# Patient Record
Sex: FEMALE | Race: WHITE | NOT HISPANIC OR LATINO | Employment: OTHER | URBAN - METROPOLITAN AREA
[De-identification: names, ages, dates, MRNs, and addresses within clinical notes are randomized per-mention and may not be internally consistent; named-entity substitution may affect disease eponyms.]

---

## 2017-01-01 ENCOUNTER — APPOINTMENT (OUTPATIENT)
Dept: CT IMAGING | Facility: HOSPITAL | Age: 62
End: 2017-01-01

## 2017-01-01 PROCEDURE — 74177 CT ABD & PELVIS W/CONTRAST: CPT

## 2017-01-04 ENCOUNTER — APPOINTMENT (OUTPATIENT)
Dept: CT IMAGING | Facility: HOSPITAL | Age: 62
End: 2017-01-04

## 2017-01-04 PROCEDURE — 70450 CT HEAD/BRAIN W/O DYE: CPT

## 2017-05-15 PROBLEM — M81.0 OSTEOPOROSIS: Status: ACTIVE | Noted: 2017-05-15

## 2017-05-15 RX ORDER — SODIUM CHLORIDE 9 MG/ML
250 INJECTION, SOLUTION INTRAVENOUS ONCE
Status: CANCELLED | OUTPATIENT
Start: 2017-05-17

## 2017-05-15 RX ORDER — ZOLEDRONIC ACID 5 MG/100ML
5 INJECTION, SOLUTION INTRAVENOUS ONCE
Status: CANCELLED | OUTPATIENT
Start: 2017-05-17

## 2017-05-31 ENCOUNTER — INFUSION (OUTPATIENT)
Dept: ONCOLOGY | Facility: HOSPITAL | Age: 62
End: 2017-05-31

## 2017-05-31 VITALS
TEMPERATURE: 98.6 F | RESPIRATION RATE: 16 BRPM | WEIGHT: 233 LBS | BODY MASS INDEX: 34.41 KG/M2 | SYSTOLIC BLOOD PRESSURE: 113 MMHG | HEART RATE: 103 BPM | DIASTOLIC BLOOD PRESSURE: 81 MMHG

## 2017-05-31 DIAGNOSIS — M81.0 OSTEOPOROSIS: Primary | ICD-10-CM

## 2017-05-31 LAB
CREAT BLDA-MCNC: 0.8 MG/DL (ref 0.6–1.3)
CREATINE SERPL-MCNC: 0.8 MG/DL

## 2017-05-31 PROCEDURE — 82565 ASSAY OF CREATININE: CPT

## 2017-05-31 PROCEDURE — 96374 THER/PROPH/DIAG INJ IV PUSH: CPT

## 2017-05-31 PROCEDURE — 25010000002 ZOLEDRONIC ACID 5 MG/100ML SOLUTION: Performed by: INTERNAL MEDICINE

## 2017-05-31 PROCEDURE — 96365 THER/PROPH/DIAG IV INF INIT: CPT

## 2017-05-31 RX ORDER — ZOLEDRONIC ACID 5 MG/100ML
5 INJECTION, SOLUTION INTRAVENOUS ONCE
Status: COMPLETED | OUTPATIENT
Start: 2017-05-31 | End: 2017-05-31

## 2017-05-31 RX ORDER — SODIUM CHLORIDE 9 MG/ML
250 INJECTION, SOLUTION INTRAVENOUS ONCE
Status: CANCELLED | OUTPATIENT
Start: 2017-05-31

## 2017-05-31 RX ORDER — ZOLEDRONIC ACID 5 MG/100ML
5 INJECTION, SOLUTION INTRAVENOUS ONCE
Status: CANCELLED | OUTPATIENT
Start: 2017-05-31

## 2017-05-31 RX ADMIN — ZOLEDRONIC ACID 5 MG: 0.05 INJECTION, SOLUTION INTRAVENOUS at 13:16

## 2017-07-13 ENCOUNTER — APPOINTMENT (OUTPATIENT)
Dept: CT IMAGING | Facility: HOSPITAL | Age: 62
End: 2017-07-13

## 2017-07-13 ENCOUNTER — HOSPITAL ENCOUNTER (INPATIENT)
Facility: HOSPITAL | Age: 62
LOS: 3 days | Discharge: HOME | End: 2017-07-17
Attending: EMERGENCY MEDICINE | Admitting: INTERNAL MEDICINE

## 2017-07-13 ENCOUNTER — APPOINTMENT (OUTPATIENT)
Dept: GENERAL RADIOLOGY | Facility: HOSPITAL | Age: 62
End: 2017-07-13

## 2017-07-13 DIAGNOSIS — N12 PYELONEPHRITIS: Primary | ICD-10-CM

## 2017-07-13 DIAGNOSIS — R10.9 ABDOMINAL PAIN, UNSPECIFIED LOCATION: ICD-10-CM

## 2017-07-13 DIAGNOSIS — R50.9 FEVER, UNSPECIFIED FEVER CAUSE: ICD-10-CM

## 2017-07-13 LAB
ALBUMIN SERPL-MCNC: 4.5 G/DL (ref 3.2–4.8)
ALBUMIN/GLOB SERPL: 1.2 G/DL (ref 1.5–2.5)
ALP SERPL-CCNC: 95 U/L (ref 25–100)
ALT SERPL W P-5'-P-CCNC: 63 U/L (ref 7–40)
ANION GAP SERPL CALCULATED.3IONS-SCNC: 16 MMOL/L (ref 3–11)
AST SERPL-CCNC: 54 U/L (ref 0–33)
BACTERIA UR QL AUTO: ABNORMAL /HPF
BASOPHILS # BLD AUTO: 0.02 10*3/MM3 (ref 0–0.2)
BASOPHILS NFR BLD AUTO: 0.2 % (ref 0–1)
BILIRUB SERPL-MCNC: 0.4 MG/DL (ref 0.3–1.2)
BILIRUB UR QL STRIP: NEGATIVE
BUN BLD-MCNC: 10 MG/DL (ref 9–23)
BUN/CREAT SERPL: 11.1 (ref 7–25)
CALCIUM SPEC-SCNC: 9.4 MG/DL (ref 8.7–10.4)
CHLORIDE SERPL-SCNC: 100 MMOL/L (ref 99–109)
CLARITY UR: CLEAR
CO2 SERPL-SCNC: 18 MMOL/L (ref 20–31)
COLOR UR: YELLOW
CREAT BLD-MCNC: 0.9 MG/DL (ref 0.6–1.3)
D-LACTATE SERPL-SCNC: 1.8 MMOL/L (ref 0.5–2)
DEPRECATED RDW RBC AUTO: 49.2 FL (ref 37–54)
EOSINOPHIL # BLD AUTO: 0.04 10*3/MM3 (ref 0–0.3)
EOSINOPHIL NFR BLD AUTO: 0.3 % (ref 0–3)
ERYTHROCYTE [DISTWIDTH] IN BLOOD BY AUTOMATED COUNT: 15.4 % (ref 11.3–14.5)
GFR SERPL CREATININE-BSD FRML MDRD: 64 ML/MIN/1.73
GLOBULIN UR ELPH-MCNC: 3.7 GM/DL
GLUCOSE BLD-MCNC: 296 MG/DL (ref 70–100)
GLUCOSE UR STRIP-MCNC: ABNORMAL MG/DL
HCT VFR BLD AUTO: 42.1 % (ref 34.5–44)
HGB BLD-MCNC: 13.5 G/DL (ref 11.5–15.5)
HGB UR QL STRIP.AUTO: NEGATIVE
IMM GRANULOCYTES # BLD: 0.03 10*3/MM3 (ref 0–0.03)
IMM GRANULOCYTES NFR BLD: 0.3 % (ref 0–0.6)
KETONES UR QL STRIP: ABNORMAL
LEUKOCYTE ESTERASE UR QL STRIP.AUTO: ABNORMAL
LIPASE SERPL-CCNC: 37 U/L (ref 6–51)
LYMPHOCYTES # BLD AUTO: 1.22 10*3/MM3 (ref 0.6–4.8)
LYMPHOCYTES NFR BLD AUTO: 10.5 % (ref 24–44)
MCH RBC QN AUTO: 27.7 PG (ref 27–31)
MCHC RBC AUTO-ENTMCNC: 32.1 G/DL (ref 32–36)
MCV RBC AUTO: 86.3 FL (ref 80–99)
MONOCYTES # BLD AUTO: 0.88 10*3/MM3 (ref 0–1)
MONOCYTES NFR BLD AUTO: 7.6 % (ref 0–12)
NEUTROPHILS # BLD AUTO: 9.43 10*3/MM3 (ref 1.5–8.3)
NEUTROPHILS NFR BLD AUTO: 81.1 % (ref 41–71)
NITRITE UR QL STRIP: NEGATIVE
PH UR STRIP.AUTO: 6.5 [PH] (ref 5–8)
PLATELET # BLD AUTO: 218 10*3/MM3 (ref 150–450)
PMV BLD AUTO: 11.3 FL (ref 6–12)
POTASSIUM BLD-SCNC: 4.2 MMOL/L (ref 3.5–5.5)
PROT SERPL-MCNC: 8.2 G/DL (ref 5.7–8.2)
PROT UR QL STRIP: NEGATIVE
RBC # BLD AUTO: 4.88 10*6/MM3 (ref 3.89–5.14)
RBC # UR: ABNORMAL /HPF
REF LAB TEST METHOD: ABNORMAL
SODIUM BLD-SCNC: 134 MMOL/L (ref 132–146)
SP GR UR STRIP: 1.02 (ref 1–1.03)
SQUAMOUS #/AREA URNS HPF: ABNORMAL /HPF
TROPONIN I SERPL-MCNC: 0 NG/ML (ref 0–0.07)
UROBILINOGEN UR QL STRIP: ABNORMAL
WBC NRBC COR # BLD: 11.62 10*3/MM3 (ref 3.5–10.8)
WBC UR QL AUTO: ABNORMAL /HPF

## 2017-07-13 PROCEDURE — 87040 BLOOD CULTURE FOR BACTERIA: CPT | Performed by: NURSE PRACTITIONER

## 2017-07-13 PROCEDURE — 87086 URINE CULTURE/COLONY COUNT: CPT | Performed by: NURSE PRACTITIONER

## 2017-07-13 PROCEDURE — 83690 ASSAY OF LIPASE: CPT | Performed by: NURSE PRACTITIONER

## 2017-07-13 PROCEDURE — 25010000002 HYDROMORPHONE PER 4 MG: Performed by: EMERGENCY MEDICINE

## 2017-07-13 PROCEDURE — 99285 EMERGENCY DEPT VISIT HI MDM: CPT

## 2017-07-13 PROCEDURE — 87186 SC STD MICRODIL/AGAR DIL: CPT | Performed by: NURSE PRACTITIONER

## 2017-07-13 PROCEDURE — 83880 ASSAY OF NATRIURETIC PEPTIDE: CPT | Performed by: NURSE PRACTITIONER

## 2017-07-13 PROCEDURE — 84145 PROCALCITONIN (PCT): CPT | Performed by: NURSE PRACTITIONER

## 2017-07-13 PROCEDURE — 71010 HC CHEST PA OR AP: CPT

## 2017-07-13 PROCEDURE — 74176 CT ABD & PELVIS W/O CONTRAST: CPT

## 2017-07-13 PROCEDURE — 83605 ASSAY OF LACTIC ACID: CPT | Performed by: NURSE PRACTITIONER

## 2017-07-13 PROCEDURE — 84484 ASSAY OF TROPONIN QUANT: CPT

## 2017-07-13 PROCEDURE — 70450 CT HEAD/BRAIN W/O DYE: CPT

## 2017-07-13 PROCEDURE — 87077 CULTURE AEROBIC IDENTIFY: CPT | Performed by: NURSE PRACTITIONER

## 2017-07-13 PROCEDURE — 93005 ELECTROCARDIOGRAM TRACING: CPT | Performed by: NURSE PRACTITIONER

## 2017-07-13 PROCEDURE — 25010000002 ONDANSETRON PER 1 MG: Performed by: EMERGENCY MEDICINE

## 2017-07-13 PROCEDURE — 81001 URINALYSIS AUTO W/SCOPE: CPT | Performed by: NURSE PRACTITIONER

## 2017-07-13 PROCEDURE — 85025 COMPLETE CBC W/AUTO DIFF WBC: CPT | Performed by: NURSE PRACTITIONER

## 2017-07-13 PROCEDURE — 80053 COMPREHEN METABOLIC PANEL: CPT | Performed by: NURSE PRACTITIONER

## 2017-07-13 RX ORDER — ACETAMINOPHEN 325 MG/1
650 TABLET ORAL ONCE
Status: DISCONTINUED | OUTPATIENT
Start: 2017-07-13 | End: 2017-07-13

## 2017-07-13 RX ORDER — IBUPROFEN 600 MG/1
600 TABLET ORAL ONCE
Status: COMPLETED | OUTPATIENT
Start: 2017-07-13 | End: 2017-07-13

## 2017-07-13 RX ORDER — SODIUM CHLORIDE 0.9 % (FLUSH) 0.9 %
10 SYRINGE (ML) INJECTION AS NEEDED
Status: DISCONTINUED | OUTPATIENT
Start: 2017-07-13 | End: 2017-07-17 | Stop reason: HOSPADM

## 2017-07-13 RX ORDER — ONDANSETRON 2 MG/ML
4 INJECTION INTRAMUSCULAR; INTRAVENOUS ONCE
Status: COMPLETED | OUTPATIENT
Start: 2017-07-13 | End: 2017-07-13

## 2017-07-13 RX ORDER — HYDROMORPHONE HYDROCHLORIDE 1 MG/ML
0.5 INJECTION, SOLUTION INTRAMUSCULAR; INTRAVENOUS; SUBCUTANEOUS ONCE
Status: COMPLETED | OUTPATIENT
Start: 2017-07-13 | End: 2017-07-13

## 2017-07-13 RX ADMIN — SODIUM CHLORIDE 1000 ML: 9 INJECTION, SOLUTION INTRAVENOUS at 23:25

## 2017-07-13 RX ADMIN — IBUPROFEN 600 MG: 600 TABLET, FILM COATED ORAL at 23:13

## 2017-07-13 RX ADMIN — ONDANSETRON 4 MG: 2 INJECTION INTRAMUSCULAR; INTRAVENOUS at 23:55

## 2017-07-13 RX ADMIN — HYDROMORPHONE HYDROCHLORIDE 0.5 MG: 1 INJECTION, SOLUTION INTRAMUSCULAR; INTRAVENOUS; SUBCUTANEOUS at 23:55

## 2017-07-14 PROBLEM — E11.40 DIABETIC NEUROPATHY (HCC): Chronic | Status: ACTIVE | Noted: 2017-07-14

## 2017-07-14 PROBLEM — R50.9 FEVER: Status: ACTIVE | Noted: 2017-07-14

## 2017-07-14 PROBLEM — M81.0 OSTEOPOROSIS: Chronic | Status: ACTIVE | Noted: 2017-05-15

## 2017-07-14 PROBLEM — R10.31 RLQ ABDOMINAL PAIN: Status: ACTIVE | Noted: 2017-07-14

## 2017-07-14 PROBLEM — K21.9 GERD (GASTROESOPHAGEAL REFLUX DISEASE): Chronic | Status: ACTIVE | Noted: 2017-07-14

## 2017-07-14 LAB
ANION GAP SERPL CALCULATED.3IONS-SCNC: 7 MMOL/L (ref 3–11)
BASOPHILS # BLD AUTO: 0.03 10*3/MM3 (ref 0–0.2)
BASOPHILS NFR BLD AUTO: 0.3 % (ref 0–1)
BNP SERPL-MCNC: 30 PG/ML (ref 0–100)
BUN BLD-MCNC: 8 MG/DL (ref 9–23)
BUN/CREAT SERPL: 11.4 (ref 7–25)
CALCIUM SPEC-SCNC: 9 MG/DL (ref 8.7–10.4)
CHLORIDE SERPL-SCNC: 104 MMOL/L (ref 99–109)
CO2 SERPL-SCNC: 25 MMOL/L (ref 20–31)
CREAT BLD-MCNC: 0.7 MG/DL (ref 0.6–1.3)
DEPRECATED RDW RBC AUTO: 50.5 FL (ref 37–54)
EOSINOPHIL # BLD AUTO: 0.02 10*3/MM3 (ref 0–0.3)
EOSINOPHIL NFR BLD AUTO: 0.2 % (ref 0–3)
ERYTHROCYTE [DISTWIDTH] IN BLOOD BY AUTOMATED COUNT: 15.6 % (ref 11.3–14.5)
GFR SERPL CREATININE-BSD FRML MDRD: 85 ML/MIN/1.73
GLUCOSE BLD-MCNC: 225 MG/DL (ref 70–100)
GLUCOSE BLDC GLUCOMTR-MCNC: 103 MG/DL (ref 70–130)
GLUCOSE BLDC GLUCOMTR-MCNC: 164 MG/DL (ref 70–130)
GLUCOSE BLDC GLUCOMTR-MCNC: 173 MG/DL (ref 70–130)
GLUCOSE BLDC GLUCOMTR-MCNC: 191 MG/DL (ref 70–130)
GLUCOSE BLDC GLUCOMTR-MCNC: 241 MG/DL (ref 70–130)
GLUCOSE BLDC GLUCOMTR-MCNC: 88 MG/DL (ref 70–130)
HBA1C MFR BLD: 9 % (ref 4.8–5.6)
HCT VFR BLD AUTO: 38 % (ref 34.5–44)
HGB BLD-MCNC: 12.1 G/DL (ref 11.5–15.5)
IMM GRANULOCYTES # BLD: 0.03 10*3/MM3 (ref 0–0.03)
IMM GRANULOCYTES NFR BLD: 0.3 % (ref 0–0.6)
LYMPHOCYTES # BLD AUTO: 2.33 10*3/MM3 (ref 0.6–4.8)
LYMPHOCYTES NFR BLD AUTO: 20.8 % (ref 24–44)
MCH RBC QN AUTO: 27.8 PG (ref 27–31)
MCHC RBC AUTO-ENTMCNC: 31.8 G/DL (ref 32–36)
MCV RBC AUTO: 87.2 FL (ref 80–99)
MONOCYTES # BLD AUTO: 1.15 10*3/MM3 (ref 0–1)
MONOCYTES NFR BLD AUTO: 10.3 % (ref 0–12)
NEUTROPHILS # BLD AUTO: 7.62 10*3/MM3 (ref 1.5–8.3)
NEUTROPHILS NFR BLD AUTO: 68.1 % (ref 41–71)
PLATELET # BLD AUTO: 208 10*3/MM3 (ref 150–450)
PMV BLD AUTO: 11.5 FL (ref 6–12)
POTASSIUM BLD-SCNC: 3.9 MMOL/L (ref 3.5–5.5)
PROCALCITONIN SERPL-MCNC: 0.38 NG/ML
RBC # BLD AUTO: 4.36 10*6/MM3 (ref 3.89–5.14)
SODIUM BLD-SCNC: 136 MMOL/L (ref 132–146)
WBC NRBC COR # BLD: 11.18 10*3/MM3 (ref 3.5–10.8)

## 2017-07-14 PROCEDURE — 25010000002 ONDANSETRON PER 1 MG: Performed by: INTERNAL MEDICINE

## 2017-07-14 PROCEDURE — 25010000002 MEROPENEM: Performed by: NURSE PRACTITIONER

## 2017-07-14 PROCEDURE — 82962 GLUCOSE BLOOD TEST: CPT

## 2017-07-14 PROCEDURE — 25010000002 MEROPENEM: Performed by: INTERNAL MEDICINE

## 2017-07-14 PROCEDURE — 25010000002 HYDROMORPHONE PER 4 MG: Performed by: FAMILY MEDICINE

## 2017-07-14 PROCEDURE — 83036 HEMOGLOBIN GLYCOSYLATED A1C: CPT | Performed by: NURSE PRACTITIONER

## 2017-07-14 PROCEDURE — 99223 1ST HOSP IP/OBS HIGH 75: CPT | Performed by: INTERNAL MEDICINE

## 2017-07-14 PROCEDURE — 85025 COMPLETE CBC W/AUTO DIFF WBC: CPT | Performed by: INTERNAL MEDICINE

## 2017-07-14 PROCEDURE — 63710000001 INSULIN LISPRO (HUMAN) PER 5 UNITS: Performed by: NURSE PRACTITIONER

## 2017-07-14 PROCEDURE — 25010000002 HEPARIN (PORCINE) PER 1000 UNITS: Performed by: INTERNAL MEDICINE

## 2017-07-14 PROCEDURE — 63710000001 PROMETHAZINE PER 25 MG: Performed by: NURSE PRACTITIONER

## 2017-07-14 PROCEDURE — 80048 BASIC METABOLIC PNL TOTAL CA: CPT | Performed by: INTERNAL MEDICINE

## 2017-07-14 PROCEDURE — 63710000001 INSULIN DETEMIR PER 5 UNITS: Performed by: NURSE PRACTITIONER

## 2017-07-14 RX ORDER — SODIUM CHLORIDE 9 MG/ML
50 INJECTION, SOLUTION INTRAVENOUS CONTINUOUS
Status: DISCONTINUED | OUTPATIENT
Start: 2017-07-14 | End: 2017-07-17 | Stop reason: HOSPADM

## 2017-07-14 RX ORDER — SCOLOPAMINE TRANSDERMAL SYSTEM 1 MG/1
1 PATCH, EXTENDED RELEASE TRANSDERMAL
Status: DISCONTINUED | OUTPATIENT
Start: 2017-07-14 | End: 2017-07-17 | Stop reason: HOSPADM

## 2017-07-14 RX ORDER — PROMETHAZINE HYDROCHLORIDE 25 MG/1
25 TABLET ORAL EVERY 6 HOURS PRN
Status: DISCONTINUED | OUTPATIENT
Start: 2017-07-14 | End: 2017-07-16 | Stop reason: SDUPTHER

## 2017-07-14 RX ORDER — DEXTROSE MONOHYDRATE 25 G/50ML
25 INJECTION, SOLUTION INTRAVENOUS
Status: DISCONTINUED | OUTPATIENT
Start: 2017-07-14 | End: 2017-07-17 | Stop reason: HOSPADM

## 2017-07-14 RX ORDER — HEPARIN SODIUM 5000 [USP'U]/ML
5000 INJECTION, SOLUTION INTRAVENOUS; SUBCUTANEOUS EVERY 8 HOURS SCHEDULED
Status: DISCONTINUED | OUTPATIENT
Start: 2017-07-14 | End: 2017-07-17 | Stop reason: HOSPADM

## 2017-07-14 RX ORDER — ACETAMINOPHEN 325 MG/1
650 TABLET ORAL EVERY 4 HOURS PRN
Status: DISCONTINUED | OUTPATIENT
Start: 2017-07-14 | End: 2017-07-17 | Stop reason: HOSPADM

## 2017-07-14 RX ORDER — HYDROMORPHONE HYDROCHLORIDE 1 MG/ML
0.5 INJECTION, SOLUTION INTRAMUSCULAR; INTRAVENOUS; SUBCUTANEOUS
Status: DISCONTINUED | OUTPATIENT
Start: 2017-07-14 | End: 2017-07-14

## 2017-07-14 RX ORDER — ALPRAZOLAM 1 MG/1
1 TABLET ORAL 3 TIMES DAILY PRN
Status: DISCONTINUED | OUTPATIENT
Start: 2017-07-14 | End: 2017-07-17 | Stop reason: HOSPADM

## 2017-07-14 RX ORDER — ONDANSETRON 2 MG/ML
4 INJECTION INTRAMUSCULAR; INTRAVENOUS EVERY 6 HOURS PRN
Status: DISCONTINUED | OUTPATIENT
Start: 2017-07-14 | End: 2017-07-17 | Stop reason: HOSPADM

## 2017-07-14 RX ORDER — PANTOPRAZOLE SODIUM 40 MG/1
40 TABLET, DELAYED RELEASE ORAL DAILY
Status: DISCONTINUED | OUTPATIENT
Start: 2017-07-14 | End: 2017-07-17 | Stop reason: HOSPADM

## 2017-07-14 RX ORDER — HYDROMORPHONE HYDROCHLORIDE 2 MG/1
2 TABLET ORAL EVERY 6 HOURS PRN
Status: DISCONTINUED | OUTPATIENT
Start: 2017-07-14 | End: 2017-07-14

## 2017-07-14 RX ORDER — NICOTINE POLACRILEX 4 MG
15 LOZENGE BUCCAL
Status: DISCONTINUED | OUTPATIENT
Start: 2017-07-14 | End: 2017-07-17 | Stop reason: HOSPADM

## 2017-07-14 RX ORDER — TIZANIDINE 4 MG/1
2 TABLET ORAL EVERY 8 HOURS PRN
Status: DISCONTINUED | OUTPATIENT
Start: 2017-07-14 | End: 2017-07-17 | Stop reason: HOSPADM

## 2017-07-14 RX ORDER — SODIUM CHLORIDE 0.9 % (FLUSH) 0.9 %
1-10 SYRINGE (ML) INJECTION AS NEEDED
Status: DISCONTINUED | OUTPATIENT
Start: 2017-07-14 | End: 2017-07-17 | Stop reason: HOSPADM

## 2017-07-14 RX ORDER — FAMOTIDINE 20 MG/1
20 TABLET, FILM COATED ORAL NIGHTLY PRN
Status: DISCONTINUED | OUTPATIENT
Start: 2017-07-14 | End: 2017-07-17 | Stop reason: HOSPADM

## 2017-07-14 RX ORDER — METOPROLOL SUCCINATE 100 MG/1
100 TABLET, EXTENDED RELEASE ORAL DAILY
Status: DISCONTINUED | OUTPATIENT
Start: 2017-07-14 | End: 2017-07-17 | Stop reason: HOSPADM

## 2017-07-14 RX ORDER — ONDANSETRON 4 MG/1
8 TABLET, FILM COATED ORAL EVERY 6 HOURS PRN
Status: DISCONTINUED | OUTPATIENT
Start: 2017-07-14 | End: 2017-07-17 | Stop reason: HOSPADM

## 2017-07-14 RX ORDER — SACCHAROMYCES BOULARDII 250 MG
250 CAPSULE ORAL 2 TIMES DAILY
Status: DISCONTINUED | OUTPATIENT
Start: 2017-07-14 | End: 2017-07-17 | Stop reason: HOSPADM

## 2017-07-14 RX ORDER — AMLODIPINE BESYLATE 5 MG/1
5 TABLET ORAL NIGHTLY
Status: DISCONTINUED | OUTPATIENT
Start: 2017-07-14 | End: 2017-07-14

## 2017-07-14 RX ORDER — GABAPENTIN 300 MG/1
600 CAPSULE ORAL EVERY 12 HOURS SCHEDULED
Status: DISCONTINUED | OUTPATIENT
Start: 2017-07-14 | End: 2017-07-17 | Stop reason: HOSPADM

## 2017-07-14 RX ORDER — LAMOTRIGINE 100 MG/1
100 TABLET ORAL EVERY 12 HOURS SCHEDULED
Status: DISCONTINUED | OUTPATIENT
Start: 2017-07-14 | End: 2017-07-17 | Stop reason: HOSPADM

## 2017-07-14 RX ORDER — ONDANSETRON 4 MG/1
4 TABLET, FILM COATED ORAL EVERY 6 HOURS PRN
Status: DISCONTINUED | OUTPATIENT
Start: 2017-07-14 | End: 2017-07-17 | Stop reason: HOSPADM

## 2017-07-14 RX ORDER — FLUTICASONE PROPIONATE 50 MCG
1 SPRAY, SUSPENSION (ML) NASAL DAILY
Status: DISCONTINUED | OUTPATIENT
Start: 2017-07-14 | End: 2017-07-17 | Stop reason: HOSPADM

## 2017-07-14 RX ADMIN — HYDROMORPHONE HYDROCHLORIDE 1 MG: 1 INJECTION, SOLUTION INTRAMUSCULAR; INTRAVENOUS; SUBCUTANEOUS at 17:06

## 2017-07-14 RX ADMIN — FAMOTIDINE 20 MG: 20 TABLET ORAL at 23:05

## 2017-07-14 RX ADMIN — HYDROMORPHONE HYDROCHLORIDE 1 MG: 1 INJECTION, SOLUTION INTRAMUSCULAR; INTRAVENOUS; SUBCUTANEOUS at 23:54

## 2017-07-14 RX ADMIN — Medication 250 MG: at 09:43

## 2017-07-14 RX ADMIN — MEROPENEM 1 G: 1 INJECTION, POWDER, FOR SOLUTION INTRAVENOUS at 02:12

## 2017-07-14 RX ADMIN — SODIUM CHLORIDE 125 ML/HR: 9 INJECTION, SOLUTION INTRAVENOUS at 21:57

## 2017-07-14 RX ADMIN — SODIUM CHLORIDE 125 ML/HR: 9 INJECTION, SOLUTION INTRAVENOUS at 13:58

## 2017-07-14 RX ADMIN — INSULIN LISPRO 3 UNITS: 100 INJECTION, SOLUTION INTRAVENOUS; SUBCUTANEOUS at 09:44

## 2017-07-14 RX ADMIN — MEROPENEM 1 G: 1 INJECTION, POWDER, FOR SOLUTION INTRAVENOUS at 18:48

## 2017-07-14 RX ADMIN — HYDROMORPHONE HYDROCHLORIDE 0.5 MG: 1 INJECTION, SOLUTION INTRAMUSCULAR; INTRAVENOUS; SUBCUTANEOUS at 13:51

## 2017-07-14 RX ADMIN — ONDANSETRON 4 MG: 2 INJECTION INTRAMUSCULAR; INTRAVENOUS at 08:04

## 2017-07-14 RX ADMIN — Medication 250 MG: at 18:49

## 2017-07-14 RX ADMIN — MEROPENEM 1 G: 1 INJECTION, POWDER, FOR SOLUTION INTRAVENOUS at 11:44

## 2017-07-14 RX ADMIN — LAMOTRIGINE 100 MG: 100 TABLET ORAL at 09:43

## 2017-07-14 RX ADMIN — SODIUM CHLORIDE 75 ML/HR: 9 INJECTION, SOLUTION INTRAVENOUS at 04:14

## 2017-07-14 RX ADMIN — ONDANSETRON 4 MG: 2 INJECTION INTRAMUSCULAR; INTRAVENOUS at 17:14

## 2017-07-14 RX ADMIN — GABAPENTIN 600 MG: 300 CAPSULE ORAL at 21:48

## 2017-07-14 RX ADMIN — INSULIN LISPRO 3 UNITS: 100 INJECTION, SOLUTION INTRAVENOUS; SUBCUTANEOUS at 21:50

## 2017-07-14 RX ADMIN — INSULIN LISPRO 10 UNITS: 100 INJECTION, SOLUTION INTRAVENOUS; SUBCUTANEOUS at 09:45

## 2017-07-14 RX ADMIN — ONDANSETRON 4 MG: 2 INJECTION INTRAMUSCULAR; INTRAVENOUS at 23:14

## 2017-07-14 RX ADMIN — PROMETHAZINE HYDROCHLORIDE 25 MG: 25 TABLET ORAL at 09:43

## 2017-07-14 RX ADMIN — LAMOTRIGINE 100 MG: 100 TABLET ORAL at 21:48

## 2017-07-14 RX ADMIN — INSULIN DETEMIR 50 UNITS: 100 INJECTION, SOLUTION SUBCUTANEOUS at 21:54

## 2017-07-14 RX ADMIN — LAMOTRIGINE 100 MG: 100 TABLET ORAL at 04:13

## 2017-07-14 RX ADMIN — INSULIN LISPRO 10 UNITS: 100 INJECTION, SOLUTION INTRAVENOUS; SUBCUTANEOUS at 11:52

## 2017-07-14 RX ADMIN — PANTOPRAZOLE SODIUM 40 MG: 40 TABLET, DELAYED RELEASE ORAL at 11:43

## 2017-07-14 RX ADMIN — GABAPENTIN 600 MG: 300 CAPSULE ORAL at 04:13

## 2017-07-14 RX ADMIN — ACETAMINOPHEN 650 MG: 325 TABLET, FILM COATED ORAL at 14:37

## 2017-07-14 RX ADMIN — INSULIN DETEMIR 50 UNITS: 100 INJECTION, SOLUTION SUBCUTANEOUS at 04:31

## 2017-07-14 RX ADMIN — GABAPENTIN 600 MG: 300 CAPSULE ORAL at 09:43

## 2017-07-14 RX ADMIN — FLUTICASONE PROPIONATE 1 SPRAY: 50 SPRAY, METERED NASAL at 09:50

## 2017-07-14 RX ADMIN — HYDROMORPHONE HYDROCHLORIDE 0.5 MG: 1 INJECTION, SOLUTION INTRAMUSCULAR; INTRAVENOUS; SUBCUTANEOUS at 11:44

## 2017-07-14 RX ADMIN — INSULIN LISPRO 5 UNITS: 100 INJECTION, SOLUTION INTRAVENOUS; SUBCUTANEOUS at 04:32

## 2017-07-14 RX ADMIN — TIZANIDINE 2 MG: 4 TABLET ORAL at 23:05

## 2017-07-14 RX ADMIN — PROMETHAZINE HYDROCHLORIDE 25 MG: 25 TABLET ORAL at 21:48

## 2017-07-14 RX ADMIN — HYDROMORPHONE HYDROCHLORIDE 2 MG: 2 TABLET ORAL at 04:13

## 2017-07-14 RX ADMIN — HYDROMORPHONE HYDROCHLORIDE 1 MG: 1 INJECTION, SOLUTION INTRAMUSCULAR; INTRAVENOUS; SUBCUTANEOUS at 21:48

## 2017-07-14 RX ADMIN — METOPROLOL SUCCINATE 100 MG: 100 TABLET, EXTENDED RELEASE ORAL at 09:44

## 2017-07-14 RX ADMIN — INSULIN LISPRO 10 UNITS: 100 INJECTION, SOLUTION INTRAVENOUS; SUBCUTANEOUS at 18:47

## 2017-07-14 RX ADMIN — TIZANIDINE 2 MG: 4 TABLET ORAL at 14:38

## 2017-07-14 NOTE — PLAN OF CARE
Problem: Patient Care Overview (Adult)  Goal: Adult Individualization and Mutuality  Outcome: Ongoing (interventions implemented as appropriate)    07/14/17 3373   Individualization   Patient Specific Interventions Please take time to talk to pt . She is in isolation and feelig it.

## 2017-07-14 NOTE — PLAN OF CARE
Problem: Patient Care Overview (Adult)  Goal: Plan of Care Review  Outcome: Ongoing (interventions implemented as appropriate)    07/14/17 0619   Coping/Psychosocial Response Interventions   Plan Of Care Reviewed With patient;son   Patient Care Overview   Progress progress toward functional goals as expected   Outcome Evaluation   Outcome Summary/Follow up Plan PT came to ER with Lower abd pain. Confused on admission but oriented x4 upon arrival to floor. Son at bedside throughout night. PT has two medications from home at bedside. PT spoke to Dr. Rangel and medication was approved by him to take.

## 2017-07-14 NOTE — CONSULTS
Urology Consult    Referring Provider: Dr Cortes  Reason for Consultation: kidney stones with UTI    Patient Care Team:  Ronnie Garvey MD as PCP - General  Ronnie Garvey MD as PCP - Family Medicine    Chief complaint Right flank pain    Subjective .     History of present illness:  Several day hx of right flank pain.  Fever and nausea.  No gross hematuria or dysuria.  UTI in December.  Found to have small stones in both kidneys.  CT now shows bilateral renal stones.  There is a 3mm stone in proximal left ureter without obstruction.  Right side shows mild hydro without stone in ureter    Review of Systems  Pertinent items are noted in HPI    History  Past Medical History:   Diagnosis Date   • Arthritis    • Arthritis    • Cancer     breast   • Chronic pain     on dilaudid x4 years   • Depression    • Diabetes     type II   • Diabetic neuropathy     gabapentin, lamictal; reports uses xanax for this when severe as well   • Fever 7/14/2017   • Gastroparesis    • GERD (gastroesophageal reflux disease)     PPI and pepcid   • Hypercholesteremia    • Hypertension    • Osteoporosis     reclast inj yearly   • RLQ abdominal pain 7/14/2017       Objective     Vital Signs   Temp:  [97.8 °F (36.6 °C)-102.5 °F (39.2 °C)] 97.8 °F (36.6 °C)  Heart Rate:  [] 104  Resp:  [18-20] 18  BP: ()/(56-87) 98/56    Physical Exam:     General Appearance:    Alert, cooperative, in no acute distress   Head:    Normocephalic, without obvious abnormality, atraumatic   Eyes:            Lids and lashes normal, conjunctivae and sclerae normal, no   icterus, no pallor, corneas clear, PERRLA   Ears:    Ears appear intact with no abnormalities noted   Throat:   No oral lesions, no thrush, oral mucosa moist   Neck:   No adenopathy, supple, trachea midline, no thyromegaly, no     carotid bruit, no JVD   Back:     No kyphosis present, no scoliosis present, no skin lesions,       erythema or scars, no tenderness to percussion or                    palpation,   range of motion normal   Lungs:     Clear to auscultation,respirations regular, even and                   unlabored    Heart:    Regular rhythm and normal rate, normal S1 and S2, no            murmur, no gallop, no rub, no click   Breast Exam:    Deferred   Abdomen:     Normal bowel sounds, no masses, no organomegaly, soft        non-tender, non-distended, no guarding, no rebound                 tenderness   Genitalia:    Deferred   Extremities:   Moves all extremities well, no edema, no cyanosis, no              redness   Pulses:   Pulses palpable and equal bilaterally   Skin:   No bleeding, bruising or rash   Lymph nodes:   No palpable adenopathy   Neurologic:   Cranial nerves 2 - 12 grossly intact, sensation intact, DTR        present and equal bilaterally       Results Review:   I reviewed the patient's new clinical results.    Lab Results (last 72 hours)     Procedure Component Value Units Date/Time    Urine Culture [[id number]] Collected:  07/13/17 2300    Specimen:  Urine from Urine, Clean Catch Updated:  07/13/17 2316    Urinalysis With / Culture If Indicated [[id number]]  (Abnormal) Collected:  07/13/17 2300    Specimen:  Urine from Urine, Clean Catch Updated:  07/13/17 2322     Color, UA Yellow     Appearance, UA Clear     pH, UA 6.5     Specific Gravity, UA 1.017     Glucose, UA >=1000 mg/dL (3+) (A)     Ketones, UA Trace (A)     Bilirubin, UA Negative     Blood, UA Negative     Protein, UA Negative     Leuk Esterase, UA Small (1+) (A)     Nitrite, UA Negative     Urobilinogen, UA 1.0 E.U./dL    Urinalysis, Microscopic Only [[id number]]  (Abnormal) Collected:  07/13/17 2300    Specimen:  Urine from Urine, Clean Catch Updated:  07/13/17 2323     RBC, UA 0-2 /HPF      WBC, UA 13-20 (A) /HPF      Bacteria, UA Trace /HPF      Squamous Epithelial Cells, UA -- /HPF      Methodology Automated Microscopy    CBC & Differential [[id number]] Collected:  07/13/17 2310    Specimen:  Blood Updated:   07/13/17 2324    Narrative:       The following orders were created for panel order CBC & Differential.  Procedure                               Abnormality         Status                     ---------                               -----------         ------                     CBC Auto Differential[[id number]]        Abnormal            Final result                 Please view results for these tests on the individual orders.    CBC Auto Differential [[id number]]  (Abnormal) Collected:  07/13/17 2310    Specimen:  Blood Updated:  07/13/17 2324     WBC 11.62 (H) 10*3/mm3      RBC 4.88 10*6/mm3      Hemoglobin 13.5 g/dL      Hematocrit 42.1 %      MCV 86.3 fL      MCH 27.7 pg      MCHC 32.1 g/dL      RDW 15.4 (H) %      RDW-SD 49.2 fl      MPV 11.3 fL      Platelets 218 10*3/mm3      Neutrophil % 81.1 (H) %      Lymphocyte % 10.5 (L) %      Monocyte % 7.6 %      Eosinophil % 0.3 %      Basophil % 0.2 %      Immature Grans % 0.3 %      Neutrophils, Absolute 9.43 (H) 10*3/mm3      Lymphocytes, Absolute 1.22 10*3/mm3      Monocytes, Absolute 0.88 10*3/mm3      Eosinophils, Absolute 0.04 10*3/mm3      Basophils, Absolute 0.02 10*3/mm3      Immature Grans, Absolute 0.03 10*3/mm3     POC Troponin, Rapid [[id number]]  (Normal) Collected:  07/13/17 2315    Specimen:  Blood Updated:  07/13/17 2335     Troponin I 0.00 ng/mL       Serial Number: [id number]:  [id number]       Comprehensive Metabolic Panel [[id number]]  (Abnormal) Collected:  07/13/17 2310    Specimen:  Blood Updated:  07/13/17 2355     Glucose 296 (H) mg/dL      BUN 10 mg/dL      Creatinine 0.90 mg/dL      Sodium 134 mmol/L      Potassium 4.2 mmol/L      Chloride 100 mmol/L      CO2 18.0 (L) mmol/L      Calcium 9.4 mg/dL      Total Protein 8.2 g/dL      Albumin 4.50 g/dL      ALT (SGPT) 63 (H) U/L      AST (SGOT) 54 (H) U/L      Alkaline Phosphatase 95 U/L      Total Bilirubin 0.4 mg/dL      eGFR Non African Amer 64 mL/min/1.73      Globulin 3.7 gm/dL      A/G  Ratio 1.2 (L) g/dL      BUN/Creatinine Ratio 11.1     Anion Gap 16.0 (H) mmol/L     Narrative:       National Kid[employer] Guidelines    Stage     Description        GFR  1         Normal or High     90+  2         Mild decrease      60-89  3         Moderate decrease  30-59  4         Severe decrease    15-29  5         Kidney failure     <15    Lipase [[id number]]  (Normal) Collected:  07/13/17 2310    Specimen:  Blood Updated:  07/13/17 2355     Lipase 37 U/L     Lactic Acid, Plasma [[id number]]  (Normal) Collected:  07/13/17 2310    Specimen:  Blood Updated:  07/13/17 2359     Lactate 1.8 mmol/L       Falsely depressed results may occur on samples drawn from patients receiving N-Acetylcysteine (NAC) or Metamizole.       BNP [[id number]]  (Normal) Collected:  07/13/17 2310    Specimen:  Blood Updated:  07/14/17 0003     BNP 30.0 pg/mL     Procalcitonin [[id number]] Collected:  07/13/17 2310    Specimen:  Blood Updated:  07/14/17 0035     Procalcitonin 0.38 ng/mL     Narrative:       As a Marker for Sepsis (Non-Neonates):   1. <0.5 ng/mL represents a low risk of severe sepsis and/or septic shock.  2. >2 ng/mL represents a high risk of severe sepsis and/or septic shock.    As a Marker for Lower Respiratory Tract Infections that require antibiotic therapy:    PCT on Admission     Antibiotic Therapy       6-12 Hrs later  > 0.5                Strongly Recommended             >0.25 - <0.5         Recommended  0.1 - 0.25           Discouraged              Remeasure/reassess PCT  <0.1                 Strongly Discouraged     Remeasure/reassess PCT                     PCT values of < 0.5 ng/mL do not exclude an infection, because localized infections (without systemic signs) may be associated with such low concentrations, or a systemic infection in its initial stages (< 6 hours). Furthermore, increased PCT can occur without infection. PCT concentrations between 0.5 and 2.0 ng/mL should be interpreted taking into account  the patient's history. It is recommended to retest PCT within 6-24 hours if any concentrations < 2 ng/mL are obtained.    POC Glucose Fingerstick [[id number]]  (Abnormal) Collected:  07/14/17 0411    Specimen:  Blood Updated:  07/14/17 0412     Glucose 241 (H) mg/dL     Narrative:       Meter: [id number] : [id number] Vince Odonnell    CBC Auto Differential [[id number]]  (Abnormal) Collected:  07/14/17 0537    Specimen:  Blood Updated:  07/14/17 0648     WBC 11.18 (H) 10*3/mm3      RBC 4.36 10*6/mm3      Hemoglobin 12.1 g/dL      Hematocrit 38.0 %      MCV 87.2 fL      MCH 27.8 pg      MCHC 31.8 (L) g/dL      RDW 15.6 (H) %      RDW-SD 50.5 fl      MPV 11.5 fL      Platelets 208 10*3/mm3      Neutrophil % 68.1 %      Lymphocyte % 20.8 (L) %      Monocyte % 10.3 %      Eosinophil % 0.2 %      Basophil % 0.3 %      Immature Grans % 0.3 %      Neutrophils, Absolute 7.62 10*3/mm3      Lymphocytes, Absolute 2.33 10*3/mm3      Monocytes, Absolute 1.15 (H) 10*3/mm3      Eosinophils, Absolute 0.02 10*3/mm3      Basophils, Absolute 0.03 10*3/mm3      Immature Grans, Absolute 0.03 10*3/mm3     Basic Metabolic Panel [[id number]]  (Abnormal) Collected:  07/14/17 0537    Specimen:  Blood Updated:  07/14/17 0706     Glucose 225 (H) mg/dL      BUN 8 (L) mg/dL      Creatinine 0.70 mg/dL      Sodium 136 mmol/L      Potassium 3.9 mmol/L      Chloride 104 mmol/L      CO2 25.0 mmol/L      Calcium 9.0 mg/dL      eGFR Non African Amer 85 mL/min/1.73      BUN/Creatinine Ratio 11.4     Anion Gap 7.0 mmol/L     Narrative:       National Kidney Foundation Guidelines    Stage     Description        GFR  1         Normal or High     90+  2         Mild decrease      60-89  3         Moderate decrease  30-59  4         Severe decrease    15-29  5         Kidney failure     <15    POC Glucose Fingerstick [[id number]]  (Abnormal) Collected:  07/14/17 0726    Specimen:  Blood Updated:  07/14/17 0735     Glucose 164 (H) mg/dL     Narrative:        Meter: [id number] : [id number] Priyanka Rodriguez    Hemoglobin A1c [[id number]]  (Abnormal) Collected:  07/14/17 0537    Specimen:  Blood Updated:  07/14/17 0836     Hemoglobin A1C 9.00 (H) %     Narrative:       The American Diabetes Association recommends maintenance of Hemoglobin A1C at 7.0% or lower. Goals for Hemoglobin A1C reduction may need to be modified if hypoglycemia is a problem.    POC Glucose Fingerstick [[id number]]  (Normal) Collected:  07/14/17 1130    Specimen:  Blood Updated:  07/14/17 1156     Glucose 103 mg/dL     Narrative:       Meter: [id number] : [id number] Mak Craven    Blood Culture [[id number]]  (Normal) Collected:  07/13/17 2258    Specimen:  Blood from Arm, Right Updated:  07/14/17 1201     Blood Culture No growth at less than 24 hours    Blood Culture [[id number]]  (Normal) Collected:  07/13/17 2310    Specimen:  Blood from Arm, Right Updated:  07/14/17 1201     Blood Culture No growth at less than 24 hours        Imaging Results (last 24 hours)     Procedure Component Value Units Date/Time    XR Chest 1 View [[id number]]  (Abnormal) Collected:  07/13/17 2256     Updated:  07/13/17 2355    Narrative:       EXAM:    XR Chest, 1 View    CLINICAL HISTORY:    61 years old, female; Signs and symptoms; Fever; Additional info: Fever AMS    TECHNIQUE:    Frontal view of the chest.    COMPARISON:    No relevant prior studies available.    FINDINGS:    Prominent lung markings/peribronchial thickening without definite evidence of   consolidation, no evidence of pleural effusion or pneumothorax.  Cardiomegaly   with pulmonary vascular congestion.  Surgical clips in the LEFT axilla.   Tortuous calcific aortic arch and descending thoracic aorta.        Impression:         Chronic cardiopulmonary changes as described without evidence of an active   lung parenchymal lesion.       THIS DOCUMENT HAS BEEN ELECTRONICALLY SIGNED BY JASON SPANGLER MD    CT Head Without Contrast [[id number]]  (Abnormal)  Collected:  07/13/17 2255     Updated:  07/14/17 0009    Narrative:       EXAM:    CT Head Without Intravenous Contrast    CLINICAL HISTORY:    61 years old, female; Pain; Other: See notes; Additional info: AMS fever    TECHNIQUE:    Axial computed tomography images of the head/brain without intravenous   contrast.  This CT exam was performed using one or more of the following dose   reduction techniques:  automated exposure control, adjustment of the mA and/or   kV according to patient size, and/or use of iterative reconstruction technique.    COMPARISON:    CT HEAD WO CONTRAST 1/4/2017 3:16:01 AM    FINDINGS:    Bilateral periventricular lucencies without intraparenchymal hemorrhage and   no obvious acute ischemic stroke. No intra-or extra-axial fluid collection, no   supra-or infratentorial mass, no mass effect or midline shift.       Mildly dilated ventricles, sulci and basal cisterns without evidence of   hydrocephalus. Skull bones are normal.  No significant mucoperiosteal   thickening in the visualized paranasal sinuses. No evidence of mastoid   effusion.       Impression:         Mild chronic microvascular disease and generalized atrophy without acute   intracranial abnormality. No evidence of acute hemorrhage, mass lesion or   obvious acute ischemic infarction.      THIS DOCUMENT HAS BEEN ELECTRONICALLY SIGNED BY JASON SPANGLER MD    CT Abdomen Pelvis Without Contrast [[id number]]  (Abnormal) Collected:  07/13/17 2255     Updated:  07/14/17 0017    Narrative:       EXAM:    CT Abdomen and Pelvis Without Intravenous Contrast    CLINICAL HISTORY:    61 years old, female; Pain; Other: See notes; Prior surgery; Surgery date: 6+   months; Surgery type: Malu, hernia, abdominal abscess drains; Additional info:   Abd pain fever    TECHNIQUE:    Axial computed tomography images of the abdomen and pelvis without   intravenous contrast.  This CT exam was performed using one or more of the   following dose reduction  techniques:  automated exposure control, adjustment of   the mA and/or kV according to patient size, and/or use of iterative   reconstruction technique.    COMPARISON:    CT ABDOMEN PELVIS W CONTRAST 1/1/2017 4:02:36 PM    FINDINGS:    PANCREATICOHEPATOBILIARY: Enlarged liver shows diffuse fatty infiltration.    No significant intra-or extrahepatic ductal dilation.  Cholecystectomy clips in   the gallbladder fossa. Pancreas and spleen are unremarkable.       GENITOURINARY: No adrenal mass.  Renal cortical scarring, focal atrophy and   indeterminate renal cortical cystic nodules.  Nonobstructive bilateral renal   stone(s) measuring less than 5 mm. Non-obstructive 3 mm proximal LEFT ureteric   stone.  Mild RIGHT hydronephrosis and uterine thickening without obstructive   urolithiasis.  Kidneys are otherwise unremarkable. Left hip hardware with   extensive artifact precluding optimal evaluation of the pelvis.  Urinary   bladder is within normal limits.  Uterus is normal, complex LEFT ovarian   cyst/follicle in a mildly enlarged LEFT ovary which measures approximately 4.5   cm. No free fluid in the pelvis.      GASTROINTESTINAL: Colon contains a moderate amount of fecal matter.  A few   colonic diverticula without evidence of acute diverticulitis.   A small size   hiatal hernia with nonspecific wall thickening of the distal thoracic esophagus   suggestive of esophagitis/reflux.  Gastric stimulator electrodes, streak   artifact from back to back in the RIGHT anterolateral abdominal wall. No   evidence of free intraperitoneal air or fluid collection.  APPENDIX is not   visualized.       OTHER STRUCTURES: Atherosclerotic calcification of the aorta without evidence   of aneurysm.  No bulky lymph node enlargement.  A epigastric hernia containing   fat.  Midline anterior abdominal wall incision with ill-defined soft tissue   thickening, infiltration of the subcutaneous fat and small amount of   nonloculated fluid measuring  approximately 7.7 x 1.8 cm. Advanced chronic   degenerative changes in the lumbar spine.       Impression:         Small bilateral renal calculi without evidence of obstructive urolithiasis.       Mild RIGHT hydronephrosis and urothelial thickening, possibility of   pyelonephritis/UTI cannot be excluded.  Recommend correlation with urinary   analysis.      Soft tissue thickening and fluid at the midline abdominal wall incision.    Differential diagnosis includes granulation tissue, seroma versus   inflammatory/infection with early abscess formation.       Enlarged LEFT ovarian cysts/follicles abnormal for the patient's age,   recommend sonography followup.      Numerous other nonemergent findings as described.       NOTE: Suboptimal evaluation of bowel loops and abdominal organs due to lack   of oral and intravenous contrast.       THIS DOCUMENT HAS BEEN ELECTRONICALLY SIGNED BY JASON SPANGLER MD          Medications:  Current Facility-Administered Medications   Medication Dose Route Frequency Provider Last Rate Last Dose   • acetaminophen (TYLENOL) tablet 650 mg  650 mg Oral Q4H PRN Roscoe Rangel MD   650 mg at 07/14/17 1437   • ALPRAZolam (XANAX) tablet 1 mg  1 mg Oral TID PRN USHA Hall       • dextrose (D50W) solution 25 g  25 g Intravenous Q15 Min PRN USHA Hall       • dextrose (GLUTOSE) oral gel 15 g  15 g Oral Q15 Min PRN USHA Hall       • domperidone 10mg tablet  10 mg Oral TID PRN Roscoe Rangel MD       • famotidine (PEPCID) tablet 20 mg  20 mg Oral Nightly PRN USHA Hall       • fluticasone (FLONASE) 50 MCG/ACT nasal spray 1 spray  1 spray Each Nare Daily USHA Hall   1 spray at 07/14/17 0950   • fluticasone-salmeterol (ADVAIR) 100-50 MCG/DOSE diskus inhaler 1 puff  1 puff Inhalation Daily USHA Hall       • gabapentin (NEURONTIN) capsule 600 mg  600 mg Oral Q12H USHA Hall   600 mg at 07/14/17 0943    • glucagon (GLUCAGEN) injection 1 mg  1 mg Subcutaneous Q15 Min PRN USHA Hall       • heparin (porcine) 5000 UNIT/ML injection 5,000 Units  5,000 Units Subcutaneous Q8H Roscoe Rangel MD       • HYDROmorphone (DILAUDID) injection 1 mg  1 mg Intravenous Q2H PRN Hanna Pastor DO       • insulin detemir (LEVEMIR) injection 50 Units  50 Units Subcutaneous Nightly USHA Hall   50 Units at 07/14/17 0431   • insulin lispro (humaLOG) injection 0-14 Units  0-14 Units Subcutaneous 4x Daily AC & at Bedtime Tessie Chairez APRN   3 Units at 07/14/17 0944   • insulin lispro (humaLOG) injection 10 Units  10 Units Subcutaneous TID With Meals USHA Hall   10 Units at 07/14/17 1152   • lamoTRIgine (LaMICtal) tablet 100 mg  100 mg Oral Q12H Tessie Chairez APRN   100 mg at 07/14/17 0943   • linaclotide (LINZESS) capsule 145 mcg  145 mcg Oral QAM Roscoe Rangel MD   145 mcg at 07/14/17 0807   • meropenem (MERREM) 1 g/100 mL 0.9% NS VTB (mbp)  1 g Intravenous Q8H Roscoe Rangel MD   1 g at 07/14/17 1144   • metoprolol succinate XL (TOPROL-XL) 24 hr tablet 100 mg  100 mg Oral Daily Tessie Chairez APRN   100 mg at 07/14/17 0944   • ondansetron (ZOFRAN) tablet 4 mg  4 mg Oral Q6H PRN Roscoe Rangel MD        Or   • ondansetron (ZOFRAN) injection 4 mg  4 mg Intravenous Q6H PRN Roscoe Rangel MD   4 mg at 07/14/17 0804   • ondansetron (ZOFRAN) tablet 8 mg  8 mg Oral Q6H PRN USHA Hall       • pantoprazole (PROTONIX) EC tablet 40 mg  40 mg Oral Daily Tessie Chairez APRN   40 mg at 07/14/17 1143   • promethazine (PHENERGAN) tablet 25 mg  25 mg Oral Q6H PRN USHA Hall   25 mg at 07/14/17 0943   • saccharomyces boulardii (FLORASTOR) capsule 250 mg  250 mg Oral BID USHA Hall   250 mg at 07/14/17 0943   • Scopolamine (TRANSDERM-SCOP) 1.5 MG/3DAYS patch 1 patch  1 patch Transdermal Q72H PRN USHA Hall       •  sodium chloride 0.9 % flush 1-10 mL  1-10 mL Intravenous PRN Roscoe Rangel MD       • sodium chloride 0.9 % flush 10 mL  10 mL Intravenous PRN USHA Kenney       • sodium chloride 0.9 % infusion  125 mL/hr Intravenous Continuous Hanna Pastor,  mL/hr at 07/14/17 1358 125 mL/hr at 07/14/17 1358   • tiZANidine (ZANAFLEX) tablet 2 mg  2 mg Oral Q8H PRN USHA Hall   2 mg at 07/14/17 1438       Assessment/Plan     Principal Problem:    Pyelonephritis  Active Problems:    Type 2 diabetes mellitus    Gastroparesis    Chronic pain syndrome, on PO dilaudid at home    Hypertension    Osteoporosis    RLQ abdominal pain    Diabetic neuropathy    GERD (gastroesophageal reflux disease)    Fever      UTI responding to tx.  Now afebrile and pain is less.  Left ureteral stone should pass.  Since pain is on right and no hydro on left we will not stent unless she gets worse    I discussed the patients findings and my recommendations with patient    Fletcher Pfeiffer MD  07/14/17  4:41 PM

## 2017-07-14 NOTE — CONSULTS
Cortney Delacruz  1955  [id number]    Date of Consult: 7/14/2017 7/13/2017      Requesting Provider: No ref. provider found  Evaluating Physician: Donavon Rios MD    Chief Complaint: Fever, right sided flank pain and UTI    Reason for Consultation: UTI/pyelonephritis    History of present illness:    Patient is a 61 y.o.  Yr old female PT OF DR TAYLOR per patient, with history of diabetes type 2 with gastroparesis and chronic gastric stimulator implant, follows and low-level apparently with plans for stimulator replacement July 2017.  She was hospitalized January 2017 with ESBL Escherichia coli UTI, discharged with carbapenem and all follow-up with Dr. Garvey.  He subsequently treated her with Bactrim/Macrobid for at least one month.  She is readmitted on July 13, 2017 with recurrent right flank pain and nausea/vomiting with dysuria and fever in addition to tachycardia consistent with sepsis.  Mention made of also transient encephalopathy likely related to sepsis.    She denies headache photophobia or neck stiffness.  No shortness of breath cough or hemoptysis.  No diarrhea.  No rash.  No other particular exposures.  She has right flank pain which is constant, nonradiating, sharp, worse with percussion, generally better with analgesics but not completely relieved.  Still with nausea.  No hematochezia melena or hematemesis.    No raw or undercooked food.  No unpasteurized milk or milk products.  No animal insect or arthropod exposure.  No tick bites.  No outdoor camping or hunting exposure.  No travel exposure.  No ill exposure.  No history of TB or TB exposure.   Denies a history of MRSA/VRE and no history of C. difficile or KPC  organisms.    Past Medical History:   Diagnosis Date   • Arthritis    • Arthritis    • Cancer     breast   • Chronic pain     on dilaudid x4 years   • Depression    • Diabetes     type II   • Diabetic neuropathy     gabapentin, lamictal; reports uses xanax for this when severe as  well   • Fever 7/14/2017   • Gastroparesis    • GERD (gastroesophageal reflux disease)     PPI and pepcid   • Hypercholesteremia    • Hypertension    • Osteoporosis     reclast inj yearly   • RLQ abdominal pain 7/14/2017       Past Surgical History:   Procedure Laterality Date   • ADENOIDECTOMY     • APPENDECTOMY     • BREAST SURGERY     • CHOLECYSTECTOMY     • GASTRIC STIMULATOR IMPLANT SURGERY     • HERNIA REPAIR     • LUMBAR LAMINECTOMY      reports 4 back surgeries   • TONSILLECTOMY     • TRIGGER FINGER RELEASE     • WRIST GANGLION EXCISION         Pediatric History   Patient Guardian Status   • Not on file.     Other Topics Concern   • Not on file     Social History Narrative    Patient is a 61 year old white female.   She denies tobacco alcohol or illicit drugs    family history includes Alcohol abuse in her father; Arthritis in her mother; Cancer in her father; Dementia in her maternal grandmother; Hypertension in her mother; Osteoporosis in her mother.    Allergies   Allergen Reactions   • Actos [Pioglitazone] Shortness Of Breath   • Ciprocinonide [Fluocinolone] Anaphylaxis   • Codeine Anaphylaxis   • Kefzol [Cefazolin] Anaphylaxis   • Metformin And Related Shortness Of Breath   • Unasyn [Ampicillin-Sulbactam Sodium] Anaphylaxis   • Biaxin [Clarithromycin] Hives and Nausea And Vomiting   • Crestor [Rosuvastatin Calcium] Myalgia   • Lisinopril Other (See Comments)     Orthostatic hypotension   • Prilosec [Omeprazole] Other (See Comments)     Gastris standstill       Medication:  Current Facility-Administered Medications   Medication Dose Route Frequency Provider Last Rate Last Dose   • acetaminophen (TYLENOL) tablet 650 mg  650 mg Oral Q4H PRN Roscoe Rangel MD       • ALPRAZolam (XANAX) tablet 1 mg  1 mg Oral TID PRN USHA Hall       • dextrose (D50W) solution 25 g  25 g Intravenous Q15 Min PRN USHA Hall       • dextrose (GLUTOSE) oral gel 15 g  15 g Oral Q15 Min PRN Tessie  USHA Dial       • domperidone 10mg tablet  10 mg Oral TID PRN Roscoe Rangel MD       • famotidine (PEPCID) tablet 20 mg  20 mg Oral Nightly PRN USHA Hall       • fluticasone (FLONASE) 50 MCG/ACT nasal spray 1 spray  1 spray Each Nare Daily USHA Hall       • fluticasone-salmeterol (ADVAIR) 100-50 MCG/DOSE diskus inhaler 1 puff  1 puff Inhalation Daily USHA Hall       • gabapentin (NEURONTIN) capsule 600 mg  600 mg Oral Q12H USHA Hall   600 mg at 07/14/17 0413   • glucagon (GLUCAGEN) injection 1 mg  1 mg Subcutaneous Q15 Min PRN USHA Hall       • heparin (porcine) 5000 UNIT/ML injection 5,000 Units  5,000 Units Subcutaneous Q8H Roscoe Rangel MD       • HYDROmorphone (DILAUDID) tablet 2 mg  2 mg Oral Q6H PRN USHA Hall   2 mg at 07/14/17 0413   • insulin detemir (LEVEMIR) injection 50 Units  50 Units Subcutaneous Nightly USHA Hall   50 Units at 07/14/17 0431   • insulin lispro (humaLOG) injection 0-14 Units  0-14 Units Subcutaneous 4x Daily AC & at Bedtime USHA Hall   5 Units at 07/14/17 0432   • insulin lispro (humaLOG) injection 10 Units  10 Units Subcutaneous TID With Meals USHA Hall       • lamoTRIgine (LaMICtal) tablet 100 mg  100 mg Oral Q12H USHA Hall   100 mg at 07/14/17 0413   • linaclotide (LINZESS) capsule 145 mcg  145 mcg Oral QAM Roscoe Rangel MD   145 mcg at 07/14/17 0807   • meropenem (MERREM) 1 g/100 mL 0.9% NS VTB (mbp)  1 g Intravenous Q8H Roscoe Rangel MD       • metoprolol succinate XL (TOPROL-XL) 24 hr tablet 100 mg  100 mg Oral Daily USHA Hall       • ondansetron (ZOFRAN) tablet 4 mg  4 mg Oral Q6H PRN Roscoe Rangel MD        Or   • ondansetron (ZOFRAN) injection 4 mg  4 mg Intravenous Q6H PRN Roscoe Rangel MD   4 mg at 07/14/17 0804   • ondansetron (ZOFRAN) tablet 8 mg  8 mg Oral Q6H PRN Tessie GROVES  USHA Chairez       • pantoprazole (PROTONIX) EC tablet 40 mg  40 mg Oral Daily USHA Hall       • promethazine (PHENERGAN) tablet 25 mg  25 mg Oral Q6H PRN USHA Hall       • saccharomyces boulardii (FLORASTOR) capsule 250 mg  250 mg Oral BID USHA Hall       • Scopolamine (TRANSDERM-SCOP) 1.5 MG/3DAYS patch 1 patch  1 patch Transdermal Q72H PRN USHA Hall       • sodium chloride 0.9 % flush 1-10 mL  1-10 mL Intravenous PRN Roscoe Rangel MD       • sodium chloride 0.9 % flush 10 mL  10 mL Intravenous PRN USHA Kenney       • sodium chloride 0.9 % infusion  75 mL/hr Intravenous Continuous Roscoe Rangel MD 75 mL/hr at 07/14/17 0626 75 mL/hr at 07/14/17 0626   • tiZANidine (ZANAFLEX) tablet 2 mg  2 mg Oral Q8H PRN USHA Hall           Antibiotics:  IV Anti-Infectives     Ordered     Dose/Rate Route Frequency Start Stop    07/14/17 0239  meropenem (MERREM) 1 g/100 mL 0.9% NS VTB (mbp)     Ordering Provider:  Roscoe Rangel MD    1 g  over 30 Minutes Intravenous Every 8 Hours 07/14/17 1000      07/14/17 0106  meropenem (MERREM) 1 g/100 mL 0.9% NS VTB (mbp)     Ordering Provider:  USHA Kenney    1 g  over 30 Minutes Intravenous Once 07/14/17 0108 07/14/17 0242            Review of Systems    Constitutional-- Appetite diminished with generalized malaise and fatigue  Heent-- No new vision, hearing or throat complaints.  No epistaxis or oral sores.  Denies odynophagia or dysphagia.  No flashers, floaters or eye pain. No odynophagia or dysphagia. No headache, photophobia or neck stiffness.  CV-- No chest pain, palpitation or syncope  Resp-- No SOB/cough/Hemoptysis  GI- No nausea, vomiting, or diarrhea.  No hematochezia, melena, or hematemesis. Denies jaundice or chronic liver disease.  -- as above.  She denies hesitancy and does not require straining   Lymph- no swollen lymph nodes in neck/axilla or groin.   Heme- No active bruising  "or bleeding; no Hx of DVT or PE.  MS-- no swelling or pain in the bones or joints of arms/legs.  No new back pain.  Neuro-- No acute focal weakness or numbness in the arms or legs.  No seizures.    Full 12 point review of systems reviewed and negative otherwise for acute complaints, except for above    Physical Exam:   Vital Signs   /73  Pulse 104  Temp 99.6 °F (37.6 °C)  Resp 18  Ht 65\" (165.1 cm)  Wt 236 lb (107 kg)  SpO2 96%  BMI 39.27 kg/m2    GENERAL: Awake and alert, in no acute distress.  Obese  HEENT: Normocephalic, atraumatic.  PERRL. EOMI. No conjunctival injection. No icterus. Oropharynx clear without evidence of thrush or exudate. No evidence of peridontal disease.    NECK: Supple without nuchal rigidity. No mass.  LYMPH: No cervical, axillary or inguinal lymphadenopathy.  HEART: RRR; No murmur, rubs, gallops.   LUNGS: Clear to auscultation bilaterally without wheezing, rales, rhonchi. Normal respiratory effort. Nonlabored. No dullness.  ABDOMEN: Soft, tender right flank percussion, nondistended. Positive bowel sounds. No rebound or guarding. NO mass or HSM.  No skin change of the abdominal wall  EXT:  No cyanosis, clubbing or edema. No cord.  : Genitalia generally unremarkable.  Without Denise catheter.  MSK: FROM without joint effusions noted arms/legs.    SKIN: Warm and dry without cutaneous eruptions on Inspection/palpation.    NEURO: Oriented to PPT. No focal deficits on motor/sensory exam at arms/legs.  PSYCHIATRIC: Normal insight and judgement. Cooperative with PE    No peripheral stigmata/phenomena of endocarditis    Abdominal wall has no redness induration or warmth.  No crepitus or bulla and no dehiscence or drainage from prior surgical sites    Laboratory Data      Results from last 7 days  Lab Units 07/14/17  0537 07/13/17  2310   WBC 10*3/mm3 11.18* 11.62*   HEMOGLOBIN g/dL 12.1 13.5   HEMATOCRIT % 38.0 42.1   PLATELETS 10*3/mm3 208 218       Results from last 7 days  Lab Units " 07/14/17  0537   SODIUM mmol/L 136   POTASSIUM mmol/L 3.9   CHLORIDE mmol/L 104   CO2 mmol/L 25.0   BUN mg/dL 8*   CREATININE mg/dL 0.70   GLUCOSE mg/dL 225*   CALCIUM mg/dL 9.0       Results from last 7 days  Lab Units 07/13/17  2310   ALK PHOS U/L 95   BILIRUBIN mg/dL 0.4   ALT (SGPT) U/L 63*   AST (SGOT) U/L 54*               Estimated Creatinine Clearance: 102.6 mL/min (by C-G formula based on Cr of 0.7).      Microbiology:      Radiology:  Imaging Results (last 72 hours)     Procedure Component Value Units Date/Time    XR Chest 1 View [[id number]]  (Abnormal) Collected:  07/13/17 2256     Updated:  07/13/17 8395    Narrative:       EXAM:    XR Chest, 1 View    CLINICAL HISTORY:    61 years old, female; Signs and symptoms; Fever; Additional info: Fever AMS    TECHNIQUE:    Frontal view of the chest.    COMPARISON:    No relevant prior studies available.    FINDINGS:    Prominent lung markings/peribronchial thickening without definite evidence of   consolidation, no evidence of pleural effusion or pneumothorax.  Cardiomegaly   with pulmonary vascular congestion.  Surgical clips in the LEFT axilla.   Tortuous calcific aortic arch and descending thoracic aorta.        Impression:         Chronic cardiopulmonary changes as described without evidence of an active   lung parenchymal lesion.       THIS DOCUMENT HAS BEEN ELECTRONICALLY SIGNED BY JASON SPANGLER MD    CT Head Without Contrast [[id number]]  (Abnormal) Collected:  07/13/17 2255     Updated:  07/14/17 0009    Narrative:       EXAM:    CT Head Without Intravenous Contrast    CLINICAL HISTORY:    61 years old, female; Pain; Other: See notes; Additional info: AMS fever    TECHNIQUE:    Axial computed tomography images of the head/brain without intravenous   contrast.  This CT exam was performed using one or more of the following dose   reduction techniques:  automated exposure control, adjustment of the mA and/or   kV according to patient size, and/or use of  iterative reconstruction technique.    COMPARISON:    CT HEAD WO CONTRAST 1/4/2017 3:16:01 AM    FINDINGS:    Bilateral periventricular lucencies without intraparenchymal hemorrhage and   no obvious acute ischemic stroke. No intra-or extra-axial fluid collection, no   supra-or infratentorial mass, no mass effect or midline shift.       Mildly dilated ventricles, sulci and basal cisterns without evidence of   hydrocephalus. Skull bones are normal.  No significant mucoperiosteal   thickening in the visualized paranasal sinuses. No evidence of mastoid   effusion.       Impression:         Mild chronic microvascular disease and generalized atrophy without acute   intracranial abnormality. No evidence of acute hemorrhage, mass lesion or   obvious acute ischemic infarction.      THIS DOCUMENT HAS BEEN ELECTRONICALLY SIGNED BY JASON SPANGLER MD    CT Abdomen Pelvis Without Contrast [[id number]]  (Abnormal) Collected:  07/13/17 2255     Updated:  07/14/17 0017    Narrative:       EXAM:    CT Abdomen and Pelvis Without Intravenous Contrast    CLINICAL HISTORY:    61 years old, female; Pain; Other: See notes; Prior surgery; Surgery date: 6+   months; Surgery type: Malu, hernia, abdominal abscess drains; Additional info:   Abd pain fever    TECHNIQUE:    Axial computed tomography images of the abdomen and pelvis without   intravenous contrast.  This CT exam was performed using one or more of the   following dose reduction techniques:  automated exposure control, adjustment of   the mA and/or kV according to patient size, and/or use of iterative   reconstruction technique.    COMPARISON:    CT ABDOMEN PELVIS W CONTRAST 1/1/2017 4:02:36 PM    FINDINGS:    PANCREATICOHEPATOBILIARY: Enlarged liver shows diffuse fatty infiltration.    No significant intra-or extrahepatic ductal dilation.  Cholecystectomy clips in   the gallbladder fossa. Pancreas and spleen are unremarkable.       GENITOURINARY: No adrenal mass.  Renal cortical  scarring, focal atrophy and   indeterminate renal cortical cystic nodules.  Nonobstructive bilateral renal   stone(s) measuring less than 5 mm. Non-obstructive 3 mm proximal LEFT ureteric   stone.  Mild RIGHT hydronephrosis and uterine thickening without obstructive   urolithiasis.  Kidneys are otherwise unremarkable. Left hip hardware with   extensive artifact precluding optimal evaluation of the pelvis.  Urinary   bladder is within normal limits.  Uterus is normal, complex LEFT ovarian   cyst/follicle in a mildly enlarged LEFT ovary which measures approximately 4.5   cm. No free fluid in the pelvis.      GASTROINTESTINAL: Colon contains a moderate amount of fecal matter.  A few   colonic diverticula without evidence of acute diverticulitis.   A small size   hiatal hernia with nonspecific wall thickening of the distal thoracic esophagus   suggestive of esophagitis/reflux.  Gastric stimulator electrodes, streak   artifact from back to back in the RIGHT anterolateral abdominal wall. No   evidence of free intraperitoneal air or fluid collection.  APPENDIX is not   visualized.       OTHER STRUCTURES: Atherosclerotic calcification of the aorta without evidence   of aneurysm.  No bulky lymph node enlargement.  A epigastric hernia containing   fat.  Midline anterior abdominal wall incision with ill-defined soft tissue   thickening, infiltration of the subcutaneous fat and small amount of   nonloculated fluid measuring approximately 7.7 x 1.8 cm. Advanced chronic   degenerative changes in the lumbar spine.       Impression:         Small bilateral renal calculi without evidence of obstructive urolithiasis.       Mild RIGHT hydronephrosis and urothelial thickening, possibility of   pyelonephritis/UTI cannot be excluded.  Recommend correlation with urinary   analysis.      Soft tissue thickening and fluid at the midline abdominal wall incision.    Differential diagnosis includes granulation tissue, seroma versus    inflammatory/infection with early abscess formation.       Enlarged LEFT ovarian cysts/follicles abnormal for the patient's age,   recommend sonography followup.      Numerous other nonemergent findings as described.       NOTE: Suboptimal evaluation of bowel loops and abdominal organs due to lack   of oral and intravenous contrast.       THIS DOCUMENT HAS BEEN ELECTRONICALLY SIGNED BY JASON SPANGLER MD            Impression:   --Acute sepsis.  Primary concern urinary source with tachycardia/fever and encephalopathy, with abnormal urinary symptoms suggestive to patient of pyelonephritis/UTI and pending urine culture.  Otherwise monitor exam for alternative focus.  Culture data pending.  Empiric antibiotics.  Resuscitative measures per internal medicine    --Acute complicated UTI/right pyelonephritis.  Abnormal CT scan suggesting hydronephrosis as well.  History ESBL organisms and temperature decreased on Merrem so far.  Further complicated by numerous antibiotic allergies.  You need to give consideration to urology input in light of hydronephrosis and recurrent UTIs.  Any option/timing or threshold for workup or intervention regarding functional/anatomic issues is up to them.    --Chronic gastroparesis with indwelling gastric stimulator and plans for replacement in [place].  Timing per them.  Likely would need to be postponed with active infection.  CT scan raises the issue of possible fluid in the abdominal wall.  Current exam does not suggest active cellulitis, no necrotic soft tissue change and no dehiscence or drainage from prior surgical sites.    --Diabetes type 2.  You need to tightly control blood sugar to give best chance for healing    PLAN: Thank you for asking us to see Cortney Delacruz, I recommend the following:  --IV Merrem    --I discussed potential risks and benefits of the prescribed antibiotics that include, but are not limited to, solid organ toxicity, neuro/bertha/vestibular toxicity, renal  toxicity, CDiff, cytopenias, hypersensitivity,  etc.. Patient/Family voice understanding and agree to proceed.     --Monitor IV and IV antibiotic with risk for systemic complication and potential drug interaction    --Check/review labs cultures and scans    --As above, you should give consideration to urology input for further workup regarding functional/anatomic issues    --Highly complex set of issues with high risk for further serious morbidity and other serious sequela    --I will follow over weekend, likely with Dr. Butts to resume care at patient's request on Monday    Donavon Rios MD  7/14/2017

## 2017-07-14 NOTE — PROGRESS NOTES
Discharge Planning Assessment  [facility]     Patient Name: Cortney Delacruz  MRN: [id number]  Today's Date: 7/14/2017    Admit Date: 7/13/2017          Discharge Needs Assessment       07/14/17 1125    Living Environment    Lives With child(eva), adult    Living Arrangements house    Home Accessibility bed and bath on same level;no concerns    Stair Railings at Home none    Type of Financial/Environmental Concern none    Transportation Available car;family or friend will provide    Living Environment    Provides Primary Care For no one    Quality Of Family Relationships supportive;helpful    Discharge Needs Assessment    Concerns To Be Addressed no discharge needs identified;denies needs/concerns at this time    Readmission Within The Last 30 Days no previous admission in last 30 days    Anticipated Changes Related to Illness none    Equipment Currently Used at Home raised toilet    Equipment Needed After Discharge none    Discharge Disposition home or self-care    Discharge Planning Comments Patient concerned about her potential need for outpatient antibiotics because she had issues with coverage of the meds in the past and a bad experience at [facility], discussed with her the potential options she may have and that we will discuss further in detail when or if she is ordered outpatient antibiotic therapy             Discharge Plan       07/14/17 1139    Case Management/Social Work Plan    Plan Home    Patient/Family In Agreement With Plan yes    Additional Comments Spoke with patient at bedside. She lives in a house with her adult son, he does work during the day but her mother lives close and still able to help her and then her son is available in the evenings. At this time her plan is to return home however the Patient is concerned about her potential need for outpatient antibiotics because she had issues with coverage of the meds in the past and a bad experience at [facility], discussed with her the potential  options she may have and that we will discuss further in detail when or if she is ordered outpatient antibiotic therapy. CM will continue to follow - sallie saldaña rn - [phone]         Discharge Placement     No information found                Demographic Summary       07/14/17 1115    Referral Information    Admission Type observation    Arrived From still a patient    Referral Source admission list    Reason For Consult discharge planning    Record Reviewed clinical discipline documentation;history and physical    Contact Information    Permission Granted to Share Information With     Primary Care Physician Information    Name Ronnie Garvey             Functional Status       07/14/17 1116    Functional Status Current    Current Functional Level Comment Please see nurses notes     Functional Status Prior    Ambulation 0-->independent    Transferring 0-->independent    Toileting 0-->independent    Bathing 0-->independent    Dressing 0-->independent    Eating 0-->independent    Communication 0-->understands/communicates without difficulty    Swallowing 0-->swallows foods/liquids without difficulty    IADL    Medications independent    Meal Preparation independent    Housekeeping independent    Laundry independent    Shopping independent    Oral Care independent    Activity Tolerance    Current Activity Limitations none    Usual Activity Tolerance good    Current Activity Tolerance moderate    Employment/Financial    Employment/Finance Comments patient confirmed she has [employer] medicare replacement and has no issues with coverage at this time             Psychosocial     None            Abuse/Neglect     None            Legal     None            Substance Abuse     None            Patient Forms     None          Sallie Saldaña RN

## 2017-07-14 NOTE — PROGRESS NOTES
[facility] HOSPITALIST    PROGRESS NOTE    Name:  Cortney Delacruz   Age:  61 y.o.  Sex:  female  :  1955  MRN:  [id number]   Visit Number:  [id number]  Admission Date:  2017  Date Of Service:  17  Primary Care Physician:  Ronnie Garvey MD     LOS: 0 days :      Chief Complaint:  Right flank pain. Follow up pyelonephritis.    Patient see this morning after breakfast.        Subjective / Interval History: She complained of recurrent nausea and vomiting, with inability to take her oral medications. She denies chest pain, shortness of breath, edema, cough. She complains of moderate to severe right flank pain.     Patient is a 61 yr old lady with history of ESBL Ecoli UTI, who presented to ED with nausea, vomiting, flank pain. She has chronic pain with chronic oral dilaudid. She has history of hospital hypersedation from increased opiate therapy.    Review of Systems:     General ROS: Patient denies any fevers, chills or loss of consciousness.  Ophthalmic ROS: Denies any diplopia or transient loss of vision.  ENT ROS: Denies sore throat, nasal congestion or ear pain.   Respiratory ROS: Denies cough or shortness of breath.  Cardiovascular ROS: Denies chest pain or palpitations. No history of exertional chest pain.   Gastrointestinal ROS: POSITIVE nausea and vomiting. Right flank intermittent crampy abdominal pain. No diarrhea.  Genito-Urinary ROS: Denies dysuria or hematuria.  Musculoskeletal ROS: Complains of chronic back pain. No muscle pain. No calf pain.  Neurological ROS: Denies any focal weakness. No loss of consciousness. Denies any numbness. Denies neck pain.   Dermatological ROS: Denies any redness or pruritis.    Vital Signs:    Temp:  [97.8 °F (36.6 °C)-102.5 °F (39.2 °C)] 97.8 °F (36.6 °C)  Heart Rate:  [] 104  Resp:  [18-20] 18  BP: ()/(56-87) 98/56    Intake and output:    I/O last 3 completed shifts:  In: 1000 [IV Piggyback:1000]  Out: -   I/O this  shift:  In: 1000 [I.V.:1000]  Out: 700 [Urine:700]    Physical Examination:    General Appearance:    Alert and cooperative, not in any acute distress.   Head:    Atraumatic and normocephalic, without obvious abnormality.   Eyes:            PERRLA, conjunctivae and sclerae normal, no Icterus. No pallor. Extra-occular movements are within normal limits.   Throat:   No oral lesions, no thrush, oral mucosa dry   Neck:   Supple, trachea midline, no thyromegaly, no carotid bruit.   Lungs:     Chest shape is normal. Breath sounds heard bilaterally equally.  No crackles or wheezing. No Pleural rub or bronchial breathing.    Heart:    Normal S1 and S2, no murmur, no gallop, no rub. No JVD   Abdomen:     Normal bowel sounds, no masses, no organomegaly. Soft        + right CVA tender, non-distended, no guarding, no rebound                tenderness   Extremities:   Moves all extremities well, no edema, no cyanosis, no             clubbing   Skin:   No bleeding, bruising or rash.   Neurologic:   Cranial nerves 2 - 12 grossly intact, sensation intact, Motor power is normal and equal bilaterally.   Laboratory results:    Lab Results (last 24 hours)     Procedure Component Value Units Date/Time    Urine Culture [[id number]] Collected:  07/13/17 2300    Specimen:  Urine from Urine, Clean Catch Updated:  07/13/17 2316    Urinalysis With / Culture If Indicated [[id number]]  (Abnormal) Collected:  07/13/17 2300    Specimen:  Urine from Urine, Clean Catch Updated:  07/13/17 2322     Color, UA Yellow     Appearance, UA Clear     pH, UA 6.5     Specific Gravity, UA 1.017     Glucose, UA >=1000 mg/dL (3+) (A)     Ketones, UA Trace (A)     Bilirubin, UA Negative     Blood, UA Negative     Protein, UA Negative     Leuk Esterase, UA Small (1+) (A)     Nitrite, UA Negative     Urobilinogen, UA 1.0 E.U./dL    Urinalysis, Microscopic Only [[id number]]  (Abnormal) Collected:  07/13/17 2300    Specimen:  Urine from Urine, Clean Catch Updated:   07/13/17 2323     RBC, UA 0-2 /HPF      WBC, UA 13-20 (A) /HPF      Bacteria, UA Trace /HPF      Squamous Epithelial Cells, UA -- /HPF      Methodology Automated Microscopy    CBC & Differential [[id number]] Collected:  07/13/17 2310    Specimen:  Blood Updated:  07/13/17 2324    Narrative:       The following orders were created for panel order CBC & Differential.  Procedure                               Abnormality         Status                     ---------                               -----------         ------                     CBC Auto Differential[[id number]]        Abnormal            Final result                 Please view results for these tests on the individual orders.    CBC Auto Differential [[id number]]  (Abnormal) Collected:  07/13/17 2310    Specimen:  Blood Updated:  07/13/17 2324     WBC 11.62 (H) 10*3/mm3      RBC 4.88 10*6/mm3      Hemoglobin 13.5 g/dL      Hematocrit 42.1 %      MCV 86.3 fL      MCH 27.7 pg      MCHC 32.1 g/dL      RDW 15.4 (H) %      RDW-SD 49.2 fl      MPV 11.3 fL      Platelets 218 10*3/mm3      Neutrophil % 81.1 (H) %      Lymphocyte % 10.5 (L) %      Monocyte % 7.6 %      Eosinophil % 0.3 %      Basophil % 0.2 %      Immature Grans % 0.3 %      Neutrophils, Absolute 9.43 (H) 10*3/mm3      Lymphocytes, Absolute 1.22 10*3/mm3      Monocytes, Absolute 0.88 10*3/mm3      Eosinophils, Absolute 0.04 10*3/mm3      Basophils, Absolute 0.02 10*3/mm3      Immature Grans, Absolute 0.03 10*3/mm3     POC Troponin, Rapid [[id number]]  (Normal) Collected:  07/13/17 2315    Specimen:  Blood Updated:  07/13/17 2335     Troponin I 0.00 ng/mL       Serial Number: [id number]  [id number]       Comprehensive Metabolic Panel [[id number]]  (Abnormal) Collected:  07/13/17 2310    Specimen:  Blood Updated:  07/13/17 2355     Glucose 296 (H) mg/dL      BUN 10 mg/dL      Creatinine 0.90 mg/dL      Sodium 134 mmol/L      Potassium 4.2 mmol/L      Chloride 100 mmol/L      CO2 18.0 (L) mmol/L       Calcium 9.4 mg/dL      Total Protein 8.2 g/dL      Albumin 4.50 g/dL      ALT (SGPT) 63 (H) U/L      AST (SGOT) 54 (H) U/L      Alkaline Phosphatase 95 U/L      Total Bilirubin 0.4 mg/dL      eGFR Non African Amer 64 mL/min/1.73      Globulin 3.7 gm/dL      A/G Ratio 1.2 (L) g/dL      BUN/Creatinine Ratio 11.1     Anion Gap 16.0 (H) mmol/L     Narrative:       National Kidney Foundation Guidelines    Stage     Description        GFR  1         Normal or High     90+  2         Mild decrease      60-89  3         Moderate decrease  30-59  4         Severe decrease    15-29  5         Kidney failure     <15    Lipase [[id number]]  (Normal) Collected:  07/13/17 2310    Specimen:  Blood Updated:  07/13/17 2355     Lipase 37 U/L     Lactic Acid, Plasma [[id number]]  (Normal) Collected:  07/13/17 2310    Specimen:  Blood Updated:  07/13/17 2359     Lactate 1.8 mmol/L       Falsely depressed results may occur on samples drawn from patients receiving N-Acetylcysteine (NAC) or Metamizole.       BNP [[id number]]  (Normal) Collected:  07/13/17 2310    Specimen:  Blood Updated:  07/14/17 0003     BNP 30.0 pg/mL     Procalcitonin [[id number]] Collected:  07/13/17 2310    Specimen:  Blood Updated:  07/14/17 0035     Procalcitonin 0.38 ng/mL     Narrative:       As a Marker for Sepsis (Non-Neonates):   1. <0.5 ng/mL represents a low risk of severe sepsis and/or septic shock.  2. >2 ng/mL represents a high risk of severe sepsis and/or septic shock.    As a Marker for Lower Respiratory Tract Infections that require antibiotic therapy:    PCT on Admission     Antibiotic Therapy       6-12 Hrs later  > 0.5                Strongly Recommended             >0.25 - <0.5         Recommended  0.1 - 0.25           Discouraged              Remeasure/reassess PCT  <0.1                 Strongly Discouraged     Remeasure/reassess PCT                     PCT values of < 0.5 ng/mL do not exclude an infection, because localized infections (without  systemic signs) may be associated with such low concentrations, or a systemic infection in its initial stages (< 6 hours). Furthermore, increased PCT can occur without infection. PCT concentrations between 0.5 and 2.0 ng/mL should be interpreted taking into account the patient's history. It is recommended to retest PCT within 6-24 hours if any concentrations < 2 ng/mL are obtained.    POC Glucose Fingerstick [[id number]]  (Abnormal) Collected:  07/14/17 0411    Specimen:  Blood Updated:  07/14/17 0412     Glucose 241 (H) mg/dL     Narrative:       Meter: [id number] : [id number] Vince Odonnell    CBC Auto Differential [[id number]]  (Abnormal) Collected:  07/14/17 0537    Specimen:  Blood Updated:  07/14/17 0648     WBC 11.18 (H) 10*3/mm3      RBC 4.36 10*6/mm3      Hemoglobin 12.1 g/dL      Hematocrit 38.0 %      MCV 87.2 fL      MCH 27.8 pg      MCHC 31.8 (L) g/dL      RDW 15.6 (H) %      RDW-SD 50.5 fl      MPV 11.5 fL      Platelets 208 10*3/mm3      Neutrophil % 68.1 %      Lymphocyte % 20.8 (L) %      Monocyte % 10.3 %      Eosinophil % 0.2 %      Basophil % 0.3 %      Immature Grans % 0.3 %      Neutrophils, Absolute 7.62 10*3/mm3      Lymphocytes, Absolute 2.33 10*3/mm3      Monocytes, Absolute 1.15 (H) 10*3/mm3      Eosinophils, Absolute 0.02 10*3/mm3      Basophils, Absolute 0.03 10*3/mm3      Immature Grans, Absolute 0.03 10*3/mm3     Basic Metabolic Panel [[id number]]  (Abnormal) Collected:  07/14/17 0537    Specimen:  Blood Updated:  07/14/17 0706     Glucose 225 (H) mg/dL      BUN 8 (L) mg/dL      Creatinine 0.70 mg/dL      Sodium 136 mmol/L      Potassium 3.9 mmol/L      Chloride 104 mmol/L      CO2 25.0 mmol/L      Calcium 9.0 mg/dL      eGFR Non African Amer 85 mL/min/1.73      BUN/Creatinine Ratio 11.4     Anion Gap 7.0 mmol/L     Narrative:       National Kidney Foundation Guidelines    Stage     Description        GFR  1         Normal or High     90+  2         Mild decrease      60-89  3          Moderate decrease  30-59  4         Severe decrease    15-29  5         Kidney failure     <15    POC Glucose Fingerstick [[id number]]  (Abnormal) Collected:  07/14/17 0726    Specimen:  Blood Updated:  07/14/17 0735     Glucose 164 (H) mg/dL     Narrative:       Meter: [id number] : [id number] Priyanka Rodriguez    Hemoglobin A1c [[id number]]  (Abnormal) Collected:  07/14/17 0537    Specimen:  Blood Updated:  07/14/17 0836     Hemoglobin A1C 9.00 (H) %     Narrative:       The American Diabetes Association recommends maintenance of Hemoglobin A1C at 7.0% or lower. Goals for Hemoglobin A1C reduction may need to be modified if hypoglycemia is a problem.    POC Glucose Fingerstick [[id number]]  (Normal) Collected:  07/14/17 1130    Specimen:  Blood Updated:  07/14/17 1156     Glucose 103 mg/dL     Narrative:       Meter: [id number] : [id number] Mak Craven    Blood Culture [[id number]]  (Normal) Collected:  07/13/17 2258    Specimen:  Blood from Arm, Right Updated:  07/14/17 1201     Blood Culture No growth at less than 24 hours    Blood Culture [[id number]]  (Normal) Collected:  07/13/17 2310    Specimen:  Blood from Arm, Right Updated:  07/14/17 1201     Blood Culture No growth at less than 24 hours          I have reviewed the patient's laboratory results.    Radiology results:    Imaging Results (last 24 hours)     Procedure Component Value Units Date/Time    XR Chest 1 View [[id number]]  (Abnormal) Collected:  07/13/17 2256     Updated:  07/13/17 2355    Narrative:       EXAM:    XR Chest, 1 View    CLINICAL HISTORY:    61 years old, female; Signs and symptoms; Fever; Additional info: Fever AMS    TECHNIQUE:    Frontal view of the chest.    COMPARISON:    No relevant prior studies available.    FINDINGS:    Prominent lung markings/peribronchial thickening without definite evidence of   consolidation, no evidence of pleural effusion or pneumothorax.  Cardiomegaly   with pulmonary vascular congestion.  Surgical clips  in the LEFT axilla.   Tortuous calcific aortic arch and descending thoracic aorta.        Impression:         Chronic cardiopulmonary changes as described without evidence of an active   lung parenchymal lesion.       THIS DOCUMENT HAS BEEN ELECTRONICALLY SIGNED BY JASON SPANGLER MD    CT Head Without Contrast [[id number]]  (Abnormal) Collected:  07/13/17 2255     Updated:  07/14/17 0009    Narrative:       EXAM:    CT Head Without Intravenous Contrast    CLINICAL HISTORY:    61 years old, female; Pain; Other: See notes; Additional info: AMS fever    TECHNIQUE:    Axial computed tomography images of the head/brain without intravenous   contrast.  This CT exam was performed using one or more of the following dose   reduction techniques:  automated exposure control, adjustment of the mA and/or   kV according to patient size, and/or use of iterative reconstruction technique.    COMPARISON:    CT HEAD WO CONTRAST 1/4/2017 3:16:01 AM    FINDINGS:    Bilateral periventricular lucencies without intraparenchymal hemorrhage and   no obvious acute ischemic stroke. No intra-or extra-axial fluid collection, no   supra-or infratentorial mass, no mass effect or midline shift.       Mildly dilated ventricles, sulci and basal cisterns without evidence of   hydrocephalus. Skull bones are normal.  No significant mucoperiosteal   thickening in the visualized paranasal sinuses. No evidence of mastoid   effusion.       Impression:         Mild chronic microvascular disease and generalized atrophy without acute   intracranial abnormality. No evidence of acute hemorrhage, mass lesion or   obvious acute ischemic infarction.      THIS DOCUMENT HAS BEEN ELECTRONICALLY SIGNED BY JASON SPANGLER MD    CT Abdomen Pelvis Without Contrast [[id number]]  (Abnormal) Collected:  07/13/17 2255     Updated:  07/14/17 0017    Narrative:       EXAM:    CT Abdomen and Pelvis Without Intravenous Contrast    CLINICAL HISTORY:    61 years old, female; Pain; Other:  See notes; Prior surgery; Surgery date: 6+   months; Surgery type: Malu, hernia, abdominal abscess drains; Additional info:   Abd pain fever    TECHNIQUE:    Axial computed tomography images of the abdomen and pelvis without   intravenous contrast.  This CT exam was performed using one or more of the   following dose reduction techniques:  automated exposure control, adjustment of   the mA and/or kV according to patient size, and/or use of iterative   reconstruction technique.    COMPARISON:    CT ABDOMEN PELVIS W CONTRAST 1/1/2017 4:02:36 PM    FINDINGS:    PANCREATICOHEPATOBILIARY: Enlarged liver shows diffuse fatty infiltration.    No significant intra-or extrahepatic ductal dilation.  Cholecystectomy clips in   the gallbladder fossa. Pancreas and spleen are unremarkable.       GENITOURINARY: No adrenal mass.  Renal cortical scarring, focal atrophy and   indeterminate renal cortical cystic nodules.  Nonobstructive bilateral renal   stone(s) measuring less than 5 mm. Non-obstructive 3 mm proximal LEFT ureteric   stone.  Mild RIGHT hydronephrosis and uterine thickening without obstructive   urolithiasis.  Kidneys are otherwise unremarkable. Left hip hardware with   extensive artifact precluding optimal evaluation of the pelvis.  Urinary   bladder is within normal limits.  Uterus is normal, complex LEFT ovarian   cyst/follicle in a mildly enlarged LEFT ovary which measures approximately 4.5   cm. No free fluid in the pelvis.      GASTROINTESTINAL: Colon contains a moderate amount of fecal matter.  A few   colonic diverticula without evidence of acute diverticulitis.   A small size   hiatal hernia with nonspecific wall thickening of the distal thoracic esophagus   suggestive of esophagitis/reflux.  Gastric stimulator electrodes, streak   artifact from back to back in the RIGHT anterolateral abdominal wall. No   evidence of free intraperitoneal air or fluid collection.  APPENDIX is not   visualized.       OTHER  STRUCTURES: Atherosclerotic calcification of the aorta without evidence   of aneurysm.  No bulky lymph node enlargement.  A epigastric hernia containing   fat.  Midline anterior abdominal wall incision with ill-defined soft tissue   thickening, infiltration of the subcutaneous fat and small amount of   nonloculated fluid measuring approximately 7.7 x 1.8 cm. Advanced chronic   degenerative changes in the lumbar spine.       Impression:         Small bilateral renal calculi without evidence of obstructive urolithiasis.       Mild RIGHT hydronephrosis and urothelial thickening, possibility of   pyelonephritis/UTI cannot be excluded.  Recommend correlation with urinary   analysis.      Soft tissue thickening and fluid at the midline abdominal wall incision.    Differential diagnosis includes granulation tissue, seroma versus   inflammatory/infection with early abscess formation.       Enlarged LEFT ovarian cysts/follicles abnormal for the patient's age,   recommend sonography followup.      Numerous other nonemergent findings as described.       NOTE: Suboptimal evaluation of bowel loops and abdominal organs due to lack   of oral and intravenous contrast.       THIS DOCUMENT HAS BEEN ELECTRONICALLY SIGNED BY JASON SPANGLER MD          I have reviewed the patient's radiology reports.    Medication Review:     I have reviewed the patients active and prn medications.     DATA: CT abdomen and pelvis reveal probable pyelonephritis with small bilateral non-obstructing renal stones. CT head without contrast revealed mild chronic microvascular disease without acute hemorrhage, mass, lesion, or obvious acute infarction.    Assessment:    Principal Problem:    Pyelonephritis  Active Problems:    Type 2 diabetes mellitus    Gastroparesis    Chronic pain syndrome, on PO dilaudid at home    Hypertension    Osteoporosis    RLQ abdominal pain    Diabetic neuropathy    GERD (gastroesophageal reflux disease)    Fever  Right  hydronephrosis  Left ovarian cysts, recommend further outpatient gyn workup    Plan:  Continue and increased rate of normal saline. Due to nausea and vomiting, I added iv dilaudid instead of her home oral dilaudid. Continue antiemetics as needed. Monitor closely with history of opiate induced hypersomnolence.     Continue Merrem due to drug allergies and she has history of ESBL Ecoli. ID following and recommended urology consult. Sliding scale insulin. Medication risks and benefits discussed.      Patient reported satisfaction with care delivered and treatment plan.   Heparin PPX      Hanna Pastor DO  07/14/17  4:18 PM

## 2017-07-14 NOTE — H&P
[facility]  HISTORY AND PHYSICAL    Primary Care Physician: Ronnie Garvey MD    Subjective     Chief Complaint:  Fever, Confusion    History of Present Illness:   Ms. Delacruz is a 61 year old female who presents to the ED this evening with her son for complaints of fevers up to 102, RLQ pain, and burning with urination over the past 24 hours. She called her PCP Dr. Garvey today and he instructed her to present to the ED if her symptoms continued. When her fever was not reduced with tylenol, her son brought her to the ED. She reports that her son told her that she was confused and seemed off, however she states that she does not feel that she was ever confused. She has had some nausea and vomiting, however she reports these as chronic issues related to her gastroparesis and have not worsened since these symptoms began. ED work-up reveals lactic 1.8, WBC 11.62, Cr 0.90. Blood and urine cultures are pending. CT abdomen and pelvis reveal probable pyelonephritis with small bilateral non-obstructing renal stones. CT head without contrast revealed mild chronic microvascular disease without acute hemorrhage, mass, lesion, or obvious acute infarction. She will be admitted at this time to [facility] for further evaluation and treatment.    62 YO WITH HISTORY OF GASTROPARESIS, DMII, ABDOMINAL WALL MESH INFECTION, MULTIPLE DRUG ALLERGIES AND PRIOR ESBL UTI PRESENTS WITH FEVER, CONFUSION AND RLQ PAIN. ONSET THIS AFTERNOON. CT ABDOMEN REVEALS MILD RIGHT HYDRONEPHROSIS AND SOME MIDLINE THICKENING AND FLUID COLLECTION IN THE SUBCUTANEOUS TISSUES.    Review of Systems   Constitutional: Positive for chills, fatigue and fever.        Fever 102.5 with tylenol intake an hour before.   Respiratory: Negative for chest tightness and shortness of breath.    Cardiovascular: Negative for chest pain and palpitations.   Gastrointestinal: Positive for abdominal pain,  "constipation, nausea and vomiting. Negative for diarrhea.        RLQ pain. \"My gastric stimulator is dead... Supposed to be replaced.\" Chronic linzess, gets a Rx drug from [facility].   Genitourinary: Positive for dysuria. Negative for difficulty urinating, frequency and urgency.        Recent abx use - bactrim, then macrobid.   Neurological: Positive for dizziness, weakness and headaches. Negative for tremors, syncope, speech difficulty, light-headedness and numbness.        HA for a week using tylenol and advil. Dizziness and balance trouble worst with eyes closed. Denies falls.   Psychiatric/Behavioral: Positive for confusion.        Reports her son has noticed acute confusion.   Otherwise complete ROS performed and negative except as mentioned in the HPI.  DICUSSED ROS AREAS WITH PATIENT AND ABOVE REFLECTS MY FINDINGS.    Past Medical History:   Diagnosis Date   • Arthritis    • Arthritis    • Cancer     breast   • Chronic pain     on dilaudid x4 years   • Depression    • Diabetes     type II   • Diabetic neuropathy     gabapentin, lamictal; reports uses xanax for this when severe as well   • Gastroparesis    • GERD (gastroesophageal reflux disease)     PPI and pepcid   • Hypercholesteremia    • Hypertension    • Osteoporosis     reclast inj yearly   • RLQ abdominal pain 7/14/2017   REVIEWED    Past Surgical History:   Procedure Laterality Date   • ADENOIDECTOMY     • APPENDECTOMY     • BREAST SURGERY     • CHOLECYSTECTOMY     • GASTRIC STIMULATOR IMPLANT SURGERY     • HERNIA REPAIR     • LUMBAR LAMINECTOMY      reports 4 back surgeries   • TONSILLECTOMY     • TRIGGER FINGER RELEASE     • WRIST GANGLION EXCISION     REVIEWED    Family History   Problem Relation Age of Onset   • Alcohol abuse Father    • Cancer Father    • Dementia Maternal Grandmother    • Hypertension Mother    • Arthritis Mother    • Osteoporosis Mother    REVIEWED    Social History     Social History   • Marital status:      Spouse name: " "N/A   • Number of children: N/A   • Years of education: N/A     Occupational History   • Not on file.     Social History Main Topics   • Smoking status: Never Smoker   • Smokeless tobacco: Never Used   • Alcohol use No   • Drug use: No   • Sexual activity: Not on file     Other Topics Concern   • Not on file     Social History Narrative    Patient is a 61 year old white female.   REVIEWED    Medications:  Reviewed and reconciled on Admission.    Allergies:  Allergies   Allergen Reactions   • Actos [Pioglitazone] Shortness Of Breath   • Ciprocinonide [Fluocinolone] Anaphylaxis   • Codeine Anaphylaxis   • Kefzol [Cefazolin] Anaphylaxis   • Metformin And Related Shortness Of Breath   • Unasyn [Ampicillin-Sulbactam Sodium] Anaphylaxis   • Biaxin [Clarithromycin] Hives and Nausea And Vomiting   • Crestor [Rosuvastatin Calcium] Myalgia   • Lisinopril Other (See Comments)     Orthostatic hypotension   • Prilosec [Omeprazole] Other (See Comments)     Gastris standstill   REVIEWED      Objective     Physical Exam:  Vital Signs: /72 (Patient Position: Sitting)  Pulse 98  Temp (!) 102.5 °F (39.2 °C) (Oral)   Resp 18  Ht 70\" (177.8 cm)  Wt 230 lb (104 kg)  SpO2 95%  BMI 33 kg/m2  Physical Exam  Gen-no acute distress, non toxic, sitting up in bed  CV-RRR, S1 S2 normal, no m/r/g  Resp-CTAB, no wheezes, rhonchi or rales  Abd-soft, Non tender, Non distended, normo active bowel sounds, midline incision/scar without induration or erythema  Ext-No lower extremity cyanosis clubbing or edema bilaterally  Neuro-speech clear, aaox3, moves all extremities  Psych-normal affect   Skin- No rash on exposed UE or LE bilaterally        Results Reviewed:    Results from last 7 days  Lab Units 07/13/17  2310   WBC 10*3/mm3 11.62*   HEMOGLOBIN g/dL 13.5   PLATELETS 10*3/mm3 218       Results from last 7 days  Lab Units 07/13/17  2310   SODIUM mmol/L 134   POTASSIUM mmol/L 4.2   CO2 mmol/L 18.0*   CREATININE mg/dL 0.90   GLUCOSE mg/dL " 296*   CALCIUM mg/dL 9.4      Ref. Range 7/13/2017 23:00   Color, UA Latest Ref Range: Yellow, Straw  Yellow   Appearance, UA Latest Ref Range: Clear  Clear   Specific [place], UA Latest Ref Range: 1.001 - 1.030  1.017   pH, UA Latest Ref Range: 5.0 - 8.0  6.5   Glucose, UA Latest Ref Range: Negative  >=1000 mg/dL (3+) (A)   Ketones, UA Latest Ref Range: Negative  Trace (A)   Blood, UA Latest Ref Range: Negative  Negative   Nitrite, UA Latest Ref Range: Negative  Negative   Leuk Esterase, UA Latest Ref Range: Negative  Small (1+) (A)   Protein, UA Latest Ref Range: Negative  Negative   Bilirubin, UA Latest Ref Range: Negative  Negative   Urobilinogen, UA Latest Ref Range: 0.2 - 1.0 E.U./dL  1.0 E.U./dL   RBC, UA Latest Ref Range: None Seen, 0-2 /HPF 0-2   WBC, UA Latest Ref Range: None Seen /HPF 13-20 (A)   Bacteria, UA Latest Ref Range: None Seen, Trace /HPF Trace   Methodology: Unknown Automated Microscopy     EXAM:  XR Chest, 1 View  07/13/2017:     CLINICAL HISTORY:  61 years old, female; Signs and symptoms; Fever; Additional info: Fever AMS     TECHNIQUE:  Frontal view of the chest.     COMPARISON:  No relevant prior studies available.     FINDINGS:  Prominent lung markings/peribronchial thickening without definite evidence of   consolidation, no evidence of pleural effusion or pneumothorax. Cardiomegaly   with pulmonary vascular congestion. Surgical clips in the LEFT axilla.   Tortuous calcific aortic arch and descending thoracic aorta.      IMPRESSION:  Chronic cardiopulmonary changes as described without evidence of an active   lung parenchymal lesion.         THIS DOCUMENT HAS BEEN ELECTRONICALLY SIGNED BY JASON SPANGLER MD    EXAM:  CT Abdomen and Pelvis Without Intravenous Contrast  07/13/2017     CLINICAL HISTORY:  61 years old, female; Pain; Other: See notes; Prior surgery; Surgery date: 6+   months; Surgery type: Amlu, hernia, abdominal abscess drains; Additional info:   Abd pain  fever     TECHNIQUE:  Axial computed tomography images of the abdomen and pelvis without   intravenous contrast. This CT exam was performed using one or more of the   following dose reduction techniques: automated exposure control, adjustment of   the mA and/or kV according to patient size, and/or use of iterative   reconstruction technique.     COMPARISON:  CT ABDOMEN PELVIS W CONTRAST 1/1/2017 4:02:36 PM     FINDINGS:  PANCREATICOHEPATOBILIARY: Enlarged liver shows diffuse fatty infiltration.   No significant intra-or extrahepatic ductal dilation. Cholecystectomy clips in   the gallbladder fossa. Pancreas and spleen are unremarkable.      GENITOURINARY: No adrenal mass. Renal cortical scarring, focal atrophy and   indeterminate renal cortical cystic nodules. Nonobstructive bilateral renal   stone(s) measuring less than 5 mm. Non-obstructive 3 mm proximal LEFT ureteric   stone. Mild RIGHT hydronephrosis and uterine thickening without obstructive   urolithiasis. Kidneys are otherwise unremarkable. Left hip hardware with   extensive artifact precluding optimal evaluation of the pelvis. Urinary   bladder is within normal limits. Uterus is normal, complex LEFT ovarian   cyst/follicle in a mildly enlarged LEFT ovary which measures approximately 4.5   cm. No free fluid in the pelvis.     GASTROINTESTINAL: Colon contains a moderate amount of fecal matter. A few   colonic diverticula without evidence of acute diverticulitis. A small size   hiatal hernia with nonspecific wall thickening of the distal thoracic esophagus   suggestive of esophagitis/reflux. Gastric stimulator electrodes, streak   artifact from back to back in the RIGHT anterolateral abdominal wall. No   evidence of free intraperitoneal air or fluid collection. APPENDIX is not   visualized.      OTHER STRUCTURES: Atherosclerotic calcification of the aorta without evidence   of aneurysm. No bulky lymph node enlargement. A epigastric hernia containing   fat.  Midline anterior abdominal wall incision with ill-defined soft tissue   thickening, infiltration of the subcutaneous fat and small amount of   nonloculated fluid measuring approximately 7.7 x 1.8 cm. Advanced chronic   degenerative changes in the lumbar spine.      IMPRESSION:  Small bilateral renal calculi without evidence of obstructive urolithiasis.      Mild RIGHT hydronephrosis and urothelial thickening, possibility of   pyelonephritis/UTI cannot be excluded. Recommend correlation with urinary   analysis.     Soft tissue thickening and fluid at the midline abdominal wall incision.   Differential diagnosis includes granulation tissue, seroma versus   inflammatory/infection with early abscess formation.      Enlarged LEFT ovarian cysts/follicles abnormal for the patient's age,   recommend sonography followup.     Numerous other nonemergent findings as described.      NOTE: Suboptimal evaluation of bowel loops and abdominal organs due to lack   of oral and intravenous contrast.       THIS DOCUMENT HAS BEEN ELECTRONICALLY SIGNED BY JASON SPANGLER MD    EXAM:  CT Head Without Intravenous Contrast  07/13/2017     CLINICAL HISTORY:  61 years old, female; Pain; Other: See notes; Additional info: AMS fever     TECHNIQUE:  Axial computed tomography images of the head/brain without intravenous   contrast. This CT exam was performed using one or more of the following dose   reduction techniques: automated exposure control, adjustment of the mA and/or   kV according to patient size, and/or use of iterative reconstruction technique.     COMPARISON:  CT HEAD WO CONTRAST 1/4/2017 3:16:01 AM     FINDINGS:  Bilateral periventricular lucencies without intraparenchymal hemorrhage and   no obvious acute ischemic stroke. No intra-or extra-axial fluid collection, no   supra-or infratentorial mass, no mass effect or midline shift.      Mildly dilated ventricles, sulci and basal cisterns without evidence of   hydrocephalus. Skull bones  are normal. No significant mucoperiosteal   thickening in the visualized paranasal sinuses. No evidence of mastoid   effusion.      IMPRESSION:  Mild chronic microvascular disease and generalized atrophy without acute   intracranial abnormality. No evidence of acute hemorrhage, mass lesion or   obvious acute ischemic infarction.      THIS DOCUMENT HAS BEEN ELECTRONICALLY SIGNED BY JASON SPANGLER MD    I have personally reviewed and interpreted available lab data, radiology studies and ECG obtained at time of admission.     Assessment / Plan     Assessment/Problem List:   Hospital Problem List     * (Principal)Pyelonephritis    Type 2 diabetes mellitus (Chronic)    Gastroparesis (Chronic)    Chronic pain syndrome, on PO dilaudid at home (Chronic)    Hypertension (Chronic)    Osteoporosis (Chronic)    RLQ abdominal pain        Plan:  -Admit to telemetry  -Gentle IV hydration  -Continue Merrem, multiple documented allergies   --Probiotic added  -Consult ID in AM   -Follow urine and blood cultures  -Tylenol as needed   -Will avoid narcotics and opiates beyond home regimen second to oversedation in the past  -antiemetics as needed  -ACHS accuchecks, high dose SSI, decrease in basal and long acting insulins while hospitalized to avoid hypoglycemia  -home medications reconciled and continued as appropriate for hospital admission  -Fall precautions  -recheck labs in AM   -CM to see for discharge and home needs  -UMAIR to chart     Fever  Leukocytosis  Delerium (resolved)  Abdominal pain  Possible UTI  Abdominal wall fluid collection  Left ovarian cysts  DMII  Gastroparesis  Chronic pain    For now will continue merrem and follow cultures. At this point it is unclear if urinary source for fever and abdominal pain, as UA is rather bland. Midline fluid collection of uncertain significance, with prior seroma drainage.  Symptomatically patient has improved. Will ask ID to evaluate and make recommendations in am. Patient will need  referral to Gynecology for ovarian cyst evaluation as outpatient.       DVT prophylaxis: TEDS/SCDS, SUBQ Heparin  Code Status: FULL  Admission Status: Patient will be admitted to Inpatient.    Plan of care discussed and developed with Dr. Rangel.   Tessie Chairez, USHA 07/14/17 2:26 AM       Roscoe Rangel MD  07/14/17  5:04 AM

## 2017-07-15 LAB
ANION GAP SERPL CALCULATED.3IONS-SCNC: 10 MMOL/L (ref 3–11)
ANION GAP SERPL CALCULATED.3IONS-SCNC: 3 MMOL/L (ref 3–11)
BUN BLD-MCNC: 11 MG/DL (ref 9–23)
BUN BLD-MCNC: 7 MG/DL (ref 9–23)
BUN/CREAT SERPL: 12.2 (ref 7–25)
BUN/CREAT SERPL: 7.8 (ref 7–25)
CALCIUM SPEC-SCNC: 8.4 MG/DL (ref 8.7–10.4)
CALCIUM SPEC-SCNC: 8.6 MG/DL (ref 8.7–10.4)
CHLORIDE SERPL-SCNC: 106 MMOL/L (ref 99–109)
CHLORIDE SERPL-SCNC: 108 MMOL/L (ref 99–109)
CO2 SERPL-SCNC: 20 MMOL/L (ref 20–31)
CO2 SERPL-SCNC: 25 MMOL/L (ref 20–31)
CREAT BLD-MCNC: 0.9 MG/DL (ref 0.6–1.3)
CREAT BLD-MCNC: 0.9 MG/DL (ref 0.6–1.3)
DEPRECATED RDW RBC AUTO: 51.8 FL (ref 37–54)
ERYTHROCYTE [DISTWIDTH] IN BLOOD BY AUTOMATED COUNT: 16.1 % (ref 11.3–14.5)
GFR SERPL CREATININE-BSD FRML MDRD: 64 ML/MIN/1.73
GFR SERPL CREATININE-BSD FRML MDRD: 64 ML/MIN/1.73
GLUCOSE BLD-MCNC: 194 MG/DL (ref 70–100)
GLUCOSE BLD-MCNC: 62 MG/DL (ref 70–100)
GLUCOSE BLDC GLUCOMTR-MCNC: 106 MG/DL (ref 70–130)
GLUCOSE BLDC GLUCOMTR-MCNC: 125 MG/DL (ref 70–130)
GLUCOSE BLDC GLUCOMTR-MCNC: 180 MG/DL (ref 70–130)
GLUCOSE BLDC GLUCOMTR-MCNC: 73 MG/DL (ref 70–130)
GLUCOSE BLDC GLUCOMTR-MCNC: 78 MG/DL (ref 70–130)
HCT VFR BLD AUTO: 36 % (ref 34.5–44)
HGB BLD-MCNC: 11.2 G/DL (ref 11.5–15.5)
MCH RBC QN AUTO: 27.3 PG (ref 27–31)
MCHC RBC AUTO-ENTMCNC: 31.1 G/DL (ref 32–36)
MCV RBC AUTO: 87.8 FL (ref 80–99)
PLATELET # BLD AUTO: 203 10*3/MM3 (ref 150–450)
PMV BLD AUTO: 11.2 FL (ref 6–12)
POTASSIUM BLD-SCNC: 3.7 MMOL/L (ref 3.5–5.5)
POTASSIUM BLD-SCNC: 4.1 MMOL/L (ref 3.5–5.5)
RBC # BLD AUTO: 4.1 10*6/MM3 (ref 3.89–5.14)
SODIUM BLD-SCNC: 136 MMOL/L (ref 132–146)
SODIUM BLD-SCNC: 136 MMOL/L (ref 132–146)
WBC NRBC COR # BLD: 11.35 10*3/MM3 (ref 3.5–10.8)

## 2017-07-15 PROCEDURE — 63710000001 INSULIN DETEMIR PER 5 UNITS: Performed by: NURSE PRACTITIONER

## 2017-07-15 PROCEDURE — 25010000002 ONDANSETRON PER 1 MG: Performed by: INTERNAL MEDICINE

## 2017-07-15 PROCEDURE — 80048 BASIC METABOLIC PNL TOTAL CA: CPT | Performed by: FAMILY MEDICINE

## 2017-07-15 PROCEDURE — 25010000002 MEROPENEM: Performed by: INTERNAL MEDICINE

## 2017-07-15 PROCEDURE — 25010000002 HYDROMORPHONE PER 4 MG: Performed by: FAMILY MEDICINE

## 2017-07-15 PROCEDURE — 99232 SBSQ HOSP IP/OBS MODERATE 35: CPT | Performed by: FAMILY MEDICINE

## 2017-07-15 PROCEDURE — 25010000002 KETOROLAC TROMETHAMINE PER 15 MG: Performed by: FAMILY MEDICINE

## 2017-07-15 PROCEDURE — 80048 BASIC METABOLIC PNL TOTAL CA: CPT | Performed by: PHYSICIAN ASSISTANT

## 2017-07-15 PROCEDURE — 82962 GLUCOSE BLOOD TEST: CPT

## 2017-07-15 PROCEDURE — 85027 COMPLETE CBC AUTOMATED: CPT | Performed by: FAMILY MEDICINE

## 2017-07-15 PROCEDURE — 63710000001 PROMETHAZINE PER 25 MG: Performed by: NURSE PRACTITIONER

## 2017-07-15 RX ORDER — KETOROLAC TROMETHAMINE 15 MG/ML
15 INJECTION, SOLUTION INTRAMUSCULAR; INTRAVENOUS EVERY 6 HOURS PRN
Status: DISCONTINUED | OUTPATIENT
Start: 2017-07-15 | End: 2017-07-17 | Stop reason: HOSPADM

## 2017-07-15 RX ORDER — HYDROMORPHONE HCL 110MG/55ML
2 PATIENT CONTROLLED ANALGESIA SYRINGE INTRAVENOUS EVERY 4 HOURS PRN
Status: DISCONTINUED | OUTPATIENT
Start: 2017-07-15 | End: 2017-07-17 | Stop reason: HOSPADM

## 2017-07-15 RX ADMIN — FLUTICASONE PROPIONATE 1 SPRAY: 50 SPRAY, METERED NASAL at 08:18

## 2017-07-15 RX ADMIN — HYDROMORPHONE HYDROCHLORIDE 2 MG: 2 INJECTION, SOLUTION INTRAMUSCULAR; INTRAVENOUS; SUBCUTANEOUS at 22:22

## 2017-07-15 RX ADMIN — KETOROLAC TROMETHAMINE 15 MG: 15 INJECTION, SOLUTION INTRAMUSCULAR; INTRAVENOUS at 21:30

## 2017-07-15 RX ADMIN — MEROPENEM 1 G: 1 INJECTION, POWDER, FOR SOLUTION INTRAVENOUS at 12:06

## 2017-07-15 RX ADMIN — ONDANSETRON 4 MG: 2 INJECTION INTRAMUSCULAR; INTRAVENOUS at 05:16

## 2017-07-15 RX ADMIN — INSULIN DETEMIR 50 UNITS: 100 INJECTION, SOLUTION SUBCUTANEOUS at 21:30

## 2017-07-15 RX ADMIN — HYDROMORPHONE HYDROCHLORIDE 1 MG: 1 INJECTION, SOLUTION INTRAMUSCULAR; INTRAVENOUS; SUBCUTANEOUS at 04:58

## 2017-07-15 RX ADMIN — HYDROMORPHONE HYDROCHLORIDE 2 MG: 2 INJECTION, SOLUTION INTRAMUSCULAR; INTRAVENOUS; SUBCUTANEOUS at 12:05

## 2017-07-15 RX ADMIN — MEROPENEM 1 G: 1 INJECTION, POWDER, FOR SOLUTION INTRAVENOUS at 18:15

## 2017-07-15 RX ADMIN — PROMETHAZINE HYDROCHLORIDE 25 MG: 25 TABLET ORAL at 08:30

## 2017-07-15 RX ADMIN — SODIUM CHLORIDE 125 ML/HR: 9 INJECTION, SOLUTION INTRAVENOUS at 08:27

## 2017-07-15 RX ADMIN — METOPROLOL SUCCINATE 100 MG: 100 TABLET, EXTENDED RELEASE ORAL at 08:10

## 2017-07-15 RX ADMIN — LAMOTRIGINE 100 MG: 100 TABLET ORAL at 08:11

## 2017-07-15 RX ADMIN — Medication 250 MG: at 18:11

## 2017-07-15 RX ADMIN — KETOROLAC TROMETHAMINE 15 MG: 15 INJECTION, SOLUTION INTRAMUSCULAR; INTRAVENOUS at 10:40

## 2017-07-15 RX ADMIN — INSULIN LISPRO 10 UNITS: 100 INJECTION, SOLUTION INTRAVENOUS; SUBCUTANEOUS at 12:06

## 2017-07-15 RX ADMIN — GABAPENTIN 600 MG: 300 CAPSULE ORAL at 08:10

## 2017-07-15 RX ADMIN — MEROPENEM 1 G: 1 INJECTION, POWDER, FOR SOLUTION INTRAVENOUS at 02:16

## 2017-07-15 RX ADMIN — HYDROMORPHONE HYDROCHLORIDE 1 MG: 1 INJECTION, SOLUTION INTRAMUSCULAR; INTRAVENOUS; SUBCUTANEOUS at 02:16

## 2017-07-15 RX ADMIN — GABAPENTIN 600 MG: 300 CAPSULE ORAL at 21:30

## 2017-07-15 RX ADMIN — PANTOPRAZOLE SODIUM 40 MG: 40 TABLET, DELAYED RELEASE ORAL at 08:10

## 2017-07-15 RX ADMIN — HYDROMORPHONE HYDROCHLORIDE 1 MG: 1 INJECTION, SOLUTION INTRAMUSCULAR; INTRAVENOUS; SUBCUTANEOUS at 08:11

## 2017-07-15 RX ADMIN — SODIUM CHLORIDE 1000 ML: 9 INJECTION, SOLUTION INTRAVENOUS at 10:39

## 2017-07-15 RX ADMIN — SODIUM CHLORIDE 1000 ML: 9 INJECTION, SOLUTION INTRAVENOUS at 15:16

## 2017-07-15 RX ADMIN — INSULIN LISPRO 3 UNITS: 100 INJECTION, SOLUTION INTRAVENOUS; SUBCUTANEOUS at 21:31

## 2017-07-15 RX ADMIN — HYDROMORPHONE HYDROCHLORIDE 2 MG: 2 INJECTION, SOLUTION INTRAMUSCULAR; INTRAVENOUS; SUBCUTANEOUS at 18:11

## 2017-07-15 RX ADMIN — LAMOTRIGINE 100 MG: 100 TABLET ORAL at 21:31

## 2017-07-15 RX ADMIN — ACETAMINOPHEN 650 MG: 325 TABLET, FILM COATED ORAL at 08:11

## 2017-07-15 RX ADMIN — Medication 250 MG: at 08:10

## 2017-07-15 NOTE — PLAN OF CARE
Problem: Patient Care Overview (Adult)  Goal: Plan of Care Review  Outcome: Ongoing (interventions implemented as appropriate)    07/15/17 0650   Patient Care Overview   Progress progress towards functional goals is fair   Outcome Evaluation   Outcome Summary/Follow up Plan Dilaudid increased from 0.5mg PO to 1.0 mg IV and PT states her pain is not controlled. Sepsis and E. Coli Positive.

## 2017-07-15 NOTE — PROGRESS NOTES
No new issues.  Pain continues to be mild on right but none on left.  No N/V.    AF/VSS    Abd soft, No CVAT    WBC 11, Cr 0.9  Culture 100k GNR    A/P: UTI, left ureteral stone with mild R HDN.  1. Continue abx.

## 2017-07-15 NOTE — PROGRESS NOTES
"[facility] Progress Note    7/13/2017      Antibiotics:  IV Anti-Infectives     Ordered     Dose/Rate Route Frequency Start Stop    07/14/17 0239  meropenem (MERREM) 1 g/100 mL 0.9% NS VTB (mbp)     Ordering Provider:  Roscoe Rangel MD    1 g  over 30 Minutes Intravenous Every 8 Hours 07/14/17 1000      07/14/17 0106  meropenem (MERREM) 1 g/100 mL 0.9% NS VTB (mbp)     Ordering Provider:  USHA Kenney    1 g  over 30 Minutes Intravenous Once 07/14/17 0108 07/14/17 0242          CC:pyelo    HPI:  Patient is a 61 y.o. Yr old female PT OF DR TAYLOR per patient, with history of diabetes type 2 with gastroparesis and chronic gastric stimulator implant, follows and low-level apparently with plans for stimulator replacement July 2017. She was hospitalized January 2017 with ESBL Escherichia coli UTI, discharged with carbapenem and all follow-up with Dr. Garvey. He subsequently treated her with Bactrim/Macrobid for at least one month. She is readmitted on July 13, 2017 with recurrent right flank pain and nausea/vomiting with dysuria and fever in addition to tachycardia consistent with sepsis. Mention made of also transient encephalopathy likely related to sepsis.     7/15/17 fever less but still generally feels crummy. No shortness of breath cough or hemoptysis. No diarrhea. No rash. No other particular exposures. She has right flank pain which is constant, nonradiating, sharp, worse with percussion, generally better with analgesics but not completely relieved. Still with nausea. No hematochezia melena or hematemesis.    ROS:      7/15/17 No f/c/s. No n/v/d. No rash. No new ADR to Abx.     PE:   /76 (BP Location: Right arm, Patient Position: Lying)  Pulse 91  Temp 98.9 °F (37.2 °C) (Oral)   Resp 20  Ht 65\" (165.1 cm)  Wt 236 lb (107 kg)  SpO2 96%  BMI 39.27 kg/m2    GENERAL: Awake and alert, in no acute distress.   HEENT:  No conjunctival injection. No icterus. Oropharynx clear without evidence of thrush or " exudate.     HEART: RRR; No murmur, rubs, gallops.   LUNGS: Clear to auscultation bilaterally without wheezing, rales, rhonchi. Normal respiratory effort. Nonlabored. No dullness.  ABDOMEN: Soft, mildly tender right flank, nondistended. Positive bowel sounds. No rebound or guarding. NO mass or HSM.  EXT:  No cyanosis, clubbing or edema. No cord.  : Genitalia generally unremarkable.  Without Denise catheter.  SKIN: Warm and dry without cutaneous eruptions on Inspection/palpation.      No peripheral stigmata/phenomena of endocarditis     Abdominal wall has no redness induration or warmth. No crepitus or bulla and no dehiscence or drainage from prior surgical sites    Laboratory Data      Results from last 7 days  Lab Units 07/15/17  0528 07/14/17  0537 07/13/17  2310   WBC 10*3/mm3 11.35* 11.18* 11.62*   HEMOGLOBIN g/dL 11.2* 12.1 13.5   HEMATOCRIT % 36.0 38.0 42.1   PLATELETS 10*3/mm3 203 208 218       Results from last 7 days  Lab Units 07/15/17  0528   SODIUM mmol/L 136   POTASSIUM mmol/L 3.7   CHLORIDE mmol/L 106   CO2 mmol/L 20.0   BUN mg/dL 7*   CREATININE mg/dL 0.90   GLUCOSE mg/dL 62*   CALCIUM mg/dL 8.4*       Results from last 7 days  Lab Units 07/13/17  2310   ALK PHOS U/L 95   BILIRUBIN mg/dL 0.4   ALT (SGPT) U/L 63*   AST (SGOT) U/L 54*               Estimated Creatinine Clearance: 79.8 mL/min (by C-G formula based on Cr of 0.9).      Microbiology:      Radiology:  Imaging Results (last 72 hours)     Procedure Component Value Units Date/Time    XR Chest 1 View [[id number]]  (Abnormal) Collected:  07/13/17 2258     Updated:  07/13/17 8255    Narrative:       EXAM:    XR Chest, 1 View    CLINICAL HISTORY:    61 years old, female; Signs and symptoms; Fever; Additional info: Fever AMS    TECHNIQUE:    Frontal view of the chest.    COMPARISON:    No relevant prior studies available.    FINDINGS:    Prominent lung markings/peribronchial thickening without definite evidence of   consolidation, no evidence of  pleural effusion or pneumothorax.  Cardiomegaly   with pulmonary vascular congestion.  Surgical clips in the LEFT axilla.   Tortuous calcific aortic arch and descending thoracic aorta.        Impression:         Chronic cardiopulmonary changes as described without evidence of an active   lung parenchymal lesion.       THIS DOCUMENT HAS BEEN ELECTRONICALLY SIGNED BY JASON SPANGLER MD    CT Head Without Contrast [[id number]]  (Abnormal) Collected:  07/13/17 2255     Updated:  07/14/17 0009    Narrative:       EXAM:    CT Head Without Intravenous Contrast    CLINICAL HISTORY:    61 years old, female; Pain; Other: See notes; Additional info: AMS fever    TECHNIQUE:    Axial computed tomography images of the head/brain without intravenous   contrast.  This CT exam was performed using one or more of the following dose   reduction techniques:  automated exposure control, adjustment of the mA and/or   kV according to patient size, and/or use of iterative reconstruction technique.    COMPARISON:    CT HEAD WO CONTRAST 1/4/2017 3:16:01 AM    FINDINGS:    Bilateral periventricular lucencies without intraparenchymal hemorrhage and   no obvious acute ischemic stroke. No intra-or extra-axial fluid collection, no   supra-or infratentorial mass, no mass effect or midline shift.       Mildly dilated ventricles, sulci and basal cisterns without evidence of   hydrocephalus. Skull bones are normal.  No significant mucoperiosteal   thickening in the visualized paranasal sinuses. No evidence of mastoid   effusion.       Impression:         Mild chronic microvascular disease and generalized atrophy without acute   intracranial abnormality. No evidence of acute hemorrhage, mass lesion or   obvious acute ischemic infarction.      THIS DOCUMENT HAS BEEN ELECTRONICALLY SIGNED BY JASON SPANGLER MD    CT Abdomen Pelvis Without Contrast [[id number]]  (Abnormal) Collected:  07/13/17 2255     Updated:  07/14/17 0017    Narrative:       EXAM:    CT  Abdomen and Pelvis Without Intravenous Contrast    CLINICAL HISTORY:    61 years old, female; Pain; Other: See notes; Prior surgery; Surgery date: 6+   months; Surgery type: Malu, hernia, abdominal abscess drains; Additional info:   Abd pain fever    TECHNIQUE:    Axial computed tomography images of the abdomen and pelvis without   intravenous contrast.  This CT exam was performed using one or more of the   following dose reduction techniques:  automated exposure control, adjustment of   the mA and/or kV according to patient size, and/or use of iterative   reconstruction technique.    COMPARISON:    CT ABDOMEN PELVIS W CONTRAST 1/1/2017 4:02:36 PM    FINDINGS:    PANCREATICOHEPATOBILIARY: Enlarged liver shows diffuse fatty infiltration.    No significant intra-or extrahepatic ductal dilation.  Cholecystectomy clips in   the gallbladder fossa. Pancreas and spleen are unremarkable.       GENITOURINARY: No adrenal mass.  Renal cortical scarring, focal atrophy and   indeterminate renal cortical cystic nodules.  Nonobstructive bilateral renal   stone(s) measuring less than 5 mm. Non-obstructive 3 mm proximal LEFT ureteric   stone.  Mild RIGHT hydronephrosis and uterine thickening without obstructive   urolithiasis.  Kidneys are otherwise unremarkable. Left hip hardware with   extensive artifact precluding optimal evaluation of the pelvis.  Urinary   bladder is within normal limits.  Uterus is normal, complex LEFT ovarian   cyst/follicle in a mildly enlarged LEFT ovary which measures approximately 4.5   cm. No free fluid in the pelvis.      GASTROINTESTINAL: Colon contains a moderate amount of fecal matter.  A few   colonic diverticula without evidence of acute diverticulitis.   A small size   hiatal hernia with nonspecific wall thickening of the distal thoracic esophagus   suggestive of esophagitis/reflux.  Gastric stimulator electrodes, streak   artifact from back to back in the RIGHT anterolateral abdominal wall. No    evidence of free intraperitoneal air or fluid collection.  APPENDIX is not   visualized.       OTHER STRUCTURES: Atherosclerotic calcification of the aorta without evidence   of aneurysm.  No bulky lymph node enlargement.  A epigastric hernia containing   fat.  Midline anterior abdominal wall incision with ill-defined soft tissue   thickening, infiltration of the subcutaneous fat and small amount of   nonloculated fluid measuring approximately 7.7 x 1.8 cm. Advanced chronic   degenerative changes in the lumbar spine.       Impression:         Small bilateral renal calculi without evidence of obstructive urolithiasis.       Mild RIGHT hydronephrosis and urothelial thickening, possibility of   pyelonephritis/UTI cannot be excluded.  Recommend correlation with urinary   analysis.      Soft tissue thickening and fluid at the midline abdominal wall incision.    Differential diagnosis includes granulation tissue, seroma versus   inflammatory/infection with early abscess formation.       Enlarged LEFT ovarian cysts/follicles abnormal for the patient's age,   recommend sonography followup.      Numerous other nonemergent findings as described.       NOTE: Suboptimal evaluation of bowel loops and abdominal organs due to lack   of oral and intravenous contrast.       THIS DOCUMENT HAS BEEN ELECTRONICALLY SIGNED BY JASON SPANGLER MD            Impression:   --Acute sepsis. Primary concern urinary source with tachycardia/fever and encephalopathy, with abnormal urinary symptoms suggestive to patient of pyelonephritis/UTI and pending urine culture. Otherwise monitor exam for alternative focus. Culture data pending. Empiric antibiotics. Resuscitative measures per internal medicine     --Acute complicated UTI/right pyelonephritis. GNR.  Abnormal CT scan suggesting hydronephrosis as well. History ESBL organisms and temperature decreased on Merrem so far. Further complicated by numerous antibiotic allergies.  Any option/timing or  threshold for workup or intervention regarding functional/anatomic issues is up to urology.     --Chronic gastroparesis with indwelling gastric stimulator and plans for replacement in [place]. Timing per them. Likely would need to be postponed with active infection.  CT scan raises the issue of possible fluid in the abdominal wall. Current exam does not suggest active cellulitis, no necrotic soft tissue change and no dehiscence or drainage from prior surgical sites.     --Diabetes type 2. You need to tightly control blood sugar to give best chance for healing    PLAN:   --IV Merrem     --I discussed potential risks and benefits of the prescribed antibiotics that include, but are not limited to, solid organ toxicity, neuro/bertha/vestibular toxicity, renal toxicity, CDiff, cytopenias, hypersensitivity, etc.. Patient/Family voice understanding and agree to proceed.      --Monitor IV and IV antibiotic with risk for systemic complication and potential drug interaction     --Check/review labs cultures and scans     --As above, you should give consideration to urology input for further workup regarding functional/anatomic issues     --Highly complex set of issues with high risk for further serious morbidity and other serious sequela        Donavon Rios MD  7/15/2017

## 2017-07-16 LAB
ANION GAP SERPL CALCULATED.3IONS-SCNC: 5 MMOL/L (ref 3–11)
BACTERIA SPEC AEROBE CULT: ABNORMAL
BUN BLD-MCNC: 9 MG/DL (ref 9–23)
BUN/CREAT SERPL: 11.3 (ref 7–25)
CALCIUM SPEC-SCNC: 8.6 MG/DL (ref 8.7–10.4)
CHLORIDE SERPL-SCNC: 109 MMOL/L (ref 99–109)
CO2 SERPL-SCNC: 25 MMOL/L (ref 20–31)
CREAT BLD-MCNC: 0.8 MG/DL (ref 0.6–1.3)
DEPRECATED RDW RBC AUTO: 51.1 FL (ref 37–54)
ERYTHROCYTE [DISTWIDTH] IN BLOOD BY AUTOMATED COUNT: 15.8 % (ref 11.3–14.5)
GFR SERPL CREATININE-BSD FRML MDRD: 73 ML/MIN/1.73
GLUCOSE BLD-MCNC: 127 MG/DL (ref 70–100)
GLUCOSE BLDC GLUCOMTR-MCNC: 104 MG/DL (ref 70–130)
GLUCOSE BLDC GLUCOMTR-MCNC: 108 MG/DL (ref 70–130)
GLUCOSE BLDC GLUCOMTR-MCNC: 132 MG/DL (ref 70–130)
GLUCOSE BLDC GLUCOMTR-MCNC: 202 MG/DL (ref 70–130)
HCT VFR BLD AUTO: 34.3 % (ref 34.5–44)
HGB BLD-MCNC: 10.5 G/DL (ref 11.5–15.5)
MAGNESIUM SERPL-MCNC: 2.2 MG/DL (ref 1.3–2.7)
MCH RBC QN AUTO: 27 PG (ref 27–31)
MCHC RBC AUTO-ENTMCNC: 30.6 G/DL (ref 32–36)
MCV RBC AUTO: 88.2 FL (ref 80–99)
PLATELET # BLD AUTO: 195 10*3/MM3 (ref 150–450)
PMV BLD AUTO: 11.4 FL (ref 6–12)
POTASSIUM BLD-SCNC: 3.7 MMOL/L (ref 3.5–5.5)
RBC # BLD AUTO: 3.89 10*6/MM3 (ref 3.89–5.14)
SODIUM BLD-SCNC: 139 MMOL/L (ref 132–146)
WBC NRBC COR # BLD: 8.03 10*3/MM3 (ref 3.5–10.8)

## 2017-07-16 PROCEDURE — 25010000002 HYDROMORPHONE PER 4 MG: Performed by: FAMILY MEDICINE

## 2017-07-16 PROCEDURE — 63710000001 INSULIN DETEMIR PER 5 UNITS: Performed by: NURSE PRACTITIONER

## 2017-07-16 PROCEDURE — 82962 GLUCOSE BLOOD TEST: CPT

## 2017-07-16 PROCEDURE — 25010000002 HEPARIN (PORCINE) PER 1000 UNITS: Performed by: INTERNAL MEDICINE

## 2017-07-16 PROCEDURE — 83735 ASSAY OF MAGNESIUM: CPT | Performed by: FAMILY MEDICINE

## 2017-07-16 PROCEDURE — 25010000002 ONDANSETRON PER 1 MG: Performed by: INTERNAL MEDICINE

## 2017-07-16 PROCEDURE — 25010000002 KETOROLAC TROMETHAMINE PER 15 MG: Performed by: FAMILY MEDICINE

## 2017-07-16 PROCEDURE — 80048 BASIC METABOLIC PNL TOTAL CA: CPT | Performed by: FAMILY MEDICINE

## 2017-07-16 PROCEDURE — 25010000002 MEROPENEM: Performed by: INTERNAL MEDICINE

## 2017-07-16 PROCEDURE — 99232 SBSQ HOSP IP/OBS MODERATE 35: CPT | Performed by: FAMILY MEDICINE

## 2017-07-16 PROCEDURE — 63710000001 INSULIN LISPRO (HUMAN) PER 5 UNITS: Performed by: NURSE PRACTITIONER

## 2017-07-16 PROCEDURE — 63710000001 PROMETHAZINE PER 25 MG: Performed by: NURSE PRACTITIONER

## 2017-07-16 PROCEDURE — 85027 COMPLETE CBC AUTOMATED: CPT | Performed by: FAMILY MEDICINE

## 2017-07-16 RX ORDER — PROMETHAZINE HYDROCHLORIDE 12.5 MG/1
12.5 SUPPOSITORY RECTAL EVERY 6 HOURS PRN
Status: DISCONTINUED | OUTPATIENT
Start: 2017-07-16 | End: 2017-07-17 | Stop reason: HOSPADM

## 2017-07-16 RX ORDER — PROMETHAZINE HYDROCHLORIDE 25 MG/ML
12.5 INJECTION, SOLUTION INTRAMUSCULAR; INTRAVENOUS EVERY 6 HOURS PRN
Status: DISCONTINUED | OUTPATIENT
Start: 2017-07-16 | End: 2017-07-17 | Stop reason: HOSPADM

## 2017-07-16 RX ORDER — PROMETHAZINE HYDROCHLORIDE 6.25 MG/5ML
12.5 SYRUP ORAL EVERY 6 HOURS PRN
Status: DISCONTINUED | OUTPATIENT
Start: 2017-07-16 | End: 2017-07-17 | Stop reason: HOSPADM

## 2017-07-16 RX ORDER — PROMETHAZINE HYDROCHLORIDE 12.5 MG/1
12.5 TABLET ORAL EVERY 6 HOURS PRN
Status: DISCONTINUED | OUTPATIENT
Start: 2017-07-16 | End: 2017-07-17 | Stop reason: HOSPADM

## 2017-07-16 RX ADMIN — LAMOTRIGINE 100 MG: 100 TABLET ORAL at 20:45

## 2017-07-16 RX ADMIN — MEROPENEM 1 G: 1 INJECTION, POWDER, FOR SOLUTION INTRAVENOUS at 02:30

## 2017-07-16 RX ADMIN — INSULIN LISPRO 10 UNITS: 100 INJECTION, SOLUTION INTRAVENOUS; SUBCUTANEOUS at 18:54

## 2017-07-16 RX ADMIN — HYDROMORPHONE HYDROCHLORIDE 2 MG: 2 INJECTION, SOLUTION INTRAMUSCULAR; INTRAVENOUS; SUBCUTANEOUS at 10:46

## 2017-07-16 RX ADMIN — PROMETHAZINE HYDROCHLORIDE 25 MG: 25 TABLET ORAL at 08:11

## 2017-07-16 RX ADMIN — INSULIN LISPRO 10 UNITS: 100 INJECTION, SOLUTION INTRAVENOUS; SUBCUTANEOUS at 08:13

## 2017-07-16 RX ADMIN — HYDROMORPHONE HYDROCHLORIDE 2 MG: 2 INJECTION, SOLUTION INTRAMUSCULAR; INTRAVENOUS; SUBCUTANEOUS at 15:20

## 2017-07-16 RX ADMIN — FLUTICASONE PROPIONATE 1 SPRAY: 50 SPRAY, METERED NASAL at 08:12

## 2017-07-16 RX ADMIN — ALPRAZOLAM 1 MG: 1 TABLET ORAL at 08:12

## 2017-07-16 RX ADMIN — KETOROLAC TROMETHAMINE 15 MG: 15 INJECTION, SOLUTION INTRAMUSCULAR; INTRAVENOUS at 20:45

## 2017-07-16 RX ADMIN — KETOROLAC TROMETHAMINE 15 MG: 15 INJECTION, SOLUTION INTRAMUSCULAR; INTRAVENOUS at 03:47

## 2017-07-16 RX ADMIN — KETOROLAC TROMETHAMINE 15 MG: 15 INJECTION, SOLUTION INTRAMUSCULAR; INTRAVENOUS at 10:46

## 2017-07-16 RX ADMIN — MEROPENEM 1 G: 1 INJECTION, POWDER, FOR SOLUTION INTRAVENOUS at 18:55

## 2017-07-16 RX ADMIN — PANTOPRAZOLE SODIUM 40 MG: 40 TABLET, DELAYED RELEASE ORAL at 08:11

## 2017-07-16 RX ADMIN — HYDROMORPHONE HYDROCHLORIDE 2 MG: 2 INJECTION, SOLUTION INTRAMUSCULAR; INTRAVENOUS; SUBCUTANEOUS at 06:39

## 2017-07-16 RX ADMIN — ONDANSETRON 4 MG: 2 INJECTION INTRAMUSCULAR; INTRAVENOUS at 20:45

## 2017-07-16 RX ADMIN — HYDROMORPHONE HYDROCHLORIDE 2 MG: 2 INJECTION, SOLUTION INTRAMUSCULAR; INTRAVENOUS; SUBCUTANEOUS at 02:29

## 2017-07-16 RX ADMIN — METOPROLOL SUCCINATE 100 MG: 100 TABLET, EXTENDED RELEASE ORAL at 08:11

## 2017-07-16 RX ADMIN — MEROPENEM 1 G: 1 INJECTION, POWDER, FOR SOLUTION INTRAVENOUS at 10:46

## 2017-07-16 RX ADMIN — INSULIN LISPRO 5 UNITS: 100 INJECTION, SOLUTION INTRAVENOUS; SUBCUTANEOUS at 21:06

## 2017-07-16 RX ADMIN — ONDANSETRON 4 MG: 2 INJECTION INTRAMUSCULAR; INTRAVENOUS at 00:32

## 2017-07-16 RX ADMIN — HYDROMORPHONE HYDROCHLORIDE 2 MG: 2 INJECTION, SOLUTION INTRAMUSCULAR; INTRAVENOUS; SUBCUTANEOUS at 22:48

## 2017-07-16 RX ADMIN — INSULIN LISPRO 10 UNITS: 100 INJECTION, SOLUTION INTRAVENOUS; SUBCUTANEOUS at 13:20

## 2017-07-16 RX ADMIN — LAMOTRIGINE 100 MG: 100 TABLET ORAL at 08:11

## 2017-07-16 RX ADMIN — ONDANSETRON 4 MG: 2 INJECTION INTRAMUSCULAR; INTRAVENOUS at 08:14

## 2017-07-16 RX ADMIN — INSULIN DETEMIR 50 UNITS: 100 INJECTION, SOLUTION SUBCUTANEOUS at 20:44

## 2017-07-16 RX ADMIN — Medication 250 MG: at 08:21

## 2017-07-16 RX ADMIN — GABAPENTIN 600 MG: 300 CAPSULE ORAL at 20:45

## 2017-07-16 RX ADMIN — GABAPENTIN 600 MG: 300 CAPSULE ORAL at 08:12

## 2017-07-16 RX ADMIN — Medication 250 MG: at 18:55

## 2017-07-16 NOTE — PROGRESS NOTES
[facility] HOSPITALIST    PROGRESS NOTE    Name:  Cortney Delacruz   Age:  61 y.o.  Sex:  female  :  1955  MRN:  [id number]   Visit Number:  [id number]  Admission Date:  2017  Date Of Service:  17  Primary Care Physician:  Ronnie Garvey MD     LOS: 2 days :      Chief Complaint:  Right flank pain stable. Follow up pyelonephritis.    Patient see this morning after breakfast again. Late entry note.        Subjective / Interval History:   The patient denies further nausea and vomiting, but still has increased right flank pain stable from admission, not controlled on only 1 mg dilaudid.    She denies chest pain, shortness of breath, edema, cough. She complains of moderate intermittently severe right flank pain.         Review of Systems:     General ROS: Patient denies any fevers, chills or loss of consciousness.  Ophthalmic ROS: Denies any diplopia or transient loss of vision.  ENT ROS: Denies sore throat, nasal congestion or ear pain.   Respiratory ROS: Denies cough or shortness of breath.  Cardiovascular ROS: Denies chest pain or palpitations. No history of exertional chest pain.   Gastrointestinal ROS: Resolved nausea and vomiting. Persistant Right flank intermittent crampy abdominal pain. No diarrhea.  Genito-Urinary ROS: Denies dysuria or hematuria.  Musculoskeletal ROS: Complains of chronic back pain. No muscle pain. No calf pain.  Neurological ROS: Denies any focal weakness. No loss of consciousness. Denies any numbness. Denies neck pain.   Dermatological ROS: Denies any redness or pruritis.    Vital Signs:    Temp:  [98.3 °F (36.8 °C)-99 °F (37.2 °C)] 98.3 °F (36.8 °C)  Heart Rate:  [83-98] 95  Resp:  [16-18] 18  BP: (120-146)/(61-85) 120/61    Intake and output:    I/O last 3 completed shifts:  In: 3664 [P.O.:420; I.V.:2944; IV Piggyback:300]  Out: 2315 [Urine:2315]       Physical Examination:    General Appearance:    Alert and cooperative, not in any acute distress.  Rare smiles.   Head:    Atraumatic and normocephalic, without obvious abnormality.   Eyes:            PERRLA, conjunctivae and sclerae normal, no Icterus. No pallor. Extra-occular movements are within normal limits.   Throat:   No oral lesions, no thrush, oral mucosa moist   Neck:   Supple, trachea midline, no thyromegaly, no carotid bruit.   Lungs:     Chest shape is normal. Breath sounds heard bilaterally equally.  No crackles or wheezing. No Pleural rub or bronchial breathing.    Heart:    Normal S1 and S2, no murmur, no gallop, no rub. No JVD   Abdomen:     Normal bowel sounds, no masses, no organomegaly. Soft        Mild right upper quadrant tender, non-distended, no guarding, no rebound tenderness   Extremities:   Moves all extremities well, no edema, no cyanosis, no             clubbing   Skin:   No bleeding, bruising or rash.   Neurologic:   Cranial nerves 2 - 12 grossly intact, sensation intact, Motor power is normal and equal bilaterally.   Laboratory results:    Lab Results (last 24 hours)     Procedure Component Value Units Date/Time    POC Glucose Fingerstick [[id number]]  (Abnormal) Collected:  07/15/17 2056    Specimen:  Blood Updated:  07/15/17 2057     Glucose 180 (H) mg/dL     Narrative:       Meter: [id number] : [id number] Vince Odonnell    Basic Metabolic Panel [[id number]]  (Abnormal) Collected:  07/15/17 2313    Specimen:  Blood Updated:  07/15/17 2349     Glucose 194 (H) mg/dL      BUN 11 mg/dL      Creatinine 0.90 mg/dL      Sodium 136 mmol/L      Potassium 4.1 mmol/L      Chloride 108 mmol/L      CO2 25.0 mmol/L      Calcium 8.6 (L) mg/dL      eGFR Non African Amer 64 mL/min/1.73      BUN/Creatinine Ratio 12.2     Anion Gap 3.0 mmol/L     Narrative:       National [employer] Guidelines    Stage     Description        GFR  1         Normal or High     90+  2         Mild decrease      60-89  3         Moderate decrease  30-59  4         Severe decrease    15-29  5         Kidney  failure     <15    Blood Culture [[id number]]  (Normal) Collected:  07/13/17 2258    Specimen:  Blood from Arm, Right Updated:  07/16/17 0002     Blood Culture No growth at 2 days    Blood Culture [[id number]]  (Normal) Collected:  07/13/17 2310    Specimen:  Blood from Arm, Right Updated:  07/16/17 0002     Blood Culture No growth at 2 days    POC Glucose Fingerstick [[id number]]  (Normal) Collected:  07/16/17 0720    Specimen:  Blood Updated:  07/16/17 0739     Glucose 108 mg/dL     Narrative:       Meter: [id number] : [id number] Mak Craven    CBC (No Diff) [[id number]]  (Abnormal) Collected:  07/16/17 0708    Specimen:  Blood Updated:  07/16/17 0811     WBC 8.03 10*3/mm3      RBC 3.89 10*6/mm3      Hemoglobin 10.5 (L) g/dL      Hematocrit 34.3 (L) %      MCV 88.2 fL      MCH 27.0 pg      MCHC 30.6 (L) g/dL      RDW 15.8 (H) %      RDW-SD 51.1 fl      MPV 11.4 fL      Platelets 195 10*3/mm3     Basic Metabolic Panel [[id number]]  (Abnormal) Collected:  07/16/17 0708    Specimen:  Blood Updated:  07/16/17 0831     Glucose 127 (H) mg/dL      BUN 9 mg/dL      Creatinine 0.80 mg/dL      Sodium 139 mmol/L      Potassium 3.7 mmol/L      Chloride 109 mmol/L      CO2 25.0 mmol/L      Calcium 8.6 (L) mg/dL      eGFR Non African Amer 73 mL/min/1.73      BUN/Creatinine Ratio 11.3     Anion Gap 5.0 mmol/L     Narrative:       National Kid[employer] Guidelines    Stage     Description        GFR  1         Normal or High     90+  2         Mild decrease      60-89  3         Moderate decrease  30-59  4         Severe decrease    15-29  5         Kidney failure     <15    Magnesium [[id number]]  (Normal) Collected:  07/16/17 0708    Specimen:  Blood Updated:  07/16/17 0831     Magnesium 2.2 mg/dL     Urine Culture [[id number]]  (Abnormal)  (Susceptibility) Collected:  07/13/17 2300    Specimen:  Urine from Urine, Clean Catch Updated:  07/16/17 0921     Urine Culture >100,000 CFU/mL Klebsiella pneumoniae (A)    Susceptibility       Klebsiella pneumoniae     LUCY     Ampicillin >16 ug/ml Resistant     Ampicillin + Sulbactam <=8/4 ug/ml Susceptible     Aztreonam <=8 ug/ml Susceptible     Cefepime <=8 ug/ml Susceptible     Cefotaxime <=2 ug/ml Susceptible     Ceftriaxone <=8 ug/ml Susceptible     Cefuroxime sodium <=4 ug/ml Susceptible     Cephalothin <=8 ug/ml Susceptible     Ertapenem <=1 ug/ml Susceptible     Gentamicin <=4 ug/ml Susceptible     Levofloxacin <=2 ug/ml Susceptible     Meropenem <=1 ug/ml Susceptible     Nitrofurantoin <=32 ug/ml Susceptible     Piperacillin + Tazobactam <=16 ug/ml Susceptible     Tetracycline <=4 ug/ml Susceptible     Tobramycin <=4 ug/ml Susceptible     Trimethoprim + Sulfamethoxazole <=2/38 ug/ml Susceptible                    POC Glucose Fingerstick [[id number]]  (Normal) Collected:  07/16/17 1126    Specimen:  Blood Updated:  07/16/17 1137     Glucose 104 mg/dL     Narrative:       Meter: [id number] : [id number] Mak Craven    POC Glucose Fingerstick [[id number]]  (Abnormal) Collected:  07/16/17 1631    Specimen:  Blood Updated:  07/16/17 1651     Glucose 132 (H) mg/dL     Narrative:       Meter: [id number] : [id number] Craven          I have reviewed the patient's laboratory results.    Radiology results:    Imaging Results (last 24 hours)     ** No results found for the last 24 hours. **          I have reviewed the patient's radiology reports.    Medication Review:     I have reviewed the patients active and prn medications.     DATA: CT abdomen and pelvis reveal probable pyelonephritis with small bilateral non-obstructing renal stones. CT head without contrast revealed mild chronic microvascular disease without acute hemorrhage, mass, lesion, or obvious acute infarction.    Assessment:  Patient is a 61 yr old lady with history of ESBL Ecoli UTI, who presented to ED with nausea, vomiting, flank pain. She has chronic pain with chronic oral dilaudid.   Principal Problem:     Pyelonephritis  Active Problems:    Type 2 diabetes mellitus    Gastroparesis    Chronic pain syndrome, on PO dilaudid at home    Hypertension    Osteoporosis    RLQ abdominal pain    Diabetic neuropathy    GERD (gastroesophageal reflux disease)    Fever  Right mild hydronephrosis  Non-obstructive 3 mm proximal LEFT ureteric stone  Left ovarian cysts, recommend further outpatient gyn workup  Sepsis, present on admission, from urinary tract infection   Acute complicated UTI/right pyelonephritis, present on admission  Chronic gastroparesis with indwelling gastric stimulator,due for replacement surgery soon  Uncontrolled type 2 diabetes mellitus        Plan:  Reduce fluid intake today. Urinary retention has resolved. She had advanced diet, with increased nausea today so will add VA promethazine. Appreciate Urology and ID consultations.     She is continued on Merrem for now, with history of Ecoli ESBL.     Continue to monitor daily labs.   She has not been up ambulating much, so encouraged increased ambulation and she did not feel PT was needed.    Patient reported satisfaction with care delivered and treatment plan. UMAIR reviewed.    Prophylaxis: heparin        Hanna Pastor DO  07/16/17  4:58 PM

## 2017-07-16 NOTE — PLAN OF CARE
Problem: Pain, Acute (Adult)  Goal: Identify Related Risk Factors and Signs and Symptoms  Outcome: Ongoing (interventions implemented as appropriate)    07/16/17 0527   Pain, Acute   Related Risk Factors (Acute Pain) disease process;infection   Signs and Symptoms (Acute Pain) nausea/vomiting/anorexia;verbalization of pain descriptors       Goal: Acceptable Pain Control/Comfort Level  Outcome: Ongoing (interventions implemented as appropriate)    07/16/17 0527   Pain, Acute (Adult)   Acceptable Pain Control/Comfort Level making progress toward outcome         Problem: Infection, Risk/Actual (Adult)  Goal: Identify Related Risk Factors and Signs and Symptoms  Outcome: Ongoing (interventions implemented as appropriate)    07/16/17 0527   Infection, Risk/Actual   Infection, Risk/Actual: Related Risk Factors chronic illness/condition   Signs and Symptoms (Infection, Risk/Actual) pain;nausea       Goal: Infection Prevention/Resolution  Outcome: Ongoing (interventions implemented as appropriate)    07/16/17 0527   Infection, Risk/Actual (Adult)   Infection Prevention/Resolution making progress toward outcome

## 2017-07-16 NOTE — PROGRESS NOTES
"[facility] Progress Note    7/13/2017      Antibiotics:  IV Anti-Infectives     Ordered     Dose/Rate Route Frequency Start Stop    07/14/17 0239  meropenem (MERREM) 1 g/100 mL 0.9% NS VTB (mbp)     Ordering Provider:  Roscoe Rangel MD    1 g  over 30 Minutes Intravenous Every 8 Hours 07/14/17 1000      07/14/17 0106  meropenem (MERREM) 1 g/100 mL 0.9% NS VTB (mbp)     Ordering Provider:  USHA Kenney    1 g  over 30 Minutes Intravenous Once 07/14/17 0108 07/14/17 0242          CC:pyelo    HPI:  Patient is a 61 y.o. Yr old female PT OF DR TAYLOR per patient, with history of diabetes type 2 with gastroparesis and chronic gastric stimulator implant, follows and low-level apparently with plans for stimulator replacement July 2017. She was hospitalized January 2017 with ESBL Escherichia coli UTI, discharged with carbapenem and all follow-up with Dr. Garvey. He subsequently treated her with Bactrim/Macrobid for at least one month. She is readmitted on July 13, 2017 with recurrent right flank pain and nausea/vomiting with dysuria and fever in addition to tachycardia consistent with sepsis. Mention made of also transient encephalopathy likely related to sepsis.     7/16/17 fever less since admit. No shortness of breath cough or hemoptysis. No diarrhea. No rash. No other particular exposures. She has right flank pain which is constant, nonradiating, sharp, worse with percussion, generally better with analgesics but not completely relieved. Still with nausea. No hematochezia melena or hematemesis.    ROS:      7/16/17 No f/c/s. No n/v/d. No rash. No new ADR to Abx.     PE:   /73 (BP Location: Left arm, Patient Position: Lying)  Pulse 86  Temp 99 °F (37.2 °C) (Oral)   Resp 16  Ht 65\" (165.1 cm)  Wt 236 lb (107 kg)  SpO2 98%  BMI 39.27 kg/m2    GENERAL: Awake and alert, in no acute distress.   HEENT:  No conjunctival injection. No icterus. Oropharynx clear without evidence of thrush or exudate.     HEART: RRR; No " murmur, rubs, gallops.   LUNGS: Clear to auscultation bilaterally without wheezing, rales, rhonchi. Normal respiratory effort. Nonlabored. No dullness.  ABDOMEN: Soft, mildly tender right flank, nondistended. Positive bowel sounds. No rebound or guarding. NO mass or HSM.  EXT:  No cyanosis, clubbing or edema. No cord.  : Genitalia generally unremarkable.  Without Denise catheter.  SKIN: Warm and dry without cutaneous eruptions on Inspection/palpation.      No peripheral stigmata/phenomena of endocarditis     Abdominal wall has no redness induration or warmth. No crepitus or bulla and no dehiscence or drainage from prior surgical sites    Laboratory Data      Results from last 7 days  Lab Units 07/15/17  0528 07/14/17  0537 07/13/17  2310   WBC 10*3/mm3 11.35* 11.18* 11.62*   HEMOGLOBIN g/dL 11.2* 12.1 13.5   HEMATOCRIT % 36.0 38.0 42.1   PLATELETS 10*3/mm3 203 208 218       Results from last 7 days  Lab Units 07/15/17  2313   SODIUM mmol/L 136   POTASSIUM mmol/L 4.1   CHLORIDE mmol/L 108   CO2 mmol/L 25.0   BUN mg/dL 11   CREATININE mg/dL 0.90   GLUCOSE mg/dL 194*   CALCIUM mg/dL 8.6*       Results from last 7 days  Lab Units 07/13/17  2310   ALK PHOS U/L 95   BILIRUBIN mg/dL 0.4   ALT (SGPT) U/L 63*   AST (SGOT) U/L 54*               Estimated Creatinine Clearance: 79.8 mL/min (by C-G formula based on Cr of 0.9).      Microbiology:      Radiology:  Imaging Results (last 72 hours)     Procedure Component Value Units Date/Time    XR Chest 1 View [[id number]]  (Abnormal) Collected:  07/13/17 2256     Updated:  07/13/17 8505    Narrative:       EXAM:    XR Chest, 1 View    CLINICAL HISTORY:    61 years old, female; Signs and symptoms; Fever; Additional info: Fever AMS    TECHNIQUE:    Frontal view of the chest.    COMPARISON:    No relevant prior studies available.    FINDINGS:    Prominent lung markings/peribronchial thickening without definite evidence of   consolidation, no evidence of pleural effusion or  pneumothorax.  Cardiomegaly   with pulmonary vascular congestion.  Surgical clips in the LEFT axilla.   Tortuous calcific aortic arch and descending thoracic aorta.        Impression:         Chronic cardiopulmonary changes as described without evidence of an active   lung parenchymal lesion.       THIS DOCUMENT HAS BEEN ELECTRONICALLY SIGNED BY JASON SPANGLER MD    CT Head Without Contrast [[id number]]  (Abnormal) Collected:  07/13/17 2255     Updated:  07/14/17 0009    Narrative:       EXAM:    CT Head Without Intravenous Contrast    CLINICAL HISTORY:    61 years old, female; Pain; Other: See notes; Additional info: AMS fever    TECHNIQUE:    Axial computed tomography images of the head/brain without intravenous   contrast.  This CT exam was performed using one or more of the following dose   reduction techniques:  automated exposure control, adjustment of the mA and/or   kV according to patient size, and/or use of iterative reconstruction technique.    COMPARISON:    CT HEAD WO CONTRAST 1/4/2017 3:16:01 AM    FINDINGS:    Bilateral periventricular lucencies without intraparenchymal hemorrhage and   no obvious acute ischemic stroke. No intra-or extra-axial fluid collection, no   supra-or infratentorial mass, no mass effect or midline shift.       Mildly dilated ventricles, sulci and basal cisterns without evidence of   hydrocephalus. Skull bones are normal.  No significant mucoperiosteal   thickening in the visualized paranasal sinuses. No evidence of mastoid   effusion.       Impression:         Mild chronic microvascular disease and generalized atrophy without acute   intracranial abnormality. No evidence of acute hemorrhage, mass lesion or   obvious acute ischemic infarction.      THIS DOCUMENT HAS BEEN ELECTRONICALLY SIGNED BY JASON SPANGLER MD    CT Abdomen Pelvis Without Contrast [[id number]]  (Abnormal) Collected:  07/13/17 2255     Updated:  07/14/17 0017    Narrative:       EXAM:    CT Abdomen and Pelvis  Without Intravenous Contrast    CLINICAL HISTORY:    61 years old, female; Pain; Other: See notes; Prior surgery; Surgery date: 6+   months; Surgery type: Malu, hernia, abdominal abscess drains; Additional info:   Abd pain fever    TECHNIQUE:    Axial computed tomography images of the abdomen and pelvis without   intravenous contrast.  This CT exam was performed using one or more of the   following dose reduction techniques:  automated exposure control, adjustment of   the mA and/or kV according to patient size, and/or use of iterative   reconstruction technique.    COMPARISON:    CT ABDOMEN PELVIS W CONTRAST 1/1/2017 4:02:36 PM    FINDINGS:    PANCREATICOHEPATOBILIARY: Enlarged liver shows diffuse fatty infiltration.    No significant intra-or extrahepatic ductal dilation.  Cholecystectomy clips in   the gallbladder fossa. Pancreas and spleen are unremarkable.       GENITOURINARY: No adrenal mass.  Renal cortical scarring, focal atrophy and   indeterminate renal cortical cystic nodules.  Nonobstructive bilateral renal   stone(s) measuring less than 5 mm. Non-obstructive 3 mm proximal LEFT ureteric   stone.  Mild RIGHT hydronephrosis and uterine thickening without obstructive   urolithiasis.  Kidneys are otherwise unremarkable. Left hip hardware with   extensive artifact precluding optimal evaluation of the pelvis.  Urinary   bladder is within normal limits.  Uterus is normal, complex LEFT ovarian   cyst/follicle in a mildly enlarged LEFT ovary which measures approximately 4.5   cm. No free fluid in the pelvis.      GASTROINTESTINAL: Colon contains a moderate amount of fecal matter.  A few   colonic diverticula without evidence of acute diverticulitis.   A small size   hiatal hernia with nonspecific wall thickening of the distal thoracic esophagus   suggestive of esophagitis/reflux.  Gastric stimulator electrodes, streak   artifact from back to back in the RIGHT anterolateral abdominal wall. No   evidence of free  intraperitoneal air or fluid collection.  APPENDIX is not   visualized.       OTHER STRUCTURES: Atherosclerotic calcification of the aorta without evidence   of aneurysm.  No bulky lymph node enlargement.  A epigastric hernia containing   fat.  Midline anterior abdominal wall incision with ill-defined soft tissue   thickening, infiltration of the subcutaneous fat and small amount of   nonloculated fluid measuring approximately 7.7 x 1.8 cm. Advanced chronic   degenerative changes in the lumbar spine.       Impression:         Small bilateral renal calculi without evidence of obstructive urolithiasis.       Mild RIGHT hydronephrosis and urothelial thickening, possibility of   pyelonephritis/UTI cannot be excluded.  Recommend correlation with urinary   analysis.      Soft tissue thickening and fluid at the midline abdominal wall incision.    Differential diagnosis includes granulation tissue, seroma versus   inflammatory/infection with early abscess formation.       Enlarged LEFT ovarian cysts/follicles abnormal for the patient's age,   recommend sonography followup.      Numerous other nonemergent findings as described.       NOTE: Suboptimal evaluation of bowel loops and abdominal organs due to lack   of oral and intravenous contrast.       THIS DOCUMENT HAS BEEN ELECTRONICALLY SIGNED BY JASON SPANGLER MD            Impression:   --Acute sepsis. Primary concern urinary source with tachycardia/fever and encephalopathy, with abnormal urinary symptoms suggestive to patient of pyelonephritis/UTI and pending urine culture. Otherwise monitor exam for alternative focus. Culture data pending. Empiric antibiotics. Resuscitative measures per internal medicine     --Acute complicated UTI/right pyelonephritis. GNR.  Abnormal CT scan suggesting hydronephrosis as well. History ESBL organisms and temperature decreased on Merrem so far. Further complicated by numerous antibiotic allergies.  Any option/timing or threshold for workup  or intervention regarding functional/anatomic issues is up to urology.     --Chronic gastroparesis with indwelling gastric stimulator and plans for replacement in [place]. Timing per them. Likely would need to be postponed with active infection.  CT scan raises the issue of possible fluid in the abdominal wall. Current exam does not suggest active cellulitis, no necrotic soft tissue change and no dehiscence or drainage from prior surgical sites.     --Diabetes type 2. You need to tightly control blood sugar to give best chance for healing    PLAN:   --IV Merrem     --I discussed potential risks and benefits of the prescribed antibiotics that include, but are not limited to, solid organ toxicity, neuro/bertha/vestibular toxicity, renal toxicity, CDiff, cytopenias, hypersensitivity, etc.. Patient/Family voice understanding and agree to proceed.      --Monitor IV and IV antibiotic with risk for systemic complication and potential drug interaction     --Check/review labs cultures and scans     --As above, you should give consideration to urology input for further workup regarding functional/anatomic issues     --Highly complex set of issues with high risk for further serious morbidity and other serious sequela        Donavon Rios MD  7/16/2017

## 2017-07-16 NOTE — PROGRESS NOTES
No new complaints; continued right flank discomfort.  No left flank pain.    AF/VSS    Abd soft, NT, mild right CVAT    UCx: GNR pending    WBC: 8    A/P: Left ureteral stone, right flank pain with UTI.  She remains AF and WBC is declining.  1. Continue abx.  2. Await final Cx.

## 2017-07-17 ENCOUNTER — APPOINTMENT (OUTPATIENT)
Dept: GENERAL RADIOLOGY | Facility: HOSPITAL | Age: 62
End: 2017-07-17

## 2017-07-17 VITALS
SYSTOLIC BLOOD PRESSURE: 145 MMHG | DIASTOLIC BLOOD PRESSURE: 73 MMHG | BODY MASS INDEX: 39.32 KG/M2 | HEART RATE: 95 BPM | WEIGHT: 236 LBS | RESPIRATION RATE: 18 BRPM | OXYGEN SATURATION: 98 % | TEMPERATURE: 98.1 F | HEIGHT: 65 IN

## 2017-07-17 PROBLEM — R50.9 FEVER: Status: RESOLVED | Noted: 2017-07-14 | Resolved: 2017-07-17

## 2017-07-17 LAB
ANION GAP SERPL CALCULATED.3IONS-SCNC: 6 MMOL/L (ref 3–11)
BUN BLD-MCNC: 5 MG/DL (ref 9–23)
BUN/CREAT SERPL: 8.3 (ref 7–25)
CALCIUM SPEC-SCNC: 8.9 MG/DL (ref 8.7–10.4)
CHLORIDE SERPL-SCNC: 110 MMOL/L (ref 99–109)
CO2 SERPL-SCNC: 24 MMOL/L (ref 20–31)
CREAT BLD-MCNC: 0.6 MG/DL (ref 0.6–1.3)
DEPRECATED RDW RBC AUTO: 50.2 FL (ref 37–54)
ERYTHROCYTE [DISTWIDTH] IN BLOOD BY AUTOMATED COUNT: 15.5 % (ref 11.3–14.5)
GFR SERPL CREATININE-BSD FRML MDRD: 102 ML/MIN/1.73
GLUCOSE BLD-MCNC: 76 MG/DL (ref 70–100)
GLUCOSE BLDC GLUCOMTR-MCNC: 157 MG/DL (ref 70–130)
GLUCOSE BLDC GLUCOMTR-MCNC: 67 MG/DL (ref 70–130)
HCT VFR BLD AUTO: 35.1 % (ref 34.5–44)
HGB BLD-MCNC: 10.9 G/DL (ref 11.5–15.5)
MCH RBC QN AUTO: 27.3 PG (ref 27–31)
MCHC RBC AUTO-ENTMCNC: 31.1 G/DL (ref 32–36)
MCV RBC AUTO: 88 FL (ref 80–99)
PLATELET # BLD AUTO: 221 10*3/MM3 (ref 150–450)
PMV BLD AUTO: 11.3 FL (ref 6–12)
POTASSIUM BLD-SCNC: 3.8 MMOL/L (ref 3.5–5.5)
RBC # BLD AUTO: 3.99 10*6/MM3 (ref 3.89–5.14)
SODIUM BLD-SCNC: 140 MMOL/L (ref 132–146)
WBC NRBC COR # BLD: 7.57 10*3/MM3 (ref 3.5–10.8)

## 2017-07-17 PROCEDURE — 25010000002 PROMETHAZINE PER 50 MG: Performed by: FAMILY MEDICINE

## 2017-07-17 PROCEDURE — 85027 COMPLETE CBC AUTOMATED: CPT | Performed by: FAMILY MEDICINE

## 2017-07-17 PROCEDURE — 80048 BASIC METABOLIC PNL TOTAL CA: CPT | Performed by: FAMILY MEDICINE

## 2017-07-17 PROCEDURE — 82962 GLUCOSE BLOOD TEST: CPT

## 2017-07-17 PROCEDURE — 25010000002 HYDROMORPHONE PER 4 MG: Performed by: FAMILY MEDICINE

## 2017-07-17 PROCEDURE — 25010000002 KETOROLAC TROMETHAMINE PER 15 MG: Performed by: FAMILY MEDICINE

## 2017-07-17 PROCEDURE — 25010000002 MEROPENEM: Performed by: INTERNAL MEDICINE

## 2017-07-17 PROCEDURE — 99239 HOSP IP/OBS DSCHRG MGMT >30: CPT | Performed by: NURSE PRACTITIONER

## 2017-07-17 RX ORDER — ACETAMINOPHEN 325 MG/1
650 TABLET ORAL EVERY 4 HOURS PRN
Refills: 0
Start: 2017-07-17 | End: 2019-06-26

## 2017-07-17 RX ORDER — SULFAMETHOXAZOLE AND TRIMETHOPRIM 800; 160 MG/1; MG/1
1 TABLET ORAL 2 TIMES DAILY
Qty: 20 TABLET | Refills: 0 | Status: SHIPPED | OUTPATIENT
Start: 2017-07-17 | End: 2018-03-05

## 2017-07-17 RX ADMIN — KETOROLAC TROMETHAMINE 15 MG: 15 INJECTION, SOLUTION INTRAMUSCULAR; INTRAVENOUS at 06:07

## 2017-07-17 RX ADMIN — MEROPENEM 1 G: 1 INJECTION, POWDER, FOR SOLUTION INTRAVENOUS at 03:20

## 2017-07-17 RX ADMIN — GABAPENTIN 600 MG: 300 CAPSULE ORAL at 08:26

## 2017-07-17 RX ADMIN — METOPROLOL SUCCINATE 100 MG: 100 TABLET, EXTENDED RELEASE ORAL at 08:27

## 2017-07-17 RX ADMIN — INSULIN LISPRO 10 UNITS: 100 INJECTION, SOLUTION INTRAVENOUS; SUBCUTANEOUS at 11:43

## 2017-07-17 RX ADMIN — Medication 250 MG: at 08:27

## 2017-07-17 RX ADMIN — FLUTICASONE PROPIONATE 1 SPRAY: 50 SPRAY, METERED NASAL at 08:28

## 2017-07-17 RX ADMIN — ACETAMINOPHEN 650 MG: 325 TABLET, FILM COATED ORAL at 02:55

## 2017-07-17 RX ADMIN — MEROPENEM 1 G: 1 INJECTION, POWDER, FOR SOLUTION INTRAVENOUS at 10:10

## 2017-07-17 RX ADMIN — LAMOTRIGINE 100 MG: 100 TABLET ORAL at 08:26

## 2017-07-17 RX ADMIN — HYDROMORPHONE HYDROCHLORIDE 2 MG: 2 INJECTION, SOLUTION INTRAMUSCULAR; INTRAVENOUS; SUBCUTANEOUS at 02:52

## 2017-07-17 RX ADMIN — HYDROMORPHONE HYDROCHLORIDE 2 MG: 2 INJECTION, SOLUTION INTRAMUSCULAR; INTRAVENOUS; SUBCUTANEOUS at 08:23

## 2017-07-17 RX ADMIN — INSULIN LISPRO 3 UNITS: 100 INJECTION, SOLUTION INTRAVENOUS; SUBCUTANEOUS at 11:42

## 2017-07-17 RX ADMIN — HYDROMORPHONE HYDROCHLORIDE 2 MG: 2 INJECTION, SOLUTION INTRAMUSCULAR; INTRAVENOUS; SUBCUTANEOUS at 11:46

## 2017-07-17 RX ADMIN — PROMETHAZINE HYDROCHLORIDE 12.5 MG: 25 INJECTION INTRAMUSCULAR; INTRAVENOUS at 11:41

## 2017-07-17 RX ADMIN — PANTOPRAZOLE SODIUM 40 MG: 40 TABLET, DELAYED RELEASE ORAL at 08:27

## 2017-07-17 RX ADMIN — KETOROLAC TROMETHAMINE 15 MG: 15 INJECTION, SOLUTION INTRAMUSCULAR; INTRAVENOUS at 12:55

## 2017-07-17 NOTE — DISCHARGE SUMMARY
[facility]  DISCHARGE SUMMARY       Date of Admission: 7/13/2017  Date of Discharge:  7/17/2017  Primary Care Physician: Ronnie Garvey MD  Consulting Physician(s)     Provider Relationship    Donavon Rios MD Consulting Physician    Danny Kern MD Consulting Physician          Discharge Diagnoses:  Active Hospital Problems (** Indicates Principal Problem)    Diagnosis Date Noted   • **Pyelonephritis [N12] 12/30/2016   • RLQ abdominal pain [R10.31] 07/14/2017   • Diabetic neuropathy [E11.40] 07/14/2017   • GERD (gastroesophageal reflux disease) [K21.9] 07/14/2017   • Osteoporosis [M81.0] 05/15/2017   • Type 2 diabetes mellitus [E11.9] 12/30/2016   • Gastroparesis [K31.84] 12/30/2016   • Chronic pain syndrome, on PO dilaudid at home [G89.4] 12/30/2016   • Hypertension [I10] 12/30/2016      Resolved Hospital Problems    Diagnosis Date Noted Date Resolved   • Fever [R50.9] 07/14/2017 07/17/2017       Presenting Problem/History of Present Illness  Pyelonephritis [N12]     Chief Complaint on Day of Discharge: pyelonephritis/ sepsis    Hospital Course  Patient is a 61 y.o. female with past medical history type 2 diabetes-uncontrolled, gastroparesis, chronic pain syndrome and history of ESBL Escherichia coli UTI followed by Dr. Butts in infectious disease [facility] on 7/13/2017 after referred from primary care for fevers up to 102, right lower quadrant pain and dysuria.  He shouldn't was admitted to Hospital medicine for acute pyelonephritis, right side as well as sepsis and encephalopathy.  CT head was negative.  Encephalopathy has resolved.  Patient was placed on IV antibiotics and infectious disease was consulted.    Ultrasound returned and patient will return home on 10 additional days of Bactrim therapy.  She will follow-up with Dr. Figueroa per infectious disease this coming Wednesday, 7/19/2017.  Currently patient's nausea, vomiting has resolved.   Patient still has pain slightly more than chronic pain at baseline.  She's tolerating diet and afebrile.  Patient follow-up with primary care in 1-2 weeks.    Procedures Performed         Consults:   Consults     Date and Time Order Name Status Description    7/14/2017 0957 Inpatient Consult to Urology      7/14/2017 0237 Inpatient Consult to Infectious Diseases Completed           Pertinent Test Results:  Results for PERRY GONZALEZ (MRN [id number]) as of 7/17/2017 11:11   Ref. Range 7/17/2017 05:31   Glucose Latest Ref Range: 70 - 100 mg/dL 76   Sodium Latest Ref Range: 132 - 146 mmol/L 140   Potassium Latest Ref Range: 3.5 - 5.5 mmol/L 3.8   CO2 Latest Ref Range: 20.0 - 31.0 mmol/L 24.0   Chloride Latest Ref Range: 99 - 109 mmol/L 110 (H)   Anion Gap Latest Ref Range: 3.0 - 11.0 mmol/L 6.0   Creatinine Latest Ref Range: 0.60 - 1.30 mg/dL 0.60   BUN Latest Ref Range: 9 - 23 mg/dL 5 (L)   BUN/Creatinine Ratio Latest Ref Range: 7.0 - 25.0  8.3   Calcium Latest Ref Range: 8.7 - 10.4 mg/dL 8.9   eGFR Non African Amer Latest Ref Range: >60 mL/min/1.73 102   WBC Latest Ref Range: 3.50 - 10.80 10*3/mm3 7.57   RBC Latest Ref Range: 3.89 - 5.14 10*6/mm3 3.99   Hemoglobin Latest Ref Range: 11.5 - 15.5 g/dL 10.9 (L)   Hematocrit Latest Ref Range: 34.5 - 44.0 % 35.1   RDW Latest Ref Range: 11.3 - 14.5 % 15.5 (H)   MCV Latest Ref Range: 80.0 - 99.0 fL 88.0   MCH Latest Ref Range: 27.0 - 31.0 pg 27.3   MCHC Latest Ref Range: 32.0 - 36.0 g/dL 31.1 (L)   MPV Latest Ref Range: 6.0 - 12.0 fL 11.3   Platelets Latest Ref Range: 150 - 450 10*3/mm3 221   RDW-SD Latest Ref Range: 37.0 - 54.0 fl 50.2     BNP [[id number]] (Normal) Collected: 07/13/17 2310        Lab Status: Final result Specimen: Blood Updated: 07/14/17 0003        BNP 30.0 pg/mL        Lipase [[id number]] (Normal) Collected: 07/13/17 2310       Lab Status: Final result Specimen: Blood Updated: 07/13/17 2355        Lipase 37 U/L        Procalcitonin [[id number]] Collected:  07/13/17 2310       Lab Status: Final result Specimen: Blood Updated: 07/14/17 0035        Procalcitonin 0.38 ng/mL        Narrative:         As a Marker for Sepsis (Non-Neonates):   1. <0.5 ng/mL represents a low risk of severe sepsis and/or septic shock.  2. >2 ng/mL represents a high risk of severe sepsis and/or septic shock.    As a Marker for Lower Respiratory Tract Infections that require antibiotic therapy:    PCT on Admission     Antibiotic Therapy       6-12 Hrs later  > 0.5                Strongly Recommended             >0.25 - <0.5         Recommended  0.1 - 0.25           Discouraged              Remeasure/reassess PCT  <0.1                 Strongly Discouraged     Remeasure/reassess PCT                     PCT values of < 0.5 ng/mL do not exclude an infection, because localized infections (without systemic signs) may be associated with such low concentrations, or a systemic infection in its initial stages (< 6 hours). Furthermore, increased PCT can occur without infection. PCT concentrations between 0.5 and 2.0 ng/mL should be interpreted taking into account the patient's history. It is recommended to retest PCT within 6-24 hours if any concentrations < 2 ng/mL are obtained.       Lactic Acid, Plasma [[id number]] (Normal) Collected: 07/13/17 2310       Lab Status: Final result Specimen: Blood Updated: 07/13/17 2359        Lactate 1.8 mmol/L        Blood Culture [[id number]] (Normal) Collected: 07/13/17 2310       Lab Status: Preliminary result Specimen: Blood from Arm, Right Updated: 07/17/17 0002        Blood Culture No growth at 3 days      Urine Culture [[id number]] (Abnormal)  Collected: 07/13/17 2300        Lab Status: Final result Specimen: Urine from Urine, Clean Catch Updated: 07/16/17 0921        Urine Culture >100,000 CFU/mL Klebsiella pneumoniae (A)         Susceptibility         Klebsiella pneumoniae         LUCY         Ampicillin >16  Resistant         Ampicillin + Sulbactam <=8/4   Susceptible         Aztreonam <=8  Susceptible         Cefepime <=8  Susceptible         Cefotaxime <=2  Susceptible         Ceftriaxone <=8  Susceptible         Cefuroxime sodium <=4  Susceptible         Cephalothin <=8  Susceptible         Ertapenem <=1  Susceptible         Gentamicin <=4  Susceptible         Levofloxacin <=2  Susceptible         Meropenem <=1  Susceptible         Nitrofurantoin <=32  Susceptible         Piperacillin + Tazobactam <=16  Susceptible         Tetracycline <=4  Susceptible         Tobramycin <=4  Susceptible         Trimethoprim + Sulfamethoxazole <=2/38  Susceptible                              Narrative:          EXAM:    CT Head Without Intravenous Contrast    CLINICAL HISTORY:    61 years old, female; Pain; Other: See notes; Additional info: AMS fever    TECHNIQUE:    Axial computed tomography images of the head/brain without intravenous   contrast.  This CT exam was performed using one or more of the following dose   reduction techniques:  automated exposure control, adjustment of the mA and/or   kV according to patient size, and/or use of iterative reconstruction technique.    COMPARISON:    CT HEAD WO CONTRAST 1/4/2017 3:16:01 AM    FINDINGS:    Bilateral periventricular lucencies without intraparenchymal hemorrhage and   no obvious acute ischemic stroke. No intra-or extra-axial fluid collection, no   supra-or infratentorial mass, no mass effect or midline shift.       Mildly dilated ventricles, sulci and basal cisterns without evidence of   hydrocephalus. Skull bones are normal.  No significant mucoperiosteal   thickening in the visualized paranasal sinuses. No evidence of mastoid   effusion.          Impression:           Mild chronic microvascular disease and generalized atrophy without acute   intracranial abnormality. No evidence of acute hemorrhage, mass lesion or   obvious acute ischemic infarction.      THIS DOCUMENT HAS BEEN ELECTRONICALLY SIGNED BY JASON SPANGLER MD        CT Abdomen Pelvis Without Contrast [[id number]] (Abnormal) Not Reviewed       Order Status: Completed Collected: 07/13/17 2255        Updated: 07/14/17 0017       Narrative:         EXAM:    CT Abdomen and Pelvis Without Intravenous Contrast    CLINICAL HISTORY:    61 years old, female; Pain; Other: See notes; Prior surgery; Surgery date: 6+   months; Surgery type: Malu, hernia, abdominal abscess drains; Additional info:   Abd pain fever    TECHNIQUE:    Axial computed tomography images of the abdomen and pelvis without   intravenous contrast.  This CT exam was performed using one or more of the   following dose reduction techniques:  automated exposure control, adjustment of   the mA and/or kV according to patient size, and/or use of iterative   reconstruction technique.    COMPARISON:    CT ABDOMEN PELVIS W CONTRAST 1/1/2017 4:02:36 PM    FINDINGS:    PANCREATICOHEPATOBILIARY: Enlarged liver shows diffuse fatty infiltration.    No significant intra-or extrahepatic ductal dilation.  Cholecystectomy clips in   the gallbladder fossa. Pancreas and spleen are unremarkable.       GENITOURINARY: No adrenal mass.  Renal cortical scarring, focal atrophy and   indeterminate renal cortical cystic nodules.  Nonobstructive bilateral renal   stone(s) measuring less than 5 mm. Non-obstructive 3 mm proximal LEFT ureteric   stone.  Mild RIGHT hydronephrosis and uterine thickening without obstructive   urolithiasis.  Kidneys are otherwise unremarkable. Left hip hardware with   extensive artifact precluding optimal evaluation of the pelvis.  Urinary   bladder is within normal limits.  Uterus is normal, complex LEFT ovarian   cyst/follicle in a mildly enlarged LEFT ovary which measures approximately 4.5   cm. No free fluid in the pelvis.      GASTROINTESTINAL: Colon contains a moderate amount of fecal matter.  A few   colonic diverticula without evidence of acute diverticulitis.   A small size   hiatal hernia with nonspecific  wall thickening of the distal thoracic esophagus   suggestive of esophagitis/reflux.  Gastric stimulator electrodes, streak   artifact from back to back in the RIGHT anterolateral abdominal wall. No   evidence of free intraperitoneal air or fluid collection.  APPENDIX is not   visualized.       OTHER STRUCTURES: Atherosclerotic calcification of the aorta without evidence   of aneurysm.  No bulky lymph node enlargement.  A epigastric hernia containing   fat.  Midline anterior abdominal wall incision with ill-defined soft tissue   thickening, infiltration of the subcutaneous fat and small amount of   nonloculated fluid measuring approximately 7.7 x 1.8 cm. Advanced chronic   degenerative changes in the lumbar spine.          Impression:           Small bilateral renal calculi without evidence of obstructive urolithiasis.       Mild RIGHT hydronephrosis and urothelial thickening, possibility of   pyelonephritis/UTI cannot be excluded.  Recommend correlation with urinary   analysis.      Soft tissue thickening and fluid at the midline abdominal wall incision.    Differential diagnosis includes granulation tissue, seroma versus   inflammatory/infection with early abscess formation.       Enlarged LEFT ovarian cysts/follicles abnormal for the patient's age,   recommend sonography followup.      Numerous other nonemergent findings as described.       NOTE: Suboptimal evaluation of bowel loops and abdominal organs due to lack   of oral and intravenous contrast.       THIS DOCUMENT HAS BEEN ELECTRONICALLY SIGNED BY JASON SPANGLER MD       XR Chest 1 View [[id number]] (Abnormal) Not Reviewed       Order Status: Completed Collected: 07/13/17 5435        Updated: 07/13/17 7374       Narrative:         EXAM:    XR Chest, 1 View    CLINICAL HISTORY:    61 years old, female; Signs and symptoms; Fever; Additional info: Fever AMS    TECHNIQUE:    Frontal view of the chest.    COMPARISON:    No relevant prior studies  "available.    FINDINGS:    Prominent lung markings/peribronchial thickening without definite evidence of   consolidation, no evidence of pleural effusion or pneumothorax.  Cardiomegaly   with pulmonary vascular congestion.  Surgical clips in the LEFT axilla.   Tortuous calcific aortic arch and descending thoracic aorta.           Impression:           Chronic cardiopulmonary changes as described without evidence of an active   lung parenchymal lesion.       THIS DOCUMENT HAS BEEN ELECTRONICALLY SIGNED BY JASON SPANGLER MD       Review All           Condition on Discharge:  stable    Physical Exam on Discharge:/73  Pulse 95  Temp 98.1 °F (36.7 °C) (Oral)   Resp 18  Ht 65\" (165.1 cm)  Wt 236 lb (107 kg)  SpO2 98%  BMI 39.27 kg/m2  Physical Exam   Constitutional: She is oriented to person, place, and time. She appears well-developed. No distress.   HENT:   Head: Normocephalic.   Mouth/Throat: No oropharyngeal exudate.   Eyes: Pupils are equal, round, and reactive to light. No scleral icterus.   Neck: Normal range of motion. No JVD present.   Cardiovascular: Normal rate, regular rhythm, normal heart sounds and intact distal pulses.    No murmur heard.  Pulmonary/Chest: Effort normal and breath sounds normal. No respiratory distress. She has no wheezes.   Abdominal: Soft. Bowel sounds are normal. She exhibits no distension. There is tenderness (Right side abd to right lower midline).   Musculoskeletal: She exhibits no edema.   Neurological: She is alert and oriented to person, place, and time.   Skin: Skin is warm and dry.   Psychiatric: She has a normal mood and affect. Her behavior is normal. Judgment and thought content normal.         Discharge Disposition  Home or Self Care    Discharge Medications   Cortney Delacruz   Home Medication Instructions JOSE:[id number]    Printed on:07/17/17 1110   Medication Information                      acetaminophen (TYLENOL) 325 MG tablet  Take 2 tablets by mouth Every " 4 (Four) Hours As Needed for Mild Pain (1-3) or Fever.             ALPRAZolam (XANAX) 1 MG tablet  Take 1 mg by mouth 3 (Three) Times a Day As Needed for anxiety.             amLODIPine (NORVASC) 5 MG tablet  Take 1 tablet by mouth Every Night.             Exenatide (BYDUREON SC)  Inject 2 mg under the skin 1 (One) Time Per Week. sunday             famotidine (PEPCID) 20 MG tablet  20 mg At Night As Needed.             FLONASE ALLERGY RELIEF 50 MCG/ACT nasal spray  1 spray Daily.             fluticasone-salmeterol (ADVAIR) 100-50 MCG/DOSE DISKUS  Inhale 1 puff Daily As Needed.             furosemide (LASIX) 20 MG tablet  Take 20 mg by mouth Daily.             gabapentin (NEURONTIN) 600 MG tablet  Take 600 mg by mouth 2 (Two) Times a Day.             HYDROmorphone (DILAUDID) 2 MG tablet  2 mg Every 6 (Six) Hours As Needed.             insulin aspart (novoLOG FLEXPEN) 100 UNIT/ML solution pen-injector sc pen  Inject 25 Units under the skin 3 (Three) Times a Day With Meals.             Insulin Degludec (TRESIBA FLEXTOUCH) 100 UNIT/ML solution pen-injector  Inject 100 Units under the skin Every Night.             lamoTRIgine (LaMICtal) 100 MG tablet  100 mg 2 (Two) Times a Day.             linaclotide (LINZESS) 290 MCG capsule capsule  Take 145 mcg by mouth Every Morning Before Breakfast.             metoprolol succinate XL (TOPROL-XL) 100 MG 24 hr tablet  Take 100 mg by mouth 2 (Two) Times a Day.             Multiple Vitamins-Minerals (MULTIVITAMIN ADULT PO)  Take 1 tablet by mouth Daily.             NON FORMULARY  Domperidone 20 mg 2x daily             ondansetron (ZOFRAN) 8 MG tablet  Take 8 mg by mouth Every 12 (Twelve) Hours As Needed for nausea or vomiting.             pantoprazole (PROTONIX) 40 MG EC tablet  Take 40 mg by mouth Daily.             promethazine (PHENERGAN) 25 MG tablet  Take 1 tablet by mouth Every 6 (Six) Hours As Needed for nausea or vomiting for up to 12 doses.             saccharomyces  boulardii (FLORASTOR) 250 MG capsule  Take 1 capsule by mouth 2 (Two) Times a Day.             Scopolamine (TRANSDERM-SCOP) 1.5 MG/3DAYS patch  Place 1 patch on the skin Every 72 (Seventy-Two) Hours.             sulfamethoxazole-trimethoprim (BACTRIM DS,SEPTRA DS) 800-160 MG per tablet  Take 1 tablet by mouth 2 (Two) Times a Day.             tiZANidine (ZANAFLEX) 2 MG tablet  Take 2 mg by mouth Every 8 (Eight) Hours As Needed for muscle spasms.             vitamin D (ERGOCALCIFEROL) 97300 UNITS capsule capsule  50,000 Units Every 7 (Seven) Days.                 Discharge Diet:   Diet Instructions     Advance Diet As Tolerated           Diet: Consistent Carbohydrate, Cardiac; Thin Liquids, No Restrictions       Discharge Diet:   Consistent Carbohydrate  Cardiac      Fluid Consistency:  Thin Liquids, No Restrictions                 Discharge Care Plan / Instructions:    Activity at Discharge:   Activity Instructions     Activity as Tolerated                     Follow-up Appointments  No future appointments.  Additional Instructions for the Follow-ups that You Need to Schedule     Discharge Follow-Up With Specified Provider    As directed    To:  Blanca Wednesday 7/19/17       Discharge Follow-up with PCP    As directed    Follow Up Details:  1-2 weeks                 Test Results Pending at Discharge   Order Current Status    Blood Culture Preliminary result    Blood Culture Preliminary result           Charla Gay, USHA 07/17/17 11:10 AM    Time: Discharge 30 min    Please note that portions of this note may have been completed with a voice recognition program. Efforts were made to edit the dictations, but occasionally words are mistranscribed.

## 2017-07-17 NOTE — PROGRESS NOTES
"[facility] Progress Note    7/13/2017      Antibiotics:  IV Anti-Infectives     Ordered     Dose/Rate Route Frequency Start Stop    07/14/17 0239  meropenem (MERREM) 1 g/100 mL 0.9% NS VTB (mbp)     Ordering Provider:  Roscoe Rangel MD    1 g  over 30 Minutes Intravenous Every 8 Hours 07/14/17 1000      07/14/17 0106  meropenem (MERREM) 1 g/100 mL 0.9% NS VTB (mbp)     Ordering Provider:  USHA Keneny    1 g  over 30 Minutes Intravenous Once 07/14/17 0108 07/14/17 0242          CC:pyelo    HPI:  Patient is a 61 y.o. Yr old female PT OF DR ATYLOR per patient, with history of diabetes type 2 with gastroparesis and chronic gastric stimulator implant, follows and low-level apparently with plans for stimulator replacement July 2017. She was hospitalized January 2017 with ESBL Escherichia coli UTI, discharged with carbapenem and all follow-up with Dr. Garvey. He subsequently treated her with Bactrim/Macrobid for at least one month. She is readmitted on July 13, 2017 with recurrent right flank pain and nausea/vomiting with dysuria and fever in addition to tachycardia consistent with sepsis. Mention made of also transient encephalopathy likely related to sepsis.     7/17/17 fever less since admit. No shortness of breath cough or hemoptysis. No diarrhea. No rash. No other particular exposures. She has right flank pain which is constant, nonradiating, sharp, worse with percussion, generally better with analgesics but not completely relieved. Still with nausea. No hematochezia melena or hematemesis.    ROS:      7/17/17 No f/c/s. No n/v/d. No rash. No new ADR to Abx.     PE:   /73  Pulse 95  Temp 98.1 °F (36.7 °C) (Oral)   Resp 18  Ht 65\" (165.1 cm)  Wt 236 lb (107 kg)  SpO2 98%  BMI 39.27 kg/m2    GENERAL: Awake and alert, in no acute distress.   HEENT:  No conjunctival injection. No icterus. Oropharynx clear without evidence of thrush or exudate.     HEART: RRR; No murmur, rubs, gallops.   LUNGS: Clear to " auscultation bilaterally without wheezing, rales, rhonchi. Normal respiratory effort. Nonlabored. No dullness.  ABDOMEN: Soft, mildly tender right flank, nondistended. Positive bowel sounds. No rebound or guarding. NO mass or HSM.  EXT:  No cyanosis, clubbing or edema. No cord.  : Genitalia generally unremarkable.  Without Denise catheter.  SKIN: Warm and dry without cutaneous eruptions on Inspection/palpation.      No peripheral stigmata/phenomena of endocarditis     Abdominal wall has no redness induration or warmth. No crepitus or bulla and no dehiscence or drainage from prior surgical sites    Laboratory Data      Results from last 7 days  Lab Units 07/17/17  0531 07/16/17  0708 07/15/17  0528   WBC 10*3/mm3 7.57 8.03 11.35*   HEMOGLOBIN g/dL 10.9* 10.5* 11.2*   HEMATOCRIT % 35.1 34.3* 36.0   PLATELETS 10*3/mm3 221 195 203       Results from last 7 days  Lab Units 07/17/17  0531   SODIUM mmol/L 140   POTASSIUM mmol/L 3.8   CHLORIDE mmol/L 110*   CO2 mmol/L 24.0   BUN mg/dL 5*   CREATININE mg/dL 0.60   GLUCOSE mg/dL 76   CALCIUM mg/dL 8.9       Results from last 7 days  Lab Units 07/13/17  2310   ALK PHOS U/L 95   BILIRUBIN mg/dL 0.4   ALT (SGPT) U/L 63*   AST (SGOT) U/L 54*               Estimated Creatinine Clearance: 119.7 mL/min (by C-G formula based on Cr of 0.6).      Microbiology:      Radiology:  Imaging Results (last 72 hours)     Procedure Component Value Units Date/Time    XR Chest 1 View [[id number]]  (Abnormal) Collected:  07/13/17 2256     Updated:  07/13/17 1272    Narrative:       EXAM:    XR Chest, 1 View    CLINICAL HISTORY:    61 years old, female; Signs and symptoms; Fever; Additional info: Fever AMS    TECHNIQUE:    Frontal view of the chest.    COMPARISON:    No relevant prior studies available.    FINDINGS:    Prominent lung markings/peribronchial thickening without definite evidence of   consolidation, no evidence of pleural effusion or pneumothorax.  Cardiomegaly   with pulmonary vascular  congestion.  Surgical clips in the LEFT axilla.   Tortuous calcific aortic arch and descending thoracic aorta.        Impression:         Chronic cardiopulmonary changes as described without evidence of an active   lung parenchymal lesion.       THIS DOCUMENT HAS BEEN ELECTRONICALLY SIGNED BY JASON SPANGLER MD    CT Head Without Contrast [[id number]]  (Abnormal) Collected:  07/13/17 2255     Updated:  07/14/17 0009    Narrative:       EXAM:    CT Head Without Intravenous Contrast    CLINICAL HISTORY:    61 years old, female; Pain; Other: See notes; Additional info: AMS fever    TECHNIQUE:    Axial computed tomography images of the head/brain without intravenous   contrast.  This CT exam was performed using one or more of the following dose   reduction techniques:  automated exposure control, adjustment of the mA and/or   kV according to patient size, and/or use of iterative reconstruction technique.    COMPARISON:    CT HEAD WO CONTRAST 1/4/2017 3:16:01 AM    FINDINGS:    Bilateral periventricular lucencies without intraparenchymal hemorrhage and   no obvious acute ischemic stroke. No intra-or extra-axial fluid collection, no   supra-or infratentorial mass, no mass effect or midline shift.       Mildly dilated ventricles, sulci and basal cisterns without evidence of   hydrocephalus. Skull bones are normal.  No significant mucoperiosteal   thickening in the visualized paranasal sinuses. No evidence of mastoid   effusion.       Impression:         Mild chronic microvascular disease and generalized atrophy without acute   intracranial abnormality. No evidence of acute hemorrhage, mass lesion or   obvious acute ischemic infarction.      THIS DOCUMENT HAS BEEN ELECTRONICALLY SIGNED BY JASON SPANGLER MD    CT Abdomen Pelvis Without Contrast [[id number]]  (Abnormal) Collected:  07/13/17 2255     Updated:  07/14/17 0017    Narrative:       EXAM:    CT Abdomen and Pelvis Without Intravenous Contrast    CLINICAL HISTORY:    61  years old, female; Pain; Other: See notes; Prior surgery; Surgery date: 6+   months; Surgery type: [place], hernia, abdominal abscess drains; Additional info:   Abd pain fever    TECHNIQUE:    Axial computed tomography images of the abdomen and pelvis without   intravenous contrast.  This CT exam was performed using one or more of the   following dose reduction techniques:  automated exposure control, adjustment of   the mA and/or kV according to patient size, and/or use of iterative   reconstruction technique.    COMPARISON:    CT ABDOMEN PELVIS W CONTRAST 1/1/2017 4:02:36 PM    FINDINGS:    PANCREATICOHEPATOBILIARY: Enlarged liver shows diffuse fatty infiltration.    No significant intra-or extrahepatic ductal dilation.  Cholecystectomy clips in   the gallbladder fossa. Pancreas and spleen are unremarkable.       GENITOURINARY: No adrenal mass.  Renal cortical scarring, focal atrophy and   indeterminate renal cortical cystic nodules.  Nonobstructive bilateral renal   stone(s) measuring less than 5 mm. Non-obstructive 3 mm proximal LEFT ureteric   stone.  Mild RIGHT hydronephrosis and uterine thickening without obstructive   urolithiasis.  Kidneys are otherwise unremarkable. Left hip hardware with   extensive artifact precluding optimal evaluation of the pelvis.  Urinary   bladder is within normal limits.  Uterus is normal, complex LEFT ovarian   cyst/follicle in a mildly enlarged LEFT ovary which measures approximately 4.5   cm. No free fluid in the pelvis.      GASTROINTESTINAL: Colon contains a moderate amount of fecal matter.  A few   colonic diverticula without evidence of acute diverticulitis.   A small size   hiatal hernia with nonspecific wall thickening of the distal thoracic esophagus   suggestive of esophagitis/reflux.  Gastric stimulator electrodes, streak   artifact from back to back in the RIGHT anterolateral abdominal wall. No   evidence of free intraperitoneal air or fluid collection.  APPENDIX is not    visualized.       OTHER STRUCTURES: Atherosclerotic calcification of the aorta without evidence   of aneurysm.  No bulky lymph node enlargement.  A epigastric hernia containing   fat.  Midline anterior abdominal wall incision with ill-defined soft tissue   thickening, infiltration of the subcutaneous fat and small amount of   nonloculated fluid measuring approximately 7.7 x 1.8 cm. Advanced chronic   degenerative changes in the lumbar spine.       Impression:         Small bilateral renal calculi without evidence of obstructive urolithiasis.       Mild RIGHT hydronephrosis and urothelial thickening, possibility of   pyelonephritis/UTI cannot be excluded.  Recommend correlation with urinary   analysis.      Soft tissue thickening and fluid at the midline abdominal wall incision.    Differential diagnosis includes granulation tissue, seroma versus   inflammatory/infection with early abscess formation.       Enlarged LEFT ovarian cysts/follicles abnormal for the patient's age,   recommend sonography followup.      Numerous other nonemergent findings as described.       NOTE: Suboptimal evaluation of bowel loops and abdominal organs due to lack   of oral and intravenous contrast.       THIS DOCUMENT HAS BEEN ELECTRONICALLY SIGNED BY JASON SPANGLER MD            Impression:   --Acute sepsis. Improved.  Primary concern urinary source with tachycardia/fever and encephalopathy, with abnormal urinary symptoms suggestive to patient of pyelonephritis/UTI and pending urine culture. Otherwise monitor exam for alternative focus. Culture data pending. Empiric antibiotics. Resuscitative measures per internal medicine     --Acute complicated UTI/right pyelonephritis. GNR.  Abnormal CT scan suggesting hydronephrosis as well. History ESBL organisms and temperature decreased on Merrem so far. Further complicated by numerous antibiotic allergies.  Any option/timing or threshold for workup or intervention regarding functional/anatomic  issues is up to urology.     --Chronic gastroparesis with indwelling gastric stimulator and plans for replacement in [place]. Timing per them. Likely would need to be postponed with active infection.  CT scan raises the issue of possible fluid in the abdominal wall. Current exam does not suggest active cellulitis, no necrotic soft tissue change and no dehiscence or drainage from prior surgical sites.     --Diabetes type 2. You need to tightly control blood sugar to give best chance for healing    PLAN:   --IV Merrem but ?change to oral bactrim DS 1 po BID for outpatient to complete 10 more days therapy;  She prefers this over cipro/levaquin risks     --I discussed potential risks and benefits of the prescribed antibiotics that include, but are not limited to, solid organ toxicity, neuro/bertha/vestibular toxicity, renal toxicity, CDiff, cytopenias, hypersensitivity, etc.. Patient/Family voice understanding and agree to proceed.      --Monitor IV and IV antibiotic with risk for systemic complication and potential drug interaction     --Check/review labs cultures and scans     --As above, you should give consideration to urology input for further workup regarding functional/anatomic issues     --Highly complex set of issues with high risk for further serious morbidity and other serious sequela    --?home today;  F/u with me wed in clinic;  Likely oral bactrim for 10 more days        Donavon Rios MD  7/17/2017

## 2017-07-17 NOTE — PLAN OF CARE
Problem: Pain, Acute (Adult)  Goal: Identify Related Risk Factors and Signs and Symptoms  Outcome: Ongoing (interventions implemented as appropriate)    07/17/17 1233   Pain, Acute   Related Risk Factors (Acute Pain) disease process;persistent pain   Signs and Symptoms (Acute Pain) nausea/vomiting/anorexia;fatigue/weakness       Goal: Acceptable Pain Control/Comfort Level  Outcome: Ongoing (interventions implemented as appropriate)

## 2017-07-17 NOTE — PLAN OF CARE
Problem: Pain, Acute (Adult)  Goal: Identify Related Risk Factors and Signs and Symptoms  Outcome: Ongoing (interventions implemented as appropriate)    07/17/17 0458   Pain, Acute   Related Risk Factors (Acute Pain) persistent pain   Signs and Symptoms (Acute Pain) verbalization of pain descriptors       Goal: Acceptable Pain Control/Comfort Level  Outcome: Ongoing (interventions implemented as appropriate)    07/17/17 0458   Pain, Acute (Adult)   Acceptable Pain Control/Comfort Level making progress toward outcome         Problem: Infection, Risk/Actual (Adult)  Goal: Identify Related Risk Factors and Signs and Symptoms  Outcome: Ongoing (interventions implemented as appropriate)    07/17/17 0458   Infection, Risk/Actual   Infection, Risk/Actual: Related Risk Factors chronic illness/condition   Signs and Symptoms (Infection, Risk/Actual) pain;nausea       Goal: Infection Prevention/Resolution  Outcome: Ongoing (interventions implemented as appropriate)    07/17/17 0458   Infection, Risk/Actual (Adult)   Infection Prevention/Resolution making progress toward outcome

## 2017-07-19 LAB
BACTERIA SPEC AEROBE CULT: NORMAL
BACTERIA SPEC AEROBE CULT: NORMAL

## 2017-10-26 ENCOUNTER — TRANSCRIBE ORDERS (OUTPATIENT)
Dept: ADMINISTRATIVE | Facility: HOSPITAL | Age: 62
End: 2017-10-26

## 2017-10-26 DIAGNOSIS — K31.84 GASTROPARESIS: Primary | ICD-10-CM

## 2017-11-09 ENCOUNTER — APPOINTMENT (OUTPATIENT)
Dept: GENERAL RADIOLOGY | Facility: HOSPITAL | Age: 62
End: 2017-11-09
Attending: INTERNAL MEDICINE

## 2018-03-05 ENCOUNTER — HOSPITAL ENCOUNTER (INPATIENT)
Facility: HOSPITAL | Age: 63
LOS: 3 days | Discharge: HOME | End: 2018-03-08
Attending: EMERGENCY MEDICINE | Admitting: HOSPITALIST

## 2018-03-05 ENCOUNTER — APPOINTMENT (OUTPATIENT)
Dept: CT IMAGING | Facility: HOSPITAL | Age: 63
End: 2018-03-05

## 2018-03-05 DIAGNOSIS — N10 ACUTE PYELONEPHRITIS: Primary | ICD-10-CM

## 2018-03-05 DIAGNOSIS — K31.84 GASTROPARESIS: ICD-10-CM

## 2018-03-05 DIAGNOSIS — R11.2 NON-INTRACTABLE VOMITING WITH NAUSEA, UNSPECIFIED VOMITING TYPE: ICD-10-CM

## 2018-03-05 PROBLEM — R19.7 DIARRHEA: Status: ACTIVE | Noted: 2018-03-05

## 2018-03-05 PROBLEM — Z86.19 HISTORY OF ESBL E. COLI INFECTION: Status: ACTIVE | Noted: 2018-03-05

## 2018-03-05 LAB
ALBUMIN SERPL-MCNC: 4.7 G/DL (ref 3.2–4.8)
ALBUMIN/GLOB SERPL: 1.2 G/DL (ref 1.5–2.5)
ALP SERPL-CCNC: 92 U/L (ref 25–100)
ALT SERPL W P-5'-P-CCNC: 39 U/L (ref 7–40)
ANION GAP SERPL CALCULATED.3IONS-SCNC: 13 MMOL/L (ref 3–11)
AST SERPL-CCNC: 34 U/L (ref 0–33)
BACTERIA UR QL AUTO: ABNORMAL /HPF
BASOPHILS # BLD AUTO: 0.03 10*3/MM3 (ref 0–0.2)
BASOPHILS NFR BLD AUTO: 0.4 % (ref 0–1)
BILIRUB SERPL-MCNC: 0.4 MG/DL (ref 0.3–1.2)
BILIRUB UR QL STRIP: NEGATIVE
BUN BLD-MCNC: 11 MG/DL (ref 9–23)
BUN/CREAT SERPL: 12.2 (ref 7–25)
CALCIUM SPEC-SCNC: 10.1 MG/DL (ref 8.7–10.4)
CHLORIDE SERPL-SCNC: 101 MMOL/L (ref 99–109)
CLARITY UR: ABNORMAL
CO2 SERPL-SCNC: 24 MMOL/L (ref 20–31)
COLOR UR: ABNORMAL
CREAT BLD-MCNC: 0.9 MG/DL (ref 0.6–1.3)
DEPRECATED RDW RBC AUTO: 43.4 FL (ref 37–54)
EOSINOPHIL # BLD AUTO: 0.09 10*3/MM3 (ref 0–0.3)
EOSINOPHIL NFR BLD AUTO: 1.1 % (ref 0–3)
ERYTHROCYTE [DISTWIDTH] IN BLOOD BY AUTOMATED COUNT: 13.9 % (ref 11.3–14.5)
GFR SERPL CREATININE-BSD FRML MDRD: 63 ML/MIN/1.73
GLOBULIN UR ELPH-MCNC: 3.9 GM/DL
GLUCOSE BLD-MCNC: 213 MG/DL (ref 70–100)
GLUCOSE BLDC GLUCOMTR-MCNC: 115 MG/DL (ref 70–130)
GLUCOSE BLDC GLUCOMTR-MCNC: 185 MG/DL (ref 70–130)
GLUCOSE BLDC GLUCOMTR-MCNC: 270 MG/DL (ref 70–130)
GLUCOSE UR STRIP-MCNC: ABNORMAL MG/DL
HCT VFR BLD AUTO: 41.2 % (ref 34.5–44)
HGB BLD-MCNC: 13.7 G/DL (ref 11.5–15.5)
HGB UR QL STRIP.AUTO: NEGATIVE
HOLD SPECIMEN: NORMAL
HOLD SPECIMEN: NORMAL
HYALINE CASTS UR QL AUTO: ABNORMAL /LPF
IMM GRANULOCYTES # BLD: 0.02 10*3/MM3 (ref 0–0.03)
IMM GRANULOCYTES NFR BLD: 0.2 % (ref 0–0.6)
KETONES UR QL STRIP: ABNORMAL
LEUKOCYTE ESTERASE UR QL STRIP.AUTO: ABNORMAL
LIPASE SERPL-CCNC: 29 U/L (ref 6–51)
LYMPHOCYTES # BLD AUTO: 1.3 10*3/MM3 (ref 0.6–4.8)
LYMPHOCYTES NFR BLD AUTO: 15.3 % (ref 24–44)
MCH RBC QN AUTO: 28.4 PG (ref 27–31)
MCHC RBC AUTO-ENTMCNC: 33.3 G/DL (ref 32–36)
MCV RBC AUTO: 85.3 FL (ref 80–99)
MONOCYTES # BLD AUTO: 0.8 10*3/MM3 (ref 0–1)
MONOCYTES NFR BLD AUTO: 9.4 % (ref 0–12)
NEUTROPHILS # BLD AUTO: 6.24 10*3/MM3 (ref 1.5–8.3)
NEUTROPHILS NFR BLD AUTO: 73.6 % (ref 41–71)
NITRITE UR QL STRIP: NEGATIVE
PH UR STRIP.AUTO: <=5 [PH] (ref 5–8)
PLATELET # BLD AUTO: 219 10*3/MM3 (ref 150–450)
PMV BLD AUTO: 11.7 FL (ref 6–12)
POTASSIUM BLD-SCNC: 4.1 MMOL/L (ref 3.5–5.5)
PROCALCITONIN SERPL-MCNC: 0.44 NG/ML
PROT SERPL-MCNC: 8.6 G/DL (ref 5.7–8.2)
PROT UR QL STRIP: ABNORMAL
RBC # BLD AUTO: 4.83 10*6/MM3 (ref 3.89–5.14)
RBC # UR: ABNORMAL /HPF
REF LAB TEST METHOD: ABNORMAL
SODIUM BLD-SCNC: 138 MMOL/L (ref 132–146)
SP GR UR STRIP: 1.02 (ref 1–1.03)
SQUAMOUS #/AREA URNS HPF: ABNORMAL /HPF
UROBILINOGEN UR QL STRIP: ABNORMAL
WBC NRBC COR # BLD: 8.48 10*3/MM3 (ref 3.5–10.8)
WBC UR QL AUTO: ABNORMAL /HPF
WHOLE BLOOD HOLD SPECIMEN: NORMAL
WHOLE BLOOD HOLD SPECIMEN: NORMAL

## 2018-03-05 PROCEDURE — 99284 EMERGENCY DEPT VISIT MOD MDM: CPT

## 2018-03-05 PROCEDURE — 82962 GLUCOSE BLOOD TEST: CPT

## 2018-03-05 PROCEDURE — 81001 URINALYSIS AUTO W/SCOPE: CPT | Performed by: EMERGENCY MEDICINE

## 2018-03-05 PROCEDURE — 25010000002 PROMETHAZINE PER 50 MG: Performed by: EMERGENCY MEDICINE

## 2018-03-05 PROCEDURE — 87077 CULTURE AEROBIC IDENTIFY: CPT | Performed by: EMERGENCY MEDICINE

## 2018-03-05 PROCEDURE — 99223 1ST HOSP IP/OBS HIGH 75: CPT | Performed by: INTERNAL MEDICINE

## 2018-03-05 PROCEDURE — 25010000002 METOCLOPRAMIDE PER 10 MG: Performed by: EMERGENCY MEDICINE

## 2018-03-05 PROCEDURE — 25010000002 ONDANSETRON PER 1 MG: Performed by: NURSE PRACTITIONER

## 2018-03-05 PROCEDURE — 83690 ASSAY OF LIPASE: CPT | Performed by: EMERGENCY MEDICINE

## 2018-03-05 PROCEDURE — 87086 URINE CULTURE/COLONY COUNT: CPT | Performed by: EMERGENCY MEDICINE

## 2018-03-05 PROCEDURE — 25010000002 FENTANYL CITRATE (PF) 100 MCG/2ML SOLUTION: Performed by: EMERGENCY MEDICINE

## 2018-03-05 PROCEDURE — 80053 COMPREHEN METABOLIC PANEL: CPT | Performed by: EMERGENCY MEDICINE

## 2018-03-05 PROCEDURE — 25010000002 MEROPENEM: Performed by: NURSE PRACTITIONER

## 2018-03-05 PROCEDURE — 25010000002 HYDROMORPHONE PER 4 MG: Performed by: NURSE PRACTITIONER

## 2018-03-05 PROCEDURE — 63710000001 INSULIN LISPRO (HUMAN) PER 5 UNITS

## 2018-03-05 PROCEDURE — 25010000002 ONDANSETRON PER 1 MG: Performed by: EMERGENCY MEDICINE

## 2018-03-05 PROCEDURE — 63710000001 INSULIN DETEMIR PER 5 UNITS: Performed by: NURSE PRACTITIONER

## 2018-03-05 PROCEDURE — 84145 PROCALCITONIN (PCT): CPT | Performed by: NURSE PRACTITIONER

## 2018-03-05 PROCEDURE — 74176 CT ABD & PELVIS W/O CONTRAST: CPT

## 2018-03-05 PROCEDURE — 85025 COMPLETE CBC W/AUTO DIFF WBC: CPT | Performed by: EMERGENCY MEDICINE

## 2018-03-05 PROCEDURE — 25010000002 PROMETHAZINE PER 50 MG: Performed by: NURSE PRACTITIONER

## 2018-03-05 PROCEDURE — 87186 SC STD MICRODIL/AGAR DIL: CPT | Performed by: EMERGENCY MEDICINE

## 2018-03-05 RX ORDER — METOCLOPRAMIDE HYDROCHLORIDE 5 MG/ML
10 INJECTION INTRAMUSCULAR; INTRAVENOUS ONCE
Status: COMPLETED | OUTPATIENT
Start: 2018-03-05 | End: 2018-03-05

## 2018-03-05 RX ORDER — PROMETHAZINE HYDROCHLORIDE 25 MG/ML
12.5 INJECTION, SOLUTION INTRAMUSCULAR; INTRAVENOUS EVERY 6 HOURS PRN
Status: DISCONTINUED | OUTPATIENT
Start: 2018-03-05 | End: 2018-03-08 | Stop reason: HOSPADM

## 2018-03-05 RX ORDER — FAMOTIDINE 20 MG/1
20 TABLET, FILM COATED ORAL NIGHTLY
Status: DISCONTINUED | OUTPATIENT
Start: 2018-03-05 | End: 2018-03-08 | Stop reason: HOSPADM

## 2018-03-05 RX ORDER — ALPRAZOLAM 1 MG/1
1 TABLET ORAL 2 TIMES DAILY PRN
Status: DISCONTINUED | OUTPATIENT
Start: 2018-03-05 | End: 2018-03-08 | Stop reason: HOSPADM

## 2018-03-05 RX ORDER — PROMETHAZINE HYDROCHLORIDE 12.5 MG/1
12.5 TABLET ORAL EVERY 6 HOURS PRN
Status: DISCONTINUED | OUTPATIENT
Start: 2018-03-05 | End: 2018-03-08 | Stop reason: HOSPADM

## 2018-03-05 RX ORDER — SODIUM CHLORIDE 0.9 % (FLUSH) 0.9 %
10 SYRINGE (ML) INJECTION AS NEEDED
Status: DISCONTINUED | OUTPATIENT
Start: 2018-03-05 | End: 2018-03-08 | Stop reason: HOSPADM

## 2018-03-05 RX ORDER — SCOLOPAMINE TRANSDERMAL SYSTEM 1 MG/1
1 PATCH, EXTENDED RELEASE TRANSDERMAL
Status: DISCONTINUED | OUTPATIENT
Start: 2018-03-06 | End: 2018-03-08 | Stop reason: HOSPADM

## 2018-03-05 RX ORDER — FLUTICASONE PROPIONATE 50 MCG
1 SPRAY, SUSPENSION (ML) NASAL DAILY PRN
Status: DISCONTINUED | OUTPATIENT
Start: 2018-03-05 | End: 2018-03-08 | Stop reason: HOSPADM

## 2018-03-05 RX ORDER — PROMETHAZINE HYDROCHLORIDE 25 MG/ML
12.5 INJECTION, SOLUTION INTRAMUSCULAR; INTRAVENOUS ONCE
Status: COMPLETED | OUTPATIENT
Start: 2018-03-05 | End: 2018-03-05

## 2018-03-05 RX ORDER — ONDANSETRON 2 MG/ML
4 INJECTION INTRAMUSCULAR; INTRAVENOUS ONCE
Status: COMPLETED | OUTPATIENT
Start: 2018-03-05 | End: 2018-03-05

## 2018-03-05 RX ORDER — PANTOPRAZOLE SODIUM 40 MG/1
40 TABLET, DELAYED RELEASE ORAL DAILY
Status: DISCONTINUED | OUTPATIENT
Start: 2018-03-06 | End: 2018-03-08 | Stop reason: HOSPADM

## 2018-03-05 RX ORDER — GABAPENTIN 300 MG/1
600 CAPSULE ORAL EVERY MORNING
Status: DISCONTINUED | OUTPATIENT
Start: 2018-03-06 | End: 2018-03-08 | Stop reason: HOSPADM

## 2018-03-05 RX ORDER — LAMOTRIGINE 100 MG/1
100 TABLET ORAL EVERY 12 HOURS SCHEDULED
Status: DISCONTINUED | OUTPATIENT
Start: 2018-03-05 | End: 2018-03-08 | Stop reason: HOSPADM

## 2018-03-05 RX ORDER — SODIUM CHLORIDE 0.9 % (FLUSH) 0.9 %
1-10 SYRINGE (ML) INJECTION AS NEEDED
Status: DISCONTINUED | OUTPATIENT
Start: 2018-03-05 | End: 2018-03-08 | Stop reason: HOSPADM

## 2018-03-05 RX ORDER — FENTANYL CITRATE 50 UG/ML
100 INJECTION, SOLUTION INTRAMUSCULAR; INTRAVENOUS AS NEEDED
Status: DISCONTINUED | OUTPATIENT
Start: 2018-03-05 | End: 2018-03-05

## 2018-03-05 RX ORDER — ONDANSETRON 2 MG/ML
4 INJECTION INTRAMUSCULAR; INTRAVENOUS EVERY 6 HOURS PRN
Status: DISCONTINUED | OUTPATIENT
Start: 2018-03-05 | End: 2018-03-08 | Stop reason: HOSPADM

## 2018-03-05 RX ORDER — METOPROLOL SUCCINATE 100 MG/1
100 TABLET, EXTENDED RELEASE ORAL EVERY 12 HOURS
Status: DISCONTINUED | OUTPATIENT
Start: 2018-03-05 | End: 2018-03-08 | Stop reason: HOSPADM

## 2018-03-05 RX ORDER — SODIUM CHLORIDE 9 MG/ML
100 INJECTION, SOLUTION INTRAVENOUS CONTINUOUS
Status: DISCONTINUED | OUTPATIENT
Start: 2018-03-05 | End: 2018-03-08 | Stop reason: HOSPADM

## 2018-03-05 RX ORDER — GABAPENTIN 400 MG/1
1200 CAPSULE ORAL NIGHTLY
Status: DISCONTINUED | OUTPATIENT
Start: 2018-03-05 | End: 2018-03-08 | Stop reason: HOSPADM

## 2018-03-05 RX ORDER — NICOTINE POLACRILEX 4 MG
15 LOZENGE BUCCAL
Status: DISCONTINUED | OUTPATIENT
Start: 2018-03-05 | End: 2018-03-08 | Stop reason: HOSPADM

## 2018-03-05 RX ORDER — NALOXONE HCL 0.4 MG/ML
0.4 VIAL (ML) INJECTION
Status: DISCONTINUED | OUTPATIENT
Start: 2018-03-05 | End: 2018-03-07

## 2018-03-05 RX ORDER — GABAPENTIN 600 MG/1
1200 TABLET ORAL EVERY EVENING
COMMUNITY
End: 2019-06-26

## 2018-03-05 RX ORDER — BUDESONIDE AND FORMOTEROL FUMARATE DIHYDRATE 160; 4.5 UG/1; UG/1
2 AEROSOL RESPIRATORY (INHALATION)
Status: DISCONTINUED | OUTPATIENT
Start: 2018-03-05 | End: 2018-03-08 | Stop reason: HOSPADM

## 2018-03-05 RX ORDER — DEXTROSE MONOHYDRATE 25 G/50ML
25 INJECTION, SOLUTION INTRAVENOUS
Status: DISCONTINUED | OUTPATIENT
Start: 2018-03-05 | End: 2018-03-08 | Stop reason: HOSPADM

## 2018-03-05 RX ORDER — HYDROMORPHONE HYDROCHLORIDE 1 MG/ML
1 INJECTION, SOLUTION INTRAMUSCULAR; INTRAVENOUS; SUBCUTANEOUS
Status: DISCONTINUED | OUTPATIENT
Start: 2018-03-05 | End: 2018-03-07

## 2018-03-05 RX ORDER — TIZANIDINE 4 MG/1
2 TABLET ORAL EVERY 8 HOURS PRN
Status: DISCONTINUED | OUTPATIENT
Start: 2018-03-05 | End: 2018-03-08 | Stop reason: HOSPADM

## 2018-03-05 RX ADMIN — HYDROMORPHONE HYDROCHLORIDE 1 MG: 10 INJECTION INTRAMUSCULAR; INTRAVENOUS; SUBCUTANEOUS at 21:48

## 2018-03-05 RX ADMIN — SODIUM CHLORIDE 1000 ML: 9 INJECTION, SOLUTION INTRAVENOUS at 11:14

## 2018-03-05 RX ADMIN — HYDROMORPHONE HYDROCHLORIDE 1 MG: 10 INJECTION INTRAMUSCULAR; INTRAVENOUS; SUBCUTANEOUS at 19:31

## 2018-03-05 RX ADMIN — PROMETHAZINE HYDROCHLORIDE 12.5 MG: 25 INJECTION INTRAMUSCULAR; INTRAVENOUS at 13:02

## 2018-03-05 RX ADMIN — METOPROLOL SUCCINATE 100 MG: 100 TABLET, EXTENDED RELEASE ORAL at 21:50

## 2018-03-05 RX ADMIN — FAMOTIDINE 20 MG: 20 TABLET, FILM COATED ORAL at 21:50

## 2018-03-05 RX ADMIN — SODIUM CHLORIDE 100 ML/HR: 9 INJECTION, SOLUTION INTRAVENOUS at 17:18

## 2018-03-05 RX ADMIN — FENTANYL CITRATE 100 MCG: 50 INJECTION INTRAMUSCULAR; INTRAVENOUS at 11:49

## 2018-03-05 RX ADMIN — WATER 2 G: 100 INJECTION, SOLUTION INTRAVENOUS at 13:26

## 2018-03-05 RX ADMIN — MEROPENEM 1 G: 1 INJECTION, POWDER, FOR SOLUTION INTRAVENOUS at 19:34

## 2018-03-05 RX ADMIN — GABAPENTIN 1200 MG: 400 CAPSULE ORAL at 21:49

## 2018-03-05 RX ADMIN — ONDANSETRON 4 MG: 2 INJECTION INTRAMUSCULAR; INTRAVENOUS at 11:48

## 2018-03-05 RX ADMIN — FENTANYL CITRATE 100 MCG: 50 INJECTION INTRAMUSCULAR; INTRAVENOUS at 13:05

## 2018-03-05 RX ADMIN — HYDROMORPHONE HYDROCHLORIDE 1 MG: 10 INJECTION INTRAMUSCULAR; INTRAVENOUS; SUBCUTANEOUS at 17:17

## 2018-03-05 RX ADMIN — INSULIN LISPRO 16 UNITS: 100 INJECTION, SOLUTION INTRAVENOUS; SUBCUTANEOUS at 17:59

## 2018-03-05 RX ADMIN — INSULIN DETEMIR 75 UNITS: 100 INJECTION, SOLUTION SUBCUTANEOUS at 21:52

## 2018-03-05 RX ADMIN — LAMOTRIGINE 100 MG: 100 TABLET ORAL at 21:50

## 2018-03-05 RX ADMIN — METOCLOPRAMIDE 10 MG: 5 INJECTION, SOLUTION INTRAMUSCULAR; INTRAVENOUS at 15:23

## 2018-03-05 RX ADMIN — PROMETHAZINE HYDROCHLORIDE 12.5 MG: 25 INJECTION INTRAMUSCULAR; INTRAVENOUS at 21:51

## 2018-03-05 RX ADMIN — ONDANSETRON HYDROCHLORIDE 4 MG: 2 INJECTION, SOLUTION INTRAMUSCULAR; INTRAVENOUS at 17:59

## 2018-03-05 NOTE — PLAN OF CARE
Problem: Patient Care Overview (Adult)  Goal: Plan of Care Review  Outcome: Ongoing (interventions implemented as appropriate)   03/05/18 1450   Coping/Psychosocial Response Interventions   Plan Of Care Reviewed With patient;family   Patient Care Overview   Progress improving   Outcome Evaluation   Outcome Summary/Follow up Plan patient is in pain, CT abdomen done.       Problem: Pain, Acute (Adult)  Goal: Identify Related Risk Factors and Signs and Symptoms  Outcome: Ongoing (interventions implemented as appropriate)   03/05/18 1450   Pain, Acute   Related Risk Factors (Acute Pain) infection   Signs and Symptoms (Acute Pain) fatigue/weakness     Goal: Acceptable Pain Control/Comfort Level  Outcome: Ongoing (interventions implemented as appropriate)   03/05/18 1450   Pain, Acute (Adult)   Acceptable Pain Control/Comfort Level making progress toward outcome       Problem: Infection, Risk/Actual (Adult)  Goal: Identify Related Risk Factors and Signs and Symptoms  Outcome: Ongoing (interventions implemented as appropriate)   03/05/18 1450   Infection, Risk/Actual   Infection, Risk/Actual: Related Risk Factors immunosuppressed   Signs and Symptoms (Infection, Risk/Actual) lab value changes     Goal: Infection Prevention/Resolution  Outcome: Ongoing (interventions implemented as appropriate)   03/05/18 1450   Infection, Risk/Actual (Adult)   Infection Prevention/Resolution making progress toward outcome

## 2018-03-05 NOTE — H&P
[facility] Medicine Services  HISTORY AND PHYSICAL    Patient Name: oCrtney Delacruz  : 1955  MRN: [id number]  Primary Care Physician: Ronnie Garvey MD    Subjective   Subjective     Chief Complaint:  n/v/d    HPI:  Cortney Delacruz is a 62 y.o. female with PMH ESBL UTI (ecoli/klebsiella), chronic pain on chronic opiates, HTN, HLP, T2DM.  She began having n/v/d/abd pain on Thursday and thought was viral GI illness.  She presented today due to decreased urine output.  Was found to have pyelo and is being admitted to hospitalist service for further treatment.      Review of Systems   Constitutional: Positive for appetite change and fever.   Respiratory: Negative for cough and shortness of breath.    Cardiovascular: Negative for chest pain.   Gastrointestinal: Positive for abdominal pain. Negative for blood in stool, diarrhea, nausea and vomiting.   Genitourinary: Positive for decreased urine volume. Negative for dysuria and hematuria.   Musculoskeletal: Positive for back pain.   Neurological: Negative for dizziness.   Psychiatric/Behavioral: Negative for confusion.        Otherwise 10-system ROS reviewed and is negative except as mentioned in the HPI.    Personal History     Past Medical History:   Diagnosis Date   • Arthritis    • Cancer     breast s/p chemo   • Chronic pain     on dilaudid x4 years   • Depression    • Diabetes     type II   • Diabetic neuropathy     gabapentin, lamictal; reports uses xanax for this when severe as well   • Gastroparesis    • GERD (gastroesophageal reflux disease)     PPI and pepcid   • Hypercholesteremia    • Hypertension    • Osteoporosis     reclast inj yearly       Past Surgical History:   Procedure Laterality Date   • ABDOMINAL HERNIA REPAIR  ,    • APPENDECTOMY     • CHOLECYSTECTOMY     • GASTRIC STIMULATOR IMPLANT SURGERY  , generator replaced    • LUMBAR LAMINECTOMY      x4;  (L4-5), (L3-4), (L3-4),  2007(L3-4, L4-5)   • MASTECTOMY Left 2000   • TONSILLECTOMY AND ADENOIDECTOMY  1962   • TOTAL HIP ARTHROPLASTY REVISION  2015   • TRIGGER FINGER RELEASE Left 2017    ring finger   • VENTRAL HERNIA REPAIR  2007, 2013   • WRIST GANGLION EXCISION Right 1986       Family History: family history includes Alcohol abuse in her father; Arthritis in her mother; Coronary artery disease in her brother; Dementia in her maternal grandmother; Hypertension in her mother; Lung cancer in her father; Melanoma in her brother; Osteoporosis in her mother.     Social History:  reports that she has never smoked. She has never used smokeless tobacco. She reports that she does not drink alcohol or use illicit drugs.  Social History     Social History Narrative    Lives in [place] with son       Medications:  Prescriptions Prior to Admission   Medication Sig Dispense Refill Last Dose   • acetaminophen (TYLENOL) 325 MG tablet Take 2 tablets by mouth Every 4 (Four) Hours As Needed for Mild Pain (1-3) or Fever.  0 Past Month at Unknown time   • ALPRAZolam (XANAX) 1 MG tablet Take 1 mg by mouth 3 (Three) Times a Day As Needed for anxiety.   Past Week at Unknown time   • famotidine (PEPCID) 20 MG tablet 20 mg At Night As Needed.   12/29/2016 at Unknown time   • FLONASE ALLERGY RELIEF 50 MCG/ACT nasal spray 1 spray Daily As Needed.   Past Week at Unknown time   • fluticasone-salmeterol (ADVAIR) 100-50 MCG/DOSE DISKUS Inhale 1 puff Daily As Needed.   Past Month at Unknown time   • gabapentin (NEURONTIN) 600 MG tablet Take 600 mg by mouth Every Morning.   12/30/2016 at Unknown time   • gabapentin (NEURONTIN) 600 MG tablet Take 1,200 mg by mouth Every Evening.      • HYDROmorphone (DILAUDID) 2 MG tablet 2 mg Every 6 (Six) Hours As Needed.   12/30/2016 at Unknown time   • insulin aspart (novoLOG FLEXPEN) 100 UNIT/ML solution pen-injector sc pen Inject 25 Units under the skin 3 (Three) Times a Day With Meals.   12/29/2016 at Unknown time   • Insulin  Degludec (TRESIBA FLEXTOUCH) 100 UNIT/ML solution pen-injector Inject 110 Units under the skin Every Night.   12/29/2016 at Unknown time   • lamoTRIgine (LaMICtal) 100 MG tablet 100 mg 2 (Two) Times a Day.   12/30/2016 at Unknown time   • metoprolol succinate XL (TOPROL-XL) 100 MG 24 hr tablet Take 100 mg by mouth 2 (Two) Times a Day.   12/30/2016 at Unknown time   • Multiple Vitamins-Minerals (MULTIVITAMIN ADULT PO) Take 1 tablet by mouth Daily.   12/29/2016 at Unknown time   • NON FORMULARY Take 20 mg by mouth 2 (Two) Times a Day. Domperidone 20 mg 2x daily       • ondansetron (ZOFRAN) 8 MG tablet Take 8 mg by mouth Every 8 (Eight) Hours As Needed for Nausea or Vomiting.   12/30/2016 at Unknown time   • pantoprazole (PROTONIX) 40 MG EC tablet Take 40 mg by mouth Daily.   12/30/2016 at Unknown time   • promethazine (PHENERGAN) 25 MG tablet Take 1 tablet by mouth Every 6 (Six) Hours As Needed for nausea or vomiting for up to 12 doses. 12 tablet 0 12/30/2016 at Unknown time   • Scopolamine (TRANSDERM-SCOP) 1.5 MG/3DAYS patch Place 1 patch on the skin Every 72 (Seventy-Two) Hours.   More than a month at Unknown time   • tiZANidine (ZANAFLEX) 2 MG tablet Take 2 mg by mouth Every 8 (Eight) Hours As Needed for muscle spasms.   12/30/2016 at Unknown time       Allergies   Allergen Reactions   • Actos [Pioglitazone] Shortness Of Breath   • Ciprocinonide [Fluocinolone] Anaphylaxis   • Codeine Anaphylaxis   • Kefzol [Cefazolin] Anaphylaxis   • Metformin And Related Shortness Of Breath   • Unasyn [Ampicillin-Sulbactam Sodium] Anaphylaxis   • Biaxin [Clarithromycin] Hives and Nausea And Vomiting   • Crestor [Rosuvastatin Calcium] Myalgia   • Lisinopril Other (See Comments)     Orthostatic hypotension   • Prilosec [Omeprazole] Other (See Comments)     Gastris standstill       Objective   Objective     Vital Signs:   Temp:  [98.8 °F (37.1 °C)-99.5 °F (37.5 °C)] 98.9 °F (37.2 °C)  Heart Rate:  [] 108  Resp:  [16-18] 18  BP:  (163-190)/() 172/98        Physical Exam   Constitutional: No acute distress, awake, alert, appears uncomfortable  Eyes: PERRLA, sclerae anicteric, no conjunctival injection  HENT: NCAT, mucous membranes moist  Neck: Supple, no thyromegaly, no lymphadenopathy, trachea midline  Respiratory: Clear to auscultation bilaterally, nonlabored respirations   Cardiovascular: RRR, no murmurs, rubs, or gallops, palpable pedal pulses bilaterally  Gastrointestinal: Positive bowel sounds, soft, right sided tenderness, nondistended  : right CVA tenderness  Musculoskeletal: No bilateral ankle edema, no clubbing or cyanosis to extremities  Psychiatric: Appropriate affect, cooperative  Neurologic: Oriented x 3, strength symmetric in all extremities, speech clear  Skin: No rashes      Results Reviewed:  I have personally reviewed current lab, radiology, and data and agree.      Results from last 7 days  Lab Units 03/05/18  1108   WBC 10*3/mm3 8.48   HEMOGLOBIN g/dL 13.7   HEMATOCRIT % 41.2   PLATELETS 10*3/mm3 219       Results from last 7 days  Lab Units 03/05/18  1108   SODIUM mmol/L 138   POTASSIUM mmol/L 4.1   CHLORIDE mmol/L 101   CO2 mmol/L 24.0   BUN mg/dL 11   CREATININE mg/dL 0.90   GLUCOSE mg/dL 213*   CALCIUM mg/dL 10.1   ALT (SGPT) U/L 39   AST (SGOT) U/L 34*     Estimated Creatinine Clearance: 83.2 mL/min (by C-G formula based on Cr of 0.9).  Brief Urine Lab Results  (Last result in the past 365 days)      Color   Clarity   Blood   Leuk Est   Nitrite   Protein   CREAT   Urine HCG        03/05/18 1113 Dark Yellow(A) Cloudy(A) Negative Small (1+)(A) Negative 30 mg/dL (1+)(A)             No results found for: BNP  No results found for: PHART  Imaging Results (last 24 hours)     Procedure Component Value Units Date/Time    CT Abdomen Pelvis Without Contrast [[id number]] Collected:  03/05/18 1333     Updated:  03/05/18 1602    Narrative:       EXAMINATION: CT ABDOMEN AND PELVIS WO CONTRAST-      INDICATION: Right flank  pain, pyelonephritis, renal stones.     TECHNIQUE: Multiple axial CT imaging was obtained of the abdomen and  pelvis without the administration of oral or intravenous contrast.     The radiation dose reduction device was turned on for each scan per the  ALARA (As Low as Reasonably Achievable) protocol.     COMPARISON: 07/13/2017.     FINDINGS: ABDOMEN: The lung bases are grossly clear. Liver is  homogeneous in appearance. Ventral abdominal wall hernia identified  containing mesenteric fat only. The spleen is unremarkable. Kidneys and  adrenal glands are within normal limits. There is calcification seen  within the abdominal aorta. Nonobstructing stone seen inferiorly within  the right kidney. No abnormal mass or fluid collection is identified.  The pancreas is homogeneous. The abdominal portion of the  gastrointestinal tract is within normal limits. No free fluid or free  air.     PELVIS: Pelvic organs are unremarkable in appearance. There is a small  collection of fluid seen along the anterior aspect of the peritoneal  lining. This is stable and unchanged when compared to the prior study.  Minimal degenerative change is seen within the spine. There is no  evidence of stones seen in the ureters. There is no bladder  calcification. Vascular calcification seen within the abdominal aorta  and iliac vessels. No free fluid or free air. No abnormal mass or fluid  collection is identified. Degenerative change is seen within the spine  and pelvis with hardware identified in the left hip from total left hip  arthroplasty. Some streak artifact obscures image detail within the  pelvis.       Impression:       Nonobstructing stone seen of the inferior aspect of the  right kidney measuring approximately 4 to 5 mm in size. Remainder of the  abdomen and pelvis is unchanged and unremarkable. There is a midline  ventral abdominal wall hernia containing mesenteric fat only.     D:  03/05/2018  E:  03/05/2018     This report was  finalized on 3/5/2018 4:00 PM by Dr. Nicky Clark MD.                Assessment/Plan   Assessment / Plan     Hospital Problem List     * (Principal)Acute pyelonephritis    Type 2 diabetes mellitus (Chronic)    Gastroparesis (Chronic)    Chronic pain syndrome, on PO dilaudid at home (Chronic)    Hypertension (Chronic)    GERD (gastroesophageal reflux disease) (Chronic)    N&V (nausea and vomiting)    Diarrhea    History of ESBL E. coli infection            Assessment & Plan:  --broad antibiotics due to h/o ESBL  --ID consult in am (cortes Butts)  --IV pain control, has required PCA in past  --continue to home meds  --hydrate    DVT prophylaxis: lovenox/TEDS/SCDS    CODE STATUS:  Full Code    Admission Status:  I believe this patient meets inpatient status due to need for iv antibiotics    Electronically signed by USHA Henry, 03/05/18, 5:16 PM.        Brief Attending Admission Attestation     I have seen and examined the patient, performing an independent face-to-face diagnostic evaluation with plan of care reviewed and developed with the advanced practice clinician (APC).      Brief Summary Statement/HPI:   Cortney Delacruz is a 62 y.o. female w/ hx of dm, obesity, recurrent uti's, kidney stones, prior esbl ecoli uti. Presented with fever (102 at home), right flank pain, n/v. Admitted with pyelonephritis. Ct a/p no obstructive uropathy.    Attending Physical Exam:  Constitutional:Alert, oriented x 3, nontoxic appearing  Psych:Normal/appropriate affect  HEENT:Ncat, oroph clear  Neck: neck supple, full range of motion  Neuro: Face symmetric, speech clear, equal , moves all extremities  Cardiac: Rrr; No pretibial pitting edema  Resp: Ctab normal effort  GI: abd soft, nontender, obese, right cva tenderness  Skin: No extremity rash  Musculoskeletal/extremities: no cyanosis extremities; no significant ankle edema          Brief Assessment/Plan :  See above for further detailed assessment and plan developed  with APC which I have reviewed and/or edited.    -iv merrem (hx esbl ecoli)  -linda/Id consult in a.m.  -antiemetics  -iv dilaudid (on po dilaudid chronically); transition back to po dilaudid once tolerating po    Electronically signed by Quang Haywood MD, 03/05/18, 8:36 PM.

## 2018-03-05 NOTE — ED PROVIDER NOTES
Subjective   HPI Comments: Cortney Delacruz is a 62 y.o.female with a history of appendectomy and cholecystectomy who presents to the ED with c/o abdominal pain. The patient has chronic upper abdominal pain, secondary to gastroparesis. For the past four days she has had a waxing and waning fever and right sided abdominal pain, which feels similar to previous pyelonephritis. Three days ago she developed nausea, vomiting and diarrhea. Her diarrhea resolved yesterday. After continued pain she presents to the ED for evaluation. At the ED she denies any other acute symptoms.    She was placed on a 10 days treatment of Bactrim three weeks ago for a bladder infection.      Patient is a 62 y.o. female presenting with abdominal pain.   History provided by:  Patient  Abdominal Pain   Pain location:  RUQ and RLQ  Pain radiates to:  Does not radiate  Onset quality:  Gradual  Duration:  4 days  Timing:  Constant  Progression:  Unchanged  Chronicity:  New  Relieved by:  None tried  Worsened by:  Nothing  Ineffective treatments:  None tried  Associated symptoms: diarrhea, fever, nausea and vomiting        Review of Systems   Constitutional: Positive for fever.   Gastrointestinal: Positive for abdominal pain, diarrhea, nausea and vomiting.   All other systems reviewed and are negative.      Past Medical History:   Diagnosis Date   • Arthritis    • Arthritis    • Cancer     breast   • Chronic pain     on dilaudid x4 years   • Depression    • Diabetes     type II   • Diabetic neuropathy     gabapentin, lamictal; reports uses xanax for this when severe as well   • Fever 7/14/2017   • Gastroparesis    • GERD (gastroesophageal reflux disease)     PPI and pepcid   • Hypercholesteremia    • Hypertension    • Osteoporosis     reclast inj yearly   • RLQ abdominal pain 7/14/2017       Allergies   Allergen Reactions   • Actos [Pioglitazone] Shortness Of Breath   • Ciprocinonide [Fluocinolone] Anaphylaxis   • Codeine Anaphylaxis   • Kefzol  [Cefazolin] Anaphylaxis   • Metformin And Related Shortness Of Breath   • Unasyn [Ampicillin-Sulbactam Sodium] Anaphylaxis   • Biaxin [Clarithromycin] Hives and Nausea And Vomiting   • Crestor [Rosuvastatin Calcium] Myalgia   • Lisinopril Other (See Comments)     Orthostatic hypotension   • Prilosec [Omeprazole] Other (See Comments)     Gastris standstill       Past Surgical History:   Procedure Laterality Date   • ADENOIDECTOMY     • APPENDECTOMY     • BREAST SURGERY     • CHOLECYSTECTOMY     • GASTRIC STIMULATOR IMPLANT SURGERY     • HERNIA REPAIR     • LUMBAR LAMINECTOMY      reports 4 back surgeries   • TONSILLECTOMY     • TRIGGER FINGER RELEASE     • WRIST GANGLION EXCISION         Family History   Problem Relation Age of Onset   • Alcohol abuse Father    • Cancer Father    • Dementia Maternal Grandmother    • Hypertension Mother    • Arthritis Mother    • Osteoporosis Mother        Social History     Social History   • Marital status:      Social History Main Topics   • Smoking status: Never Smoker   • Smokeless tobacco: Never Used   • Alcohol use No   • Drug use: No     Social History Narrative    Patient is a 61 year old white female.         Objective   Physical Exam   Constitutional: She is oriented to person, place, and time. She appears well-developed and well-nourished. No distress.   Miserable, restless obese female.   HENT:   Head: Normocephalic and atraumatic.   Mouth/Throat: No oropharyngeal exudate.   Airway patent. Pharynx benign.   Eyes: Conjunctivae are normal. No scleral icterus.   Neck: Normal range of motion. Neck supple. No JVD present.   Cardiovascular: Normal rate, regular rhythm and normal heart sounds.  Exam reveals no gallop and no friction rub.    No murmur heard.  Pulmonary/Chest: Effort normal and breath sounds normal. No respiratory distress. She has no wheezes. She has no rales.   Abdominal: Soft. Bowel sounds are normal. She exhibits no distension. There is tenderness.  There is no rebound and no guarding.   TTP over the right CVA. Pretty TTP down the right flank to the RLQ.   Musculoskeletal: Normal range of motion. She exhibits no edema.   Lymphadenopathy:     She has no cervical adenopathy.   Neurological: She is alert and oriented to person, place, and time.   Skin: Skin is warm and dry. She is not diaphoretic.   Psychiatric: She has a normal mood and affect. Her behavior is normal.   Nursing note and vitals reviewed.      Procedures         ED Course  ED Course   Comment By Time   Dr. Anderson re-evaluated the patient and discussed all findings. [facility] 03/05 1257   Dr. Justin Haywood, hospitalist. [facility] 03/05 1306     Recent Results (from the past 24 hour(s))   POC Glucose Once    Collection Time: 03/05/18 11:03 AM   Result Value Ref Range    Glucose 185 (H) 70 - 130 mg/dL   Comprehensive Metabolic Panel    Collection Time: 03/05/18 11:08 AM   Result Value Ref Range    Glucose 213 (H) 70 - 100 mg/dL    BUN 11 9 - 23 mg/dL    Creatinine 0.90 0.60 - 1.30 mg/dL    Sodium 138 132 - 146 mmol/L    Potassium 4.1 3.5 - 5.5 mmol/L    Chloride 101 99 - 109 mmol/L    CO2 24.0 20.0 - 31.0 mmol/L    Calcium 10.1 8.7 - 10.4 mg/dL    Total Protein 8.6 (H) 5.7 - 8.2 g/dL    Albumin 4.70 3.20 - 4.80 g/dL    ALT (SGPT) 39 7 - 40 U/L    AST (SGOT) 34 (H) 0 - 33 U/L    Alkaline Phosphatase 92 25 - 100 U/L    Total Bilirubin 0.4 0.3 - 1.2 mg/dL    eGFR Non African Amer 63 >60 mL/min/1.73    Globulin 3.9 gm/dL    A/G Ratio 1.2 (L) 1.5 - 2.5 g/dL    BUN/Creatinine Ratio 12.2 7.0 - 25.0    Anion Gap 13.0 (H) 3.0 - 11.0 mmol/L   Lipase    Collection Time: 03/05/18 11:08 AM   Result Value Ref Range    Lipase 29 6 - 51 U/L   Light Blue Top    Collection Time: 03/05/18 11:08 AM   Result Value Ref Range    Extra Tube hold for add-on    Green Top (Gel)    Collection Time: 03/05/18 11:08 AM   Result Value Ref Range    Extra Tube Hold for add-ons.    Lavender Top    Collection  Time: 03/05/18 11:08 AM   Result Value Ref Range    Extra Tube hold for add-on    Gold Top - SST    Collection Time: 03/05/18 11:08 AM   Result Value Ref Range    Extra Tube Hold for add-ons.    CBC Auto Differential    Collection Time: 03/05/18 11:08 AM   Result Value Ref Range    WBC 8.48 3.50 - 10.80 10*3/mm3    RBC 4.83 3.89 - 5.14 10*6/mm3    Hemoglobin 13.7 11.5 - 15.5 g/dL    Hematocrit 41.2 34.5 - 44.0 %    MCV 85.3 80.0 - 99.0 fL    MCH 28.4 27.0 - 31.0 pg    MCHC 33.3 32.0 - 36.0 g/dL    RDW 13.9 11.3 - 14.5 %    RDW-SD 43.4 37.0 - 54.0 fl    MPV 11.7 6.0 - 12.0 fL    Platelets 219 150 - 450 10*3/mm3    Neutrophil % 73.6 (H) 41.0 - 71.0 %    Lymphocyte % 15.3 (L) 24.0 - 44.0 %    Monocyte % 9.4 0.0 - 12.0 %    Eosinophil % 1.1 0.0 - 3.0 %    Basophil % 0.4 0.0 - 1.0 %    Immature Grans % 0.2 0.0 - 0.6 %    Neutrophils, Absolute 6.24 1.50 - 8.30 10*3/mm3    Lymphocytes, Absolute 1.30 0.60 - 4.80 10*3/mm3    Monocytes, Absolute 0.80 0.00 - 1.00 10*3/mm3    Eosinophils, Absolute 0.09 0.00 - 0.30 10*3/mm3    Basophils, Absolute 0.03 0.00 - 0.20 10*3/mm3    Immature Grans, Absolute 0.02 0.00 - 0.03 10*3/mm3   Urinalysis With / Culture If Indicated - Urine, Catheter    Collection Time: 03/05/18 11:13 AM   Result Value Ref Range    Color, UA Dark Yellow (A) Yellow, Straw    Appearance, UA Cloudy (A) Clear    pH, UA <=5.0 5.0 - 8.0    Specific Gravity, UA 1.022 1.001 - 1.030    Glucose,  mg/dL (Trace) (A) Negative    Ketones, UA 40 mg/dL (2+) (A) Negative    Bilirubin, UA Negative Negative    Blood, UA Negative Negative    Protein, UA 30 mg/dL (1+) (A) Negative    Leuk Esterase, UA Small (1+) (A) Negative    Nitrite, UA Negative Negative    Urobilinogen, UA 1.0 E.U./dL 0.2 - 1.0 E.U./dL   Urinalysis, Microscopic Only - Urine, Clean Catch    Collection Time: 03/05/18 11:13 AM   Result Value Ref Range    RBC, UA 0-2 None Seen, 0-2 /HPF    WBC, UA Too Numerous to Count (A) None Seen, 0-2 /HPF    Bacteria, UA 4+  "(A) None Seen, Trace /HPF    Squamous Epithelial Cells, UA 0-2 None Seen, 0-2 /HPF    Hyaline Casts, UA None Seen 0 - 6 /LPF    Methodology Manual Light Microscopy      Note: In addition to lab results from this visit, the labs listed above may include labs taken at another facility or during a different encounter within the last 24 hours. Please correlate lab times with ED admission and discharge times for further clarification of the services performed during this visit.    CT Abdomen Pelvis Without Contrast   Preliminary Result   Nonobstructing stone seen of the inferior aspect of the   right kidney measuring approximately 4 to 5 mm in size. Remainder of the   abdomen and pelvis is unchanged and unremarkable. There is a midline   ventral abdominal wall hernia containing mesenteric fat only.       D:  03/05/2018   E:  03/05/2018            Vitals:    03/05/18 1011 03/05/18 1200 03/05/18 1230 03/05/18 1315   BP: 178/89 (!) 190/105 163/89 179/87   BP Location: Right arm      Patient Position: Sitting      Pulse: 105 89 92 97   Resp: 18 18 18 18   Temp: 99.5 °F (37.5 °C)      TempSrc: Oral      SpO2: 96% 93% 95% 96%   Weight: 104 kg (230 lb)      Height: 175.3 cm (69\")        Medications   sodium chloride 0.9 % flush 10 mL (not administered)   metoclopramide (REGLAN) injection 10 mg (10 mg Intravenous Not Given 3/5/18 1115)   fentaNYL citrate (PF) (SUBLIMAZE) injection 100 mcg (100 mcg Intravenous Given 3/5/18 1305)   sodium chloride 0.9 % bolus 1,000 mL (0 mL Intravenous Stopped 3/5/18 1214)   ondansetron (ZOFRAN) injection 4 mg (4 mg Intravenous Given 3/5/18 1148)   promethazine (PHENERGAN) injection 12.5 mg (12.5 mg Intravenous Given 3/5/18 1302)   aztreonam (AZACTAM) in SWFI 2 grams/10ml IV PUSH syringe (2 g Intravenous Given 3/5/18 1326)     ECG/EMG Results (last 24 hours)     ** No results found for the last 24 hours. **                    MDM    Final diagnoses:   Acute pyelonephritis   Gastroparesis "   Non-intractable vomiting with nausea, unspecified vomiting type       Documentation assistance provided by flaquita Valentin.  Information recorded by the scribe was done at my direction and has been verified and validated by me.     Hugo Valentin  03/05/18 8413       Jeferson Anderson MD  03/05/18 0136

## 2018-03-06 PROBLEM — E87.6 HYPOKALEMIA: Status: ACTIVE | Noted: 2018-03-06

## 2018-03-06 LAB
ANION GAP SERPL CALCULATED.3IONS-SCNC: 9 MMOL/L (ref 3–11)
BASOPHILS # BLD AUTO: 0.03 10*3/MM3 (ref 0–0.2)
BASOPHILS NFR BLD AUTO: 0.3 % (ref 0–1)
BUN BLD-MCNC: 12 MG/DL (ref 9–23)
BUN/CREAT SERPL: 13.3 (ref 7–25)
CALCIUM SPEC-SCNC: 9.4 MG/DL (ref 8.7–10.4)
CHLORIDE SERPL-SCNC: 104 MMOL/L (ref 99–109)
CO2 SERPL-SCNC: 26 MMOL/L (ref 20–31)
CREAT BLD-MCNC: 0.9 MG/DL (ref 0.6–1.3)
DEPRECATED RDW RBC AUTO: 45 FL (ref 37–54)
EOSINOPHIL # BLD AUTO: 0.1 10*3/MM3 (ref 0–0.3)
EOSINOPHIL NFR BLD AUTO: 1 % (ref 0–3)
ERYTHROCYTE [DISTWIDTH] IN BLOOD BY AUTOMATED COUNT: 14.3 % (ref 11.3–14.5)
GFR SERPL CREATININE-BSD FRML MDRD: 63 ML/MIN/1.73
GLUCOSE BLD-MCNC: 65 MG/DL (ref 70–100)
GLUCOSE BLDC GLUCOMTR-MCNC: 102 MG/DL (ref 70–130)
GLUCOSE BLDC GLUCOMTR-MCNC: 150 MG/DL (ref 70–130)
GLUCOSE BLDC GLUCOMTR-MCNC: 71 MG/DL (ref 70–130)
GLUCOSE BLDC GLUCOMTR-MCNC: 82 MG/DL (ref 70–130)
HCT VFR BLD AUTO: 37.6 % (ref 34.5–44)
HGB BLD-MCNC: 11.9 G/DL (ref 11.5–15.5)
IMM GRANULOCYTES # BLD: 0.02 10*3/MM3 (ref 0–0.03)
IMM GRANULOCYTES NFR BLD: 0.2 % (ref 0–0.6)
LYMPHOCYTES # BLD AUTO: 3.71 10*3/MM3 (ref 0.6–4.8)
LYMPHOCYTES NFR BLD AUTO: 38 % (ref 24–44)
MCH RBC QN AUTO: 27.3 PG (ref 27–31)
MCHC RBC AUTO-ENTMCNC: 31.6 G/DL (ref 32–36)
MCV RBC AUTO: 86.2 FL (ref 80–99)
MONOCYTES # BLD AUTO: 1.15 10*3/MM3 (ref 0–1)
MONOCYTES NFR BLD AUTO: 11.8 % (ref 0–12)
NEUTROPHILS # BLD AUTO: 4.75 10*3/MM3 (ref 1.5–8.3)
NEUTROPHILS NFR BLD AUTO: 48.7 % (ref 41–71)
PLAT MORPH BLD: NORMAL
PLATELET # BLD AUTO: 222 10*3/MM3 (ref 150–450)
PMV BLD AUTO: 12.2 FL (ref 6–12)
POTASSIUM BLD-SCNC: 3.2 MMOL/L (ref 3.5–5.5)
POTASSIUM BLD-SCNC: 3.9 MMOL/L (ref 3.5–5.5)
RBC # BLD AUTO: 4.36 10*6/MM3 (ref 3.89–5.14)
RBC MORPH BLD: NORMAL
SODIUM BLD-SCNC: 139 MMOL/L (ref 132–146)
WBC MORPH BLD: NORMAL
WBC NRBC COR # BLD: 9.76 10*3/MM3 (ref 3.5–10.8)

## 2018-03-06 PROCEDURE — 63710000001 INSULIN DETEMIR PER 5 UNITS: Performed by: NURSE PRACTITIONER

## 2018-03-06 PROCEDURE — 99233 SBSQ HOSP IP/OBS HIGH 50: CPT | Performed by: HOSPITALIST

## 2018-03-06 PROCEDURE — 85025 COMPLETE CBC W/AUTO DIFF WBC: CPT | Performed by: NURSE PRACTITIONER

## 2018-03-06 PROCEDURE — 25010000002 ONDANSETRON PER 1 MG: Performed by: NURSE PRACTITIONER

## 2018-03-06 PROCEDURE — 80048 BASIC METABOLIC PNL TOTAL CA: CPT | Performed by: NURSE PRACTITIONER

## 2018-03-06 PROCEDURE — 25010000002 ERTAPENEM: Performed by: INTERNAL MEDICINE

## 2018-03-06 PROCEDURE — 82962 GLUCOSE BLOOD TEST: CPT

## 2018-03-06 PROCEDURE — 84132 ASSAY OF SERUM POTASSIUM: CPT | Performed by: HOSPITALIST

## 2018-03-06 PROCEDURE — 25010000002 MEROPENEM: Performed by: NURSE PRACTITIONER

## 2018-03-06 PROCEDURE — 85007 BL SMEAR W/DIFF WBC COUNT: CPT | Performed by: NURSE PRACTITIONER

## 2018-03-06 PROCEDURE — 25010000002 HYDROMORPHONE PER 4 MG: Performed by: NURSE PRACTITIONER

## 2018-03-06 PROCEDURE — 25010000002 PROMETHAZINE PER 50 MG: Performed by: NURSE PRACTITIONER

## 2018-03-06 RX ORDER — POTASSIUM CHLORIDE 7.45 MG/ML
10 INJECTION INTRAVENOUS
Status: DISCONTINUED | OUTPATIENT
Start: 2018-03-06 | End: 2018-03-06

## 2018-03-06 RX ORDER — POTASSIUM CHLORIDE 750 MG/1
40 CAPSULE, EXTENDED RELEASE ORAL AS NEEDED
Status: DISCONTINUED | OUTPATIENT
Start: 2018-03-06 | End: 2018-03-08 | Stop reason: HOSPADM

## 2018-03-06 RX ORDER — POTASSIUM CHLORIDE 1.5 G/1.77G
40 POWDER, FOR SOLUTION ORAL AS NEEDED
Status: DISCONTINUED | OUTPATIENT
Start: 2018-03-06 | End: 2018-03-08 | Stop reason: HOSPADM

## 2018-03-06 RX ADMIN — MEROPENEM 1 G: 1 INJECTION, POWDER, FOR SOLUTION INTRAVENOUS at 09:07

## 2018-03-06 RX ADMIN — HYDROMORPHONE HYDROCHLORIDE 1 MG: 10 INJECTION INTRAMUSCULAR; INTRAVENOUS; SUBCUTANEOUS at 18:18

## 2018-03-06 RX ADMIN — LAMOTRIGINE 100 MG: 100 TABLET ORAL at 20:45

## 2018-03-06 RX ADMIN — HYDROMORPHONE HYDROCHLORIDE 1 MG: 10 INJECTION INTRAMUSCULAR; INTRAVENOUS; SUBCUTANEOUS at 06:18

## 2018-03-06 RX ADMIN — ERTAPENEM SODIUM 1 G: 1 INJECTION, POWDER, LYOPHILIZED, FOR SOLUTION INTRAMUSCULAR; INTRAVENOUS at 15:42

## 2018-03-06 RX ADMIN — INSULIN DETEMIR 60 UNITS: 100 INJECTION, SOLUTION SUBCUTANEOUS at 20:46

## 2018-03-06 RX ADMIN — POTASSIUM CHLORIDE 40 MEQ: 750 CAPSULE, EXTENDED RELEASE ORAL at 12:25

## 2018-03-06 RX ADMIN — PANTOPRAZOLE SODIUM 40 MG: 40 TABLET, DELAYED RELEASE ORAL at 06:18

## 2018-03-06 RX ADMIN — METOPROLOL SUCCINATE 100 MG: 100 TABLET, EXTENDED RELEASE ORAL at 20:44

## 2018-03-06 RX ADMIN — HYDROMORPHONE HYDROCHLORIDE 1 MG: 10 INJECTION INTRAMUSCULAR; INTRAVENOUS; SUBCUTANEOUS at 15:47

## 2018-03-06 RX ADMIN — HYDROMORPHONE HYDROCHLORIDE 1 MG: 10 INJECTION INTRAMUSCULAR; INTRAVENOUS; SUBCUTANEOUS at 09:32

## 2018-03-06 RX ADMIN — FAMOTIDINE 20 MG: 20 TABLET, FILM COATED ORAL at 20:45

## 2018-03-06 RX ADMIN — SODIUM CHLORIDE 100 ML/HR: 9 INJECTION, SOLUTION INTRAVENOUS at 20:04

## 2018-03-06 RX ADMIN — MEROPENEM 1 G: 1 INJECTION, POWDER, FOR SOLUTION INTRAVENOUS at 02:42

## 2018-03-06 RX ADMIN — HYDROMORPHONE HYDROCHLORIDE 1 MG: 10 INJECTION INTRAMUSCULAR; INTRAVENOUS; SUBCUTANEOUS at 12:25

## 2018-03-06 RX ADMIN — GABAPENTIN 600 MG: 300 CAPSULE ORAL at 06:18

## 2018-03-06 RX ADMIN — POTASSIUM CHLORIDE 40 MEQ: 750 CAPSULE, EXTENDED RELEASE ORAL at 18:07

## 2018-03-06 RX ADMIN — PROMETHAZINE HYDROCHLORIDE 12.53 MG: 25 INJECTION INTRAMUSCULAR; INTRAVENOUS at 22:48

## 2018-03-06 RX ADMIN — HYDROMORPHONE HYDROCHLORIDE 1 MG: 10 INJECTION INTRAMUSCULAR; INTRAVENOUS; SUBCUTANEOUS at 00:22

## 2018-03-06 RX ADMIN — METOPROLOL SUCCINATE 100 MG: 100 TABLET, EXTENDED RELEASE ORAL at 09:08

## 2018-03-06 RX ADMIN — ONDANSETRON HYDROCHLORIDE 4 MG: 2 INJECTION, SOLUTION INTRAMUSCULAR; INTRAVENOUS at 18:18

## 2018-03-06 RX ADMIN — GABAPENTIN 1200 MG: 400 CAPSULE ORAL at 20:45

## 2018-03-06 RX ADMIN — HYDROMORPHONE HYDROCHLORIDE 1 MG: 10 INJECTION INTRAMUSCULAR; INTRAVENOUS; SUBCUTANEOUS at 22:44

## 2018-03-06 RX ADMIN — ONDANSETRON HYDROCHLORIDE 4 MG: 2 INJECTION, SOLUTION INTRAMUSCULAR; INTRAVENOUS at 11:48

## 2018-03-06 RX ADMIN — LAMOTRIGINE 100 MG: 100 TABLET ORAL at 09:08

## 2018-03-06 NOTE — CONSULTS
"Cortney Delacruz  1955  [id number]  3/6/2018      Referring Provider: Ronnie Garvey MD  Reason for Consultation:   Chief Complaint   Patient presents with   • Abdominal Pain         Subjective   History of present illness:   Patient is a 62 y.o. female with history of diabetes type 2 with gastroparesis and prior ESBL E-coli and Klebsiella cystitis and pyelonephritis with known nephrolithiasis.  She was hospitalized January 2017 and July 2017 with the above UTI's and pylonephritis, she was discharged with a carbapenems on both occassions.  He subsequently treated her with Bactrim/Macrobid for at least one month. Patient reports last Thursday she acutely developed nausea, vomiting, and diarrhea. She stated everyone has had the \"GI bug\" in her hometown.  Patient reports she became very dehydrated and then developed fever with associated chills (rigors) and sweats.  She presented to the ER yesterday and her urinalysis appeared grossly infected.  A CT scan showed a non-obstructing stone.  Patient reports since her admission she is feeling better, fevers have improved.  Patient is currently on Merrem.  Blood cultures were not drawn in the ER.  Seen and agree.     Past Medical History:   Diagnosis Date   • Arthritis    • Cancer 2000    breast s/p chemo   • Chronic pain     on dilaudid x4 years   • Depression    • Diabetes     type II   • Diabetic neuropathy     gabapentin, lamictal; reports uses xanax for this when severe as well   • Gastroparesis    • GERD (gastroesophageal reflux disease)     PPI and pepcid   • Hypercholesteremia    • Hypertension    • Osteoporosis     reclast inj yearly       Past Surgical History:   Procedure Laterality Date   • ABDOMINAL HERNIA REPAIR  2001, 2005   • APPENDECTOMY  1968   • CHOLECYSTECTOMY  1992   • GASTRIC STIMULATOR IMPLANT SURGERY  2014, generator replaced 2015   • LUMBAR LAMINECTOMY      x4; 1985 (L4-5), 1987(L3-4), 1997(L3-4), 2007(L3-4, L4-5)   • MASTECTOMY Left 2000   • " TONSILLECTOMY AND ADENOIDECTOMY  1962   • TOTAL HIP ARTHROPLASTY REVISION  2015   • TRIGGER FINGER RELEASE Left 2017    ring finger   • VENTRAL HERNIA REPAIR  2007, 2013   • WRIST GANGLION EXCISION Right 1986       Pediatric History   Patient Guardian Status   • Not on file.     Other Topics Concern   • Not on file     Social History Narrative    Lives in [place] with son       family history includes Alcohol abuse in her father; Arthritis in her mother; Coronary artery disease in her brother; Dementia in her maternal grandmother; Hypertension in her mother; Lung cancer in her father; Melanoma in her brother; Osteoporosis in her mother.    Allergies   Allergen Reactions   • Actos [Pioglitazone] Shortness Of Breath   • Ciprocinonide [Fluocinolone] Anaphylaxis   • Codeine Anaphylaxis   • Kefzol [Cefazolin] Anaphylaxis   • Metformin And Related Shortness Of Breath   • Unasyn [Ampicillin-Sulbactam Sodium] Anaphylaxis   • Biaxin [Clarithromycin] Hives and Nausea And Vomiting   • Crestor [Rosuvastatin Calcium] Myalgia   • Lisinopril Other (See Comments)     Orthostatic hypotension   • Prilosec [Omeprazole] Other (See Comments)     Gastris standstill       Medication:  Antibiotics:  Anti-Infectives     Ordered     Dose/Rate Route Frequency Start Stop    03/05/18 1728  meropenem (MERREM) 1 g/100 mL 0.9% NS VTB (mbp)     Ordering Provider:  USHA Henry    1 g  over 3 Hours Intravenous Every 8 Hours 03/06/18 0200 03/13/18 0159    03/05/18 1728  meropenem (MERREM) 1 g/100 mL 0.9% NS VTB (mbp)     Ordering Provider:  USHA Henry    1 g  over 30 Minutes Intravenous Once 03/05/18 1800 03/05/18 2004 03/05/18 1256  aztreonam (AZACTAM) in SWFI 2 grams/10ml IV PUSH syringe     Ordering Provider:  Jeferson Anderson MD    2 g  over 5 Minutes Intravenous Once 03/05/18 1258 03/05/18 1331          Please refer to the medical record for a full medication list    Review of Systems  Review of Systems   Constitutional: Positive  "for appetite change, chills, diaphoresis, fatigue and fever.   HENT: Negative for dental problem, mouth sores and sore throat.    Eyes: Negative for pain.   Respiratory: Negative for cough, shortness of breath and wheezing.    Cardiovascular: Negative.    Gastrointestinal: Positive for abdominal pain, diarrhea, nausea and vomiting.   Endocrine: Negative for cold intolerance, heat intolerance, polyphagia and polyuria.   Genitourinary: Positive for decreased urine volume, dysuria, flank pain and frequency. Negative for hematuria.   Musculoskeletal: Negative for back pain and joint swelling.   Skin: Negative for rash and wound.   Allergic/Immunologic: Negative for immunocompromised state.   Neurological: Positive for weakness. Negative for seizures.   Psychiatric/Behavioral: Negative for confusion. The patient is not nervous/anxious.          Objective     Physical Exam:   Vital Signs   Temp:  [97.8 °F (36.6 °C)-98.9 °F (37.2 °C)] 97.9 °F (36.6 °C)  Heart Rate:  [] 88  Resp:  [14-18] 18  BP: ()/() 148/79    Blood pressure 148/79, pulse 88, temperature 97.9 °F (36.6 °C), temperature source Oral, resp. rate 18, height 175.3 cm (69\"), weight 104 kg (230 lb), SpO2 97 %.    Physical Exam   Constitutional: She is oriented to person, place, and time. She appears well-developed and well-nourished.   HENT:   Head: Normocephalic and atraumatic.   Eyes: Conjunctivae are normal. No scleral icterus.   Neck: Neck supple.   Cardiovascular: Normal rate, regular rhythm and normal heart sounds.    No murmur heard.  Pulmonary/Chest: Effort normal and breath sounds normal. No respiratory distress.   Abdominal: Soft. Bowel sounds are normal. She exhibits no distension. There is tenderness.   Genitourinary:   Genitourinary Comments: RT CVA tenderness on exam   Musculoskeletal: Normal range of motion. She exhibits no edema or tenderness.   Lymphadenopathy:     She has no cervical adenopathy.   Neurological: She is alert and " oriented to person, place, and time.   Skin: Skin is warm and dry. No rash noted.   Psychiatric: She has a normal mood and affect.     Seen and agree.   Right CVA tenderness.     Lab Results   Component Value Date    WBC 9.76 03/06/2018    HGB 11.9 03/06/2018    HCT 37.6 03/06/2018    MCV 86.2 03/06/2018     03/06/2018       Lab Results   Component Value Date    GLUCOSE 65 (L) 03/06/2018    BUN 12 03/06/2018    CREATININE 0.90 03/06/2018    EGFRIFNONA 63 03/06/2018    BCR 13.3 03/06/2018    CO2 26.0 03/06/2018    CALCIUM 9.4 03/06/2018    ALBUMIN 4.70 03/05/2018    LABIL2 1.2 (L) 03/05/2018    AST 34 (H) 03/05/2018    ALT 39 03/05/2018       Estimated Creatinine Clearance: 83.2 mL/min (by C-G formula based on Cr of 0.9).      Radiology:  Imaging Results (last 72 hours)     Procedure Component Value Units Date/Time    CT Abdomen Pelvis Without Contrast [[id number]] Collected:  03/05/18 1333     Updated:  03/05/18 1602    Narrative:       EXAMINATION: CT ABDOMEN AND PELVIS WO CONTRAST-      INDICATION: Right flank pain, pyelonephritis, renal stones.     TECHNIQUE: Multiple axial CT imaging was obtained of the abdomen and  pelvis without the administration of oral or intravenous contrast.     The radiation dose reduction device was turned on for each scan per the  ALARA (As Low as Reasonably Achievable) protocol.     COMPARISON: 07/13/2017.     FINDINGS: ABDOMEN: The lung bases are grossly clear. Liver is  homogeneous in appearance. Ventral abdominal wall hernia identified  containing mesenteric fat only. The spleen is unremarkable. Kidneys and  adrenal glands are within normal limits. There is calcification seen  within the abdominal aorta. Nonobstructing stone seen inferiorly within  the right kidney. No abnormal mass or fluid collection is identified.  The pancreas is homogeneous. The abdominal portion of the  gastrointestinal tract is within normal limits. No free fluid or free  air.     PELVIS: Pelvic organs  are unremarkable in appearance. There is a small  collection of fluid seen along the anterior aspect of the peritoneal  lining. This is stable and unchanged when compared to the prior study.  Minimal degenerative change is seen within the spine. There is no  evidence of stones seen in the ureters. There is no bladder  calcification. Vascular calcification seen within the abdominal aorta  and iliac vessels. No free fluid or free air. No abnormal mass or fluid  collection is identified. Degenerative change is seen within the spine  and pelvis with hardware identified in the left hip from total left hip  arthroplasty. Some streak artifact obscures image detail within the  pelvis.       Impression:       Nonobstructing stone seen of the inferior aspect of the  right kidney measuring approximately 4 to 5 mm in size. Remainder of the  abdomen and pelvis is unchanged and unremarkable. There is a midline  ventral abdominal wall hernia containing mesenteric fat only.     D:  03/05/2018  E:  03/05/2018     This report was finalized on 3/5/2018 4:00 PM by Dr. Nicky Clark MD.             Lab Results (last 24 hours)     Procedure Component Value Units Date/Time    POC Glucose Once [[id number]]  (Abnormal) Collected:  03/05/18 1103    Specimen:  Blood Updated:  03/05/18 1105     Glucose 185 (H) mg/dL     Narrative:       Meter: [id number] : [id number] Anselmo Myers    CBC & Differential [[id number]] Collected:  03/05/18 1108    Specimen:  Blood Updated:  03/05/18 1130    Narrative:       The following orders were created for panel order CBC & Differential.  Procedure                               Abnormality         Status                     ---------                               -----------         ------                     CBC Auto Differential[[id number]]        Abnormal            Final result                 Please view results for these tests on the individual orders.    CBC Auto Differential [[id number]]   (Abnormal) Collected:  03/05/18 1108    Specimen:  Blood Updated:  03/05/18 1130     WBC 8.48 10*3/mm3      RBC 4.83 10*6/mm3      Hemoglobin 13.7 g/dL      Hematocrit 41.2 %      MCV 85.3 fL      MCH 28.4 pg      MCHC 33.3 g/dL      RDW 13.9 %      RDW-SD 43.4 fl      MPV 11.7 fL      Platelets 219 10*3/mm3      Neutrophil % 73.6 (H) %      Lymphocyte % 15.3 (L) %      Monocyte % 9.4 %      Eosinophil % 1.1 %      Basophil % 0.4 %      Immature Grans % 0.2 %      Neutrophils, Absolute 6.24 10*3/mm3      Lymphocytes, Absolute 1.30 10*3/mm3      Monocytes, Absolute 0.80 10*3/mm3      Eosinophils, Absolute 0.09 10*3/mm3      Basophils, Absolute 0.03 10*3/mm3      Immature Grans, Absolute 0.02 10*3/mm3     Urinalysis With / Culture If Indicated - Urine, Catheter [[id number]]  (Abnormal) Collected:  03/05/18 1113    Specimen:  Urine from Urine, Catheter Updated:  03/05/18 1201     Color, UA Dark Yellow (A)     Appearance, UA Cloudy (A)     pH, UA <=5.0     Specific Gravity, UA 1.022     Glucose,  mg/dL (Trace) (A)     Ketones, UA 40 mg/dL (2+) (A)     Bilirubin, UA Negative     Blood, UA Negative     Protein, UA 30 mg/dL (1+) (A)     Leuk Esterase, UA Small (1+) (A)     Nitrite, UA Negative     Urobilinogen, UA 1.0 E.U./dL    Urinalysis, Microscopic Only - Urine, Clean Catch [[id number]]  (Abnormal) Collected:  03/05/18 1113    Specimen:  Urine from Urine, Catheter Updated:  03/05/18 1201     RBC, UA 0-2 /HPF      WBC, UA Too Numerous to Count (A) /HPF      Bacteria, UA 4+ (A) /HPF      Squamous Epithelial Cells, UA 0-2 /HPF      Hyaline Casts, UA None Seen /LPF      Methodology Manual Light Microscopy    Comprehensive Metabolic Panel [[id number]]  (Abnormal) Collected:  03/05/18 1108    Specimen:  Blood Updated:  03/05/18 1211     Glucose 213 (H) mg/dL      BUN 11 mg/dL      Creatinine 0.90 mg/dL      Sodium 138 mmol/L      Potassium 4.1 mmol/L      Chloride 101 mmol/L      CO2 24.0 mmol/L      Calcium 10.1 mg/dL       Total Protein 8.6 (H) g/dL      Albumin 4.70 g/dL      ALT (SGPT) 39 U/L      AST (SGOT) 34 (H) U/L      Alkaline Phosphatase 92 U/L      Total Bilirubin 0.4 mg/dL      eGFR Non African Amer 63 mL/min/1.73      Globulin 3.9 gm/dL      A/G Ratio 1.2 (L) g/dL      BUN/Creatinine Ratio 12.2     Anion Gap 13.0 (H) mmol/L     Narrative:       National Kid[employer] Guidelines    Stage     Description        GFR  1         Normal or High     90+  2         Mild decrease      60-89  3         Moderate decrease  30-59  4         Severe decrease    15-29  5         Kidney failure     <15    Lipase [[id number]]  (Normal) Collected:  03/05/18 1108    Specimen:  Blood Updated:  03/05/18 1211     Lipase 29 U/L     Light Blue Top [[id number]] Collected:  03/05/18 1108    Specimen:  Blood Updated:  03/05/18 1216     Extra Tube hold for add-on      Auto resulted       Green Top (Gel) [[id number]] Collected:  03/05/18 1108    Specimen:  Blood Updated:  03/05/18 1216     Extra Tube Hold for add-ons.      Auto resulted.       Lavender Top [[id number]] Collected:  03/05/18 1108    Specimen:  Blood Updated:  03/05/18 1216     Extra Tube hold for add-on      Auto resulted       Gold Top - SST [[id number]] Collected:  03/05/18 1108    Specimen:  Blood Updated:  03/05/18 1216     Extra Tube Hold for add-ons.      Auto resulted.       [place] Draw [[id number]] Collected:  03/05/18 1108    Specimen:  Blood Updated:  03/05/18 1257    Narrative:       The following orders were created for panel order [place] Draw.  Procedure                               Abnormality         Status                     ---------                               -----------         ------                     Light Blue Top[[id number]]                                   Final result               Green Top (Gel)[[id number]]                                  Final result               Lavender Top[[id number]]                                     Final result                Gold Top - SST[[id number]]                                   Final result               Green Top (No Gel)[[id number]]                                                            Please view results for these tests on the individual orders.    POC Glucose Once [[id number]]  (Abnormal) Collected:  03/05/18 1738    Specimen:  Blood Updated:  03/05/18 1750     Glucose 270 (H) mg/dL     Narrative:       Meter: [id number] : [id number] Carolina Gonzales    Procalcitonin [[id number]]  (Abnormal) Collected:  03/05/18 1108    Specimen:  Blood Updated:  03/05/18 1839     Procalcitonin 0.44 (H) ng/mL     Narrative:       As a Marker for Sepsis (Non-Neonates):   1. <0.5 ng/mL represents a low risk of severe sepsis and/or septic shock.  2. >2 ng/mL represents a high risk of severe sepsis and/or septic shock.    As a Marker for Lower Respiratory Tract Infections that require antibiotic therapy:    PCT on Admission     Antibiotic Therapy       6-12 Hrs later  > 0.5                Strongly Recommended             >0.25 - <0.5         Recommended  0.1 - 0.25           Discouraged              Remeasure/reassess PCT  <0.1                 Strongly Discouraged     Remeasure/reassess PCT                     PCT values of < 0.5 ng/mL do not exclude an infection, because localized infections (without systemic signs) may be associated with such low concentrations, or a systemic infection in its initial stages (< 6 hours). Furthermore, increased PCT can occur without infection. PCT concentrations between 0.5 and 2.0 ng/mL should be interpreted taking into account the patient's history. It is recommended to retest PCT within 6-24 hours if any concentrations < 2 ng/mL are obtained.    POC Glucose Once [[id number]]  (Normal) Collected:  03/05/18 2051    Specimen:  Blood Updated:  03/05/18 2053     Glucose 115 mg/dL     Narrative:       Meter: [id number] : [id number] Lissy Brandy    Basic Metabolic Panel [[id number]]  (Abnormal) Collected:   03/06/18 0450    Specimen:  Blood Updated:  03/06/18 0645     Glucose 65 (L) mg/dL      BUN 12 mg/dL      Creatinine 0.90 mg/dL      Sodium 139 mmol/L      Potassium 3.2 (L) mmol/L      Chloride 104 mmol/L      CO2 26.0 mmol/L      Calcium 9.4 mg/dL      eGFR Non African Amer 63 mL/min/1.73      BUN/Creatinine Ratio 13.3     Anion Gap 9.0 mmol/L     Narrative:       National Kid[employer] Guidelines    Stage     Description        GFR  1         Normal or High     90+  2         Mild decrease      60-89  3         Moderate decrease  30-59  4         Severe decrease    15-29  5         Kidney failure     <15    POC Glucose Once [[id number]]  (Normal) Collected:  03/06/18 0714    Specimen:  Blood Updated:  03/06/18 0715     Glucose 71 mg/dL     Narrative:       Meter: [id number] : [id number] [facility] Cl[facility]    CBC Auto Differential [[id number]]  (Abnormal) Collected:  03/06/18 0450    Specimen:  Blood Updated:  03/06/18 0857     WBC 9.76 10*3/mm3      RBC 4.36 10*6/mm3      Hemoglobin 11.9 g/dL      Hematocrit 37.6 %      MCV 86.2 fL      MCH 27.3 pg      MCHC 31.6 (L) g/dL      RDW 14.3 %      RDW-SD 45.0 fl      MPV 12.2 (H) fL      Platelets 222 10*3/mm3      Neutrophil % 48.7 %      Lymphocyte % 38.0 %      Monocyte % 11.8 %      Eosinophil % 1.0 %      Basophil % 0.3 %      Immature Grans % 0.2 %      Neutrophils, Absolute 4.75 10*3/mm3      Lymphocytes, Absolute 3.71 10*3/mm3      Monocytes, Absolute 1.15 (H) 10*3/mm3      Eosinophils, Absolute 0.10 10*3/mm3      Basophils, Absolute 0.03 10*3/mm3      Immature Grans, Absolute 0.02 10*3/mm3     Narrative:       Appended report. These results have been appended to a previously verified report.    Scan Slide [[id number]]  (Normal) Collected:  03/06/18 0450    Specimen:  Blood Updated:  03/06/18 0857     RBC Morphology Normal     WBC Morphology Normal     Platelet Morphology Normal    Urine Culture - Urine, Urine, Clean Catch [[id number]]  (Abnormal) Collected:   03/05/18 1113    Specimen:  Urine from Urine, Catheter Updated:  03/06/18 0931     Urine Culture --      >100,000 CFU/mL Gram Negative Bacilli (A)        Microbiology Results Abnormal     Procedure Component Value - Date/Time    Urine Culture - Urine, Urine, Clean Catch [[id number]]  (Abnormal) Collected:  03/05/18 1113    Lab Status:  Preliminary result Specimen:  Urine from Urine, Catheter Updated:  03/06/18 0931     Urine Culture --      >100,000 CFU/mL Gram Negative Bacilli (A)          ASSESSMENT AND PLAN:   Gram negative cystitis possible pyelonephritis   Prior ESBL E-coli and Klebsiella  Fever  Nausea, vomiting, and diarrhea  Chronic gastroparesis  Chronic pain syndrome  DM2  Antibiotic allergies - cipro, Unasyn, Kefzol, Biaxin    Currently on Merrem - Switched to Invanz for now.   Await culture results, will work on outpatient plan  Attending at bedside as well      I discussed the patients findings and my recommendations with patient.  Thank you for this consult.  Our group would be pleased to follow this patient over the course of their hospitalization and assist with outpatient antimicrobial therapy, as indicated.     USHA Zayas for Dr. Dionte Butts  3/6/2018

## 2018-03-06 NOTE — PROGRESS NOTES
[facility] Medicine Services  PROGRESS NOTE    Patient Name: Cortney Delacruz  : 1955  MRN: [id number]    Date of Admission: 3/5/2018  Length of Stay: 1  Primary Care Physician: Ronnie Garvey MD    Subjective   Subjective     CC:  F/U pyelonephritis    HPI:  Patient seen this morning. Still with right flank pain and nausea.     Review of Systems  Gen-no fevers, no chills  CV-no chest pain, no palpitations  Resp-no cough, no dyspnea  GI-+nausea, no vomiting; no abd pain      Otherwise ROS is negative except as mentioned in the HPI.    Objective   Objective     Vital Signs:   Temp:  [97.8 °F (36.6 °C)-99.2 °F (37.3 °C)] 99.2 °F (37.3 °C)  Heart Rate:  [] 84  Resp:  [14-18] 16  BP: ()/() 151/83        Physical Exam:  Gen-no acute distress  CV-RRR, S1 S2 normal, no m/r/g  Resp-CTAB, no wheezes  Abd-soft, NT, ND, +BS  : right CVA tenderness  Ext-no edema  Neuro-A&Ox3, no focal deficits  Psych-appropriate mood      Results Reviewed:  I have personally reviewed current lab, radiology, and data and agree.      Results from last 7 days  Lab Units 18  0450 18  1108   WBC 10*3/mm3 9.76 8.48   HEMOGLOBIN g/dL 11.9 13.7   HEMATOCRIT % 37.6 41.2   PLATELETS 10*3/mm3 222 219       Results from last 7 days  Lab Units 18  0450 18  1108   SODIUM mmol/L 139 138   POTASSIUM mmol/L 3.2* 4.1   CHLORIDE mmol/L 104 101   CO2 mmol/L 26.0 24.0   BUN mg/dL 12 11   CREATININE mg/dL 0.90 0.90   GLUCOSE mg/dL 65* 213*   CALCIUM mg/dL 9.4 10.1   ALT (SGPT) U/L  --  39   AST (SGOT) U/L  --  34*     Estimated Creatinine Clearance: 83.2 mL/min (by C-G formula based on Cr of 0.9).  No results found for: BNP  No results found for: PHART    Microbiology Results Abnormal     Procedure Component Value - Date/Time    Urine Culture - Urine, Urine, Clean Catch [[id number]]  (Abnormal) Collected:  18 1113    Lab Status:  Preliminary result Specimen:  Urine from Urine,  Catheter Updated:  03/06/18 0931     Urine Culture --      >100,000 CFU/mL Gram Negative Bacilli (A)          Imaging Results (last 24 hours)     Procedure Component Value Units Date/Time    CT Abdomen Pelvis Without Contrast [[id number]] Collected:  03/05/18 1333     Updated:  03/05/18 1602    Narrative:       EXAMINATION: CT ABDOMEN AND PELVIS WO CONTRAST-      INDICATION: Right flank pain, pyelonephritis, renal stones.     TECHNIQUE: Multiple axial CT imaging was obtained of the abdomen and  pelvis without the administration of oral or intravenous contrast.     The radiation dose reduction device was turned on for each scan per the  ALARA (As Low as Reasonably Achievable) protocol.     COMPARISON: 07/13/2017.     FINDINGS: ABDOMEN: The lung bases are grossly clear. Liver is  homogeneous in appearance. Ventral abdominal wall hernia identified  containing mesenteric fat only. The spleen is unremarkable. Kidneys and  adrenal glands are within normal limits. There is calcification seen  within the abdominal aorta. Nonobstructing stone seen inferiorly within  the right kidney. No abnormal mass or fluid collection is identified.  The pancreas is homogeneous. The abdominal portion of the  gastrointestinal tract is within normal limits. No free fluid or free  air.     PELVIS: Pelvic organs are unremarkable in appearance. There is a small  collection of fluid seen along the anterior aspect of the peritoneal  lining. This is stable and unchanged when compared to the prior study.  Minimal degenerative change is seen within the spine. There is no  evidence of stones seen in the ureters. There is no bladder  calcification. Vascular calcification seen within the abdominal aorta  and iliac vessels. No free fluid or free air. No abnormal mass or fluid  collection is identified. Degenerative change is seen within the spine  and pelvis with hardware identified in the left hip from total left hip  arthroplasty. Some streak artifact  obscures image detail within the  pelvis.       Impression:       Nonobstructing stone seen of the inferior aspect of the  right kidney measuring approximately 4 to 5 mm in size. Remainder of the  abdomen and pelvis is unchanged and unremarkable. There is a midline  ventral abdominal wall hernia containing mesenteric fat only.     D:  03/05/2018  E:  03/05/2018     This report was finalized on 3/5/2018 4:00 PM by Dr. Nicky Clark MD.                I have reviewed the medications.    Assessment/Plan   Assessment / Plan     Hospital Problem List     * (Principal)Acute pyelonephritis    N&V (nausea and vomiting)    Type 2 diabetes mellitus (Chronic)    Gastroparesis (Chronic)    Chronic pain syndrome, on PO dilaudid at home (Chronic)    Hypertension (Chronic)    GERD (gastroesophageal reflux disease) (Chronic)    Diarrhea    History of ESBL E. coli infection    Hypokalemia             Brief Hospital Course to date:  Cortney Delacruz is a 62 y.o. female with hx of ESBL E.coli UTI presents with right flank pain, nausea, vomiting, and decreased urine output. Found to have pyelonephritis.      Assessment & Plan:  --Continue Merrem. ID consulted due to ESBL E.coli history.  --Follow urine culture.  --Continue fluids.  --PRN pain and nausea meds. She does have chronic pain and gastroparesis s/p gastric stimulator.  --Replace K.    DVT Prophylaxis:  Lovenox    CODE STATUS: Full Code    Disposition: I expect the patient to be discharged home in ~2-3 days      Electronically signed by Rosana García MD, 03/06/18, 12:21 PM.

## 2018-03-06 NOTE — PROGRESS NOTES
Discharge Planning Assessment  [facility]     Patient Name: Cortney Delacruz  MRN: [id number]  Today's Date: 3/6/2018    Admit Date: 3/5/2018          Discharge Needs Assessment       03/06/18 1330    Living Environment    Lives With child(eva), adult    Home Accessibility no concerns    Stair Railings at Home none    Type of Financial/Environmental Concern none    Transportation Available car;family or friend will provide    Living Environment    Provides Primary Care For no one    Primary Care Provided By spouse/significant other    Quality Of Family Relationships supportive    Able to Return to Prior Living Arrangements yes    Discharge Needs Assessment    Concerns To Be Addressed no discharge needs identified;denies needs/concerns at this time    Readmission Within The Last 30 Days no previous admission in last 30 days    Anticipated Changes Related to Illness none    Equipment Currently Used at Home none    Equipment Needed After Discharge none    Discharge Disposition home or self-care            Discharge Plan       03/06/18 3379    Case Management/Social Work Plan    Plan home with possible outpatient antibiotics     Patient/Family In Agreement With Plan yes    Additional Comments Spoke with patient at bedside. She lives with her spouse in [facility]. She is independent with her ADLs and has no current needs for DME or home health. Issues may arise if ongoing IV antibiotics are needs after discharge because patient has a $200/visit outpatient co-pay and her insurance will cover home health but would not cover med costs so this could be too expensive. CM discussed with patient and will continue to follow for any discharge needs - [phone]         Discharge Placement     No information found                Demographic Summary       03/06/18 1329    Referral Information    Admission Type inpatient    Arrived From admitted as an inpatient    Referral Source admission list    Reason For Consult discharge  planning    Record Reviewed history and physical;medical record    Contact Information    Permission Granted to Share Information With     Primary Care Physician Information    Name Ronnie Garvey             Functional Status       03/06/18 5732    Functional Status Current    Current Functional Level Comment Please see nursing notes     Functional Status Prior    Ambulation 0-->independent    Transferring 0-->independent    Toileting 0-->independent    Bathing 0-->independent    Dressing 0-->independent    Eating 0-->independent    Communication 0-->understands/communicates without difficulty    Swallowing 0-->swallows foods/liquids without difficulty    IADL    Medications independent    Meal Preparation independent    Housekeeping independent    Laundry independent    Shopping independent    Oral Care independent    Activity Tolerance    Current Activity Limitations none    Usual Activity Tolerance good    Current Activity Tolerance moderate    Cognitive/Perceptual/Developmental    Current Mental Status/Cognitive Functioning no deficits noted    Employment/Financial    Employment/Finance Comments [employer] medicare replacement             Psychosocial     None            Abuse/Neglect     None            Legal     None            Substance Abuse     None            Patient Forms     None          Sallie Saldaña RN

## 2018-03-06 NOTE — PLAN OF CARE
Problem: Patient Care Overview (Adult)  Goal: Plan of Care Review  Outcome: Ongoing (interventions implemented as appropriate)   03/06/18 0636   Coping/Psychosocial Response Interventions   Plan Of Care Reviewed With patient   Patient Care Overview   Progress progress towards functional goals is fair   Outcome Evaluation   Outcome Summary/Follow up Plan pain control is getting better

## 2018-03-06 NOTE — PLAN OF CARE
Problem: Patient Care Overview (Adult)  Goal: Adult Individualization and Mutuality  Outcome: Ongoing (interventions implemented as appropriate)   03/06/18 1705   Individualization   Patient Specific Preferences Pt has been unhappy with all meals today. Turkey box given, Ice tea made. No family here today.

## 2018-03-06 NOTE — PLAN OF CARE
Problem: Infection, Risk/Actual (Adult)  Goal: Identify Related Risk Factors and Signs and Symptoms  Outcome: Ongoing (interventions implemented as appropriate)   03/06/18 0635   Infection, Risk/Actual   Infection, Risk/Actual: Related Risk Factors exposure to microbes   Signs and Symptoms (Infection, Risk/Actual) lab value changes;nausea;pain

## 2018-03-06 NOTE — PLAN OF CARE
Problem: Pain, Acute (Adult)  Intervention: Support/Optimize Psychosocial Response to Acute Pain   03/06/18 2062   Coping Strategies   Trust Relationship/Rapport care explained;choices provided;emotional support provided;empathic listening provided;questions answered;reassurance provided;thoughts/feelings acknowledged   Supportive Measures active listening utilized;self-care encouraged;self-responsibility promoted;verbalization of feelings encouraged;relaxation techniques promoted;positive reinforcement provided

## 2018-03-07 LAB
ANION GAP SERPL CALCULATED.3IONS-SCNC: 7 MMOL/L (ref 3–11)
BACTERIA SPEC AEROBE CULT: ABNORMAL
BACTERIA SPEC AEROBE CULT: ABNORMAL
BUN BLD-MCNC: 6 MG/DL (ref 9–23)
BUN/CREAT SERPL: 8.6 (ref 7–25)
CALCIUM SPEC-SCNC: 8.7 MG/DL (ref 8.7–10.4)
CHLORIDE SERPL-SCNC: 106 MMOL/L (ref 99–109)
CO2 SERPL-SCNC: 24 MMOL/L (ref 20–31)
CREAT BLD-MCNC: 0.7 MG/DL (ref 0.6–1.3)
DEPRECATED RDW RBC AUTO: 44.9 FL (ref 37–54)
ERYTHROCYTE [DISTWIDTH] IN BLOOD BY AUTOMATED COUNT: 14.2 % (ref 11.3–14.5)
GFR SERPL CREATININE-BSD FRML MDRD: 85 ML/MIN/1.73
GLUCOSE BLD-MCNC: 117 MG/DL (ref 70–100)
GLUCOSE BLDC GLUCOMTR-MCNC: 102 MG/DL (ref 70–130)
GLUCOSE BLDC GLUCOMTR-MCNC: 140 MG/DL (ref 70–130)
GLUCOSE BLDC GLUCOMTR-MCNC: 80 MG/DL (ref 70–130)
GLUCOSE BLDC GLUCOMTR-MCNC: 85 MG/DL (ref 70–130)
HCT VFR BLD AUTO: 35.8 % (ref 34.5–44)
HGB BLD-MCNC: 11.5 G/DL (ref 11.5–15.5)
MCH RBC QN AUTO: 27.7 PG (ref 27–31)
MCHC RBC AUTO-ENTMCNC: 32.1 G/DL (ref 32–36)
MCV RBC AUTO: 86.3 FL (ref 80–99)
PLATELET # BLD AUTO: 219 10*3/MM3 (ref 150–450)
PMV BLD AUTO: 11.8 FL (ref 6–12)
POTASSIUM BLD-SCNC: 4 MMOL/L (ref 3.5–5.5)
RBC # BLD AUTO: 4.15 10*6/MM3 (ref 3.89–5.14)
SODIUM BLD-SCNC: 137 MMOL/L (ref 132–146)
WBC NRBC COR # BLD: 7.08 10*3/MM3 (ref 3.5–10.8)

## 2018-03-07 PROCEDURE — 82962 GLUCOSE BLOOD TEST: CPT

## 2018-03-07 PROCEDURE — 25010000002 HYDROMORPHONE PER 4 MG: Performed by: HOSPITALIST

## 2018-03-07 PROCEDURE — 25010000002 HYDROMORPHONE PER 4 MG: Performed by: NURSE PRACTITIONER

## 2018-03-07 PROCEDURE — 25010000002 ERTAPENEM: Performed by: INTERNAL MEDICINE

## 2018-03-07 PROCEDURE — 85027 COMPLETE CBC AUTOMATED: CPT | Performed by: HOSPITALIST

## 2018-03-07 PROCEDURE — 80048 BASIC METABOLIC PNL TOTAL CA: CPT | Performed by: HOSPITALIST

## 2018-03-07 PROCEDURE — 63710000001 INSULIN DETEMIR PER 5 UNITS: Performed by: NURSE PRACTITIONER

## 2018-03-07 PROCEDURE — 25010000002 PROMETHAZINE PER 50 MG: Performed by: NURSE PRACTITIONER

## 2018-03-07 PROCEDURE — 99233 SBSQ HOSP IP/OBS HIGH 50: CPT | Performed by: HOSPITALIST

## 2018-03-07 PROCEDURE — 25010000002 ONDANSETRON PER 1 MG: Performed by: NURSE PRACTITIONER

## 2018-03-07 RX ORDER — HYDROMORPHONE HYDROCHLORIDE 2 MG/1
2 TABLET ORAL EVERY 6 HOURS PRN
Status: DISCONTINUED | OUTPATIENT
Start: 2018-03-07 | End: 2018-03-08 | Stop reason: HOSPADM

## 2018-03-07 RX ORDER — NALOXONE HCL 0.4 MG/ML
0.4 VIAL (ML) INJECTION
Status: DISCONTINUED | OUTPATIENT
Start: 2018-03-07 | End: 2018-03-08 | Stop reason: HOSPADM

## 2018-03-07 RX ORDER — HYDROMORPHONE HYDROCHLORIDE 1 MG/ML
1 INJECTION, SOLUTION INTRAMUSCULAR; INTRAVENOUS; SUBCUTANEOUS
Status: DISCONTINUED | OUTPATIENT
Start: 2018-03-07 | End: 2018-03-08 | Stop reason: HOSPADM

## 2018-03-07 RX ORDER — ECHINACEA PURPUREA EXTRACT 125 MG
2 TABLET ORAL AS NEEDED
Status: DISCONTINUED | OUTPATIENT
Start: 2018-03-07 | End: 2018-03-08 | Stop reason: HOSPADM

## 2018-03-07 RX ORDER — TETRAHYDROZOLINE HCL 0.05 %
2 DROPS OPHTHALMIC (EYE) 3 TIMES DAILY PRN
Status: DISCONTINUED | OUTPATIENT
Start: 2018-03-07 | End: 2018-03-08 | Stop reason: HOSPADM

## 2018-03-07 RX ORDER — METOCLOPRAMIDE HYDROCHLORIDE 5 MG/ML
10 INJECTION INTRAMUSCULAR; INTRAVENOUS EVERY 6 HOURS PRN
Status: DISCONTINUED | OUTPATIENT
Start: 2018-03-07 | End: 2018-03-08 | Stop reason: HOSPADM

## 2018-03-07 RX ORDER — HYDROMORPHONE HYDROCHLORIDE 1 MG/ML
1 INJECTION, SOLUTION INTRAMUSCULAR; INTRAVENOUS; SUBCUTANEOUS EVERY 4 HOURS PRN
Status: DISCONTINUED | OUTPATIENT
Start: 2018-03-07 | End: 2018-03-07

## 2018-03-07 RX ORDER — SULFAMETHOXAZOLE AND TRIMETHOPRIM 800; 160 MG/1; MG/1
1 TABLET ORAL EVERY 12 HOURS SCHEDULED
Status: DISCONTINUED | OUTPATIENT
Start: 2018-03-08 | End: 2018-03-08 | Stop reason: HOSPADM

## 2018-03-07 RX ORDER — NALOXONE HCL 0.4 MG/ML
0.4 VIAL (ML) INJECTION
Status: DISCONTINUED | OUTPATIENT
Start: 2018-03-07 | End: 2018-03-07

## 2018-03-07 RX ORDER — ONDANSETRON 4 MG/1
4 TABLET, FILM COATED ORAL EVERY 12 HOURS
Status: DISCONTINUED | OUTPATIENT
Start: 2018-03-08 | End: 2018-03-08 | Stop reason: HOSPADM

## 2018-03-07 RX ORDER — ECHINACEA PURPUREA EXTRACT 125 MG
1 TABLET ORAL AS NEEDED
COMMUNITY

## 2018-03-07 RX ADMIN — PROMETHAZINE HYDROCHLORIDE 12.5 MG: 25 INJECTION INTRAMUSCULAR; INTRAVENOUS at 06:06

## 2018-03-07 RX ADMIN — GABAPENTIN 1200 MG: 400 CAPSULE ORAL at 20:56

## 2018-03-07 RX ADMIN — HYDROMORPHONE HYDROCHLORIDE 1 MG: 10 INJECTION INTRAMUSCULAR; INTRAVENOUS; SUBCUTANEOUS at 16:39

## 2018-03-07 RX ADMIN — HYDROMORPHONE HYDROCHLORIDE 1 MG: 10 INJECTION INTRAMUSCULAR; INTRAVENOUS; SUBCUTANEOUS at 01:44

## 2018-03-07 RX ADMIN — LAMOTRIGINE 100 MG: 100 TABLET ORAL at 08:51

## 2018-03-07 RX ADMIN — INSULIN DETEMIR 50 UNITS: 100 INJECTION, SOLUTION SUBCUTANEOUS at 21:00

## 2018-03-07 RX ADMIN — FAMOTIDINE 20 MG: 20 TABLET, FILM COATED ORAL at 20:56

## 2018-03-07 RX ADMIN — LAMOTRIGINE 100 MG: 100 TABLET ORAL at 20:56

## 2018-03-07 RX ADMIN — HYDROMORPHONE HYDROCHLORIDE 1 MG: 10 INJECTION INTRAMUSCULAR; INTRAVENOUS; SUBCUTANEOUS at 04:28

## 2018-03-07 RX ADMIN — GABAPENTIN 600 MG: 300 CAPSULE ORAL at 06:06

## 2018-03-07 RX ADMIN — ONDANSETRON HYDROCHLORIDE 4 MG: 2 INJECTION, SOLUTION INTRAMUSCULAR; INTRAVENOUS at 09:00

## 2018-03-07 RX ADMIN — ONDANSETRON HYDROCHLORIDE 4 MG: 2 INJECTION, SOLUTION INTRAMUSCULAR; INTRAVENOUS at 16:40

## 2018-03-07 RX ADMIN — PROMETHAZINE HYDROCHLORIDE 12.5 MG: 25 INJECTION INTRAMUSCULAR; INTRAVENOUS at 12:24

## 2018-03-07 RX ADMIN — TETRAHYDROZOLINE HYDROCHLORIDE 2 DROP: 0.5 SOLUTION/ DROPS OPHTHALMIC at 21:08

## 2018-03-07 RX ADMIN — ERTAPENEM SODIUM 1 G: 1 INJECTION, POWDER, LYOPHILIZED, FOR SOLUTION INTRAMUSCULAR; INTRAVENOUS at 08:50

## 2018-03-07 RX ADMIN — HYDROMORPHONE HYDROCHLORIDE 1 MG: 10 INJECTION INTRAMUSCULAR; INTRAVENOUS; SUBCUTANEOUS at 12:24

## 2018-03-07 RX ADMIN — PROMETHAZINE HYDROCHLORIDE 12.5 MG: 25 INJECTION INTRAMUSCULAR; INTRAVENOUS at 20:56

## 2018-03-07 RX ADMIN — SALINE NASAL SPRAY 2 SPRAY: 1.5 SOLUTION NASAL at 21:08

## 2018-03-07 RX ADMIN — ONDANSETRON HYDROCHLORIDE 4 MG: 2 INJECTION, SOLUTION INTRAMUSCULAR; INTRAVENOUS at 01:44

## 2018-03-07 RX ADMIN — METOPROLOL SUCCINATE 100 MG: 100 TABLET, EXTENDED RELEASE ORAL at 20:56

## 2018-03-07 RX ADMIN — PANTOPRAZOLE SODIUM 40 MG: 40 TABLET, DELAYED RELEASE ORAL at 06:06

## 2018-03-07 RX ADMIN — METOPROLOL SUCCINATE 100 MG: 100 TABLET, EXTENDED RELEASE ORAL at 08:51

## 2018-03-07 RX ADMIN — HYDROMORPHONE HYDROCHLORIDE 1 MG: 10 INJECTION INTRAMUSCULAR; INTRAVENOUS; SUBCUTANEOUS at 20:56

## 2018-03-07 RX ADMIN — HYDROMORPHONE HYDROCHLORIDE 1 MG: 10 INJECTION INTRAMUSCULAR; INTRAVENOUS; SUBCUTANEOUS at 09:00

## 2018-03-07 RX ADMIN — SODIUM CHLORIDE 100 ML/HR: 9 INJECTION, SOLUTION INTRAVENOUS at 16:40

## 2018-03-07 NOTE — PROGRESS NOTES
"Cortney Delacruz  1955  [id number]  3/7/2018    CC:   Chief Complaint   Patient presents with   • Abdominal Pain       Cortney Delacruz is a 62 y.o. female here for CC UTI     History of present illness:   Patient is a 62 y.o. female with history of diabetes type 2 with gastroparesis and prior ESBL E-coli and Klebsiella cystitis and pyelonephritis with known nephrolithiasis.  She was hospitalized January 2017 and July 2017 with the above UTI's and pylonephritis, she was discharged with a carbapenems on both occassions.  He subsequently treated her with Bactrim/Macrobid for at least one month. Patient reports last Thursday she acutely developed nausea, vomiting, and diarrhea. She stated everyone has had the \"GI bug\" in her hometown.  Patient reports she became very dehydrated and then developed fever with associated chills (rigors) and sweats.  She presented to the ER yesterday and her urinalysis appeared grossly infected.  A CT scan showed a non-obstructing stone.  Patient reports since her admission she is feeling better, fevers have improved.  Patient is currently on Merrem.  Blood cultures were not drawn in the ER.    3/7 - co nausea  Co weakness  \"I feel better\"  No rash  \"I can take sulfz but causes nausea\"      Past medical history:  Past Medical History:   Diagnosis Date   • Arthritis    • Cancer 2000    breast s/p chemo   • Chronic pain     on dilaudid x4 years   • Depression    • Diabetes     type II   • Diabetic neuropathy     gabapentin, lamictal; reports uses xanax for this when severe as well   • Gastroparesis    • GERD (gastroesophageal reflux disease)     PPI and pepcid   • Hypercholesteremia    • Hypertension    • Osteoporosis     reclast inj yearly       Medications:   Current Facility-Administered Medications:   •  ALPRAZolam (XANAX) tablet 1 mg, 1 mg, Oral, BID PRN, USHA Henry  •  budesonide-formoterol (SYMBICORT) 160-4.5 MCG/ACT inhaler 2 puff, 2 puff, Inhalation, BID - RT, Clarisse " USHA Mariscal  •  dextrose (D50W) solution 25 g, 25 g, Intravenous, Q15 Min PRN, USHA Henry  •  dextrose (GLUTOSE) oral gel 15 g, 15 g, Oral, Q15 Min PRN, USHA Henry  •  enoxaparin (LOVENOX) syringe 40 mg, 40 mg, Subcutaneous, Q24H, Clarisse Mariscal APRN  •  ertapenem (INVanz) 1 g/100 mL 0.9% NS VTB (mbp), 1 g, Intravenous, Q24H, Dionte Butts MD, 1 g at 03/07/18 0850  •  famotidine (PEPCID) tablet 20 mg, 20 mg, Oral, Nightly, USHA Henry, 20 mg at 03/06/18 2045  •  fluticasone (FLONASE) 50 MCG/ACT nasal spray 1 spray, 1 spray, Each Nare, Daily PRN, USHA Henry  •  gabapentin (NEURONTIN) capsule 1,200 mg, 1,200 mg, Oral, Nightly, Clarisse Mariscal APRN, 1,200 mg at 03/06/18 2045  •  gabapentin (NEURONTIN) capsule 600 mg, 600 mg, Oral, QAM, Clarisse Mariscal APRN, 600 mg at 03/07/18 0606  •  glucagon (human recombinant) (GLUCAGEN DIAGNOSTIC) injection 1 mg, 1 mg, Subcutaneous, PRN, Clarisse Mariscal APRN  •  HYDROmorphone (DILAUDID) injection 1 mg, 1 mg, Intravenous, Q3H PRN **AND** naloxone (NARCAN) injection 0.4 mg, 0.4 mg, Intravenous, Q5 Min PRN, Rosana MAIER MD  •  HYDROmorphone (DILAUDID) tablet 2 mg, 2 mg, Oral, Q6H PRN, Rosana MAIER MD  •  insulin detemir (LEVEMIR) injection 75 Units, 75 Units, Subcutaneous, Nightly, USHA Henry, 60 Units at 03/06/18 2046  •  insulin lispro (humaLOG) injection 0-9 Units, 0-9 Units, Subcutaneous, 4x Daily With Meals & Nightly, USHA Henry, 6 Units at 03/05/18 1801  •  insulin lispro (humaLOG) injection 16 Units, 16 Units, Subcutaneous, TID With Meals, Savage Elias, [facility], 16 Units at 03/05/18 1759  •  lamoTRIgine (LaMICtal) tablet 100 mg, 100 mg, Oral, Q12H, USHA Henry, 100 mg at 03/07/18 0851  •  metoclopramide (REGLAN) injection 10 mg, 10 mg, Intravenous, Q6H PRN, Rosana MAIER MD  •  metoprolol succinate XL (TOPROL-XL) 24 hr tablet 100 mg, 100 mg, Oral, Q12H, USHA Henry, 100 mg at 03/07/18 0851  •  ondansetron (ZOFRAN) injection 4 mg,  4 mg, Intravenous, Q6H PRN, Clarisse Mariscal APRN, 4 mg at 03/07/18 0900  •  pantoprazole (PROTONIX) EC tablet 40 mg, 40 mg, Oral, Daily, Clarisse Mariscal APRN, 40 mg at 03/07/18 0606  •  potassium chloride (MICRO-K) CR capsule 40 mEq, 40 mEq, Oral, PRN, Stopped at 03/06/18 1809 **OR** potassium chloride (KLOR-CON) packet 40 mEq, 40 mEq, Oral, PRN **OR** [DISCONTINUED] potassium chloride 10 mEq in 100 mL IVPB, 10 mEq, Intravenous, Q1H PRN, Rosana MAIER MD  •  promethazine (PHENERGAN) tablet 12.5 mg, 12.5 mg, Oral, Q6H PRN **OR** promethazine (PHENERGAN) injection 12.5 mg, 12.5 mg, Intravenous, Q6H PRN, Clarisse Mariscal APRMOSES, 12.5 mg at 03/07/18 0606  •  Scopolamine (TRANSDERM-SCOP) 1.5 MG/3DAYS patch 1 patch, 1 patch, Transdermal, Q72H, Clarisse Mariscal APRMOSES  •  sodium chloride 0.9 % flush 1-10 mL, 1-10 mL, Intravenous, PRN, Clarisse Mariscal APRN  •  sodium chloride 0.9 % flush 10 mL, 10 mL, Intravenous, PRN, Jeferson Anderson MD  •  sodium chloride 0.9 % infusion, 100 mL/hr, Intravenous, Continuous, USHA Henry, Last Rate: 100 mL/hr at 03/06/18 2004, 100 mL/hr at 03/06/18 2004  •  tiZANidine (ZANAFLEX) tablet 2 mg, 2 mg, Oral, Q8H PRN, Clarisse Mariscal APRMOSES  Antibiotics:  Anti-Infectives     Ordered     Dose/Rate Route Frequency Start Stop    03/06/18 1233  ertapenem (INVanz) 1 g/100 mL 0.9% NS VTB (mbp)     Ordering Provider:  Dionte Butts MD    1 g  over 30 Minutes Intravenous Every 24 Hours Scheduled 03/06/18 1345 03/11/18 0859    03/05/18 1728  meropenem (MERREM) 1 g/100 mL 0.9% NS VTB (mbp)     Ordering Provider:  USHA Henry    1 g  over 30 Minutes Intravenous Once 03/05/18 1800 03/05/18 2004 03/05/18 1256  aztreonam (AZACTAM) in SWFI 2 grams/10ml IV PUSH syringe     Ordering Provider:  Jeferson Anderson MD    2 g  over 5 Minutes Intravenous Once 03/05/18 1258 03/05/18 1331          Allergies:  is allergic to actos [pioglitazone]; ciprocinonide [fluocinolone]; codeine; kefzol [cefazolin]; metformin and related; unasyn  "[ampicillin-sulbactam sodium]; biaxin [clarithromycin]; crestor [rosuvastatin calcium]; lisinopril; and prilosec [omeprazole].    Family History: family history includes Alcohol abuse in her father; Arthritis in her mother; Coronary artery disease in her brother; Dementia in her maternal grandmother; Hypertension in her mother; Lung cancer in her father; Melanoma in her brother; Osteoporosis in her mother.    Social History:  reports that she has never smoked. She has never used smokeless tobacco. She reports that she does not drink alcohol or use illicit drugs.    Review of Systems: All other reviewed and negative except as per HPI    Blood pressure 139/76, pulse 80, temperature 98.6 °F (37 °C), temperature source Oral, resp. rate 14, height 175.3 cm (69\"), weight 104 kg (230 lb), SpO2 96 %.  GENERAL: Awake and alert, in mild  distress.   HEENT: Oropharynx without thrush. . No cervical adenopathy. No neck masses  EYES: . No conjunctival injection. No icterus.   LYMPHATICS: No lymphadenopathy of the neck or axillary or inguinal regions.   HEART: No murmur, gallop, or pericardial friction rub.   LUNGS: Clear to auscultation anteriorly. No respiratory distress  ABDOMEN: Soft, tender right CVA   , nondistended. No appreciable HSM.    SKIN: Warm and dry without cutaneous eruptions. .   PSYCHIATRIC: Mental status lucid. Cranial nerve function intact.   EXT: No cellulitic changes      DIAGNOSTICS:  Lab Results   Component Value Date    WBC 7.08 03/07/2018    HGB 11.5 03/07/2018    HCT 35.8 03/07/2018     03/07/2018     Lab Results   Component Value Date    CRP 31.7 (H) 11/06/2015     Lab Results   Component Value Date    SEDRATE 33 (H) 11/06/2015     Lab Results   Component Value Date    GLUCOSE 117 (H) 03/07/2018    BUN 6 (L) 03/07/2018    CREATININE 0.70 03/07/2018    EGFRIFNONA 85 03/07/2018    BCR 8.6 03/07/2018    CO2 24.0 03/07/2018    CALCIUM 8.7 03/07/2018    ALBUMIN 4.70 03/05/2018    LABIL2 1.2 (L) " 03/05/2018    AST 34 (H) 03/05/2018    ALT 39 03/05/2018       Microbiology:  Klebsiella in urine      RADIOLOGY:  Imaging Results (last 72 hours)     Procedure Component Value Units Date/Time    CT Abdomen Pelvis Without Contrast [[id number]] Collected:  03/05/18 1333     Updated:  03/05/18 1602    Narrative:       EXAMINATION: CT ABDOMEN AND PELVIS WO CONTRAST-      INDICATION: Right flank pain, pyelonephritis, renal stones.     TECHNIQUE: Multiple axial CT imaging was obtained of the abdomen and  pelvis without the administration of oral or intravenous contrast.     The radiation dose reduction device was turned on for each scan per the  ALARA (As Low as Reasonably Achievable) protocol.     COMPARISON: 07/13/2017.     FINDINGS: ABDOMEN: The lung bases are grossly clear. Liver is  homogeneous in appearance. Ventral abdominal wall hernia identified  containing mesenteric fat only. The spleen is unremarkable. Kidneys and  adrenal glands are within normal limits. There is calcification seen  within the abdominal aorta. Nonobstructing stone seen inferiorly within  the right kidney. No abnormal mass or fluid collection is identified.  The pancreas is homogeneous. The abdominal portion of the  gastrointestinal tract is within normal limits. No free fluid or free  air.     PELVIS: Pelvic organs are unremarkable in appearance. There is a small  collection of fluid seen along the anterior aspect of the peritoneal  lining. This is stable and unchanged when compared to the prior study.  Minimal degenerative change is seen within the spine. There is no  evidence of stones seen in the ureters. There is no bladder  calcification. Vascular calcification seen within the abdominal aorta  and iliac vessels. No free fluid or free air. No abnormal mass or fluid  collection is identified. Degenerative change is seen within the spine  and pelvis with hardware identified in the left hip from total left hip  arthroplasty. Some streak  artifact obscures image detail within the  pelvis.       Impression:       Nonobstructing stone seen of the inferior aspect of the  right kidney measuring approximately 4 to 5 mm in size. Remainder of the  abdomen and pelvis is unchanged and unremarkable. There is a midline  ventral abdominal wall hernia containing mesenteric fat only.     D:  03/05/2018  E:  03/05/2018     This report was finalized on 3/5/2018 4:00 PM by Dr. Nicky Clark MD.             Assessment and Plan:     ASSESSMENT AND PLAN:   Gram negative cystitis possible pyelonephritis                         Klebsiella  Fever  Nausea, vomiting, and diarrhea  Chronic gastroparesis  Chronic pain syndrome  DM2  Antibiotic allergies - cipro, Unasyn, Kefzol, Biaxin     Currently on  Invanz for now.     Discussed with Pharmacy    Plan to change to PO Bactrim in am and DC then?    UM discussed with staff        I discussed the patients findings and my recommendations with patient.      Dionte Butts MD  3/7/2018

## 2018-03-07 NOTE — PLAN OF CARE
Problem: Patient Care Overview (Adult)  Goal: Plan of Care Review  Outcome: Ongoing (interventions implemented as appropriate)   03/07/18 0322   Coping/Psychosocial Response Interventions   Plan Of Care Reviewed With patient   Patient Care Overview   Progress progress toward functional goals as expected   Outcome Evaluation   Outcome Summary/Follow up Plan Pt has c/o pain and nausea frequently.

## 2018-03-07 NOTE — PLAN OF CARE
Problem: Patient Care Overview (Adult)  Goal: Adult Individualization and Mutuality  Outcome: Ongoing (interventions implemented as appropriate)   03/07/18 1631   Individualization   Patient Specific Goals Pain < 4   Patient Specific Interventions Assess q2h & prn       Problem: Pain, Acute (Adult)  Goal: Identify Related Risk Factors and Signs and Symptoms  Outcome: Ongoing (interventions implemented as appropriate)   03/07/18 1631   Pain, Acute   Related Risk Factors (Acute Pain) infection;persistent pain   Signs and Symptoms (Acute Pain) verbalization of pain descriptors;nausea/vomiting/anorexia     Goal: Acceptable Pain Control/Comfort Level  Outcome: Ongoing (interventions implemented as appropriate)   03/07/18 1631   Pain, Acute (Adult)   Acceptable Pain Control/Comfort Level making progress toward outcome       Problem: Infection, Risk/Actual (Adult)  Goal: Identify Related Risk Factors and Signs and Symptoms  Outcome: Ongoing (interventions implemented as appropriate)   03/07/18 1631   Infection, Risk/Actual   Infection, Risk/Actual: Related Risk Factors treatment plan   Signs and Symptoms (Infection, Risk/Actual) nausea;pain     Goal: Infection Prevention/Resolution  Outcome: Ongoing (interventions implemented as appropriate)   03/07/18 1631   Infection, Risk/Actual (Adult)   Infection Prevention/Resolution making progress toward outcome

## 2018-03-07 NOTE — PROGRESS NOTES
[facility] Medicine Services  PROGRESS NOTE    Patient Name: Cortney Delacruz  : 1955  MRN: [id number]    Date of Admission: 3/5/2018  Length of Stay: 2  Primary Care Physician: Ronnie Garvey MD    Subjective   Subjective     CC:  F/U pyelonephritis    HPI:  Patient seen this morning. Still with right flank pain and nausea. Says she is starting to get some pain on the other side as well.     Review of Systems  Gen-no fevers, no chills  CV-no chest pain, no palpitations  Resp-no cough, no dyspnea  GI-+nausea, +vomiting; +abd pain      Otherwise ROS is negative except as mentioned in the HPI.    Objective   Objective     Vital Signs:   Temp:  [98 °F (36.7 °C)-99.4 °F (37.4 °C)] 98 °F (36.7 °C)  Heart Rate:  [79-91] 79  Resp:  [16-18] 16  BP: (136-167)/(83-96) 136/83        Physical Exam:  Gen-no acute distress  CV-RRR, S1 S2 normal, no m/r/g  Resp-CTAB, no wheezes  Abd-soft, NT, ND, +BS  -right CVA tenderness  Ext-no edema  Neuro-A&Ox3, no focal deficits  Psych-appropriate mood      Results Reviewed:  I have personally reviewed current lab, radiology, and data and agree.      Results from last 7 days  Lab Units 18  0403 18  0450 18  1108   WBC 10*3/mm3 7.08 9.76 8.48   HEMOGLOBIN g/dL 11.5 11.9 13.7   HEMATOCRIT % 35.8 37.6 41.2   PLATELETS 10*3/mm3 219 222 219       Results from last 7 days  Lab Units 18  0403 18  2204 18  0450 18  1108   SODIUM mmol/L 137  --  139 138   POTASSIUM mmol/L 4.0 3.9 3.2* 4.1   CHLORIDE mmol/L 106  --  104 101   CO2 mmol/L 24.0  --  26.0 24.0   BUN mg/dL 6*  --  12 11   CREATININE mg/dL 0.70  --  0.90 0.90   GLUCOSE mg/dL 117*  --  65* 213*   CALCIUM mg/dL 8.7  --  9.4 10.1   ALT (SGPT) U/L  --   --   --  39   AST (SGOT) U/L  --   --   --  34*     Estimated Creatinine Clearance: 106.9 mL/min (by C-G formula based on Cr of 0.7).  No results found for: BNP  No results found for: PHART    Microbiology Results  Abnormal     Procedure Component Value - Date/Time    Urine Culture - Urine, Urine, Clean Catch [[id number]]  (Abnormal) Collected:  03/05/18 1113    Lab Status:  Preliminary result Specimen:  Urine from Urine, Catheter Updated:  03/06/18 0931     Urine Culture --      >100,000 CFU/mL Gram Negative Bacilli (A)          Imaging Results (last 24 hours)     ** No results found for the last 24 hours. **             I have reviewed the medications.    Assessment/Plan   Assessment / Plan     Hospital Problem List     * (Principal)Acute pyelonephritis    N&V (nausea and vomiting)    Type 2 diabetes mellitus (Chronic)    Gastroparesis (Chronic)    Chronic pain syndrome, on PO dilaudid at home (Chronic)    Hypertension (Chronic)    GERD (gastroesophageal reflux disease) (Chronic)    Diarrhea    History of ESBL E. coli infection    Hypokalemia             Brief Hospital Course to date:  Cortney Delacruz is a 62 y.o. female with hx of ESBL E.coli UTI presents with right flank pain, nausea, vomiting, and decreased urine output. Found to have pyelonephritis.      Assessment & Plan:  --Switched to Invanz. ID following due to ESBL E.coli history. Urine culture pending.  --Continue fluids.  --PRN pain and nausea meds. She does have chronic pain and gastroparesis s/p gastric stimulator. Add back home dose of PO Dilaudid. Spread out IV Dilaudid a bit. Add PRN Reglan as she states she takes this at home if her nausea/vomiting get really bad.     DVT Prophylaxis:  Lovenox    CODE STATUS: Full Code    Disposition: I expect the patient to be discharged home in ~2 days      Electronically signed by Rosana García MD, 03/07/18, 9:16 AM.

## 2018-03-08 VITALS
DIASTOLIC BLOOD PRESSURE: 82 MMHG | BODY MASS INDEX: 34.07 KG/M2 | WEIGHT: 230 LBS | SYSTOLIC BLOOD PRESSURE: 137 MMHG | OXYGEN SATURATION: 97 % | HEIGHT: 69 IN | HEART RATE: 81 BPM | TEMPERATURE: 98 F | RESPIRATION RATE: 16 BRPM

## 2018-03-08 LAB
ANION GAP SERPL CALCULATED.3IONS-SCNC: 5 MMOL/L (ref 3–11)
BUN BLD-MCNC: <5 MG/DL (ref 9–23)
BUN/CREAT SERPL: ABNORMAL (ref 7–25)
CALCIUM SPEC-SCNC: 8.6 MG/DL (ref 8.7–10.4)
CHLORIDE SERPL-SCNC: 108 MMOL/L (ref 99–109)
CO2 SERPL-SCNC: 26 MMOL/L (ref 20–31)
CREAT BLD-MCNC: 0.7 MG/DL (ref 0.6–1.3)
DEPRECATED RDW RBC AUTO: 45.4 FL (ref 37–54)
ERYTHROCYTE [DISTWIDTH] IN BLOOD BY AUTOMATED COUNT: 14.3 % (ref 11.3–14.5)
GFR SERPL CREATININE-BSD FRML MDRD: 85 ML/MIN/1.73
GLUCOSE BLD-MCNC: 88 MG/DL (ref 70–100)
GLUCOSE BLDC GLUCOMTR-MCNC: 77 MG/DL (ref 70–130)
HCT VFR BLD AUTO: 37 % (ref 34.5–44)
HGB BLD-MCNC: 11.4 G/DL (ref 11.5–15.5)
MCH RBC QN AUTO: 27 PG (ref 27–31)
MCHC RBC AUTO-ENTMCNC: 30.8 G/DL (ref 32–36)
MCV RBC AUTO: 87.5 FL (ref 80–99)
PLATELET # BLD AUTO: 222 10*3/MM3 (ref 150–450)
PMV BLD AUTO: 11.7 FL (ref 6–12)
POTASSIUM BLD-SCNC: 3.6 MMOL/L (ref 3.5–5.5)
RBC # BLD AUTO: 4.23 10*6/MM3 (ref 3.89–5.14)
SODIUM BLD-SCNC: 139 MMOL/L (ref 132–146)
WBC NRBC COR # BLD: 7.1 10*3/MM3 (ref 3.5–10.8)

## 2018-03-08 PROCEDURE — 25010000002 ONDANSETRON PER 1 MG: Performed by: NURSE PRACTITIONER

## 2018-03-08 PROCEDURE — 25010000002 HYDROMORPHONE PER 4 MG: Performed by: HOSPITALIST

## 2018-03-08 PROCEDURE — 99239 HOSP IP/OBS DSCHRG MGMT >30: CPT | Performed by: HOSPITALIST

## 2018-03-08 PROCEDURE — 82962 GLUCOSE BLOOD TEST: CPT

## 2018-03-08 PROCEDURE — 25010000002 METOCLOPRAMIDE PER 10 MG: Performed by: HOSPITALIST

## 2018-03-08 PROCEDURE — 80048 BASIC METABOLIC PNL TOTAL CA: CPT | Performed by: HOSPITALIST

## 2018-03-08 PROCEDURE — 85027 COMPLETE CBC AUTOMATED: CPT | Performed by: HOSPITALIST

## 2018-03-08 RX ORDER — SULFAMETHOXAZOLE AND TRIMETHOPRIM 800; 160 MG/1; MG/1
1 TABLET ORAL EVERY 12 HOURS SCHEDULED
Qty: 15 TABLET | Refills: 0 | Status: SHIPPED | OUTPATIENT
Start: 2018-03-08 | End: 2018-03-16

## 2018-03-08 RX ADMIN — LAMOTRIGINE 100 MG: 100 TABLET ORAL at 09:06

## 2018-03-08 RX ADMIN — METOPROLOL SUCCINATE 100 MG: 100 TABLET, EXTENDED RELEASE ORAL at 09:06

## 2018-03-08 RX ADMIN — ONDANSETRON 4 MG: 4 TABLET, FILM COATED ORAL at 09:06

## 2018-03-08 RX ADMIN — ONDANSETRON HYDROCHLORIDE 4 MG: 2 INJECTION, SOLUTION INTRAMUSCULAR; INTRAVENOUS at 01:46

## 2018-03-08 RX ADMIN — METOCLOPRAMIDE 10 MG: 5 INJECTION, SOLUTION INTRAMUSCULAR; INTRAVENOUS at 05:21

## 2018-03-08 RX ADMIN — POTASSIUM CHLORIDE 40 MEQ: 750 CAPSULE, EXTENDED RELEASE ORAL at 09:06

## 2018-03-08 RX ADMIN — SULFAMETHOXAZOLE AND TRIMETHOPRIM 160 MG: 800; 160 TABLET ORAL at 09:06

## 2018-03-08 RX ADMIN — PANTOPRAZOLE SODIUM 40 MG: 40 TABLET, DELAYED RELEASE ORAL at 05:21

## 2018-03-08 RX ADMIN — HYDROMORPHONE HYDROCHLORIDE 2 MG: 2 TABLET ORAL at 05:21

## 2018-03-08 RX ADMIN — HYDROMORPHONE HYDROCHLORIDE 1 MG: 10 INJECTION INTRAMUSCULAR; INTRAVENOUS; SUBCUTANEOUS at 09:28

## 2018-03-08 RX ADMIN — GABAPENTIN 600 MG: 300 CAPSULE ORAL at 05:21

## 2018-03-08 RX ADMIN — HYDROMORPHONE HYDROCHLORIDE 1 MG: 10 INJECTION INTRAMUSCULAR; INTRAVENOUS; SUBCUTANEOUS at 01:46

## 2018-03-08 NOTE — DISCHARGE SUMMARY
[facility] Medicine Services  DISCHARGE SUMMARY    Patient Name: Cortney Delacruz  : 1955  MRN: [id number]    Date of Admission: 3/5/2018  Date of Discharge:  18  Primary Care Physician: Ronnie Garvey MD    Consults     Date and Time Order Name Status Description    3/6/2018 0030 Inpatient Consult to Infectious Diseases Completed         Hospital Course     Presenting Problem:   Acute pyelonephritis [N10]    Active Hospital Problems (** Indicates Principal Problem)    Diagnosis Date Noted   • **Acute pyelonephritis [N10] 2018   • N&V (nausea and vomiting) [R11.2] 2018     Priority: Medium   • Hypokalemia [E87.6] 2018   • Diarrhea [R19.7] 2018   • History of ESBL E. coli infection [Z86.19] 2018   • GERD (gastroesophageal reflux disease) [K21.9] 2017   • Type 2 diabetes mellitus [E11.9] 2016   • Hypertension [I10] 2016   • Gastroparesis [K31.84] 2016   • Chronic pain syndrome, on PO dilaudid at home [G89.4] 2016      Resolved Hospital Problems    Diagnosis Date Noted Date Resolved   No resolved problems to display.          Hospital Course:  Cortney Delacruz is a 62 y.o. female with hx of ESBL E.coli UTI presents with right flank pain, nausea, vomiting, and decreased urine output. Found to have pyelonephritis. Urine culture grew Klebsiella. ID followed the patient. She was on Invanz until urine culture finalized. Switched to PO Bactrim and will continue for 8 days, per Dr. Butts. She has had complains of L>R flank pain as well as dysuria which have improved every day. CT A/P on admission showed 4 mm right kidney stone, nonobstructing.  Has underlying chronic pain as well as nausea/vomiting due to her gastroparesis. If her N/V gets really bad she has Reglan at home that she takes. She is tolerating diet and has reached maximal benefit of inpatient hospitalization. Discharge home today with family. Follow up with PCP in  1 week and with Dr. Butts as needed.            Day of Discharge     HPI:   Patient seen this morning. Feels better. Has some left flank pain still but right side is much better.     Review of Systems  Gen-no fevers, no chills  CV-no chest pain, no palpitations  Resp-no cough, no dyspnea  GI-no N/V/D, no abd pain    Otherwise ROS is negative except as mentioned in the HPI.    Vital Signs:   Temp:  [97.7 °F (36.5 °C)-98.6 °F (37 °C)] 98 °F (36.7 °C)  Heart Rate:  [71-81] 81  Resp:  [14-16] 16  BP: (121-142)/(64-82) 137/82     Physical Exam:  Gen-no acute distress  CV-RRR, S1 S2 normal, no m/r/g  Resp-CTAB, no wheezes  Abd-soft, NT, ND, +BS  Ext-no edema  Neuro-A&Ox3, no focal deficits  Psych-appropriate mood      Pertinent  and/or Most Recent Results       Results from last 7 days  Lab Units 03/08/18  0511 03/07/18  0403 03/06/18  2204 03/06/18  0450 03/05/18  1108   WBC 10*3/mm3 7.10 7.08  --  9.76 8.48   HEMOGLOBIN g/dL 11.4* 11.5  --  11.9 13.7   HEMATOCRIT % 37.0 35.8  --  37.6 41.2   PLATELETS 10*3/mm3 222 219  --  222 219   SODIUM mmol/L 139 137  --  139 138   POTASSIUM mmol/L 3.6 4.0 3.9 3.2* 4.1   CHLORIDE mmol/L 108 106  --  104 101   CO2 mmol/L 26.0 24.0  --  26.0 24.0   BUN mg/dL <5* 6*  --  12 11   CREATININE mg/dL 0.70 0.70  --  0.90 0.90   GLUCOSE mg/dL 88 117*  --  65* 213*   CALCIUM mg/dL 8.6* 8.7  --  9.4 10.1       Results from last 7 days  Lab Units 03/05/18  1108   BILIRUBIN mg/dL 0.4   ALK PHOS U/L 92   ALT (SGPT) U/L 39   AST (SGOT) U/L 34*           Invalid input(s): TG, LDLCALC, LDLREALC      Brief Urine Lab Results  (Last result in the past 365 days)      Color   Clarity   Blood   Leuk Est   Nitrite   Protein   CREAT   Urine HCG        03/05/18 1113 Dark Yellow(A) Cloudy(A) Negative Small (1+)(A) Negative 30 mg/dL (1+)(A)               Microbiology Results Abnormal     Procedure Component Value - Date/Time    Urine Culture - Urine, Urine, Clean Catch [[id number]]  (Abnormal)  (Susceptibility)  Collected:  03/05/18 1113    Lab Status:  Final result Specimen:  Urine from Urine, Catheter Updated:  03/07/18 1122     Urine Culture --      >100,000 CFU/mL Klebsiella pneumoniae (A)    Susceptibility      Klebsiella pneumoniae     LUCY     Ampicillin >16 ug/ml Resistant     Ampicillin + Sulbactam <=8/4 ug/ml Susceptible     Aztreonam <=8 ug/ml Susceptible     Cefepime <=8 ug/ml Susceptible     Cefotaxime <=2 ug/ml Susceptible     Ceftriaxone <=8 ug/ml Susceptible     Cefuroxime sodium <=4 ug/ml Susceptible     Cephalothin <=8 ug/ml Susceptible     Ertapenem <=1 ug/ml Susceptible     Gentamicin <=4 ug/ml Susceptible     Levofloxacin <=2 ug/ml Susceptible     Meropenem <=1 ug/ml Susceptible     Nitrofurantoin <=32 ug/ml Susceptible     Piperacillin + Tazobactam <=16 ug/ml Susceptible     Tetracycline <=4 ug/ml Susceptible     Tobramycin <=4 ug/ml Susceptible     Trimethoprim + Sulfamethoxazole <=2/38 ug/ml Susceptible                          Imaging Results (all)     Procedure Component Value Units Date/Time    CT Abdomen Pelvis Without Contrast [[id number]] Collected:  03/05/18 1333     Updated:  03/05/18 1602    Narrative:       EXAMINATION: CT ABDOMEN AND PELVIS WO CONTRAST-      INDICATION: Right flank pain, pyelonephritis, renal stones.     TECHNIQUE: Multiple axial CT imaging was obtained of the abdomen and  pelvis without the administration of oral or intravenous contrast.     The radiation dose reduction device was turned on for each scan per the  ALARA (As Low as Reasonably Achievable) protocol.     COMPARISON: 07/13/2017.     FINDINGS: ABDOMEN: The lung bases are grossly clear. Liver is  homogeneous in appearance. Ventral abdominal wall hernia identified  containing mesenteric fat only. The spleen is unremarkable. Kidneys and  adrenal glands are within normal limits. There is calcification seen  within the abdominal aorta. Nonobstructing stone seen inferiorly within  the right kidney. No abnormal mass or  fluid collection is identified.  The pancreas is homogeneous. The abdominal portion of the  gastrointestinal tract is within normal limits. No free fluid or free  air.     PELVIS: Pelvic organs are unremarkable in appearance. There is a small  collection of fluid seen along the anterior aspect of the peritoneal  lining. This is stable and unchanged when compared to the prior study.  Minimal degenerative change is seen within the spine. There is no  evidence of stones seen in the ureters. There is no bladder  calcification. Vascular calcification seen within the abdominal aorta  and iliac vessels. No free fluid or free air. No abnormal mass or fluid  collection is identified. Degenerative change is seen within the spine  and pelvis with hardware identified in the left hip from total left hip  arthroplasty. Some streak artifact obscures image detail within the  pelvis.       Impression:       Nonobstructing stone seen of the inferior aspect of the  right kidney measuring approximately 4 to 5 mm in size. Remainder of the  abdomen and pelvis is unchanged and unremarkable. There is a midline  ventral abdominal wall hernia containing mesenteric fat only.     D:  03/05/2018  E:  03/05/2018     This report was finalized on 3/5/2018 4:00 PM by Dr. Nicky Clark MD.                              Discharge Details      Richardanthonydenice Cortney M   Home Medication Instructions JOSE:[id number]    Printed on:03/08/18 0924   Medication Information                      acetaminophen (TYLENOL) 325 MG tablet  Take 2 tablets by mouth Every 4 (Four) Hours As Needed for Mild Pain (1-3) or Fever.             ALPRAZolam (XANAX) 1 MG tablet  Take 1 mg by mouth 3 (Three) Times a Day As Needed for anxiety.             famotidine (PEPCID) 20 MG tablet  20 mg At Night As Needed.             FLONASE ALLERGY RELIEF 50 MCG/ACT nasal spray  1 spray Daily As Needed.             fluticasone-salmeterol (ADVAIR) 100-50 MCG/DOSE DISKUS  Inhale 1 puff Daily  As Needed.             gabapentin (NEURONTIN) 600 MG tablet  Take 600 mg by mouth Every Morning.             gabapentin (NEURONTIN) 600 MG tablet  Take 1,200 mg by mouth Every Evening.             HYDROmorphone (DILAUDID) 2 MG tablet  2 mg Every 6 (Six) Hours As Needed.             insulin aspart (novoLOG FLEXPEN) 100 UNIT/ML solution pen-injector sc pen  Inject 25 Units under the skin 3 (Three) Times a Day With Meals.             Insulin Degludec (TRESIBA FLEXTOUCH) 100 UNIT/ML solution pen-injector  Inject 110 Units under the skin Every Night.             lamoTRIgine (LaMICtal) 100 MG tablet  100 mg 2 (Two) Times a Day.             metoprolol succinate XL (TOPROL-XL) 100 MG 24 hr tablet  Take 100 mg by mouth 2 (Two) Times a Day.             Multiple Vitamins-Minerals (MULTIVITAMIN ADULT PO)  Take 1 tablet by mouth Daily.             NON FORMULARY  Take 20 mg by mouth 2 (Two) Times a Day. Domperidone 20 mg 2x daily              ondansetron (ZOFRAN) 8 MG tablet  Take 8 mg by mouth Every 8 (Eight) Hours As Needed for Nausea or Vomiting.             pantoprazole (PROTONIX) 40 MG EC tablet  Take 40 mg by mouth Daily.             promethazine (PHENERGAN) 25 MG tablet  Take 1 tablet by mouth Every 6 (Six) Hours As Needed for nausea or vomiting for up to 12 doses.             Scopolamine (TRANSDERM-SCOP) 1.5 MG/3DAYS patch  Place 1 patch on the skin Every 72 (Seventy-Two) Hours.             sodium chloride (OCEAN) 0.65 % nasal spray  1 spray into each nostril As Needed for Congestion.             sulfamethoxazole-trimethoprim (BACTRIM DS,SEPTRA DS) 800-160 MG per tablet  Take 1 tablet by mouth Every 12 (Twelve) Hours for 15 doses. Indications: Urinary Tract Infection, Pyelonephritis             tiZANidine (ZANAFLEX) 2 MG tablet  Take 2 mg by mouth Every 8 (Eight) Hours As Needed for muscle spasms.                   Discharge Disposition:  Home or Self Care    Discharge Diet:  Diet Instructions     Advance Diet As  Tolerated                     Discharge Activity:   Activity Instructions     Activity as Tolerated                       No future appointments.    Additional Instructions for the Follow-ups that You Need to Schedule     Discharge Follow-up with PCP    As directed    Follow Up Details:  1 week           Discharge Follow-up with Specialty: Infectious Disease, Dr. Butts as needed    As directed    Specialty:  Infectious DiseaseDr. Butts as needed                     Time Spent on Discharge:  35 minutes    Electronically signed by Rosana García MD, 03/08/18, 9:28 AM

## 2018-03-08 NOTE — PLAN OF CARE
Problem: Patient Care Overview (Adult)  Goal: Plan of Care Review  Outcome: Ongoing (interventions implemented as appropriate)   03/08/18 0340   Coping/Psychosocial Response Interventions   Plan Of Care Reviewed With patient   Patient Care Overview   Progress progress toward functional goals as expected   Outcome Evaluation   Outcome Summary/Follow up Plan Pt has rested well throughout the night. Still requiring IV pain and nausea meds.

## 2018-03-08 NOTE — PROGRESS NOTES
"Cortney Delacruz  1955  [id number]  3/8/2018    CC:   Chief Complaint   Patient presents with   • Abdominal Pain       Cortney Delacruz is a 62 y.o. female here for CC UTI     History of present illness:   Patient is a 62 y.o. female with history of diabetes type 2 with gastroparesis and prior ESBL E-coli and Klebsiella cystitis and pyelonephritis with known nephrolithiasis.  She was hospitalized January 2017 and July 2017 with the above UTI's and pylonephritis, she was discharged with a carbapenems on both occassions.  He subsequently treated her with Bactrim/Macrobid for at least one month. Patient reports last Thursday she acutely developed nausea, vomiting, and diarrhea. She stated everyone has had the \"GI bug\" in her hometown.  Patient reports she became very dehydrated and then developed fever with associated chills (rigors) and sweats.  She presented to the ER yesterday and her urinalysis appeared grossly infected.  A CT scan showed a non-obstructing stone.  Patient reports since her admission she is feeling better, fevers have improved.  Patient is currently on Merrem.  Blood cultures were not drawn in the ER.    3/7 - co nausea  Co weakness  \"I feel better\"  No rash  \"I can take sulfz but causes nausea\"  3/8/17 - Patient is feeling better overall.  Patient denies any fever, chills, or sweats.  Patient denies any nausea, vomiting, or diarrhea.  No rash.  Seen and agree      Past medical history:  Past Medical History:   Diagnosis Date   • Arthritis    • Cancer 2000    breast s/p chemo   • Chronic pain     on dilaudid x4 years   • Depression    • Diabetes     type II   • Diabetic neuropathy     gabapentin, lamictal; reports uses xanax for this when severe as well   • Gastroparesis    • GERD (gastroesophageal reflux disease)     PPI and pepcid   • Hypercholesteremia    • Hypertension    • Osteoporosis     reclast inj yearly       Medications:   Current Facility-Administered Medications:   •  ALPRAZolam " (XANAX) tablet 1 mg, 1 mg, Oral, BID PRN, Clarisse Mariscal, APRN  •  budesonide-formoterol (SYMBICORT) 160-4.5 MCG/ACT inhaler 2 puff, 2 puff, Inhalation, BID - RT, Clarisse Mariscal, APRN  •  dextrose (D50W) solution 25 g, 25 g, Intravenous, Q15 Min PRN, Clarisse Mariscal, APRN  •  dextrose (GLUTOSE) oral gel 15 g, 15 g, Oral, Q15 Min PRN, Clarisse Mariscal, APRN  •  enoxaparin (LOVENOX) syringe 40 mg, 40 mg, Subcutaneous, Q24H, Clarisse Mariscal, APRN  •  famotidine (PEPCID) tablet 20 mg, 20 mg, Oral, Nightly, Clarisse Merrillan, APRN, 20 mg at 03/07/18 2056  •  fluticasone (FLONASE) 50 MCG/ACT nasal spray 1 spray, 1 spray, Each Nare, Daily PRN, Clarisse Mariscal, APRN  •  gabapentin (NEURONTIN) capsule 1,200 mg, 1,200 mg, Oral, Nightly, Clarisse Merrillan, APRN, 1,200 mg at 03/07/18 2056  •  gabapentin (NEURONTIN) capsule 600 mg, 600 mg, Oral, QAM, Clarisse Merrillan, APRN, 600 mg at 03/08/18 0521  •  glucagon (human recombinant) (GLUCAGEN DIAGNOSTIC) injection 1 mg, 1 mg, Subcutaneous, PRN, Clarisse Mariscal, APRN  •  HYDROmorphone (DILAUDID) injection 1 mg, 1 mg, Intravenous, Q3H PRN, 1 mg at 03/08/18 0928 **AND** naloxone (NARCAN) injection 0.4 mg, 0.4 mg, Intravenous, Q5 Min PRN, Rosana MAIER MD  •  HYDROmorphone (DILAUDID) tablet 2 mg, 2 mg, Oral, Q6H PRN, Rosana MAIER MD, 2 mg at 03/08/18 0521  •  insulin detemir (LEVEMIR) injection 75 Units, 75 Units, Subcutaneous, Nightly, Clarisse Merrillan, APRN, 50 Units at 03/07/18 2100  •  insulin lispro (humaLOG) injection 0-9 Units, 0-9 Units, Subcutaneous, 4x Daily With Meals & Nightly, USHA Henry, 6 Units at 03/05/18 1801  •  insulin lispro (humaLOG) injection 16 Units, 16 Units, Subcutaneous, TID With Meals, [facility], 16 Units at 03/05/18 1759  •  lamoTRIgine (LaMICtal) tablet 100 mg, 100 mg, Oral, Q12H, USHA Henry, 100 mg at 03/08/18 0906  •  metoclopramide (REGLAN) injection 10 mg, 10 mg, Intravenous, Q6H PRN, Rosana MAIER MD, 10 mg at 03/08/18 0521  •  metoprolol succinate XL (TOPROL-XL) 24  hr tablet 100 mg, 100 mg, Oral, Q12H, Clarisse Mariscal, APRN, 100 mg at 03/08/18 0906  •  ondansetron (ZOFRAN) injection 4 mg, 4 mg, Intravenous, Q6H PRN, Clarisse Mariscal APRN, 4 mg at 03/08/18 0146  •  ondansetron (ZOFRAN) tablet 4 mg, 4 mg, Oral, Q12H, Dionte Butts MD, 4 mg at 03/08/18 0906  •  pantoprazole (PROTONIX) EC tablet 40 mg, 40 mg, Oral, Daily, Clarisse Mariscal APRN, 40 mg at 03/08/18 0521  •  potassium chloride (MICRO-K) CR capsule 40 mEq, 40 mEq, Oral, PRN, 40 mEq at 03/08/18 0906 **OR** potassium chloride (KLOR-CON) packet 40 mEq, 40 mEq, Oral, PRN **OR** [DISCONTINUED] potassium chloride 10 mEq in 100 mL IVPB, 10 mEq, Intravenous, Q1H PRN, Rosana MAIER MD  •  promethazine (PHENERGAN) tablet 12.5 mg, 12.5 mg, Oral, Q6H PRN **OR** promethazine (PHENERGAN) injection 12.5 mg, 12.5 mg, Intravenous, Q6H PRN, USHA Henry, 12.5 mg at 03/07/18 2056  •  Scopolamine (TRANSDERM-SCOP) 1.5 MG/3DAYS patch 1 patch, 1 patch, Transdermal, Q72H, USHA Henry  •  sodium chloride (OCEAN) nasal spray 2 spray, 2 spray, Each Nare, PRN, Rosana MAIER MD, 2 spray at 03/07/18 2108  •  sodium chloride 0.9 % flush 1-10 mL, 1-10 mL, Intravenous, PRN, USHA Henry  •  sodium chloride 0.9 % flush 10 mL, 10 mL, Intravenous, PRN, Jeferson Anderson MD  •  sodium chloride 0.9 % infusion, 100 mL/hr, Intravenous, Continuous, USHA Henry, Last Rate: 100 mL/hr at 03/07/18 1640, 100 mL/hr at 03/07/18 1640  •  sulfamethoxazole-trimethoprim (BACTRIM DS,SEPTRA DS) 800-160 MG per tablet 160 mg, 1 tablet, Oral, Q12H, Dionte Butts MD, 160 mg at 03/08/18 0906  •  tetrahydrozoline 0.05 % ophthalmic solution 2 drop, 2 drop, Both Eyes, TID PRN, Rosana MAIER MD, 2 drop at 03/07/18 2108  •  tiZANidine (ZANAFLEX) tablet 2 mg, 2 mg, Oral, Q8H PRN, Clarisse Mariscal, APRN  Antibiotics:  Anti-Infectives     Ordered     Dose/Rate Route Frequency Start Stop    03/07/18 1316  sulfamethoxazole-trimethoprim (BACTRIM DS,SEPTRA DS) 800-160 MG per tablet  "160 mg     Ordering Provider:  Dionte Butts MD    1 tablet Oral Every 12 Hours Scheduled 03/08/18 0900 03/16/18 0859    03/08/18 0923  sulfamethoxazole-trimethoprim (BACTRIM DS,SEPTRA DS) 800-160 MG per tablet     Ordering Provider:  Rosana MAIER MD    1 tablet Oral Every 12 Hours Scheduled 03/08/18 0000 03/16/18 2359    03/05/18 1728  meropenem (MERREM) 1 g/100 mL 0.9% NS VTB (mbp)     Ordering Provider:  USHA Henry    1 g  over 30 Minutes Intravenous Once 03/05/18 1800 03/05/18 2004    03/05/18 1256  aztreonam (AZACTAM) in SWFI 2 grams/10ml IV PUSH syringe     Ordering Provider:  Jeferson Anderson MD    2 g  over 5 Minutes Intravenous Once 03/05/18 1258 03/05/18 1331          Allergies:  is allergic to actos [pioglitazone]; ciprocinonide [fluocinolone]; codeine; kefzol [cefazolin]; metformin and related; unasyn [ampicillin-sulbactam sodium]; biaxin [clarithromycin]; crestor [rosuvastatin calcium]; lisinopril; and prilosec [omeprazole].    Family History: family history includes Alcohol abuse in her father; Arthritis in her mother; Coronary artery disease in her brother; Dementia in her maternal grandmother; Hypertension in her mother; Lung cancer in her father; Melanoma in her brother; Osteoporosis in her mother.    Social History:  reports that she has never smoked. She has never used smokeless tobacco. She reports that she does not drink alcohol or use illicit drugs.    Review of Systems: All other reviewed and negative except as per HPI    Blood pressure 137/82, pulse 81, temperature 98 °F (36.7 °C), temperature source Oral, resp. rate 16, height 175.3 cm (69\"), weight 104 kg (230 lb), SpO2 97 %.  GENERAL: Awake and alert, in NAD.   HEENT: Oropharynx without thrush.    EYES: . No conjunctival injection. No icterus.   HEART: Regular, S1S2.  No murmur, gallop, or pericardial friction rub.   LUNGS: Clear to auscultation anteriorly. No respiratory distress  ABDOMEN: Soft, mild RT CVA tenderness, " nondistended.   SKIN: Warm and dry without cutaneous eruptions.  EXT: No cellulitic changes  No rash      DIAGNOSTICS:  Lab Results   Component Value Date    WBC 7.10 03/08/2018    HGB 11.4 (L) 03/08/2018    HCT 37.0 03/08/2018     03/08/2018     Lab Results   Component Value Date    CRP 31.7 (H) 11/06/2015     Lab Results   Component Value Date    SEDRATE 33 (H) 11/06/2015     Lab Results   Component Value Date    GLUCOSE 88 03/08/2018    BUN <5 (L) 03/08/2018    CREATININE 0.70 03/08/2018    EGFRIFNONA 85 03/08/2018    BCR  03/08/2018      Comment:      Unable to calculate Bun/Crea Ratio.    CO2 26.0 03/08/2018    CALCIUM 8.6 (L) 03/08/2018    ALBUMIN 4.70 03/05/2018    LABIL2 1.2 (L) 03/05/2018    AST 34 (H) 03/05/2018    ALT 39 03/05/2018       Microbiology:   Microbiology Results Abnormal     Procedure Component Value - Date/Time    Urine Culture - Urine, Urine, Clean Catch [[id number]]  (Abnormal)  (Susceptibility) Collected:  03/05/18 1113    Lab Status:  Final result Specimen:  Urine from Urine, Catheter Updated:  03/07/18 1122     Urine Culture --      >100,000 CFU/mL Klebsiella pneumoniae (A)    Susceptibility      Klebsiella pneumoniae     LUCY     Ampicillin >16 ug/ml Resistant     Ampicillin + Sulbactam <=8/4 ug/ml Susceptible     Aztreonam <=8 ug/ml Susceptible     Cefepime <=8 ug/ml Susceptible     Cefotaxime <=2 ug/ml Susceptible     Ceftriaxone <=8 ug/ml Susceptible     Cefuroxime sodium <=4 ug/ml Susceptible     Cephalothin <=8 ug/ml Susceptible     Ertapenem <=1 ug/ml Susceptible     Gentamicin <=4 ug/ml Susceptible     Levofloxacin <=2 ug/ml Susceptible     Meropenem <=1 ug/ml Susceptible     Nitrofurantoin <=32 ug/ml Susceptible     Piperacillin + Tazobactam <=16 ug/ml Susceptible     Tetracycline <=4 ug/ml Susceptible     Tobramycin <=4 ug/ml Susceptible     Trimethoprim + Sulfamethoxazole <=2/38 ug/ml Susceptible                            RADIOLOGY:  Imaging Results (last 72 hours)      Procedure Component Value Units Date/Time    CT Abdomen Pelvis Without Contrast [[id number]] Collected:  03/05/18 1333     Updated:  03/05/18 1602    Narrative:       EXAMINATION: CT ABDOMEN AND PELVIS WO CONTRAST-      INDICATION: Right flank pain, pyelonephritis, renal stones.     TECHNIQUE: Multiple axial CT imaging was obtained of the abdomen and  pelvis without the administration of oral or intravenous contrast.     The radiation dose reduction device was turned on for each scan per the  ALARA (As Low as Reasonably Achievable) protocol.     COMPARISON: 07/13/2017.     FINDINGS: ABDOMEN: The lung bases are grossly clear. Liver is  homogeneous in appearance. Ventral abdominal wall hernia identified  containing mesenteric fat only. The spleen is unremarkable. Kidneys and  adrenal glands are within normal limits. There is calcification seen  within the abdominal aorta. Nonobstructing stone seen inferiorly within  the right kidney. No abnormal mass or fluid collection is identified.  The pancreas is homogeneous. The abdominal portion of the  gastrointestinal tract is within normal limits. No free fluid or free  air.     PELVIS: Pelvic organs are unremarkable in appearance. There is a small  collection of fluid seen along the anterior aspect of the peritoneal  lining. This is stable and unchanged when compared to the prior study.  Minimal degenerative change is seen within the spine. There is no  evidence of stones seen in the ureters. There is no bladder  calcification. Vascular calcification seen within the abdominal aorta  and iliac vessels. No free fluid or free air. No abnormal mass or fluid  collection is identified. Degenerative change is seen within the spine  and pelvis with hardware identified in the left hip from total left hip  arthroplasty. Some streak artifact obscures image detail within the  pelvis.       Impression:       Nonobstructing stone seen of the inferior aspect of the  right kidney measuring  approximately 4 to 5 mm in size. Remainder of the  abdomen and pelvis is unchanged and unremarkable. There is a midline  ventral abdominal wall hernia containing mesenteric fat only.     D:  03/05/2018  E:  03/05/2018     This report was finalized on 3/5/2018 4:00 PM by Dr. Nicky Clark MD.             Assessment and Plan:     ASSESSMENT AND PLAN:   Klebsiella cystitis possible pyelonephritis  Fever, resolved  Nausea, vomiting, and diarrhea - resolved  Chronic gastroparesis  Chronic pain syndrome  DM2  Antibiotic allergies - cipro, Unasyn, Kefzol, Biaxin     Currently on Bactrim - script sent to pharmacy (30 day supply) - called pharmacy to change to 30 day supply  F/U appt  3/29/18 @ 2p.m.     I discussed the patients findings and my recommendations with patient.  OK for discharge from ID standpoint.   UM discussed with primary team    USHA Zayas for Dr. Dionte Butts  3/8/2018

## 2018-06-05 ENCOUNTER — APPOINTMENT (OUTPATIENT)
Dept: ONCOLOGY | Facility: HOSPITAL | Age: 63
End: 2018-06-05

## 2018-06-08 RX ORDER — ZOLEDRONIC ACID 5 MG/100ML
5 INJECTION, SOLUTION INTRAVENOUS ONCE
Status: CANCELLED | OUTPATIENT
Start: 2018-06-11

## 2018-06-11 ENCOUNTER — APPOINTMENT (OUTPATIENT)
Dept: ONCOLOGY | Facility: HOSPITAL | Age: 63
End: 2018-06-11

## 2018-06-28 ENCOUNTER — INFUSION (OUTPATIENT)
Dept: ONCOLOGY | Facility: HOSPITAL | Age: 63
End: 2018-06-28

## 2018-06-28 VITALS
RESPIRATION RATE: 16 BRPM | TEMPERATURE: 96.2 F | WEIGHT: 238.8 LBS | SYSTOLIC BLOOD PRESSURE: 126 MMHG | DIASTOLIC BLOOD PRESSURE: 88 MMHG | HEART RATE: 81 BPM | BODY MASS INDEX: 35.26 KG/M2

## 2018-06-28 DIAGNOSIS — M81.0 OSTEOPOROSIS, UNSPECIFIED OSTEOPOROSIS TYPE, UNSPECIFIED PATHOLOGICAL FRACTURE PRESENCE: Primary | ICD-10-CM

## 2018-06-28 LAB — CREATINE SERPL-MCNC: 0.9 MG/DL

## 2018-06-28 PROCEDURE — 96374 THER/PROPH/DIAG INJ IV PUSH: CPT

## 2018-06-28 PROCEDURE — 25010000002 ZOLEDRONIC ACID 5 MG/100ML SOLUTION: Performed by: INTERNAL MEDICINE

## 2018-06-28 PROCEDURE — 82565 ASSAY OF CREATININE: CPT

## 2018-06-28 RX ORDER — ZOLEDRONIC ACID 5 MG/100ML
5 INJECTION, SOLUTION INTRAVENOUS ONCE
Status: COMPLETED | OUTPATIENT
Start: 2018-06-28 | End: 2018-06-28

## 2018-06-28 RX ORDER — ZOLEDRONIC ACID 5 MG/100ML
5 INJECTION, SOLUTION INTRAVENOUS ONCE
Status: CANCELLED | OUTPATIENT
Start: 2018-06-28

## 2018-06-28 RX ADMIN — ZOLEDRONIC ACID 5 MG: 0.05 INJECTION, SOLUTION INTRAVENOUS at 12:28

## 2018-06-29 LAB — CREAT BLDA-MCNC: 0.9 MG/DL (ref 0.6–1.3)

## 2019-01-30 ENCOUNTER — TRANSCRIBE ORDERS (OUTPATIENT)
Dept: ADMINISTRATIVE | Facility: HOSPITAL | Age: 64
End: 2019-01-30

## 2019-01-30 ENCOUNTER — HOSPITAL ENCOUNTER (OUTPATIENT)
Dept: GENERAL RADIOLOGY | Facility: HOSPITAL | Age: 64
Discharge: HOME | End: 2019-01-30
Attending: INTERNAL MEDICINE | Admitting: INTERNAL MEDICINE

## 2019-01-30 DIAGNOSIS — R05.3 CHRONIC COUGH: ICD-10-CM

## 2019-01-30 DIAGNOSIS — T17.900A PULMONARY ASPIRATION, INITIAL ENCOUNTER: Primary | ICD-10-CM

## 2019-01-30 PROCEDURE — 71046 X-RAY EXAM CHEST 2 VIEWS: CPT

## 2019-03-20 ENCOUNTER — HOSPITAL ENCOUNTER (INPATIENT)
Facility: HOSPITAL | Age: 64
LOS: 5 days | Discharge: HOME | End: 2019-03-25
Attending: EMERGENCY MEDICINE | Admitting: INTERNAL MEDICINE

## 2019-03-20 ENCOUNTER — APPOINTMENT (OUTPATIENT)
Dept: GENERAL RADIOLOGY | Facility: HOSPITAL | Age: 64
End: 2019-03-20

## 2019-03-20 ENCOUNTER — APPOINTMENT (OUTPATIENT)
Dept: CT IMAGING | Facility: HOSPITAL | Age: 64
End: 2019-03-20

## 2019-03-20 DIAGNOSIS — R65.10 SIRS (SYSTEMIC INFLAMMATORY RESPONSE SYNDROME) (HCC): Primary | ICD-10-CM

## 2019-03-20 DIAGNOSIS — N17.9 ACUTE RENAL FAILURE, UNSPECIFIED ACUTE RENAL FAILURE TYPE (HCC): ICD-10-CM

## 2019-03-20 DIAGNOSIS — R50.9 FEVER OF UNKNOWN ORIGIN (FUO): ICD-10-CM

## 2019-03-20 LAB
ALBUMIN SERPL-MCNC: 4.58 G/DL (ref 3.2–4.8)
ALBUMIN/GLOB SERPL: 1.5 G/DL (ref 1.5–2.5)
ALP SERPL-CCNC: 115 U/L (ref 25–100)
ALT SERPL W P-5'-P-CCNC: 32 U/L (ref 7–40)
ANION GAP SERPL CALCULATED.3IONS-SCNC: 12 MMOL/L (ref 3–11)
AST SERPL-CCNC: 31 U/L (ref 0–33)
BACTERIA UR QL AUTO: ABNORMAL /HPF
BASOPHILS # BLD AUTO: 0.02 10*3/MM3 (ref 0–0.2)
BASOPHILS NFR BLD AUTO: 0.1 % (ref 0–1)
BILIRUB SERPL-MCNC: 0.6 MG/DL (ref 0.3–1.2)
BILIRUB UR QL STRIP: NEGATIVE
BUN BLD-MCNC: 15 MG/DL (ref 9–23)
BUN/CREAT SERPL: 10.6 (ref 7–25)
CALCIUM SPEC-SCNC: 9.4 MG/DL (ref 8.7–10.4)
CHLORIDE SERPL-SCNC: 96 MMOL/L (ref 99–109)
CLARITY UR: CLEAR
CO2 SERPL-SCNC: 24 MMOL/L (ref 20–31)
COLOR UR: YELLOW
CREAT BLD-MCNC: 1.42 MG/DL (ref 0.6–1.3)
D-LACTATE SERPL-SCNC: 1.9 MMOL/L (ref 0.5–2)
DEPRECATED RDW RBC AUTO: 45.4 FL (ref 37–54)
EOSINOPHIL # BLD AUTO: 0.04 10*3/MM3 (ref 0–0.3)
EOSINOPHIL NFR BLD AUTO: 0.2 % (ref 0–3)
ERYTHROCYTE [DISTWIDTH] IN BLOOD BY AUTOMATED COUNT: 14.4 % (ref 11.3–14.5)
FLUAV AG NPH QL: NEGATIVE
FLUBV AG NPH QL IA: NEGATIVE
GFR SERPL CREATININE-BSD FRML MDRD: 37 ML/MIN/1.73
GLOBULIN UR ELPH-MCNC: 3 GM/DL
GLUCOSE BLD-MCNC: 288 MG/DL (ref 70–100)
GLUCOSE UR STRIP-MCNC: ABNORMAL MG/DL
HCT VFR BLD AUTO: 41.4 % (ref 34.5–44)
HGB BLD-MCNC: 13.4 G/DL (ref 11.5–15.5)
HGB UR QL STRIP.AUTO: ABNORMAL
HYALINE CASTS UR QL AUTO: ABNORMAL /LPF
IMM GRANULOCYTES # BLD AUTO: 0.06 10*3/MM3 (ref 0–0.05)
IMM GRANULOCYTES NFR BLD AUTO: 0.3 % (ref 0–0.6)
KETONES UR QL STRIP: ABNORMAL
LEUKOCYTE ESTERASE UR QL STRIP.AUTO: ABNORMAL
LYMPHOCYTES # BLD AUTO: 1.32 10*3/MM3 (ref 0.6–4.8)
LYMPHOCYTES NFR BLD AUTO: 6.8 % (ref 24–44)
MCH RBC QN AUTO: 28 PG (ref 27–31)
MCHC RBC AUTO-ENTMCNC: 32.4 G/DL (ref 32–36)
MCV RBC AUTO: 86.6 FL (ref 80–99)
MONOCYTES # BLD AUTO: 1.2 10*3/MM3 (ref 0–1)
MONOCYTES NFR BLD AUTO: 6.2 % (ref 0–12)
NEUTROPHILS # BLD AUTO: 16.76 10*3/MM3 (ref 1.5–8.3)
NEUTROPHILS NFR BLD AUTO: 86.7 % (ref 41–71)
NITRITE UR QL STRIP: NEGATIVE
PH UR STRIP.AUTO: <=5 [PH] (ref 5–8)
PLATELET # BLD AUTO: 237 10*3/MM3 (ref 150–450)
PMV BLD AUTO: 12 FL (ref 6–12)
POTASSIUM BLD-SCNC: 5.2 MMOL/L (ref 3.5–5.5)
PROCALCITONIN SERPL-MCNC: 2.68 NG/ML
PROT SERPL-MCNC: 7.6 G/DL (ref 5.7–8.2)
PROT UR QL STRIP: NEGATIVE
RBC # BLD AUTO: 4.78 10*6/MM3 (ref 3.89–5.14)
RBC # UR: ABNORMAL /HPF
REF LAB TEST METHOD: ABNORMAL
SODIUM BLD-SCNC: 132 MMOL/L (ref 132–146)
SP GR UR STRIP: 1.02 (ref 1–1.03)
SQUAMOUS #/AREA URNS HPF: ABNORMAL /HPF
TROPONIN I SERPL-MCNC: 0 NG/ML (ref 0–0.07)
UROBILINOGEN UR QL STRIP: ABNORMAL
WBC NRBC COR # BLD: 19.34 10*3/MM3 (ref 3.5–10.8)
WBC UR QL AUTO: ABNORMAL /HPF

## 2019-03-20 PROCEDURE — 74176 CT ABD & PELVIS W/O CONTRAST: CPT

## 2019-03-20 PROCEDURE — 80053 COMPREHEN METABOLIC PANEL: CPT | Performed by: EMERGENCY MEDICINE

## 2019-03-20 PROCEDURE — 25010000002 VANCOMYCIN 10 G RECONSTITUTED SOLUTION: Performed by: EMERGENCY MEDICINE

## 2019-03-20 PROCEDURE — 81001 URINALYSIS AUTO W/SCOPE: CPT | Performed by: EMERGENCY MEDICINE

## 2019-03-20 PROCEDURE — 85025 COMPLETE CBC W/AUTO DIFF WBC: CPT | Performed by: EMERGENCY MEDICINE

## 2019-03-20 PROCEDURE — 99223 1ST HOSP IP/OBS HIGH 75: CPT | Performed by: INTERNAL MEDICINE

## 2019-03-20 PROCEDURE — 87804 INFLUENZA ASSAY W/OPTIC: CPT | Performed by: EMERGENCY MEDICINE

## 2019-03-20 PROCEDURE — 25010000002 HYDROMORPHONE 1 MG/ML SOLUTION: Performed by: EMERGENCY MEDICINE

## 2019-03-20 PROCEDURE — 71045 X-RAY EXAM CHEST 1 VIEW: CPT

## 2019-03-20 PROCEDURE — 87086 URINE CULTURE/COLONY COUNT: CPT | Performed by: EMERGENCY MEDICINE

## 2019-03-20 PROCEDURE — 84145 PROCALCITONIN (PCT): CPT | Performed by: EMERGENCY MEDICINE

## 2019-03-20 PROCEDURE — 87040 BLOOD CULTURE FOR BACTERIA: CPT | Performed by: EMERGENCY MEDICINE

## 2019-03-20 PROCEDURE — 93005 ELECTROCARDIOGRAM TRACING: CPT | Performed by: EMERGENCY MEDICINE

## 2019-03-20 PROCEDURE — 87186 SC STD MICRODIL/AGAR DIL: CPT | Performed by: EMERGENCY MEDICINE

## 2019-03-20 PROCEDURE — 87077 CULTURE AEROBIC IDENTIFY: CPT | Performed by: EMERGENCY MEDICINE

## 2019-03-20 PROCEDURE — 99285 EMERGENCY DEPT VISIT HI MDM: CPT

## 2019-03-20 PROCEDURE — 83605 ASSAY OF LACTIC ACID: CPT | Performed by: EMERGENCY MEDICINE

## 2019-03-20 PROCEDURE — 84484 ASSAY OF TROPONIN QUANT: CPT

## 2019-03-20 RX ORDER — ACETAMINOPHEN 500 MG
1000 TABLET ORAL ONCE
Status: COMPLETED | OUTPATIENT
Start: 2019-03-20 | End: 2019-03-20

## 2019-03-20 RX ORDER — HYDROMORPHONE HYDROCHLORIDE 2 MG/1
2 TABLET ORAL EVERY 6 HOURS PRN
Status: DISCONTINUED | OUTPATIENT
Start: 2019-03-20 | End: 2019-03-25 | Stop reason: HOSPADM

## 2019-03-20 RX ORDER — SULFAMETHOXAZOLE AND TRIMETHOPRIM 400; 80 MG/1; MG/1
1 TABLET ORAL 2 TIMES DAILY
COMMUNITY
End: 2019-03-25 | Stop reason: HOSPADM

## 2019-03-20 RX ORDER — SODIUM CHLORIDE 0.9 % (FLUSH) 0.9 %
10 SYRINGE (ML) INJECTION AS NEEDED
Status: DISCONTINUED | OUTPATIENT
Start: 2019-03-20 | End: 2019-03-25 | Stop reason: HOSPADM

## 2019-03-20 RX ADMIN — HYDROMORPHONE HYDROCHLORIDE 2 MG: 2 TABLET ORAL at 22:33

## 2019-03-20 RX ADMIN — VANCOMYCIN HYDROCHLORIDE 2250 MG: 10 INJECTION, POWDER, LYOPHILIZED, FOR SOLUTION INTRAVENOUS at 22:09

## 2019-03-20 RX ADMIN — ACETAMINOPHEN 1000 MG: 500 TABLET, FILM COATED ORAL at 20:21

## 2019-03-20 RX ADMIN — SODIUM CHLORIDE 2 G: 9 INJECTION, SOLUTION INTRAVENOUS at 21:12

## 2019-03-20 RX ADMIN — SODIUM CHLORIDE 1000 ML: 9 INJECTION, SOLUTION INTRAVENOUS at 20:21

## 2019-03-20 RX ADMIN — HYDROMORPHONE HYDROCHLORIDE 1 MG: 1 INJECTION, SOLUTION INTRAMUSCULAR; INTRAVENOUS; SUBCUTANEOUS at 20:24

## 2019-03-21 ENCOUNTER — APPOINTMENT (OUTPATIENT)
Dept: CARDIOLOGY | Facility: HOSPITAL | Age: 64
End: 2019-03-21

## 2019-03-21 ENCOUNTER — APPOINTMENT (OUTPATIENT)
Dept: ULTRASOUND IMAGING | Facility: HOSPITAL | Age: 64
End: 2019-03-21

## 2019-03-21 LAB
ANION GAP SERPL CALCULATED.3IONS-SCNC: 9 MMOL/L (ref 3–11)
BASOPHILS # BLD AUTO: 0.02 10*3/MM3 (ref 0–0.2)
BASOPHILS NFR BLD AUTO: 0.1 % (ref 0–1)
BUN BLD-MCNC: 14 MG/DL (ref 9–23)
BUN/CREAT SERPL: 11.6 (ref 7–25)
CALCIUM SPEC-SCNC: 8.6 MG/DL (ref 8.7–10.4)
CHLORIDE SERPL-SCNC: 101 MMOL/L (ref 99–109)
CO2 SERPL-SCNC: 22 MMOL/L (ref 20–31)
CREAT BLD-MCNC: 1.21 MG/DL (ref 0.6–1.3)
D-LACTATE SERPL-SCNC: 1.6 MMOL/L (ref 0.5–2)
DEPRECATED RDW RBC AUTO: 47.3 FL (ref 37–54)
EOSINOPHIL # BLD AUTO: 0.01 10*3/MM3 (ref 0–0.3)
EOSINOPHIL NFR BLD AUTO: 0 % (ref 0–3)
ERYTHROCYTE [DISTWIDTH] IN BLOOD BY AUTOMATED COUNT: 14.9 % (ref 11.3–14.5)
GFR SERPL CREATININE-BSD FRML MDRD: 45 ML/MIN/1.73
GLUCOSE BLD-MCNC: 300 MG/DL (ref 70–100)
GLUCOSE BLDC GLUCOMTR-MCNC: 185 MG/DL (ref 70–130)
GLUCOSE BLDC GLUCOMTR-MCNC: 208 MG/DL (ref 70–130)
GLUCOSE BLDC GLUCOMTR-MCNC: 212 MG/DL (ref 70–130)
GLUCOSE BLDC GLUCOMTR-MCNC: 275 MG/DL (ref 70–130)
HBA1C MFR BLD: 8.7 % (ref 4.8–5.6)
HCT VFR BLD AUTO: 36.2 % (ref 34.5–44)
HGB BLD-MCNC: 11.6 G/DL (ref 11.5–15.5)
IMM GRANULOCYTES # BLD AUTO: 0.11 10*3/MM3 (ref 0–0.05)
IMM GRANULOCYTES NFR BLD AUTO: 0.5 % (ref 0–0.6)
LIPASE SERPL-CCNC: 26 U/L (ref 6–51)
LYMPHOCYTES # BLD AUTO: 1.38 10*3/MM3 (ref 0.6–4.8)
LYMPHOCYTES NFR BLD AUTO: 6.8 % (ref 24–44)
MCH RBC QN AUTO: 27.5 PG (ref 27–31)
MCHC RBC AUTO-ENTMCNC: 32 G/DL (ref 32–36)
MCV RBC AUTO: 85.8 FL (ref 80–99)
MONOCYTES # BLD AUTO: 1.6 10*3/MM3 (ref 0–1)
MONOCYTES NFR BLD AUTO: 7.9 % (ref 0–12)
NEUTROPHILS # BLD AUTO: 17.14 10*3/MM3 (ref 1.5–8.3)
NEUTROPHILS NFR BLD AUTO: 84.7 % (ref 41–71)
PLATELET # BLD AUTO: 213 10*3/MM3 (ref 150–450)
PMV BLD AUTO: 12 FL (ref 6–12)
POTASSIUM BLD-SCNC: 4.6 MMOL/L (ref 3.5–5.5)
RBC # BLD AUTO: 4.22 10*6/MM3 (ref 3.89–5.14)
SODIUM BLD-SCNC: 132 MMOL/L (ref 132–146)
WBC NRBC COR # BLD: 20.26 10*3/MM3 (ref 3.5–10.8)

## 2019-03-21 PROCEDURE — 25010000002 ONDANSETRON PER 1 MG: Performed by: INTERNAL MEDICINE

## 2019-03-21 PROCEDURE — 25010000002 HYDROMORPHONE PER 4 MG: Performed by: INTERNAL MEDICINE

## 2019-03-21 PROCEDURE — 99233 SBSQ HOSP IP/OBS HIGH 50: CPT | Performed by: INTERNAL MEDICINE

## 2019-03-21 PROCEDURE — 80048 BASIC METABOLIC PNL TOTAL CA: CPT | Performed by: INTERNAL MEDICINE

## 2019-03-21 PROCEDURE — 99231 SBSQ HOSP IP/OBS SF/LOW 25: CPT | Performed by: NURSE PRACTITIONER

## 2019-03-21 PROCEDURE — 63710000001 INSULIN DETEMIR PER 5 UNITS: Performed by: INTERNAL MEDICINE

## 2019-03-21 PROCEDURE — 83036 HEMOGLOBIN GLYCOSYLATED A1C: CPT | Performed by: INTERNAL MEDICINE

## 2019-03-21 PROCEDURE — 25010000002 ONDANSETRON PER 1 MG

## 2019-03-21 PROCEDURE — 93306 TTE W/DOPPLER COMPLETE: CPT | Performed by: INTERNAL MEDICINE

## 2019-03-21 PROCEDURE — 63710000001 INSULIN LISPRO (HUMAN) PER 5 UNITS: Performed by: NURSE PRACTITIONER

## 2019-03-21 PROCEDURE — 83605 ASSAY OF LACTIC ACID: CPT | Performed by: INTERNAL MEDICINE

## 2019-03-21 PROCEDURE — 83690 ASSAY OF LIPASE: CPT | Performed by: INTERNAL MEDICINE

## 2019-03-21 PROCEDURE — 93306 TTE W/DOPPLER COMPLETE: CPT

## 2019-03-21 PROCEDURE — 25010000002 PROMETHAZINE PER 50 MG: Performed by: INTERNAL MEDICINE

## 2019-03-21 PROCEDURE — 25010000002 METOCLOPRAMIDE PER 10 MG: Performed by: INTERNAL MEDICINE

## 2019-03-21 PROCEDURE — 76705 ECHO EXAM OF ABDOMEN: CPT

## 2019-03-21 PROCEDURE — 25010000002 MEROPENEM: Performed by: INTERNAL MEDICINE

## 2019-03-21 PROCEDURE — 99291 CRITICAL CARE FIRST HOUR: CPT | Performed by: INTERNAL MEDICINE

## 2019-03-21 PROCEDURE — 82962 GLUCOSE BLOOD TEST: CPT

## 2019-03-21 PROCEDURE — 85025 COMPLETE CBC W/AUTO DIFF WBC: CPT | Performed by: INTERNAL MEDICINE

## 2019-03-21 PROCEDURE — 25010000002 VANCOMYCIN 10 G RECONSTITUTED SOLUTION

## 2019-03-21 RX ORDER — IBUPROFEN 600 MG/1
600 TABLET ORAL ONCE
Status: COMPLETED | OUTPATIENT
Start: 2019-03-21 | End: 2019-03-21

## 2019-03-21 RX ORDER — SODIUM CHLORIDE 9 MG/ML
50 INJECTION, SOLUTION INTRAVENOUS CONTINUOUS
Status: DISCONTINUED | OUTPATIENT
Start: 2019-03-21 | End: 2019-03-23

## 2019-03-21 RX ORDER — LATANOPROST 50 UG/ML
1 SOLUTION/ DROPS OPHTHALMIC NIGHTLY
Status: DISCONTINUED | OUTPATIENT
Start: 2019-03-21 | End: 2019-03-25 | Stop reason: HOSPADM

## 2019-03-21 RX ORDER — HEPARIN SODIUM 5000 [USP'U]/ML
5000 INJECTION, SOLUTION INTRAVENOUS; SUBCUTANEOUS EVERY 8 HOURS SCHEDULED
Status: DISCONTINUED | OUTPATIENT
Start: 2019-03-21 | End: 2019-03-25 | Stop reason: HOSPADM

## 2019-03-21 RX ORDER — MULTIPLE VITAMINS W/ MINERALS TAB 9MG-400MCG
1 TAB ORAL DAILY
Status: DISCONTINUED | OUTPATIENT
Start: 2019-03-21 | End: 2019-03-25 | Stop reason: HOSPADM

## 2019-03-21 RX ORDER — GABAPENTIN 300 MG/1
600 CAPSULE ORAL NIGHTLY
Status: DISCONTINUED | OUTPATIENT
Start: 2019-03-21 | End: 2019-03-25 | Stop reason: HOSPADM

## 2019-03-21 RX ORDER — PANTOPRAZOLE SODIUM 40 MG/1
40 TABLET, DELAYED RELEASE ORAL
Status: DISCONTINUED | OUTPATIENT
Start: 2019-03-21 | End: 2019-03-25 | Stop reason: HOSPADM

## 2019-03-21 RX ORDER — PROMETHAZINE HYDROCHLORIDE 25 MG/ML
12.5 INJECTION, SOLUTION INTRAMUSCULAR; INTRAVENOUS EVERY 6 HOURS PRN
Status: DISCONTINUED | OUTPATIENT
Start: 2019-03-21 | End: 2019-03-25 | Stop reason: HOSPADM

## 2019-03-21 RX ORDER — ONDANSETRON 2 MG/ML
4 INJECTION INTRAMUSCULAR; INTRAVENOUS EVERY 6 HOURS PRN
Status: DISCONTINUED | OUTPATIENT
Start: 2019-03-21 | End: 2019-03-21

## 2019-03-21 RX ORDER — ALPRAZOLAM 1 MG/1
1 TABLET ORAL 3 TIMES DAILY PRN
Status: DISCONTINUED | OUTPATIENT
Start: 2019-03-21 | End: 2019-03-25 | Stop reason: HOSPADM

## 2019-03-21 RX ORDER — HYDROMORPHONE HYDROCHLORIDE 1 MG/ML
0.5 INJECTION, SOLUTION INTRAMUSCULAR; INTRAVENOUS; SUBCUTANEOUS ONCE
Status: COMPLETED | OUTPATIENT
Start: 2019-03-21 | End: 2019-03-21

## 2019-03-21 RX ORDER — ECHINACEA PURPUREA EXTRACT 125 MG
1 TABLET ORAL AS NEEDED
Status: DISCONTINUED | OUTPATIENT
Start: 2019-03-21 | End: 2019-03-25 | Stop reason: HOSPADM

## 2019-03-21 RX ORDER — ONDANSETRON 4 MG/1
8 TABLET, FILM COATED ORAL EVERY 6 HOURS PRN
Status: DISCONTINUED | OUTPATIENT
Start: 2019-03-21 | End: 2019-03-25 | Stop reason: HOSPADM

## 2019-03-21 RX ORDER — GABAPENTIN 300 MG/1
600 CAPSULE ORAL DAILY
Status: DISCONTINUED | OUTPATIENT
Start: 2019-03-21 | End: 2019-03-25 | Stop reason: HOSPADM

## 2019-03-21 RX ORDER — SODIUM CHLORIDE 9 MG/ML
100 INJECTION, SOLUTION INTRAVENOUS CONTINUOUS
Status: DISCONTINUED | OUTPATIENT
Start: 2019-03-21 | End: 2019-03-21 | Stop reason: SDUPTHER

## 2019-03-21 RX ORDER — FAMOTIDINE 20 MG/1
20 TABLET, FILM COATED ORAL NIGHTLY
Status: DISCONTINUED | OUTPATIENT
Start: 2019-03-21 | End: 2019-03-25 | Stop reason: HOSPADM

## 2019-03-21 RX ORDER — ACETAMINOPHEN 325 MG/1
650 TABLET ORAL EVERY 4 HOURS PRN
Status: DISCONTINUED | OUTPATIENT
Start: 2019-03-21 | End: 2019-03-22

## 2019-03-21 RX ORDER — ONDANSETRON 2 MG/ML
INJECTION INTRAMUSCULAR; INTRAVENOUS
Status: COMPLETED
Start: 2019-03-21 | End: 2019-03-21

## 2019-03-21 RX ORDER — METOCLOPRAMIDE HYDROCHLORIDE 5 MG/ML
10 INJECTION INTRAMUSCULAR; INTRAVENOUS EVERY 6 HOURS PRN
Status: DISCONTINUED | OUTPATIENT
Start: 2019-03-21 | End: 2019-03-25 | Stop reason: HOSPADM

## 2019-03-21 RX ORDER — METOPROLOL TARTRATE 5 MG/5ML
2.5 INJECTION INTRAVENOUS ONCE
Status: COMPLETED | OUTPATIENT
Start: 2019-03-21 | End: 2019-03-21

## 2019-03-21 RX ORDER — METOPROLOL SUCCINATE 100 MG/1
100 TABLET, EXTENDED RELEASE ORAL
Status: DISCONTINUED | OUTPATIENT
Start: 2019-03-21 | End: 2019-03-23

## 2019-03-21 RX ORDER — NICOTINE POLACRILEX 4 MG
15 LOZENGE BUCCAL
Status: DISCONTINUED | OUTPATIENT
Start: 2019-03-21 | End: 2019-03-25 | Stop reason: HOSPADM

## 2019-03-21 RX ORDER — LAMOTRIGINE 100 MG/1
100 TABLET ORAL EVERY 12 HOURS SCHEDULED
Status: DISCONTINUED | OUTPATIENT
Start: 2019-03-21 | End: 2019-03-25 | Stop reason: HOSPADM

## 2019-03-21 RX ORDER — DEXTROSE MONOHYDRATE 25 G/50ML
25 INJECTION, SOLUTION INTRAVENOUS
Status: DISCONTINUED | OUTPATIENT
Start: 2019-03-21 | End: 2019-03-25 | Stop reason: HOSPADM

## 2019-03-21 RX ORDER — ONDANSETRON 4 MG/1
4 TABLET, FILM COATED ORAL EVERY 6 HOURS PRN
Status: DISCONTINUED | OUTPATIENT
Start: 2019-03-21 | End: 2019-03-21

## 2019-03-21 RX ORDER — TIZANIDINE 4 MG/1
2 TABLET ORAL EVERY 8 HOURS PRN
Status: DISCONTINUED | OUTPATIENT
Start: 2019-03-21 | End: 2019-03-25 | Stop reason: HOSPADM

## 2019-03-21 RX ADMIN — IBUPROFEN 600 MG: 600 TABLET, FILM COATED ORAL at 00:16

## 2019-03-21 RX ADMIN — ONDANSETRON 4 MG: 2 INJECTION INTRAMUSCULAR; INTRAVENOUS at 06:56

## 2019-03-21 RX ADMIN — INSULIN LISPRO 3 UNITS: 100 INJECTION, SOLUTION INTRAVENOUS; SUBCUTANEOUS at 12:08

## 2019-03-21 RX ADMIN — ACETAMINOPHEN 650 MG: 325 TABLET ORAL at 14:34

## 2019-03-21 RX ADMIN — INSULIN DETEMIR 10 UNITS: 100 INJECTION, SOLUTION SUBCUTANEOUS at 20:30

## 2019-03-21 RX ADMIN — GABAPENTIN 600 MG: 300 CAPSULE ORAL at 08:31

## 2019-03-21 RX ADMIN — HYDROMORPHONE HYDROCHLORIDE 0.5 MG: 1 INJECTION, SOLUTION INTRAMUSCULAR; INTRAVENOUS; SUBCUTANEOUS at 02:09

## 2019-03-21 RX ADMIN — ONDANSETRON 4 MG: 2 INJECTION INTRAMUSCULAR; INTRAVENOUS at 00:50

## 2019-03-21 RX ADMIN — HYDROMORPHONE HYDROCHLORIDE 2 MG: 2 TABLET ORAL at 23:47

## 2019-03-21 RX ADMIN — INSULIN LISPRO 2 UNITS: 100 INJECTION, SOLUTION INTRAVENOUS; SUBCUTANEOUS at 18:06

## 2019-03-21 RX ADMIN — MULTIPLE VITAMINS W/ MINERALS TAB 1 TABLET: TAB ORAL at 08:31

## 2019-03-21 RX ADMIN — ACETAMINOPHEN 650 MG: 325 TABLET ORAL at 20:18

## 2019-03-21 RX ADMIN — LAMOTRIGINE 100 MG: 100 TABLET ORAL at 08:31

## 2019-03-21 RX ADMIN — HYDROMORPHONE HYDROCHLORIDE 2 MG: 2 TABLET ORAL at 12:46

## 2019-03-21 RX ADMIN — HYDROMORPHONE HYDROCHLORIDE 2 MG: 2 TABLET ORAL at 05:18

## 2019-03-21 RX ADMIN — SODIUM CHLORIDE 1000 ML: 9 INJECTION, SOLUTION INTRAVENOUS at 14:34

## 2019-03-21 RX ADMIN — LAMOTRIGINE 100 MG: 100 TABLET ORAL at 20:20

## 2019-03-21 RX ADMIN — MEROPENEM 500 MG: 500 INJECTION, POWDER, FOR SOLUTION INTRAVENOUS at 23:48

## 2019-03-21 RX ADMIN — METOPROLOL TARTRATE 2.5 MG: 5 INJECTION INTRAVENOUS at 20:57

## 2019-03-21 RX ADMIN — LATANOPROST 1 DROP: 50 SOLUTION OPHTHALMIC at 23:49

## 2019-03-21 RX ADMIN — GABAPENTIN 600 MG: 300 CAPSULE ORAL at 20:27

## 2019-03-21 RX ADMIN — INSULIN LISPRO 3 UNITS: 100 INJECTION, SOLUTION INTRAVENOUS; SUBCUTANEOUS at 20:29

## 2019-03-21 RX ADMIN — PROMETHAZINE HYDROCHLORIDE 12.5 MG: 25 INJECTION INTRAMUSCULAR; INTRAVENOUS at 09:49

## 2019-03-21 RX ADMIN — FAMOTIDINE 20 MG: 20 TABLET ORAL at 20:20

## 2019-03-21 RX ADMIN — SODIUM CHLORIDE 1000 ML: 9 INJECTION, SOLUTION INTRAVENOUS at 19:34

## 2019-03-21 RX ADMIN — ACETAMINOPHEN 650 MG: 325 TABLET ORAL at 23:48

## 2019-03-21 RX ADMIN — MEROPENEM 1 G: 1 INJECTION, POWDER, FOR SOLUTION INTRAVENOUS at 14:58

## 2019-03-21 RX ADMIN — ONDANSETRON HYDROCHLORIDE 8 MG: 4 TABLET, FILM COATED ORAL at 20:18

## 2019-03-21 RX ADMIN — SODIUM CHLORIDE 100 ML/HR: 9 INJECTION, SOLUTION INTRAVENOUS at 12:07

## 2019-03-21 RX ADMIN — ACETAMINOPHEN 650 MG: 325 TABLET ORAL at 08:41

## 2019-03-21 RX ADMIN — ACETAMINOPHEN 650 MG: 325 TABLET ORAL at 01:56

## 2019-03-21 RX ADMIN — HYDROMORPHONE HYDROCHLORIDE 2 MG: 2 TABLET ORAL at 18:13

## 2019-03-21 RX ADMIN — METOPROLOL SUCCINATE 100 MG: 100 TABLET, EXTENDED RELEASE ORAL at 08:31

## 2019-03-21 RX ADMIN — SODIUM CHLORIDE 100 ML/HR: 9 INJECTION, SOLUTION INTRAVENOUS at 02:08

## 2019-03-21 RX ADMIN — METOCLOPRAMIDE 10 MG: 5 INJECTION, SOLUTION INTRAMUSCULAR; INTRAVENOUS at 09:17

## 2019-03-21 RX ADMIN — INSULIN LISPRO 4 UNITS: 100 INJECTION, SOLUTION INTRAVENOUS; SUBCUTANEOUS at 08:33

## 2019-03-21 RX ADMIN — VANCOMYCIN HYDROCHLORIDE 1250 MG: 10 INJECTION, POWDER, LYOPHILIZED, FOR SOLUTION INTRAVENOUS at 18:06

## 2019-03-21 RX ADMIN — SODIUM CHLORIDE 100 ML/HR: 9 INJECTION, SOLUTION INTRAVENOUS at 20:59

## 2019-03-21 RX ADMIN — PANTOPRAZOLE SODIUM 40 MG: 40 TABLET, DELAYED RELEASE ORAL at 08:41

## 2019-03-22 ENCOUNTER — APPOINTMENT (OUTPATIENT)
Dept: GENERAL RADIOLOGY | Facility: HOSPITAL | Age: 64
End: 2019-03-22

## 2019-03-22 LAB
ANION GAP SERPL CALCULATED.3IONS-SCNC: 8 MMOL/L (ref 3–11)
BH CV ECHO MEAS - AO ROOT AREA (BSA CORRECTED): 1.3
BH CV ECHO MEAS - AO ROOT AREA: 6.2 CM^2
BH CV ECHO MEAS - AO ROOT DIAM: 2.8 CM
BH CV ECHO MEAS - ASC AORTA: 3.2 CM
BH CV ECHO MEAS - BSA(HAYCOCK): 2.3 M^2
BH CV ECHO MEAS - BSA: 2.2 M^2
BH CV ECHO MEAS - BZI_BMI: 35.3 KILOGRAMS/M^2
BH CV ECHO MEAS - BZI_METRIC_HEIGHT: 175.3 CM
BH CV ECHO MEAS - BZI_METRIC_WEIGHT: 108.4 KG
BH CV ECHO MEAS - EDV(CUBED): 49.2 ML
BH CV ECHO MEAS - EDV(MOD-SP2): 81 ML
BH CV ECHO MEAS - EDV(MOD-SP4): 49 ML
BH CV ECHO MEAS - EDV(TEICH): 56.8 ML
BH CV ECHO MEAS - EF(CUBED): 70 %
BH CV ECHO MEAS - EF(MOD-BP): 56 %
BH CV ECHO MEAS - EF(MOD-SP2): 56.8 %
BH CV ECHO MEAS - EF(MOD-SP4): 57.1 %
BH CV ECHO MEAS - EF(TEICH): 62.5 %
BH CV ECHO MEAS - ESV(CUBED): 14.8 ML
BH CV ECHO MEAS - ESV(MOD-SP2): 35 ML
BH CV ECHO MEAS - ESV(MOD-SP4): 21 ML
BH CV ECHO MEAS - ESV(TEICH): 21.3 ML
BH CV ECHO MEAS - FS: 33.1 %
BH CV ECHO MEAS - IVS/LVPW: 1.3
BH CV ECHO MEAS - IVSD: 1 CM
BH CV ECHO MEAS - LA DIMENSION: 2.9 CM
BH CV ECHO MEAS - LA/AO: 1
BH CV ECHO MEAS - LAD MAJOR: 3.8 CM
BH CV ECHO MEAS - LAT PEAK E' VEL: 5.5 CM/SEC
BH CV ECHO MEAS - LATERAL E/E' RATIO: 19.7
BH CV ECHO MEAS - LV DIASTOLIC VOL/BSA (35-75): 22 ML/M^2
BH CV ECHO MEAS - LV IVRT: 0.03 SEC
BH CV ECHO MEAS - LV MASS(C)D: 98.6 GRAMS
BH CV ECHO MEAS - LV MASS(C)DI: 44.3 GRAMS/M^2
BH CV ECHO MEAS - LV MAX PG: 3.8 MMHG
BH CV ECHO MEAS - LV MEAN PG: 2.1 MMHG
BH CV ECHO MEAS - LV SYSTOLIC VOL/BSA (12-30): 9.4 ML/M^2
BH CV ECHO MEAS - LV V1 MAX: 98.1 CM/SEC
BH CV ECHO MEAS - LV V1 MEAN: 66.6 CM/SEC
BH CV ECHO MEAS - LV V1 VTI: 18.8 CM
BH CV ECHO MEAS - LVIDD: 3.7 CM
BH CV ECHO MEAS - LVIDS: 2.5 CM
BH CV ECHO MEAS - LVLD AP2: 7.5 CM
BH CV ECHO MEAS - LVLD AP4: 7.3 CM
BH CV ECHO MEAS - LVLS AP2: 6.6 CM
BH CV ECHO MEAS - LVLS AP4: 6.1 CM
BH CV ECHO MEAS - LVOT AREA (M): 3.1 CM^2
BH CV ECHO MEAS - LVOT AREA: 3 CM^2
BH CV ECHO MEAS - LVOT DIAM: 2 CM
BH CV ECHO MEAS - LVPWD: 0.82 CM
BH CV ECHO MEAS - MED PEAK E' VEL: 4.4 CM/SEC
BH CV ECHO MEAS - MEDIAL E/E' RATIO: 24.4
BH CV ECHO MEAS - MV A MAX VEL: 122.2 CM/SEC
BH CV ECHO MEAS - MV DEC SLOPE: 692 CM/SEC^2
BH CV ECHO MEAS - MV E MAX VEL: 111.1 CM/SEC
BH CV ECHO MEAS - MV E/A: 0.91
BH CV ECHO MEAS - MV P1/2T MAX VEL: 106 CM/SEC
BH CV ECHO MEAS - MV P1/2T: 44.9 MSEC
BH CV ECHO MEAS - MVA P1/2T LCG: 2.1 CM^2
BH CV ECHO MEAS - MVA(P1/2T): 4.9 CM^2
BH CV ECHO MEAS - PA ACC SLOPE: 567.5 CM/SEC^2
BH CV ECHO MEAS - PA ACC TIME: 0.15 SEC
BH CV ECHO MEAS - PA PR(ACCEL): 13.6 MMHG
BH CV ECHO MEAS - RAP SYSTOLE: 3 MMHG
BH CV ECHO MEAS - RVSP: 33 MMHG
BH CV ECHO MEAS - SI(CUBED): 15.5 ML/M^2
BH CV ECHO MEAS - SI(LVOT): 25.4 ML/M^2
BH CV ECHO MEAS - SI(MOD-SP2): 20.6 ML/M^2
BH CV ECHO MEAS - SI(MOD-SP4): 12.6 ML/M^2
BH CV ECHO MEAS - SI(TEICH): 15.9 ML/M^2
BH CV ECHO MEAS - SV(CUBED): 34.5 ML
BH CV ECHO MEAS - SV(LVOT): 56.6 ML
BH CV ECHO MEAS - SV(MOD-SP2): 46 ML
BH CV ECHO MEAS - SV(MOD-SP4): 28 ML
BH CV ECHO MEAS - SV(TEICH): 35.5 ML
BH CV ECHO MEAS - TAPSE (>1.6): 2.4 CM2
BH CV ECHO MEAS - TR MAX PG: 30 MMHG
BH CV ECHO MEAS - TR MAX VEL: 274.2 CM/SEC
BH CV ECHO MEASUREMENTS AVERAGE E/E' RATIO: 22.44
BH CV VAS BP LEFT ARM: NORMAL MMHG
BH CV XLRA - RV BASE: 3.2 CM
BH CV XLRA - RV LENGTH: 6.3 CM
BH CV XLRA - RV MID: 3.6 CM
BUN BLD-MCNC: 9 MG/DL (ref 9–23)
BUN/CREAT SERPL: 11.3 (ref 7–25)
CALCIUM SPEC-SCNC: 8.2 MG/DL (ref 8.7–10.4)
CHLORIDE SERPL-SCNC: 104 MMOL/L (ref 99–109)
CO2 SERPL-SCNC: 21 MMOL/L (ref 20–31)
CREAT BLD-MCNC: 0.8 MG/DL (ref 0.6–1.3)
DEPRECATED RDW RBC AUTO: 48.1 FL (ref 37–54)
ERYTHROCYTE [DISTWIDTH] IN BLOOD BY AUTOMATED COUNT: 14.9 % (ref 11.3–14.5)
GFR SERPL CREATININE-BSD FRML MDRD: 72 ML/MIN/1.73
GLUCOSE BLD-MCNC: 174 MG/DL (ref 70–100)
GLUCOSE BLDC GLUCOMTR-MCNC: 158 MG/DL (ref 70–130)
GLUCOSE BLDC GLUCOMTR-MCNC: 171 MG/DL (ref 70–130)
GLUCOSE BLDC GLUCOMTR-MCNC: 207 MG/DL (ref 70–130)
GLUCOSE BLDC GLUCOMTR-MCNC: 228 MG/DL (ref 70–130)
HCT VFR BLD AUTO: 35.4 % (ref 34.5–44)
HGB BLD-MCNC: 11.1 G/DL (ref 11.5–15.5)
LV EF 2D ECHO EST: 60 %
MCH RBC QN AUTO: 27.5 PG (ref 27–31)
MCHC RBC AUTO-ENTMCNC: 31.4 G/DL (ref 32–36)
MCV RBC AUTO: 87.6 FL (ref 80–99)
PLATELET # BLD AUTO: 185 10*3/MM3 (ref 150–450)
PMV BLD AUTO: 11.4 FL (ref 6–12)
POTASSIUM BLD-SCNC: 4 MMOL/L (ref 3.5–5.5)
RBC # BLD AUTO: 4.04 10*6/MM3 (ref 3.89–5.14)
SODIUM BLD-SCNC: 133 MMOL/L (ref 132–146)
VANCOMYCIN TROUGH SERPL-MCNC: 10 MCG/ML (ref 10–20)
WBC NRBC COR # BLD: 14.35 10*3/MM3 (ref 3.5–10.8)

## 2019-03-22 PROCEDURE — 25010000002 KETOROLAC TROMETHAMINE PER 15 MG: Performed by: FAMILY MEDICINE

## 2019-03-22 PROCEDURE — 25010000002 HYDROMORPHONE PER 4 MG: Performed by: INTERNAL MEDICINE

## 2019-03-22 PROCEDURE — 25010000002 MEROPENEM: Performed by: INTERNAL MEDICINE

## 2019-03-22 PROCEDURE — 71045 X-RAY EXAM CHEST 1 VIEW: CPT

## 2019-03-22 PROCEDURE — 25010000002 PROMETHAZINE PER 50 MG: Performed by: INTERNAL MEDICINE

## 2019-03-22 PROCEDURE — 82962 GLUCOSE BLOOD TEST: CPT

## 2019-03-22 PROCEDURE — 85027 COMPLETE CBC AUTOMATED: CPT | Performed by: INTERNAL MEDICINE

## 2019-03-22 PROCEDURE — 99233 SBSQ HOSP IP/OBS HIGH 50: CPT | Performed by: INTERNAL MEDICINE

## 2019-03-22 PROCEDURE — 25010000002 VANCOMYCIN 10 G RECONSTITUTED SOLUTION

## 2019-03-22 PROCEDURE — 80048 BASIC METABOLIC PNL TOTAL CA: CPT | Performed by: INTERNAL MEDICINE

## 2019-03-22 PROCEDURE — 63710000001 INSULIN DETEMIR PER 5 UNITS: Performed by: INTERNAL MEDICINE

## 2019-03-22 PROCEDURE — 80202 ASSAY OF VANCOMYCIN: CPT

## 2019-03-22 PROCEDURE — 25010000002 ONDANSETRON PER 1 MG

## 2019-03-22 RX ORDER — KETOROLAC TROMETHAMINE 30 MG/ML
15 INJECTION, SOLUTION INTRAMUSCULAR; INTRAVENOUS ONCE AS NEEDED
Status: COMPLETED | OUTPATIENT
Start: 2019-03-22 | End: 2019-03-22

## 2019-03-22 RX ORDER — ONDANSETRON 2 MG/ML
4 INJECTION INTRAMUSCULAR; INTRAVENOUS EVERY 6 HOURS PRN
Status: DISCONTINUED | OUTPATIENT
Start: 2019-03-22 | End: 2019-03-25 | Stop reason: HOSPADM

## 2019-03-22 RX ORDER — ACETAMINOPHEN 500 MG
1000 TABLET ORAL EVERY 6 HOURS PRN
Status: DISCONTINUED | OUTPATIENT
Start: 2019-03-22 | End: 2019-03-25 | Stop reason: HOSPADM

## 2019-03-22 RX ORDER — HYDROMORPHONE HYDROCHLORIDE 1 MG/ML
0.5 INJECTION, SOLUTION INTRAMUSCULAR; INTRAVENOUS; SUBCUTANEOUS EVERY 4 HOURS PRN
Status: DISCONTINUED | OUTPATIENT
Start: 2019-03-22 | End: 2019-03-24

## 2019-03-22 RX ORDER — HYDROMORPHONE HYDROCHLORIDE 1 MG/ML
0.5 INJECTION, SOLUTION INTRAMUSCULAR; INTRAVENOUS; SUBCUTANEOUS
Status: DISCONTINUED | OUTPATIENT
Start: 2019-03-22 | End: 2019-03-22

## 2019-03-22 RX ADMIN — ACETAMINOPHEN 1000 MG: 500 TABLET, FILM COATED ORAL at 05:36

## 2019-03-22 RX ADMIN — MEROPENEM 500 MG: 500 INJECTION, POWDER, FOR SOLUTION INTRAVENOUS at 20:41

## 2019-03-22 RX ADMIN — METOPROLOL SUCCINATE 100 MG: 100 TABLET, EXTENDED RELEASE ORAL at 08:55

## 2019-03-22 RX ADMIN — GABAPENTIN 600 MG: 300 CAPSULE ORAL at 20:39

## 2019-03-22 RX ADMIN — INSULIN LISPRO 3 UNITS: 100 INJECTION, SOLUTION INTRAVENOUS; SUBCUTANEOUS at 20:39

## 2019-03-22 RX ADMIN — POLYVINYL ALCOHOL, POVIDONE 2 DROP: 14; 6 SOLUTION/ DROPS OPHTHALMIC at 20:45

## 2019-03-22 RX ADMIN — INSULIN LISPRO 2 UNITS: 100 INJECTION, SOLUTION INTRAVENOUS; SUBCUTANEOUS at 08:52

## 2019-03-22 RX ADMIN — INSULIN DETEMIR 10 UNITS: 100 INJECTION, SOLUTION SUBCUTANEOUS at 20:39

## 2019-03-22 RX ADMIN — FAMOTIDINE 20 MG: 20 TABLET ORAL at 20:39

## 2019-03-22 RX ADMIN — HYDROMORPHONE HYDROCHLORIDE 0.5 MG: 1 INJECTION, SOLUTION INTRAMUSCULAR; INTRAVENOUS; SUBCUTANEOUS at 23:50

## 2019-03-22 RX ADMIN — MEROPENEM 500 MG: 500 INJECTION, POWDER, FOR SOLUTION INTRAVENOUS at 05:04

## 2019-03-22 RX ADMIN — PANTOPRAZOLE SODIUM 40 MG: 40 TABLET, DELAYED RELEASE ORAL at 05:36

## 2019-03-22 RX ADMIN — Medication: at 11:30

## 2019-03-22 RX ADMIN — HYDROMORPHONE HYDROCHLORIDE 0.5 MG: 1 INJECTION, SOLUTION INTRAMUSCULAR; INTRAVENOUS; SUBCUTANEOUS at 12:14

## 2019-03-22 RX ADMIN — HYDROMORPHONE HYDROCHLORIDE 0.5 MG: 1 INJECTION, SOLUTION INTRAMUSCULAR; INTRAVENOUS; SUBCUTANEOUS at 17:28

## 2019-03-22 RX ADMIN — GABAPENTIN 600 MG: 300 CAPSULE ORAL at 08:51

## 2019-03-22 RX ADMIN — KETOROLAC TROMETHAMINE 15 MG: 30 INJECTION, SOLUTION INTRAMUSCULAR; INTRAVENOUS at 00:49

## 2019-03-22 RX ADMIN — ACETAMINOPHEN 1000 MG: 500 TABLET, FILM COATED ORAL at 20:53

## 2019-03-22 RX ADMIN — LAMOTRIGINE 100 MG: 100 TABLET ORAL at 08:51

## 2019-03-22 RX ADMIN — ACETAMINOPHEN 1000 MG: 500 TABLET, FILM COATED ORAL at 11:15

## 2019-03-22 RX ADMIN — LATANOPROST 1 DROP: 50 SOLUTION OPHTHALMIC at 20:53

## 2019-03-22 RX ADMIN — HYDROMORPHONE HYDROCHLORIDE 2 MG: 2 TABLET ORAL at 20:39

## 2019-03-22 RX ADMIN — INSULIN LISPRO 3 UNITS: 100 INJECTION, SOLUTION INTRAVENOUS; SUBCUTANEOUS at 17:28

## 2019-03-22 RX ADMIN — ONDANSETRON HYDROCHLORIDE 4 MG: 2 INJECTION, SOLUTION INTRAMUSCULAR; INTRAVENOUS at 10:05

## 2019-03-22 RX ADMIN — LAMOTRIGINE 100 MG: 100 TABLET ORAL at 20:39

## 2019-03-22 RX ADMIN — MEROPENEM 500 MG: 500 INJECTION, POWDER, FOR SOLUTION INTRAVENOUS at 15:23

## 2019-03-22 RX ADMIN — PROMETHAZINE HYDROCHLORIDE 12.5 MG: 25 INJECTION INTRAMUSCULAR; INTRAVENOUS at 11:15

## 2019-03-22 RX ADMIN — VANCOMYCIN HYDROCHLORIDE 1250 MG: 10 INJECTION, POWDER, LYOPHILIZED, FOR SOLUTION INTRAVENOUS at 13:53

## 2019-03-22 RX ADMIN — HYDROMORPHONE HYDROCHLORIDE 2 MG: 2 TABLET ORAL at 08:51

## 2019-03-22 RX ADMIN — MEROPENEM 500 MG: 500 INJECTION, POWDER, FOR SOLUTION INTRAVENOUS at 09:58

## 2019-03-22 RX ADMIN — INSULIN LISPRO 2 UNITS: 100 INJECTION, SOLUTION INTRAVENOUS; SUBCUTANEOUS at 12:14

## 2019-03-22 RX ADMIN — MULTIPLE VITAMINS W/ MINERALS TAB 1 TABLET: TAB ORAL at 08:51

## 2019-03-23 ENCOUNTER — APPOINTMENT (OUTPATIENT)
Dept: GENERAL RADIOLOGY | Facility: HOSPITAL | Age: 64
End: 2019-03-23

## 2019-03-23 PROBLEM — J18.9 CAP (COMMUNITY ACQUIRED PNEUMONIA): Status: ACTIVE | Noted: 2019-03-23

## 2019-03-23 LAB
ANION GAP SERPL CALCULATED.3IONS-SCNC: 8 MMOL/L (ref 3–11)
BACTERIA SPEC AEROBE CULT: ABNORMAL
BUN BLD-MCNC: 7 MG/DL (ref 9–23)
BUN/CREAT SERPL: 10.1 (ref 7–25)
CALCIUM SPEC-SCNC: 7.9 MG/DL (ref 8.7–10.4)
CHLORIDE SERPL-SCNC: 107 MMOL/L (ref 99–109)
CO2 SERPL-SCNC: 22 MMOL/L (ref 20–31)
CREAT BLD-MCNC: 0.69 MG/DL (ref 0.6–1.3)
DEPRECATED RDW RBC AUTO: 49.1 FL (ref 37–54)
ERYTHROCYTE [DISTWIDTH] IN BLOOD BY AUTOMATED COUNT: 15.2 % (ref 11.3–14.5)
GFR SERPL CREATININE-BSD FRML MDRD: 86 ML/MIN/1.73
GLUCOSE BLD-MCNC: 164 MG/DL (ref 70–100)
GLUCOSE BLDC GLUCOMTR-MCNC: 149 MG/DL (ref 70–130)
GLUCOSE BLDC GLUCOMTR-MCNC: 163 MG/DL (ref 70–130)
GLUCOSE BLDC GLUCOMTR-MCNC: 186 MG/DL (ref 70–130)
GLUCOSE BLDC GLUCOMTR-MCNC: 232 MG/DL (ref 70–130)
HCT VFR BLD AUTO: 33.4 % (ref 34.5–44)
HGB BLD-MCNC: 10.4 G/DL (ref 11.5–15.5)
MCH RBC QN AUTO: 27.4 PG (ref 27–31)
MCHC RBC AUTO-ENTMCNC: 31.1 G/DL (ref 32–36)
MCV RBC AUTO: 87.9 FL (ref 80–99)
PLATELET # BLD AUTO: 190 10*3/MM3 (ref 150–450)
PMV BLD AUTO: 11.6 FL (ref 6–12)
POTASSIUM BLD-SCNC: 4.1 MMOL/L (ref 3.5–5.5)
RBC # BLD AUTO: 3.8 10*6/MM3 (ref 3.89–5.14)
SODIUM BLD-SCNC: 137 MMOL/L (ref 132–146)
WBC NRBC COR # BLD: 10.06 10*3/MM3 (ref 3.5–10.8)

## 2019-03-23 PROCEDURE — 25010000002 VANCOMYCIN 10 G RECONSTITUTED SOLUTION

## 2019-03-23 PROCEDURE — 25010000002 MEROPENEM: Performed by: INTERNAL MEDICINE

## 2019-03-23 PROCEDURE — 71045 X-RAY EXAM CHEST 1 VIEW: CPT

## 2019-03-23 PROCEDURE — 82962 GLUCOSE BLOOD TEST: CPT

## 2019-03-23 PROCEDURE — 25010000002 PROMETHAZINE PER 50 MG: Performed by: INTERNAL MEDICINE

## 2019-03-23 PROCEDURE — 99233 SBSQ HOSP IP/OBS HIGH 50: CPT | Performed by: INTERNAL MEDICINE

## 2019-03-23 PROCEDURE — 80048 BASIC METABOLIC PNL TOTAL CA: CPT | Performed by: INTERNAL MEDICINE

## 2019-03-23 PROCEDURE — 25010000002 ONDANSETRON PER 1 MG

## 2019-03-23 PROCEDURE — 25010000002 HYDROMORPHONE PER 4 MG: Performed by: INTERNAL MEDICINE

## 2019-03-23 PROCEDURE — 85027 COMPLETE CBC AUTOMATED: CPT | Performed by: INTERNAL MEDICINE

## 2019-03-23 PROCEDURE — 25010000002 VANCOMYCIN

## 2019-03-23 PROCEDURE — 63710000001 INSULIN DETEMIR PER 5 UNITS: Performed by: INTERNAL MEDICINE

## 2019-03-23 RX ORDER — FUROSEMIDE 20 MG/1
20 TABLET ORAL ONCE
Status: COMPLETED | OUTPATIENT
Start: 2019-03-23 | End: 2019-03-23

## 2019-03-23 RX ORDER — METOPROLOL SUCCINATE 100 MG/1
100 TABLET, EXTENDED RELEASE ORAL EVERY 12 HOURS
Status: DISCONTINUED | OUTPATIENT
Start: 2019-03-23 | End: 2019-03-25 | Stop reason: HOSPADM

## 2019-03-23 RX ADMIN — INSULIN DETEMIR 10 UNITS: 100 INJECTION, SOLUTION SUBCUTANEOUS at 20:15

## 2019-03-23 RX ADMIN — MEROPENEM 500 MG: 500 INJECTION, POWDER, FOR SOLUTION INTRAVENOUS at 03:09

## 2019-03-23 RX ADMIN — HYDROMORPHONE HYDROCHLORIDE 0.5 MG: 1 INJECTION, SOLUTION INTRAMUSCULAR; INTRAVENOUS; SUBCUTANEOUS at 12:26

## 2019-03-23 RX ADMIN — GABAPENTIN 600 MG: 300 CAPSULE ORAL at 09:11

## 2019-03-23 RX ADMIN — ONDANSETRON HYDROCHLORIDE 4 MG: 2 INJECTION, SOLUTION INTRAMUSCULAR; INTRAVENOUS at 21:58

## 2019-03-23 RX ADMIN — INSULIN LISPRO 2 UNITS: 100 INJECTION, SOLUTION INTRAVENOUS; SUBCUTANEOUS at 12:22

## 2019-03-23 RX ADMIN — ACETAMINOPHEN 1000 MG: 500 TABLET, FILM COATED ORAL at 12:22

## 2019-03-23 RX ADMIN — VANCOMYCIN HYDROCHLORIDE 1250 MG: 10 INJECTION, POWDER, LYOPHILIZED, FOR SOLUTION INTRAVENOUS at 12:00

## 2019-03-23 RX ADMIN — HYDROMORPHONE HYDROCHLORIDE 0.5 MG: 1 INJECTION, SOLUTION INTRAMUSCULAR; INTRAVENOUS; SUBCUTANEOUS at 06:14

## 2019-03-23 RX ADMIN — GABAPENTIN 600 MG: 300 CAPSULE ORAL at 20:15

## 2019-03-23 RX ADMIN — LAMOTRIGINE 100 MG: 100 TABLET ORAL at 09:12

## 2019-03-23 RX ADMIN — FUROSEMIDE 20 MG: 20 TABLET ORAL at 10:33

## 2019-03-23 RX ADMIN — ONDANSETRON HYDROCHLORIDE 4 MG: 2 INJECTION, SOLUTION INTRAMUSCULAR; INTRAVENOUS at 13:59

## 2019-03-23 RX ADMIN — METOPROLOL SUCCINATE 100 MG: 100 TABLET, EXTENDED RELEASE ORAL at 09:12

## 2019-03-23 RX ADMIN — LAMOTRIGINE 100 MG: 100 TABLET ORAL at 20:15

## 2019-03-23 RX ADMIN — VANCOMYCIN HYDROCHLORIDE 1250 MG: 10 INJECTION, POWDER, LYOPHILIZED, FOR SOLUTION INTRAVENOUS at 00:57

## 2019-03-23 RX ADMIN — PROMETHAZINE HYDROCHLORIDE 12.5 MG: 25 INJECTION INTRAMUSCULAR; INTRAVENOUS at 09:16

## 2019-03-23 RX ADMIN — VANCOMYCIN HYDROCHLORIDE 1250 MG: 10 INJECTION, POWDER, LYOPHILIZED, FOR SOLUTION INTRAVENOUS at 23:05

## 2019-03-23 RX ADMIN — LATANOPROST 1 DROP: 50 SOLUTION OPHTHALMIC at 20:15

## 2019-03-23 RX ADMIN — FAMOTIDINE 20 MG: 20 TABLET ORAL at 20:15

## 2019-03-23 RX ADMIN — HYDROMORPHONE HYDROCHLORIDE 2 MG: 2 TABLET ORAL at 15:15

## 2019-03-23 RX ADMIN — MEROPENEM 500 MG: 500 INJECTION, POWDER, FOR SOLUTION INTRAVENOUS at 09:12

## 2019-03-23 RX ADMIN — INSULIN LISPRO 3 UNITS: 100 INJECTION, SOLUTION INTRAVENOUS; SUBCUTANEOUS at 20:16

## 2019-03-23 RX ADMIN — ONDANSETRON HYDROCHLORIDE 4 MG: 2 INJECTION, SOLUTION INTRAMUSCULAR; INTRAVENOUS at 06:18

## 2019-03-23 RX ADMIN — METOPROLOL SUCCINATE 100 MG: 100 TABLET, EXTENDED RELEASE ORAL at 20:15

## 2019-03-23 RX ADMIN — ACETAMINOPHEN 1000 MG: 500 TABLET, FILM COATED ORAL at 20:29

## 2019-03-23 RX ADMIN — HYDROMORPHONE HYDROCHLORIDE 2 MG: 2 TABLET ORAL at 23:11

## 2019-03-23 RX ADMIN — PANTOPRAZOLE SODIUM 40 MG: 40 TABLET, DELAYED RELEASE ORAL at 06:14

## 2019-03-23 RX ADMIN — ACETAMINOPHEN 1000 MG: 500 TABLET, FILM COATED ORAL at 03:15

## 2019-03-23 RX ADMIN — INSULIN LISPRO 2 UNITS: 100 INJECTION, SOLUTION INTRAVENOUS; SUBCUTANEOUS at 17:47

## 2019-03-23 RX ADMIN — HYDROMORPHONE HYDROCHLORIDE 0.5 MG: 1 INJECTION, SOLUTION INTRAMUSCULAR; INTRAVENOUS; SUBCUTANEOUS at 20:15

## 2019-03-23 RX ADMIN — HYDROMORPHONE HYDROCHLORIDE 2 MG: 2 TABLET ORAL at 03:15

## 2019-03-24 LAB
ANION GAP SERPL CALCULATED.3IONS-SCNC: 12 MMOL/L (ref 3–11)
BUN BLD-MCNC: 8 MG/DL (ref 9–23)
BUN/CREAT SERPL: 10.5 (ref 7–25)
CALCIUM SPEC-SCNC: 8.7 MG/DL (ref 8.7–10.4)
CHLORIDE SERPL-SCNC: 103 MMOL/L (ref 99–109)
CO2 SERPL-SCNC: 26 MMOL/L (ref 20–31)
CREAT BLD-MCNC: 0.76 MG/DL (ref 0.6–1.3)
GFR SERPL CREATININE-BSD FRML MDRD: 77 ML/MIN/1.73
GLUCOSE BLD-MCNC: 181 MG/DL (ref 70–100)
GLUCOSE BLDC GLUCOMTR-MCNC: 154 MG/DL (ref 70–130)
GLUCOSE BLDC GLUCOMTR-MCNC: 167 MG/DL (ref 70–130)
GLUCOSE BLDC GLUCOMTR-MCNC: 210 MG/DL (ref 70–130)
GLUCOSE BLDC GLUCOMTR-MCNC: 240 MG/DL (ref 70–130)
POTASSIUM BLD-SCNC: 4.1 MMOL/L (ref 3.5–5.5)
SODIUM BLD-SCNC: 141 MMOL/L (ref 132–146)

## 2019-03-24 PROCEDURE — 82962 GLUCOSE BLOOD TEST: CPT

## 2019-03-24 PROCEDURE — 80048 BASIC METABOLIC PNL TOTAL CA: CPT | Performed by: INTERNAL MEDICINE

## 2019-03-24 PROCEDURE — 63710000001 INSULIN DETEMIR PER 5 UNITS: Performed by: INTERNAL MEDICINE

## 2019-03-24 PROCEDURE — 25010000002 HYDROMORPHONE PER 4 MG: Performed by: INTERNAL MEDICINE

## 2019-03-24 PROCEDURE — 25010000002 PROMETHAZINE PER 50 MG: Performed by: INTERNAL MEDICINE

## 2019-03-24 PROCEDURE — 99233 SBSQ HOSP IP/OBS HIGH 50: CPT | Performed by: INTERNAL MEDICINE

## 2019-03-24 PROCEDURE — 25010000002 ONDANSETRON PER 1 MG

## 2019-03-24 RX ORDER — FUROSEMIDE 20 MG/1
20 TABLET ORAL DAILY
Status: DISCONTINUED | OUTPATIENT
Start: 2019-03-24 | End: 2019-03-25 | Stop reason: HOSPADM

## 2019-03-24 RX ORDER — LINEZOLID 600 MG/1
600 TABLET, FILM COATED ORAL EVERY 12 HOURS SCHEDULED
Status: DISCONTINUED | OUTPATIENT
Start: 2019-03-24 | End: 2019-03-25 | Stop reason: HOSPADM

## 2019-03-24 RX ORDER — HYDROMORPHONE HYDROCHLORIDE 1 MG/ML
0.5 INJECTION, SOLUTION INTRAMUSCULAR; INTRAVENOUS; SUBCUTANEOUS EVERY 4 HOURS PRN
Status: DISCONTINUED | OUTPATIENT
Start: 2019-03-24 | End: 2019-03-25 | Stop reason: HOSPADM

## 2019-03-24 RX ADMIN — HYDROMORPHONE HYDROCHLORIDE 2 MG: 2 TABLET ORAL at 08:38

## 2019-03-24 RX ADMIN — METOPROLOL SUCCINATE 100 MG: 100 TABLET, EXTENDED RELEASE ORAL at 08:38

## 2019-03-24 RX ADMIN — INSULIN LISPRO 3 UNITS: 100 INJECTION, SOLUTION INTRAVENOUS; SUBCUTANEOUS at 20:20

## 2019-03-24 RX ADMIN — LATANOPROST 1 DROP: 50 SOLUTION OPHTHALMIC at 20:19

## 2019-03-24 RX ADMIN — LINEZOLID 600 MG: 600 TABLET, FILM COATED ORAL at 10:52

## 2019-03-24 RX ADMIN — METOPROLOL SUCCINATE 100 MG: 100 TABLET, EXTENDED RELEASE ORAL at 20:20

## 2019-03-24 RX ADMIN — HYDROMORPHONE HYDROCHLORIDE 0.5 MG: 1 INJECTION, SOLUTION INTRAMUSCULAR; INTRAVENOUS; SUBCUTANEOUS at 03:24

## 2019-03-24 RX ADMIN — FAMOTIDINE 20 MG: 20 TABLET ORAL at 20:20

## 2019-03-24 RX ADMIN — PANTOPRAZOLE SODIUM 40 MG: 40 TABLET, DELAYED RELEASE ORAL at 05:12

## 2019-03-24 RX ADMIN — LAMOTRIGINE 100 MG: 100 TABLET ORAL at 20:20

## 2019-03-24 RX ADMIN — INSULIN DETEMIR 10 UNITS: 100 INJECTION, SOLUTION SUBCUTANEOUS at 20:21

## 2019-03-24 RX ADMIN — LINEZOLID 600 MG: 600 TABLET, FILM COATED ORAL at 20:19

## 2019-03-24 RX ADMIN — ONDANSETRON HYDROCHLORIDE 4 MG: 2 INJECTION, SOLUTION INTRAMUSCULAR; INTRAVENOUS at 08:44

## 2019-03-24 RX ADMIN — PROMETHAZINE HYDROCHLORIDE 12.5 MG: 25 INJECTION INTRAMUSCULAR; INTRAVENOUS at 12:06

## 2019-03-24 RX ADMIN — HYDROMORPHONE HYDROCHLORIDE 2 MG: 2 TABLET ORAL at 16:17

## 2019-03-24 RX ADMIN — HYDROMORPHONE HYDROCHLORIDE 0.5 MG: 1 INJECTION, SOLUTION INTRAMUSCULAR; INTRAVENOUS; SUBCUTANEOUS at 12:15

## 2019-03-24 RX ADMIN — INSULIN LISPRO 2 UNITS: 100 INJECTION, SOLUTION INTRAVENOUS; SUBCUTANEOUS at 17:12

## 2019-03-24 RX ADMIN — LAMOTRIGINE 100 MG: 100 TABLET ORAL at 08:38

## 2019-03-24 RX ADMIN — GABAPENTIN 600 MG: 300 CAPSULE ORAL at 08:37

## 2019-03-24 RX ADMIN — GABAPENTIN 600 MG: 300 CAPSULE ORAL at 20:19

## 2019-03-24 RX ADMIN — HYDROMORPHONE HYDROCHLORIDE 0.5 MG: 1 INJECTION, SOLUTION INTRAMUSCULAR; INTRAVENOUS; SUBCUTANEOUS at 20:22

## 2019-03-24 RX ADMIN — INSULIN LISPRO 3 UNITS: 100 INJECTION, SOLUTION INTRAVENOUS; SUBCUTANEOUS at 12:06

## 2019-03-24 RX ADMIN — FUROSEMIDE 20 MG: 20 TABLET ORAL at 08:37

## 2019-03-24 RX ADMIN — PROMETHAZINE HYDROCHLORIDE 12.5 MG: 25 INJECTION INTRAMUSCULAR; INTRAVENOUS at 20:21

## 2019-03-24 RX ADMIN — ONDANSETRON HYDROCHLORIDE 4 MG: 2 INJECTION, SOLUTION INTRAMUSCULAR; INTRAVENOUS at 16:17

## 2019-03-24 RX ADMIN — PROMETHAZINE HYDROCHLORIDE 12.5 MG: 25 INJECTION INTRAMUSCULAR; INTRAVENOUS at 00:47

## 2019-03-25 VITALS
SYSTOLIC BLOOD PRESSURE: 144 MMHG | HEIGHT: 69 IN | WEIGHT: 237.2 LBS | OXYGEN SATURATION: 95 % | TEMPERATURE: 97.9 F | BODY MASS INDEX: 35.13 KG/M2 | DIASTOLIC BLOOD PRESSURE: 73 MMHG | RESPIRATION RATE: 16 BRPM | HEART RATE: 84 BPM

## 2019-03-25 PROBLEM — N17.9 AKI (ACUTE KIDNEY INJURY) (HCC): Status: RESOLVED | Noted: 2019-03-20 | Resolved: 2019-03-25

## 2019-03-25 LAB
BACTERIA SPEC AEROBE CULT: NORMAL
BACTERIA SPEC AEROBE CULT: NORMAL
GLUCOSE BLDC GLUCOMTR-MCNC: 180 MG/DL (ref 70–130)

## 2019-03-25 PROCEDURE — 25010000002 HYDROMORPHONE PER 4 MG: Performed by: INTERNAL MEDICINE

## 2019-03-25 PROCEDURE — 87205 SMEAR GRAM STAIN: CPT | Performed by: INTERNAL MEDICINE

## 2019-03-25 PROCEDURE — 99238 HOSP IP/OBS DSCHRG MGMT 30/<: CPT | Performed by: NURSE PRACTITIONER

## 2019-03-25 PROCEDURE — 87070 CULTURE OTHR SPECIMN AEROBIC: CPT | Performed by: INTERNAL MEDICINE

## 2019-03-25 PROCEDURE — 25010000002 ONDANSETRON PER 1 MG

## 2019-03-25 PROCEDURE — 82962 GLUCOSE BLOOD TEST: CPT

## 2019-03-25 RX ORDER — FUROSEMIDE 20 MG/1
20 TABLET ORAL DAILY
Qty: 30 TABLET | Refills: 0 | Status: ON HOLD | OUTPATIENT
Start: 2019-03-26 | End: 2022-02-27 | Stop reason: SDUPTHER

## 2019-03-25 RX ORDER — LINEZOLID 600 MG/1
600 TABLET, FILM COATED ORAL EVERY 12 HOURS SCHEDULED
Qty: 18 TABLET | Refills: 0 | Status: SHIPPED | OUTPATIENT
Start: 2019-03-25 | End: 2019-04-03

## 2019-03-25 RX ADMIN — LINEZOLID 600 MG: 600 TABLET, FILM COATED ORAL at 08:20

## 2019-03-25 RX ADMIN — METOPROLOL SUCCINATE 100 MG: 100 TABLET, EXTENDED RELEASE ORAL at 08:20

## 2019-03-25 RX ADMIN — INSULIN LISPRO 2 UNITS: 100 INJECTION, SOLUTION INTRAVENOUS; SUBCUTANEOUS at 08:21

## 2019-03-25 RX ADMIN — PANTOPRAZOLE SODIUM 40 MG: 40 TABLET, DELAYED RELEASE ORAL at 04:58

## 2019-03-25 RX ADMIN — HYDROMORPHONE HYDROCHLORIDE 2 MG: 2 TABLET ORAL at 01:52

## 2019-03-25 RX ADMIN — HYDROMORPHONE HYDROCHLORIDE 0.5 MG: 1 INJECTION, SOLUTION INTRAMUSCULAR; INTRAVENOUS; SUBCUTANEOUS at 04:57

## 2019-03-25 RX ADMIN — GABAPENTIN 600 MG: 300 CAPSULE ORAL at 08:20

## 2019-03-25 RX ADMIN — LAMOTRIGINE 100 MG: 100 TABLET ORAL at 08:20

## 2019-03-25 RX ADMIN — ONDANSETRON HYDROCHLORIDE 4 MG: 2 INJECTION, SOLUTION INTRAMUSCULAR; INTRAVENOUS at 05:02

## 2019-03-26 ENCOUNTER — READMISSION MANAGEMENT (OUTPATIENT)
Dept: CALL CENTER | Facility: HOSPITAL | Age: 64
End: 2019-03-26

## 2019-03-27 ENCOUNTER — READMISSION MANAGEMENT (OUTPATIENT)
Dept: CALL CENTER | Facility: HOSPITAL | Age: 64
End: 2019-03-27

## 2019-03-27 LAB
BACTERIA SPEC RESP CULT: ABNORMAL
GRAM STN SPEC: ABNORMAL

## 2019-03-27 NOTE — OUTREACH NOTE
Prep Survey      Responses   Facility patient discharged from?  [facility]   Is patient eligible?  Yes   Discharge diagnosis  Severe sepsis    Does the patient have one of the following disease processes/diagnoses(primary or secondary)?  Sepsis   Does the patient have Home health ordered?  No   Is there a DME ordered?  No   Prep survey completed?  Yes          Amberly Culp RN

## 2019-03-27 NOTE — OUTREACH NOTE
Sepsis Week 1 Survey      Responses   Facility patient discharged from?  [facility]   Does the patient have one of the following disease processes/diagnoses(primary or secondary)?  Sepsis   Is there a successful TCM telephone encounter documented?  No   Week 1 attempt successful?  Yes   Call start time  1308   Call end time  1311   Discharge diagnosis  Severe sepsis    Is patient permission given to speak with other caregiver?  Yes   List who call center can speak with  son   Meds reviewed with patient/caregiver?  Yes   Is the patient having any side effects they believe may be caused by any medication additions or changes?  No   Does the patient have all medications related to this admission filled (includes all antibiotics, inhalers, nebulizers,steroids,etc.)  Yes   Is the patient taking all medications as directed (includes completed medication regime)?  Yes   Does the patient have a primary care provider?   Yes   Does the patient have an appointment with their PCP within 7 days of discharge?  Yes   Has the patient kept scheduled appointments due by today?  N/A   Psychosocial issues?  No   Comments  voiding without issues. Loose stools. Still having N/V, using Zofran. Staying hydrated.    Did the patient receive a copy of their discharge instructions?  Yes   Nursing interventions  Reviewed instructions with patient   What is the patient's perception of their health status since discharge?  Improving   Is the patient/caregiver able to teach back Sepsis?  S - Shivering,fever or very cold, P - Pale or discolored skin, S - Sleepy, difficult to arouse,confused   Nursing interventions  Nurse provided reassurance to patient   Is patient/caregiver able to teach back steps to recovery at home?  Talk about feelings with family/friends, Set small, achievable goals for return to baseline health, Learn about sepsis(sepsis.org)   Is the patient/caregiver able to teach back signs and symptoms of worsening condition:  Fever,  Rapid heart rate (>90), Shortness of breath/rapid respiratory rate, Hyperthermia   Is the patient/caregiver able to teach back the hierarchy of who to call/visit for symptoms/problems? PCP, Specialist, Home health nurse, Urgent Care, ED, 911  Yes   Week 1 call completed?  Yes          Lisa Polo RN

## 2019-04-03 ENCOUNTER — READMISSION MANAGEMENT (OUTPATIENT)
Dept: CALL CENTER | Facility: HOSPITAL | Age: 64
End: 2019-04-03

## 2019-04-03 NOTE — OUTREACH NOTE
Sepsis Week 2 Survey      Responses   Facility patient discharged from?  [facility]   Does the patient have one of the following disease processes/diagnoses(primary or secondary)?  Sepsis   Week 2 attempt successful?  No   Unsuccessful attempts  Attempt 1          Riddhi Hansen RN

## 2019-04-04 ENCOUNTER — READMISSION MANAGEMENT (OUTPATIENT)
Dept: CALL CENTER | Facility: HOSPITAL | Age: 64
End: 2019-04-04

## 2019-04-04 NOTE — OUTREACH NOTE
Sepsis Week 2 Survey      Responses   Facility patient discharged from?  [facility]   Does the patient have one of the following disease processes/diagnoses(primary or secondary)?  Sepsis   Week 2 attempt successful?  No   Unsuccessful attempts  Attempt 2   Revoke  Decline to participate [Hung up on nurse]          Chrystal Culp RN

## 2019-05-24 PROBLEM — Z01.419 WELL WOMAN EXAM: Status: ACTIVE | Noted: 2019-05-24

## 2019-06-26 ENCOUNTER — OFFICE VISIT (OUTPATIENT)
Dept: OBSTETRICS AND GYNECOLOGY | Facility: CLINIC | Age: 64
End: 2019-06-26

## 2019-06-26 VITALS
RESPIRATION RATE: 14 BRPM | BODY MASS INDEX: 35.15 KG/M2 | SYSTOLIC BLOOD PRESSURE: 138 MMHG | WEIGHT: 238 LBS | DIASTOLIC BLOOD PRESSURE: 76 MMHG

## 2019-06-26 DIAGNOSIS — N81.2 UTEROVAGINAL PROLAPSE, INCOMPLETE: ICD-10-CM

## 2019-06-26 DIAGNOSIS — Z01.419 WELL WOMAN EXAM: Primary | ICD-10-CM

## 2019-06-26 PROBLEM — J18.9 CAP (COMMUNITY ACQUIRED PNEUMONIA): Status: RESOLVED | Noted: 2019-03-23 | Resolved: 2019-06-26

## 2019-06-26 PROCEDURE — G0101 CA SCREEN;PELVIC/BREAST EXAM: HCPCS | Performed by: OBSTETRICS & GYNECOLOGY

## 2019-06-26 NOTE — PROGRESS NOTES
Subjective   Chief Complaint   Patient presents with   • [facility]     Cortney Delacruz is a 63 y.o. year old  menopausal female presenting to be seen for her annual exam.  This past year she has not been on hormone replacement therapy.  She has not had any vaginal bleeding in the last 12 months.  Menopausal symptoms are not present.    Has had long-standing history with rectocele and cystocele.  Has had urinary incontinence for quite a while.  Fecal incontinence is just becoming a problem recently.  They have had issues with recurring UTIs.  Primary care wonders if the anatomic abnormalities with prolapse could be contributing.    She is up-to-date on mammography.  She is been getting those at [facility] and [place], [place]    SEXUAL Hx:  She is not currently sexually active.  In the past year there she has not been sexually active.    Condoms are not needed because she is not sexually active.  She would not like to be screened for STD's at today's exam.  [place] is painful: n/a  HEALTH Hx:  She exercises regularly: yes.  She wears her seat belt: yes.  She has concerns about domestic violence: no.  She has noticed changes in height: no.  OTHER THINGS SHE WANTS TO DISCUSS TODAY:  Nothing else    The following portions of the patient's history were reviewed and updated as appropriate:problem list, current medications, allergies, past family history, past medical history, past social history and past surgical history.    Social History    Tobacco Use      Smoking status: Never Smoker      Smokeless tobacco: Never Used      Review of Systems  Constitutional POS: nothing reported    NEG: anorexia or night sweats   Genitourinary POS: see HPI    NEG: dysuria or hematuria      Gastointestinal POS: see HPI    NEG: bloating, change in bowel habits, melena or reflux symptoms   Integument POS: nothing reported    NEG: moles that are changing in size, shape, color or rashes   Breast POS:  nothing reported    NEG: persistent breast lump, skin dimpling or nipple discharge        Objective   /76   Resp 14   Wt 108 kg (238 lb)   LMP  (LMP Unknown)   Breastfeeding? No   BMI 35.15 kg/m²     General:  well developed; well nourished  no acute distress   Skin:  No suspicious lesions seen   Thyroid: normal to inspection and palpation   Breasts:  Examined in supine position   Right is normal; left is absent   Abdomen: soft, non-tender; no masses  no umbilical or inguinal hernias are present  no hepato-splenomegaly   Pelvis: Clinical staff was present for exam  External genitalia:  normal appearance of the external genitalia including Bartholin's and [place]'s glands.  :  urethral meatus normal;  Vaginal:  atrophic mucosal changes are present; Introitus is gaping  Cervix:  normal appearance.  Uterus:  normal size, shape and consistency.  Adnexa:  non palpable bilaterally.  Rectal:  digital rectal exam not performed; anus visually normal appearing.  Cystocele GRADE 3  Rectocele GRADE 2  Uterine GRADE 2        Assessment   1. Pelvic organ prolapse - as compared to the prior exams it is progressively worse.  It is symptomatic and affecting her quality of life.  May be impacting her recurring UTIs  2. Menopausal female currently not on HRT - without significant symptoms affecting activities of daily living   3. History of breast cancer - RADHA x19 years  4. Osteoporosis being managed by primary care  5. She is up to date on all relevant gynecologic and colorectal screenings     Plan   1. Pap was done today.  If she does not receive the results of the Pap within 2 weeks  time, she was instructed to call to find out the results.  I explained to Cortney that the recommendations for Pap smear interval in a low risk patient's has lengthened to 3 years time.  I encouraged her to be seen yearly for a full physical exam including breast and pelvic exam even during the off years when PAP's will not be  performed.  2. She was encouraged to get yearly mammograms.  She should report any palpable breast lump(s) or skin changes regardless of mammographic findings.  I explained to Cortney that notification regarding her mammogram results will come from the center performing the study.  Our office will not be routinely calling with mammogram results.  It is her responsibility to make sure that the results from the mammogram are communicated to her by the breast center.  If she has any questions about the results, she is welcome to call our office anytime.`  3. The importance of keeping all planned follow-up and taking all medications as prescribed was emphasized.  4. Suspect will not be a good pessary candidate.  Given her multiple comorbidities and anatomic findings from today I do not think she would have an excellent chance of long-term surgical cure with a vaginal reconstruction.  May do best with abdominal sacrocolpopexy.  Should she wish to pursue this, I would recommend we refer her to Dr. Piedad Puente given her prior history of complicated abdominal procedures along with intraperitoneal pelvic adhesive disease  5. Follow up for annual exam 1 year.         This note was electronically signed.    Erik Sandoval M.D.  June 26, 2019    Note: Speech recognition transcription software may have been used to create portions of this document.  An attempt at proofreading has been made but errors in transcription could still be present.

## 2019-07-29 NOTE — ED PROVIDER NOTES
Subjective   HPI Comments: Cortney Delacruz is a 61 y.o.female who presents to the ED with c/o fever and AMS which began today. Her son reports she has been experiencing waxing/waning fevers reaching up to 101.0 since this morning. She has been taking Tylenol and Ibuprofen with only mild temporary improvement in her symptoms. Her son contacted her PCP Dr. Garvey, who referred her to the ED for evaluation. Her son reports that en route to the ED she became fairly confused. He states she began talking out of her head at that time. She has no history of similar previous episodes and at her baseline is alert and oriented x 3 without any confusion. She also c/o right sided abdominal pain, nausea, and brief hesitation when attempting to void urine but denies any other complaints at this time. She does have history of gastroparesis and diabetes mellitus.       Patient is a 61 y.o. female presenting with fever.   History provided by:  Patient and relative  Fever   Max temp prior to arrival:  101.0  Temp source:  Oral  Severity:  Moderate  Onset quality:  Sudden  Timing:  Constant  Progression:  Waxing and waning  Chronicity:  New  Relieved by: Minimal improved with Tylenol and Ibuprofen   Worsened by:  Nothing  Ineffective treatments:  None tried  Associated symptoms: confusion and nausea    Associated symptoms: no chest pain, no chills, no congestion, no cough, no diarrhea, no rhinorrhea, no sore throat and no vomiting    Associated symptoms comment:  Urinary hesitation when attempting to void urine.       Review of Systems   Constitutional: Positive for fever. Negative for chills.   HENT: Negative for congestion, rhinorrhea and sore throat.    Respiratory: Negative for cough and shortness of breath.    Cardiovascular: Negative for chest pain.   Gastrointestinal: Positive for abdominal pain and nausea. Negative for diarrhea and vomiting.   Psychiatric/Behavioral: Positive for confusion.   All other systems reviewed and are  [Good] : being in good health [Healthy Diet] : a healthy diet negative.      Past Medical History:   Diagnosis Date   • Arthritis    • Arthritis    • Cancer     breast   • Chronic pain     on dilaudid x4 years   • Depression    • Diabetes     type II   • Diabetic neuropathy     gabapentin, lamictal; reports uses xanax for this when severe as well   • Fever 7/14/2017   • Gastroparesis    • GERD (gastroesophageal reflux disease)     PPI and pepcid   • Hypercholesteremia    • Hypertension    • Osteoporosis     reclast inj yearly   • RLQ abdominal pain 7/14/2017       Allergies   Allergen Reactions   • Actos [Pioglitazone] Shortness Of Breath   • Ciprocinonide [Fluocinolone] Anaphylaxis   • Codeine Anaphylaxis   • Kefzol [Cefazolin] Anaphylaxis   • Metformin And Related Shortness Of Breath   • Unasyn [Ampicillin-Sulbactam Sodium] Anaphylaxis   • Biaxin [Clarithromycin] Hives and Nausea And Vomiting   • Crestor [Rosuvastatin Calcium] Myalgia   • Lisinopril Other (See Comments)     Orthostatic hypotension   • Prilosec [Omeprazole] Other (See Comments)     Gastris standstill       Past Surgical History:   Procedure Laterality Date   • ADENOIDECTOMY     • APPENDECTOMY     • BREAST SURGERY     • CHOLECYSTECTOMY     • GASTRIC STIMULATOR IMPLANT SURGERY     • HERNIA REPAIR     • LUMBAR LAMINECTOMY      reports 4 back surgeries   • TONSILLECTOMY     • TRIGGER FINGER RELEASE     • WRIST GANGLION EXCISION         Family History   Problem Relation Age of Onset   • Alcohol abuse Father    • Cancer Father    • Dementia Maternal Grandmother    • Hypertension Mother    • Arthritis Mother    • Osteoporosis Mother        Social History     Social History   • Marital status:      Spouse name: N/A   • Number of children: N/A   • Years of education: N/A     Social History Main Topics   • Smoking status: Never Smoker   • Smokeless tobacco: Never Used   • Alcohol use No   • Drug use: No   • Sexual activity: Not Asked     Other Topics Concern   • None     Social History Narrative    Patient is a  61 year old white female.         Objective   Physical Exam   Constitutional: She is oriented to person, place, and time. She appears well-developed and well-nourished. No distress.   Anxious appearing.    HENT:   Head: Normocephalic and atraumatic.   Eyes: Conjunctivae are normal. No scleral icterus.   Neck: Normal range of motion. Neck supple.   Cardiovascular: Normal rate, regular rhythm, normal heart sounds and intact distal pulses.    No murmur heard.  Pulmonary/Chest: Effort normal and breath sounds normal. No respiratory distress.   Abdominal: Soft. Bowel sounds are normal. There is tenderness (right sided abdominal tenderness). There is no rebound and no guarding.   Musculoskeletal: Normal range of motion. She exhibits no edema.   Neurological: She is alert and oriented to person, place, and time.   Skin: Skin is warm and dry.   Psychiatric: Her behavior is normal.   Nursing note and vitals reviewed.      Procedures         ED Course  ED Course   Comment By Time   I consulted Dr. Rangel and he will admit. USHA Kenney 07/14 0154                     [facility]    Final diagnoses:   Pyelonephritis   Abdominal pain, unspecified location   Fever, unspecified fever cause       Documentation assistance provided by flaquita Matthews.  Information recorded by the flaquita was done at my direction and has been verified and validated by me.     Carmen Matthews  07/13/17 9087       USHA Kenney  07/14/17 0156       USHA Kenney  07/14/17 0243     [Regular Exercise] : regular exercise [Definite:  ___ (Date)] : the last menstrual period was [unfilled] [Regular Cycle Intervals] : periods have been regular [Frequency: Q ___ days] : menstrual periods occur approximately every [unfilled] days [Menstrual Cramps] : menstrual cramps [Sexually Active] : is sexually active [Monogamous] : is monogamous [Contraception] : uses contraception [Condoms] : uses condoms [de-identified] : Emy & depoProv also d/w pt who used depoProv in the past & had constant bleeding

## 2019-09-27 ENCOUNTER — INFUSION (OUTPATIENT)
Dept: ONCOLOGY | Facility: HOSPITAL | Age: 64
End: 2019-09-27

## 2019-09-27 VITALS
RESPIRATION RATE: 16 BRPM | SYSTOLIC BLOOD PRESSURE: 147 MMHG | TEMPERATURE: 97.6 F | DIASTOLIC BLOOD PRESSURE: 74 MMHG | HEART RATE: 82 BPM

## 2019-09-27 DIAGNOSIS — M81.0 OSTEOPOROSIS, UNSPECIFIED OSTEOPOROSIS TYPE, UNSPECIFIED PATHOLOGICAL FRACTURE PRESENCE: Primary | ICD-10-CM

## 2019-09-27 PROCEDURE — 96372 THER/PROPH/DIAG INJ SC/IM: CPT

## 2019-09-27 PROCEDURE — 25010000002 DENOSUMAB 60 MG/ML SOLUTION PREFILLED SYRINGE: Performed by: INTERNAL MEDICINE

## 2019-09-27 RX ADMIN — DENOSUMAB 60 MG: 60 INJECTION SUBCUTANEOUS at 12:29

## 2019-12-23 ENCOUNTER — LAB REQUISITION (OUTPATIENT)
Dept: LAB | Facility: HOSPITAL | Age: 64
End: 2019-12-23

## 2019-12-23 DIAGNOSIS — Z00.00 ROUTINE GENERAL MEDICAL EXAMINATION AT A HEALTH CARE FACILITY: ICD-10-CM

## 2019-12-23 LAB — NT-PROBNP SERPL-MCNC: 32.4 PG/ML (ref 5–900)

## 2019-12-23 PROCEDURE — 83880 ASSAY OF NATRIURETIC PEPTIDE: CPT

## 2020-01-12 ENCOUNTER — APPOINTMENT (OUTPATIENT)
Dept: GENERAL RADIOLOGY | Facility: HOSPITAL | Age: 65
End: 2020-01-12

## 2020-01-12 ENCOUNTER — HOSPITAL ENCOUNTER (EMERGENCY)
Facility: HOSPITAL | Age: 65
Discharge: HOME | End: 2020-01-12
Attending: EMERGENCY MEDICINE | Admitting: EMERGENCY MEDICINE

## 2020-01-12 VITALS
BODY MASS INDEX: 33.77 KG/M2 | RESPIRATION RATE: 22 BRPM | HEART RATE: 84 BPM | OXYGEN SATURATION: 97 % | TEMPERATURE: 98.4 F | WEIGHT: 228 LBS | HEIGHT: 69 IN | SYSTOLIC BLOOD PRESSURE: 170 MMHG | DIASTOLIC BLOOD PRESSURE: 96 MMHG

## 2020-01-12 DIAGNOSIS — J40 BRONCHITIS: Primary | ICD-10-CM

## 2020-01-12 LAB
ALBUMIN SERPL-MCNC: 4.2 G/DL (ref 3.5–5.2)
ALBUMIN/GLOB SERPL: 1.1 G/DL
ALP SERPL-CCNC: 85 U/L (ref 39–117)
ALT SERPL W P-5'-P-CCNC: 19 U/L (ref 1–33)
ANION GAP SERPL CALCULATED.3IONS-SCNC: 15 MMOL/L (ref 5–15)
AST SERPL-CCNC: 24 U/L (ref 1–32)
BASOPHILS # BLD AUTO: 0.03 10*3/MM3 (ref 0–0.2)
BASOPHILS NFR BLD AUTO: 0.4 % (ref 0–1.5)
BILIRUB SERPL-MCNC: 0.2 MG/DL (ref 0.2–1.2)
BUN BLD-MCNC: 13 MG/DL (ref 8–23)
BUN/CREAT SERPL: 11.9 (ref 7–25)
CALCIUM SPEC-SCNC: 9.7 MG/DL (ref 8.6–10.5)
CHLORIDE SERPL-SCNC: 100 MMOL/L (ref 98–107)
CO2 SERPL-SCNC: 21 MMOL/L (ref 22–29)
CREAT BLD-MCNC: 1.09 MG/DL (ref 0.57–1)
DEPRECATED RDW RBC AUTO: 49.7 FL (ref 37–54)
EOSINOPHIL # BLD AUTO: 0.52 10*3/MM3 (ref 0–0.4)
EOSINOPHIL NFR BLD AUTO: 7.7 % (ref 0.3–6.2)
ERYTHROCYTE [DISTWIDTH] IN BLOOD BY AUTOMATED COUNT: 15.9 % (ref 12.3–15.4)
GFR SERPL CREATININE-BSD FRML MDRD: 51 ML/MIN/1.73
GLOBULIN UR ELPH-MCNC: 3.9 GM/DL
GLUCOSE BLD-MCNC: 175 MG/DL (ref 65–99)
HCT VFR BLD AUTO: 45.9 % (ref 34–46.6)
HGB BLD-MCNC: 14.5 G/DL (ref 12–15.9)
HOLD SPECIMEN: NORMAL
HOLD SPECIMEN: NORMAL
IMM GRANULOCYTES # BLD AUTO: 0.02 10*3/MM3 (ref 0–0.05)
IMM GRANULOCYTES NFR BLD AUTO: 0.3 % (ref 0–0.5)
LYMPHOCYTES # BLD AUTO: 2.37 10*3/MM3 (ref 0.7–3.1)
LYMPHOCYTES NFR BLD AUTO: 35.2 % (ref 19.6–45.3)
MCH RBC QN AUTO: 27.1 PG (ref 26.6–33)
MCHC RBC AUTO-ENTMCNC: 31.6 G/DL (ref 31.5–35.7)
MCV RBC AUTO: 85.6 FL (ref 79–97)
MONOCYTES # BLD AUTO: 0.53 10*3/MM3 (ref 0.1–0.9)
MONOCYTES NFR BLD AUTO: 7.9 % (ref 5–12)
NEUTROPHILS # BLD AUTO: 3.27 10*3/MM3 (ref 1.7–7)
NEUTROPHILS NFR BLD AUTO: 48.5 % (ref 42.7–76)
NRBC BLD AUTO-RTO: 0 /100 WBC (ref 0–0.2)
NT-PROBNP SERPL-MCNC: 35.8 PG/ML (ref 5–900)
PLATELET # BLD AUTO: 213 10*3/MM3 (ref 140–450)
PMV BLD AUTO: 11.2 FL (ref 6–12)
POTASSIUM BLD-SCNC: 4.6 MMOL/L (ref 3.5–5.2)
PROT SERPL-MCNC: 8.1 G/DL (ref 6–8.5)
RBC # BLD AUTO: 5.36 10*6/MM3 (ref 3.77–5.28)
SODIUM BLD-SCNC: 136 MMOL/L (ref 136–145)
TROPONIN T SERPL-MCNC: <0.01 NG/ML (ref 0–0.03)
WBC NRBC COR # BLD: 6.74 10*3/MM3 (ref 3.4–10.8)
WHOLE BLOOD HOLD SPECIMEN: NORMAL
WHOLE BLOOD HOLD SPECIMEN: NORMAL

## 2020-01-12 PROCEDURE — 93005 ELECTROCARDIOGRAM TRACING: CPT

## 2020-01-12 PROCEDURE — 85025 COMPLETE CBC W/AUTO DIFF WBC: CPT | Performed by: EMERGENCY MEDICINE

## 2020-01-12 PROCEDURE — 84484 ASSAY OF TROPONIN QUANT: CPT | Performed by: EMERGENCY MEDICINE

## 2020-01-12 PROCEDURE — 99284 EMERGENCY DEPT VISIT MOD MDM: CPT

## 2020-01-12 PROCEDURE — 80053 COMPREHEN METABOLIC PANEL: CPT | Performed by: EMERGENCY MEDICINE

## 2020-01-12 PROCEDURE — 93005 ELECTROCARDIOGRAM TRACING: CPT | Performed by: EMERGENCY MEDICINE

## 2020-01-12 PROCEDURE — 94640 AIRWAY INHALATION TREATMENT: CPT

## 2020-01-12 PROCEDURE — 71045 X-RAY EXAM CHEST 1 VIEW: CPT

## 2020-01-12 PROCEDURE — 83880 ASSAY OF NATRIURETIC PEPTIDE: CPT | Performed by: EMERGENCY MEDICINE

## 2020-01-12 RX ORDER — ALBUTEROL SULFATE 90 UG/1
2 AEROSOL, METERED RESPIRATORY (INHALATION) EVERY 6 HOURS PRN
Qty: 6.7 G | Refills: 0 | Status: SHIPPED | OUTPATIENT
Start: 2020-01-12

## 2020-01-12 RX ORDER — SODIUM CHLORIDE 0.9 % (FLUSH) 0.9 %
10 SYRINGE (ML) INJECTION AS NEEDED
Status: DISCONTINUED | OUTPATIENT
Start: 2020-01-12 | End: 2020-01-12 | Stop reason: HOSPADM

## 2020-01-12 RX ORDER — ALBUTEROL SULFATE 1.25 MG/3ML
1 SOLUTION RESPIRATORY (INHALATION) EVERY 6 HOURS PRN
Qty: 30 VIAL | Refills: 0 | Status: SHIPPED | OUTPATIENT
Start: 2020-01-12 | End: 2021-08-23

## 2020-01-12 RX ORDER — IPRATROPIUM BROMIDE AND ALBUTEROL SULFATE 2.5; .5 MG/3ML; MG/3ML
3 SOLUTION RESPIRATORY (INHALATION) ONCE
Status: COMPLETED | OUTPATIENT
Start: 2020-01-12 | End: 2020-01-12

## 2020-01-12 RX ORDER — SULFAMETHOXAZOLE AND TRIMETHOPRIM 800; 160 MG/1; MG/1
1 TABLET ORAL DAILY
COMMUNITY
End: 2021-09-06

## 2020-01-12 RX ORDER — FLUTICASONE PROPIONATE 50 MCG
2 SPRAY, SUSPENSION (ML) NASAL DAILY
COMMUNITY

## 2020-01-12 RX ADMIN — IPRATROPIUM BROMIDE AND ALBUTEROL SULFATE 3 ML: 2.5; .5 SOLUTION RESPIRATORY (INHALATION) at 16:07

## 2020-01-12 NOTE — ED PROVIDER NOTES
Subjective   Cortney Delacruz is a 64 year old female presenting to the ED today with complaints of shortness of breath. She reports 3 days ago she began experiencing a cough, that continues to worsen. Since her cough onset she states she has begun experiencing wheezing along with shortness of breath as well. She denies chest pain, abdominal pain, or lower extremity edema.  She has been using an Albuterol inhaler from a previous hospital admission, however states this has not given her relief. She reports a history of type 2 diabetes mellitus, and states her recent blood sugar readings have been in the 180's-190's. She also reports a history of hypertension, gastroparesis, and chronic pain due to her gastric stimulator. She denies a history of pulmonary or cardiac issues. She also denies a history of smoking. There are no other acute complaints at this time.       History provided by:  Patient  Shortness of Breath   Severity:  Moderate  Onset quality:  Gradual  Duration:  3 days  Timing:  Constant  Progression:  Worsening  Chronicity:  New  Ineffective treatments: Albuterol.  Associated symptoms: cough and wheezing    Associated symptoms: no abdominal pain, no chest pain, no fever, no rash and no sore throat    Risk factors: no tobacco use        Review of Systems   Constitutional: Negative for chills and fever.   HENT: Negative for sore throat.    Respiratory: Positive for cough, shortness of breath and wheezing.    Cardiovascular: Negative for chest pain.   Gastrointestinal: Negative for abdominal pain.   Genitourinary: Negative for dysuria.   Musculoskeletal: Negative for back pain.        Negative for lower extremity edema.   Skin: Negative for rash.   Allergic/Immunologic: Negative for immunocompromised state.   Hematological: Negative.    Psychiatric/Behavioral: Negative.    All other systems reviewed and are negative.      Past Medical History:   Diagnosis Date   • Arthritis    • CAP (community acquired  pneumonia) 2015   • Chronic pain     on dilaudid x4 years   • Depression    • Diabetes (CMS/HCC)     type II   • Diabetic neuropathy (CMS/HCC)     gabapentin, lamictal; reports uses xanax for this when severe as well   • Gastroparesis    • GERD (gastroesophageal reflux disease)     PPI and pepcid   • HX: breast cancer 2000    s/p chemo   • Hypercholesteremia    • Hypertension 2002   • Osteoporosis     reclast inj yearly   • Pyelonephritis 03/2019       Allergies   Allergen Reactions   • Cephalosporins Anaphylaxis   • Ciprofloxacin Anaphylaxis   • Codeine Anaphylaxis   • Kiwi Extract Anaphylaxis   • Metformin And Related Shortness Of Breath   • Pioglitazone Shortness Of Breath   • Sulbactam Anaphylaxis   • Ace Inhibitors Itching   • Amitriptyline Other (See Comments)   • Clarithromycin Hives, Nausea And Vomiting and GI Intolerance   • Crestor [Rosuvastatin Calcium] Myalgia   • Dexlansoprazole Other (See Comments)   • Empagliflozin Other (See Comments)   • Esomeprazole Magnesium Other (See Comments)     Gastric standstill   • Glimepiride Irritability   • Hydrocodone-Acetaminophen Other (See Comments)   • Latex Itching   • Omeprazole Other (See Comments) and Diarrhea     Gastris standstill   • Pitavastatin Itching   • Pravachol  [Pravastatin Sodium] Hives   • Sitagliptin Itching   • Topiramate Itching   • Vraylar  [Cariprazine Hcl] Itching       Past Surgical History:   Procedure Laterality Date   • ABDOMINAL HERNIA REPAIR  2001   • ABDOMINAL HERNIA REPAIR  2005   • APPENDECTOMY  1968   • DIAGNOSTIC LAPAROSCOPY  1988   • GASTRIC STIMULATOR IMPLANT SURGERY  2014   • GASTRIC STIMULATOR IMPLANT SURGERY  2015    replaced generator   • GASTRIC STIMULATOR IMPLANT SURGERY  2017    Replacement   • LAPAROSCOPIC CHOLECYSTECTOMY  1992   • LUMBAR LAMINECTOMY  1985    L4-L5   • LUMBAR LAMINECTOMY  1987    L3-L4   • LUMBAR LAMINECTOMY  1997    L3-L4   • LUMBAR LAMINECTOMY  2007    L3-L5   • MASTECTOMY Left 2000   • TONGUE SURGERY   2018   • TONSILLECTOMY AND ADENOIDECTOMY  1962   • TOTAL HIP ARTHROPLASTY REVISION  2015   • TRIGGER FINGER RELEASE Left 2017    ring finger   • URETEROSCOPY LASER LITHOTRIPSY WITH STENT INSERTION Right 03/13/2019    Dr. Cerna   • VENTRAL HERNIA REPAIR  2007   • VENTRAL HERNIA REPAIR  2013   • WRIST GANGLION EXCISION Right 1986       Family History   Problem Relation Age of Onset   • Alcohol abuse Father    • Lung cancer Father    • Dementia Maternal Grandmother    • Hypertension Mother    • Arthritis Mother    • Osteoporosis Mother    • Coronary artery disease Brother    • Melanoma Brother        Social History     Socioeconomic History   • Marital status:      Spouse name: Not on file   • Number of children: Not on file   • Years of education: Not on file   • Highest education level: Not on file   Tobacco Use   • Smoking status: Never Smoker   • Smokeless tobacco: Never Used   Substance and Sexual Activity   • Alcohol use: No   • Drug use: No   Social History Narrative    Lives in [place] with son         Objective   Physical Exam   Constitutional: She is oriented to person, place, and time. She appears well-developed and well-nourished. She does not appear ill. No distress.   HENT:   Head: Normocephalic and atraumatic.   Mouth/Throat: Oropharynx is clear and moist.   Eyes: Conjunctivae are normal. No scleral icterus.   Neck: Normal range of motion. Neck supple.   Cardiovascular: Normal rate, regular rhythm and normal heart sounds.   No murmur heard.  Pulmonary/Chest: Effort normal. No respiratory distress. She has wheezes.   Bilateral expiratory wheezes.   Abdominal: Soft. There is no tenderness.   Musculoskeletal: Normal range of motion. She exhibits no edema.   No lower extremity edema.   Neurological: She is alert and oriented to person, place, and time.   Skin: Skin is warm and dry.   Psychiatric: She has a normal mood and affect. Her behavior is normal.   Nursing note and vitals  reviewed.      Procedures         ED Course    Pt appears in no distress.  She seems to force expiratory wheezes when I auscultate her lungs but then stops when I remove the stethoscope.  Her sats are in the upper 90s on room air.  CXR shows chronic changes.  No concerning labs.  We discussed steroids but she prefers not to use them due to her diabetes.  I will write for nebulizer and albuterol as well as an albuterol MDI and have her f/u with her PCP.  ED Course as of Jan 12 1716   Sun Jan 12, 2020 1708 At bedside, reevaluating patient. -HNB    [RP]      ED Course User Index  [RP] KilpatrickLillian     Recent Results (from the past 24 hour(s))   Comprehensive Metabolic Panel    Collection Time: 01/12/20  2:58 PM   Result Value Ref Range    Glucose 175 (H) 65 - 99 mg/dL    BUN 13 8 - 23 mg/dL    Creatinine 1.09 (H) 0.57 - 1.00 mg/dL    Sodium 136 136 - 145 mmol/L    Potassium 4.6 3.5 - 5.2 mmol/L    Chloride 100 98 - 107 mmol/L    CO2 21.0 (L) 22.0 - 29.0 mmol/L    Calcium 9.7 8.6 - 10.5 mg/dL    Total Protein 8.1 6.0 - 8.5 g/dL    Albumin 4.20 3.50 - 5.20 g/dL    ALT (SGPT) 19 1 - 33 U/L    AST (SGOT) 24 1 - 32 U/L    Alkaline Phosphatase 85 39 - 117 U/L    Total Bilirubin 0.2 0.2 - 1.2 mg/dL    eGFR Non African Amer 51 (L) >60 mL/min/1.73    Globulin 3.9 gm/dL    A/G Ratio 1.1 g/dL    BUN/Creatinine Ratio 11.9 7.0 - 25.0    Anion Gap 15.0 5.0 - 15.0 mmol/L   BNP    Collection Time: 01/12/20  2:58 PM   Result Value Ref Range    proBNP 35.8 5.0 - 900.0 pg/mL   Troponin    Collection Time: 01/12/20  2:58 PM   Result Value Ref Range    Troponin T <0.010 0.000 - 0.030 ng/mL   Light Blue Top    Collection Time: 01/12/20  2:58 PM   Result Value Ref Range    Extra Tube hold for add-on    Green Top (Gel)    Collection Time: 01/12/20  2:58 PM   Result Value Ref Range    Extra Tube Hold for add-ons.    Lavender Top    Collection Time: 01/12/20  2:58 PM   Result Value Ref Range    Extra Tube hold for add-on    Gold Top - SST     Collection Time: 01/12/20  2:58 PM   Result Value Ref Range    Extra Tube Hold for add-ons.    CBC Auto Differential    Collection Time: 01/12/20  2:58 PM   Result Value Ref Range    WBC 6.74 3.40 - 10.80 10*3/mm3    RBC 5.36 (H) 3.77 - 5.28 10*6/mm3    Hemoglobin 14.5 12.0 - 15.9 g/dL    Hematocrit 45.9 34.0 - 46.6 %    MCV 85.6 79.0 - 97.0 fL    MCH 27.1 26.6 - 33.0 pg    MCHC 31.6 31.5 - 35.7 g/dL    RDW 15.9 (H) 12.3 - 15.4 %    RDW-SD 49.7 37.0 - 54.0 fl    MPV 11.2 6.0 - 12.0 fL    Platelets 213 140 - 450 10*3/mm3    Neutrophil % 48.5 42.7 - 76.0 %    Lymphocyte % 35.2 19.6 - 45.3 %    Monocyte % 7.9 5.0 - 12.0 %    Eosinophil % 7.7 (H) 0.3 - 6.2 %    Basophil % 0.4 0.0 - 1.5 %    Immature Grans % 0.3 0.0 - 0.5 %    Neutrophils, Absolute 3.27 1.70 - 7.00 10*3/mm3    Lymphocytes, Absolute 2.37 0.70 - 3.10 10*3/mm3    Monocytes, Absolute 0.53 0.10 - 0.90 10*3/mm3    Eosinophils, Absolute 0.52 (H) 0.00 - 0.40 10*3/mm3    Basophils, Absolute 0.03 0.00 - 0.20 10*3/mm3    Immature Grans, Absolute 0.02 0.00 - 0.05 10*3/mm3    nRBC 0.0 0.0 - 0.2 /100 WBC     Note: In addition to lab results from this visit, the labs listed above may include labs taken at another facility or during a different encounter within the last 24 hours. Please correlate lab times with ED admission and discharge times for further clarification of the services performed during this visit.    XR Chest 1 View   Preliminary Result   Stable chronic changes seen within the lung fields with no    evidence of acute parenchymal disease. Pulmonary vascularity is within   normal limits.       DICTATED:   01/12/2020   EDITED/ls :   01/12/2020                         Vitals:    01/12/20 1607 01/12/20 1615 01/12/20 1645 01/12/20 1700   BP:  152/79 156/71 163/82   BP Location:       Patient Position:       Pulse: 80 82  76   Resp:       Temp:       TempSrc:       SpO2: 100% 95%  94%   Weight:       Height:         Medications   sodium chloride 0.9 % flush  10 mL (has no administration in time range)   ipratropium-albuterol (DUO-NEB) nebulizer solution 3 mL (3 mL Nebulization Given 1/12/20 1607)     ECG/EMG Results (last 24 hours)     Procedure Component Value Units Date/Time    ECG 12 Lead [[id number]] Collected:  01/12/20 1457     Updated:  01/12/20 1510    Narrative:       Test Reason : SOB  Blood Pressure : **/** mmHG  Vent. Rate : 082 BPM     Atrial Rate : 082 BPM     P-R Int : 184 ms          QRS Dur : 070 ms      QT Int : 364 ms       P-R-T Axes : 027 -16 052 degrees     QTc Int : 425 ms      Normal sinus rhythm  Normal ECG  When compared with ECG of 20-MAR-2019 18:38,  Criteria for Inferior infarct are no longer present  Confirmed by RAKAN MAC MD ([id number]) on 1/12/2020 3:10:26 PM    Referred By: MD ER           Confirmed By:RAKAN MAC MD        ECG 12 Lead   Final Result   Test Reason : SOB   Blood Pressure : **/** mmHG   Vent. Rate : 082 BPM     Atrial Rate : 082 BPM      P-R Int : 184 ms          QRS Dur : 070 ms       QT Int : 364 ms       P-R-T Axes : 027 -16 052 degrees      QTc Int : 425 ms         Normal sinus rhythm   Normal ECG   When compared with ECG of 20-MAR-2019 18:38,   Criteria for Inferior infarct are no longer present   Confirmed by RAKAN MAC MD ([id number]) on 1/12/2020 3:10:26 PM      Referred By: MD ER           Confirmed By:RAKAN MAC MD                          [facility]    Final diagnoses:   Bronchitis       Documentation assistance provided by flaquita Kilpatrick.  Information recorded by the flaquita was done at my direction and has been verified and validated by me.     Lillian Kilpatrick  01/12/20 1621       Brian Burton PA  01/12/20 3045

## 2020-01-12 NOTE — DISCHARGE INSTRUCTIONS
Albuterol as prescribed.  Follow up with your PCP (call tomorrow for first available appointment).  Retutr to ER if worse.

## 2020-09-16 ENCOUNTER — TRANSCRIBE ORDERS (OUTPATIENT)
Dept: ADMINISTRATIVE | Facility: HOSPITAL | Age: 65
End: 2020-09-16

## 2020-09-16 DIAGNOSIS — M75.102 ROTATOR CUFF SYNDROME OF LEFT SHOULDER: Primary | ICD-10-CM

## 2021-01-06 ENCOUNTER — OFFICE VISIT (OUTPATIENT)
Dept: ENDOCRINOLOGY | Facility: CLINIC | Age: 66
End: 2021-01-06

## 2021-01-06 VITALS
OXYGEN SATURATION: 98 % | BODY MASS INDEX: 33.92 KG/M2 | DIASTOLIC BLOOD PRESSURE: 82 MMHG | HEIGHT: 69 IN | WEIGHT: 229 LBS | SYSTOLIC BLOOD PRESSURE: 142 MMHG | HEART RATE: 76 BPM

## 2021-01-06 DIAGNOSIS — IMO0002 UNCONTROLLED TYPE 2 DIABETES MELLITUS WITH COMPLICATION, WITH LONG-TERM CURRENT USE OF INSULIN: Primary | ICD-10-CM

## 2021-01-06 LAB
EXPIRATION DATE: ABNORMAL
GLUCOSE BLDC GLUCOMTR-MCNC: 265 MG/DL (ref 70–130)
HBA1C MFR BLD: 8.9 %
Lab: ABNORMAL

## 2021-01-06 PROCEDURE — 82947 ASSAY GLUCOSE BLOOD QUANT: CPT | Performed by: INTERNAL MEDICINE

## 2021-01-06 PROCEDURE — 83036 HEMOGLOBIN GLYCOSYLATED A1C: CPT | Performed by: INTERNAL MEDICINE

## 2021-01-06 PROCEDURE — 99204 OFFICE O/P NEW MOD 45 MIN: CPT | Performed by: INTERNAL MEDICINE

## 2021-01-06 RX ORDER — HUMAN INSULIN 100 [IU]/ML
INJECTION, SUSPENSION SUBCUTANEOUS
Qty: 30 ML | Refills: 5 | Status: SHIPPED | OUTPATIENT
Start: 2021-01-06 | End: 2021-09-06

## 2021-01-06 RX ORDER — HUMAN INSULIN 100 [IU]/ML
INJECTION, SOLUTION SUBCUTANEOUS
Qty: 10 ML | Refills: 11 | Status: SHIPPED | OUTPATIENT
Start: 2021-01-06 | End: 2021-09-06

## 2021-01-16 NOTE — PROGRESS NOTES
Chief Complaint   Patient presents with   • Diabetes     New Consult FLORENCIA Garvey       HPI:   PERRY GONZALEZ is a 65 y.o.female sent in consultation by Ronnie Garvey MD for further evaluation and management of her Type 2 DM. Her history is as follows:    - pt is a retired nurse. Her medical history is significant for gastroparesis. She is followed at [facility] gastroenterology (Dr. Pruett). Is s/p gastric stimulator placement.   - Stimulator currently not working. Needs battery change and possibly adjustment of wires.    1) Type 2 DM with associated complications of peripheral neuropathy, gastroparesis, NPDR, mild CKD:   - Diagnosed in 2001  - Initially on metformin and actos.   - Started insulin in 2010  - Gastroparesis causing variable meal schedule / tolerance and prolonged postprandial hyperglycemia.     Current DM Medications:  - Farxiga 10 mg daily: states she is tolerating. Occasional candidiasis  - Tresiba: 102 units nightly  - Humalog: CF only of 5 units : 50 > 150    Glucometer / BG log Review:  - AM BGs: 100's - 200's  - Midday BG's 100's - 200's  - post-dinner BG's 200's, occasional 300's    DM Health Maintenance:  Ophtho:will obtain outside records  Podiatry: no resent visit  Monofilament / Foot exam: due  Lipids: (11/2020) Tchol 167, , HDL 42, LDL 96 - not on a statin  Urine Microalb/Cr ratio: (05/2020) 7.0  CMP: (11/2020) Cr 1.01, GFR 59, LFTs WNL  CBC: (05/2020) Hgb 13.7  TSH: (05/2020) 1.360    Review of Systems   Constitutional: Positive for fatigue.   HENT: Negative.    Eyes: Positive for itching.        Dry eyes   Respiratory: Positive for shortness of breath and wheezing.    Cardiovascular: Positive for palpitations and leg swelling (ankles).   Gastrointestinal: Positive for abdominal pain, constipation, nausea and vomiting. Negative for diarrhea.   Endocrine: Positive for polydipsia.        Hyperglycemia, hot flashes   Genitourinary: Positive for frequency.   Musculoskeletal: Positive  for arthralgias.        Chronic pain   Skin: Negative.    Allergic/Immunologic: Negative.    Neurological: Positive for numbness.   Hematological: Bruises/bleeds easily.   Psychiatric/Behavioral: Positive for sleep disturbance.        H/o depression     Past Medical History:   Diagnosis Date   • Arthritis    • CAP (community acquired pneumonia) 2015   • Chronic pain     on dilaudid x4 years   • Depression    • Diabetes (CMS/HCC)     type II   • Diabetic neuropathy (CMS/HCC)     gabapentin, lamictal; reports uses xanax for this when severe as well   • Gastroparesis    • GERD (gastroesophageal reflux disease)     PPI and pepcid   • HX: breast cancer 2000    s/p chemo   • Hypercholesteremia    • Hypertension 2002   • Osteoporosis     reclast inj yearly   • Pyelonephritis 03/2019     family history includes Alcohol abuse in her father; Arthritis in her mother; Coronary artery disease in her brother; Dementia in her maternal grandmother; Hypertension in her mother; Lung cancer in her father; Melanoma in her brother; Osteoporosis in her mother.  Past Surgical History:   Procedure Laterality Date   • ABDOMINAL HERNIA REPAIR  2001   • ABDOMINAL HERNIA REPAIR  2005   • APPENDECTOMY  1968   • DIAGNOSTIC LAPAROSCOPY  1988   • GASTRIC STIMULATOR IMPLANT SURGERY  2014   • GASTRIC STIMULATOR IMPLANT SURGERY  2015    replaced generator   • GASTRIC STIMULATOR IMPLANT SURGERY  2017    Replacement   • LAPAROSCOPIC CHOLECYSTECTOMY  1992   • LUMBAR LAMINECTOMY  1985    L4-L5   • LUMBAR LAMINECTOMY  1987    L3-L4   • LUMBAR LAMINECTOMY  1997    L3-L4   • LUMBAR LAMINECTOMY  2007    L3-L5   • MASTECTOMY Left 2000   • TONGUE SURGERY  2018   • TONSILLECTOMY AND ADENOIDECTOMY  1962   • TOTAL HIP ARTHROPLASTY REVISION  2015   • TRIGGER FINGER RELEASE Left 2017    ring finger   • URETEROSCOPY LASER LITHOTRIPSY WITH STENT INSERTION Right 03/13/2019    Dr. Cerna   • VENTRAL HERNIA REPAIR  2007   • VENTRAL HERNIA REPAIR  2013   • WRIST  GANGLION EXCISION Right 1986     Social History     Tobacco Use   • Smoking status: Never Smoker   • Smokeless tobacco: Never Used   Substance Use Topics   • Alcohol use: No   • Drug use: No     Outpatient Medications Prior to Visit   Medication Sig Dispense Refill   • albuterol (ACCUNEB) 1.25 MG/3ML nebulizer solution Take 3 mL by nebulization Every 6 (Six) Hours As Needed for Wheezing. 30 vial 0   • albuterol sulfate  (90 Base) MCG/ACT inhaler Inhale 2 puffs Every 6 (Six) Hours As Needed for Wheezing. 6.7 g 0   • ALPRAZolam (XANAX) 1 MG tablet Take 1 mg by mouth 3 (Three) Times a Day As Needed for anxiety.     • Dapagliflozin Propanediol (FARXIGA) 10 MG tablet Take  by mouth.     • famotidine (PEPCID) 20 MG tablet 20 mg At Night As Needed.     • fluticasone (FLONASE) 50 MCG/ACT nasal spray 2 sprays into the nostril(s) as directed by provider Daily. AS NEEDED     • furosemide (LASIX) 20 MG tablet Take 1 tablet by mouth Daily. (Patient taking differently: Take 20 mg by mouth As Needed.) 30 tablet 0   • gabapentin (NEURONTIN) 600 MG tablet Take 600 mg by mouth Every Morning. 1 TAB QAM, 2 TABS QPM     • HYDROmorphone (DILAUDID) 2 MG tablet 2 mg Every 6 (Six) Hours As Needed.     • lamoTRIgine (LaMICtal) 100 MG tablet 100 mg 2 (Two) Times a Day.     • Latanoprost 0.005 % emulsion Apply  to eye(s) as directed by provider. 1 GTT BOTH EYES DAILY     • metoprolol succinate XL (TOPROL-XL) 100 MG 24 hr tablet Take 100 mg by mouth 2 (Two) Times a Day.     • Multiple Vitamins-Minerals (MULTIVITAMIN ADULT PO) Take 1 tablet by mouth Daily.     • ondansetron (ZOFRAN) 8 MG tablet Take 8 mg by mouth Every 8 (Eight) Hours As Needed for Nausea or Vomiting.     • pantoprazole (PROTONIX) 40 MG EC tablet Take 40 mg by mouth Daily.     • Probiotic Product (PROBIOTIC PO) Take  by mouth.     • promethazine (PHENERGAN) 25 MG tablet Take 1 tablet by mouth Every 6 (Six) Hours As Needed for nausea or vomiting for up to 12 doses.  (Patient taking differently: Take 25 mg by mouth Every 4 (Four) Hours As Needed for Nausea or Vomiting.) 12 tablet 0   • sodium chloride (OCEAN) 0.65 % nasal spray 1 spray into each nostril As Needed for Congestion.     • Soft Lens Products (SALINE SENSITIVE EYES) solution PRN     • sulfamethoxazole-trimethoprim (BACTRIM DS,SEPTRA DS) 800-160 MG per tablet Take 1 tablet by mouth Daily.     • insulin aspart (novoLOG FLEXPEN) 100 UNIT/ML solution pen-injector sc pen Inject 50 Units under the skin into the appropriate area as directed 2 (Two) Times a Day. SLIDING SCALE AC+HS     • Insulin Degludec (TRESIBA FLEXTOUCH) 100 UNIT/ML solution pen-injector Inject 102 Units under the skin into the appropriate area as directed Every Night.     • Scopolamine (TRANSDERM-SCOP) 1.5 MG/3DAYS patch Place 1 patch on the skin Every 72 (Seventy-Two) Hours.     • tiZANidine (ZANAFLEX) 2 MG tablet Take 2 mg by mouth Every 8 (Eight) Hours As Needed for muscle spasms.       No facility-administered medications prior to visit.      Allergies   Allergen Reactions   • Cephalosporins Anaphylaxis   • Ciprofloxacin Anaphylaxis   • Codeine Anaphylaxis   • Kiwi Extract Anaphylaxis   • Metformin And Related Shortness Of Breath   • Pioglitazone Shortness Of Breath   • Sulbactam Anaphylaxis   • Ace Inhibitors Itching   • Amitriptyline Other (See Comments)   • Clarithromycin Hives, Nausea And Vomiting and GI Intolerance   • Crestor [Rosuvastatin Calcium] Myalgia   • Dexlansoprazole Other (See Comments)   • Empagliflozin Other (See Comments)   • Esomeprazole Magnesium Other (See Comments)     Gastric standstill   • Glimepiride Irritability   • Hydrocodone-Acetaminophen Other (See Comments)   • Latex Itching   • Omeprazole Other (See Comments) and Diarrhea     Gastris standstill   • Pitavastatin Itching   • Pravachol  [Pravastatin Sodium] Hives   • Sitagliptin Itching   • Topiramate Itching   • Vraylar  [Cariprazine Hcl] Itching       /82   Pulse  "76   Ht 175.3 cm (69\")   Wt 104 kg (229 lb)   LMP  (LMP Unknown)   SpO2 98%   BMI 33.82 kg/m²   Physical Exam  Vitals signs reviewed.   Constitutional:       General: She is not in acute distress.     Appearance: She is well-developed. She is not diaphoretic.      Comments: overweight   HENT:      Head: Normocephalic.   Eyes:      Conjunctiva/sclera: Conjunctivae normal.      Pupils: Pupils are equal, round, and reactive to light.   Neck:      Thyroid: No thyromegaly.      Trachea: No tracheal deviation.      Comments: No palpable thyroid nodules    Cardiovascular:      Rate and Rhythm: Normal rate and regular rhythm.      Heart sounds: Normal heart sounds. No murmur.   Pulmonary:      Effort: Pulmonary effort is normal. No respiratory distress.      Breath sounds: Normal breath sounds.   Abdominal:      General: Bowel sounds are normal.      Palpations: Abdomen is soft. There is no mass.      Tenderness: There is abdominal tenderness (with deep palpation).      Comments: No scarring at insulin injection sites  Stimulator noted on right   Lymphadenopathy:      Cervical: No cervical adenopathy.   Skin:     General: Skin is warm and dry.      Findings: No erythema.   Neurological:      Mental Status: She is alert and oriented to person, place, and time.      Cranial Nerves: No cranial nerve deficit.   Psychiatric:         Behavior: Behavior normal.         LABS/IMAGING: outside records reviewed and summarized in HPI  Results for orders placed or performed in visit on 01/06/21   POC Glucose, Blood    Specimen: Blood   Result Value Ref Range    Glucose 265 (A) 70 - 130 mg/dL    Lot Number [id number]     Expiration Date 05/19/2021    POC Glycosylated Hemoglobin (Hb A1C)    Specimen: Blood   Result Value Ref Range    Hemoglobin A1C 8.9 %       ASSESSMENT/PLAN:    1) Type 2 DM with associated complications of peripheral neuropathy, gastroparesis, NPDR, mild CKD:   - Uncontrolled at this time, A1c percent 8.9 " today  -Discussed with patient that older, non-analog insulins tend to work better in the setting of severe gastroparesis due to the longer duration and longer time to onset of action.  - Will discontinue the Tresiba and Humalog.  - Start NPH 40 units BID   - Start Regular insulin 6 units TID with/after meals + CF of 2:50 units > 150.   - Continue Farxiga 10 mg qAM  - Written instructions reviewed with patient.   - Pt to call me with BGs weekly or sooner if hypoglycemia develops.    ADDENDUM 01/15/2021.   Pt has been sending BG data every 2 -3 days. Has much improved glucoses. Also having some hypoglycemia.  Have made the following changes to her regimen:    - change NPH to 45 units qAM , 40 units nightly   - Regular insulin: 8 units with BK,  6 units with other meals + CF of 2:50 units > 150.   - Continue Farxiga 10 mg qAM    Pt meets medicare criteria for CGM:  - pt checking FSBG 4 times a day  - Pt treated with 3 - 4 injections of insulin per day  - Pt's insulin regimen requires frequent adjustment by the patient on the basis of CGM results  - Due to COVID-19, remote monitoring of the patient's glucose is recommended as it is safer for patient and provider.    Will send CGM order for Dexcom to DME supplier    RTC 3 months    Signed: Nancy Shepherd MD    Counseling was given to patient for the following topics:  instructions for management, impressions and risks and benefits of treatment options, see details in assessment/plan. Total face to face time of the encounter was 45 minutes and 30 minutes was spent counseling.

## 2021-02-15 DIAGNOSIS — IMO0002 UNCONTROLLED TYPE 2 DIABETES MELLITUS WITH COMPLICATION, WITH LONG-TERM CURRENT USE OF INSULIN: Primary | ICD-10-CM

## 2021-02-15 RX ORDER — SYRINGE-NEEDLE,INSULIN,0.5 ML 27GX1/2"
1 SYRINGE, EMPTY DISPOSABLE MISCELLANEOUS 4 TIMES DAILY
Qty: 100 EACH | Refills: 11 | Status: SHIPPED | OUTPATIENT
Start: 2021-02-15

## 2021-04-07 ENCOUNTER — OFFICE VISIT (OUTPATIENT)
Dept: ENDOCRINOLOGY | Facility: CLINIC | Age: 66
End: 2021-04-07

## 2021-04-07 VITALS
SYSTOLIC BLOOD PRESSURE: 128 MMHG | HEIGHT: 69 IN | HEART RATE: 74 BPM | OXYGEN SATURATION: 97 % | DIASTOLIC BLOOD PRESSURE: 80 MMHG | WEIGHT: 240 LBS | BODY MASS INDEX: 35.55 KG/M2

## 2021-04-07 DIAGNOSIS — Z79.4 TYPE 2 DIABETES MELLITUS WITH OTHER SPECIFIED COMPLICATION, WITH LONG-TERM CURRENT USE OF INSULIN (HCC): Primary | ICD-10-CM

## 2021-04-07 DIAGNOSIS — E11.69 TYPE 2 DIABETES MELLITUS WITH OTHER SPECIFIED COMPLICATION, WITH LONG-TERM CURRENT USE OF INSULIN (HCC): Primary | ICD-10-CM

## 2021-04-07 LAB
GLUCOSE BLDC GLUCOMTR-MCNC: 139 MG/DL (ref 70–130)
HBA1C MFR BLD: 6.4 %

## 2021-04-07 PROCEDURE — 83036 HEMOGLOBIN GLYCOSYLATED A1C: CPT | Performed by: INTERNAL MEDICINE

## 2021-04-07 PROCEDURE — 99213 OFFICE O/P EST LOW 20 MIN: CPT | Performed by: INTERNAL MEDICINE

## 2021-04-07 PROCEDURE — 95251 CONT GLUC MNTR ANALYSIS I&R: CPT | Performed by: INTERNAL MEDICINE

## 2021-04-07 NOTE — PROGRESS NOTES
Chief Complaint   Patient presents with   • Diabetes     follow up       HPI:   PERRY GONZALEZ is a 65 y.o.female who [facility] to [facility] for f/u evaluation and management of her Type 2 DM. Last visit 01/06/2021. Her history is as follows:    - pt is a retired nurse. Her medical history is significant for gastroparesis. She is followed at [facility] gastroenterology (Dr. Pruett). Is s/p gastric stimulator placement.   - Stimulator currently not working well. Battery change pending.  - Is using SGLT-2 inhibitor samples from PCP office   - Has had significantly improved glycemic control on NPH and regular    1) Type 2 DM with associated complications of peripheral neuropathy, gastroparesis, NPDR, CKD stage 2:   - Diagnosed in 2001  - Initially on metformin and actos.   - Started insulin in 2010  - Gastroparesis causing variable meal schedule / tolerance and prolonged postprandial hyperglycemia.  - Was on basal-bolus therapy with analogue insulins. Switched to NPH/Reg in 01/2021 at her initial Endocrine Visit    Current DM Medications:  - Farxiga 10 mg daily or Invokana 100 mg daily: states she is tolerating. Taking samples from PCP's office  - NPH: 45 units qAM, 40 units qHS  - Regular: 8 units with meals + CF of 2 units: 50 > 150    Glucometer / Dexcom CGM:  SG > 250: 2.4%  SG (181 - 250): 21.6%  SG (70 - 180): 76.8 (Goal > 70%)   SG (54 - 69): 1.5%  SG < 54: 0.2%   Avg Glucose: 144  Glucose Variability: 32.5  (goal </= 35%)  Glucose Management Indicator: 6.7%    Overall with good glycemic control from MN to 12PM. Some post-prandial hyperglycemia as pt must wait to administer regular insulin after she eats. Also having occasional hypoglycemia due to stacking of frequent correction insulin    DM Health Maintenance:  Ophtho:will obtain outside records  Podiatry: no resent visit  Monofilament / Foot exam: due  Lipids: (11/2020) Tchol 167, , HDL 42, LDL 96 - not on a statin  Urine Microalb/Cr ratio:  "(05/2020) 7.0  CMP: (11/2020) Cr 1.01, GFR 59, LFTs WNL  CBC: (05/2020) Hgb 13.7  TSH: (05/2020) 1.360    Review of Systems   Constitutional: Negative for fatigue.        Wt gain   HENT: Negative.    Eyes: Positive for itching.        Dry eyes   Respiratory: Positive for shortness of breath and wheezing.    Cardiovascular: Positive for palpitations and leg swelling (ankles).   Gastrointestinal: Positive for abdominal pain, constipation, nausea and vomiting. Negative for diarrhea.   Endocrine: Negative.  Negative for polydipsia.        Hot flashes   Genitourinary: Positive for frequency.   Musculoskeletal: Positive for arthralgias.        Chronic pain   Skin: Negative.    Allergic/Immunologic: Negative.    Neurological: Positive for numbness.   Hematological: Bruises/bleeds easily.   Psychiatric/Behavioral: Positive for sleep disturbance.        H/o depression       The following portions of the patient's history were reviewed and updated as appropriate: allergies, current medications, past family history, past medical history, past social history, past surgical history and problem list.      /80   Pulse 74   Ht 175.3 cm (69\")   Wt 109 kg (240 lb)   LMP  (LMP Unknown)   SpO2 97%   BMI 35.44 kg/m²   Physical Exam  Vitals reviewed.   Constitutional:       General: She is not in acute distress.     Appearance: She is well-developed. She is not diaphoretic.      Comments: overweight   HENT:      Head: Normocephalic.   Eyes:      Conjunctiva/sclera: Conjunctivae normal.      Pupils: Pupils are equal, round, and reactive to light.   Neck:      Thyroid: No thyromegaly.      Trachea: No tracheal deviation.      Comments: No palpable thyroid nodules    Cardiovascular:      Rate and Rhythm: Normal rate and regular rhythm.      Heart sounds: Normal heart sounds. No murmur heard.     Pulmonary:      Effort: Pulmonary effort is normal. No respiratory distress.      Breath sounds: Normal breath sounds.   Abdominal:     "  General: Bowel sounds are normal.      Palpations: Abdomen is soft. There is no mass.      Tenderness: There is abdominal tenderness (with deep palpation).      Comments: No scarring at insulin injection sites  Stimulator noted on right   Lymphadenopathy:      Cervical: No cervical adenopathy.   Skin:     General: Skin is warm and dry.      Findings: No erythema.   Neurological:      Mental Status: She is alert and oriented to person, place, and time.      Cranial Nerves: No cranial nerve deficit.   Psychiatric:         Behavior: Behavior normal.       LABS/IMAGING: outside records reviewed and summarized in HPI  Results for orders placed or performed in visit on 04/07/21   POC Glucose, Blood    Specimen: Blood   Result Value Ref Range    Glucose 139 (A) 70 - 130 mg/dL   POC Glycosylated Hemoglobin (Hb A1C)    Specimen: Blood   Result Value Ref Range    Hemoglobin A1C 6.4 %       ASSESSMENT/PLAN:    1) Type 2 DM with associated complications of peripheral neuropathy, gastroparesis, NPDR, mild CKD:   - Controlled at this time, A1C% 6.4 today down from 8.9 last visit. CGM estimated A1C% is 6.7%    CGM reviewed, see HPI    - Continue Farxiga 10 mg daily or Invokana 100 mg daily: states she is tolerating. Taking samples from PCP's office  - Continue NPH: 45 units qAM, 40 units qHS  - Continue Regular: 8 units with meals + CF of 2 units: 50 > 150  - Advised pt not to take regular insulin correction factor more frequently than every 4 hours or stacking will occur which leads to hypoglycemia.    Pt meets medicare criteria for CGM:  - pt checking FSBG 4 times a day  - Pt treated with 3 - 4 injections of insulin per day  - Pt's insulin regimen requires frequent adjustment by the patient on the basis of CGM results  - Due to COVID-19, remote monitoring of the patient's glucose is recommended as it is safer for patient and provider.    RTC 4 months    Signed: Nancy Shepherd MD

## 2021-04-13 PROBLEM — M67.40 GANGLION CYST: Status: ACTIVE | Noted: 2020-08-28

## 2021-04-13 PROBLEM — M65.312 TRIGGER THUMB OF LEFT HAND: Status: ACTIVE | Noted: 2019-11-22

## 2021-04-13 PROBLEM — M71.39: Status: ACTIVE | Noted: 2020-08-28

## 2021-04-13 PROBLEM — E66.9 OBESITY WITH BODY MASS INDEX 30 OR GREATER: Status: ACTIVE | Noted: 2019-08-22

## 2021-06-03 ENCOUNTER — HOSPITAL ENCOUNTER (OUTPATIENT)
Dept: GENERAL RADIOLOGY | Facility: HOSPITAL | Age: 66
Discharge: HOME | End: 2021-06-03
Admitting: INTERNAL MEDICINE

## 2021-06-03 ENCOUNTER — TRANSCRIBE ORDERS (OUTPATIENT)
Dept: ADMINISTRATIVE | Facility: HOSPITAL | Age: 66
End: 2021-06-03

## 2021-06-03 DIAGNOSIS — R05.9 COUGH: Primary | ICD-10-CM

## 2021-06-03 PROCEDURE — 71046 X-RAY EXAM CHEST 2 VIEWS: CPT

## 2021-08-02 ENCOUNTER — INFUSION (OUTPATIENT)
Dept: ONCOLOGY | Facility: HOSPITAL | Age: 66
End: 2021-08-02

## 2021-08-09 ENCOUNTER — APPOINTMENT (OUTPATIENT)
Dept: ONCOLOGY | Facility: HOSPITAL | Age: 66
End: 2021-08-09

## 2021-08-18 ENCOUNTER — APPOINTMENT (OUTPATIENT)
Dept: ONCOLOGY | Facility: HOSPITAL | Age: 66
End: 2021-08-18

## 2021-08-23 ENCOUNTER — OFFICE VISIT (OUTPATIENT)
Dept: ENDOCRINOLOGY | Facility: CLINIC | Age: 66
End: 2021-08-23

## 2021-08-23 VITALS
DIASTOLIC BLOOD PRESSURE: 78 MMHG | BODY MASS INDEX: 35.6 KG/M2 | SYSTOLIC BLOOD PRESSURE: 120 MMHG | HEART RATE: 80 BPM | WEIGHT: 240.4 LBS | HEIGHT: 69 IN | OXYGEN SATURATION: 97 %

## 2021-08-23 DIAGNOSIS — IMO0002 UNCONTROLLED TYPE 2 DIABETES MELLITUS WITH COMPLICATION, WITH LONG-TERM CURRENT USE OF INSULIN: ICD-10-CM

## 2021-08-23 DIAGNOSIS — E11.8 TYPE 2 DIABETES MELLITUS WITH COMPLICATION, WITH LONG-TERM CURRENT USE OF INSULIN (HCC): Primary | ICD-10-CM

## 2021-08-23 DIAGNOSIS — Z79.4 TYPE 2 DIABETES MELLITUS WITH COMPLICATION, WITH LONG-TERM CURRENT USE OF INSULIN (HCC): Primary | ICD-10-CM

## 2021-08-23 LAB — GLUCOSE BLDC GLUCOMTR-MCNC: 66 MG/DL (ref 70–130)

## 2021-08-23 PROCEDURE — 95251 CONT GLUC MNTR ANALYSIS I&R: CPT | Performed by: INTERNAL MEDICINE

## 2021-08-23 PROCEDURE — 99213 OFFICE O/P EST LOW 20 MIN: CPT | Performed by: INTERNAL MEDICINE

## 2021-08-23 RX ORDER — CANAGLIFLOZIN 100 MG/1
TABLET, FILM COATED ORAL
COMMUNITY
End: 2021-09-06

## 2021-09-06 RX ORDER — TIZANIDINE 2 MG/1
TABLET ORAL 3 TIMES DAILY
COMMUNITY

## 2021-09-06 RX ORDER — HUMAN INSULIN 100 [IU]/ML
INJECTION, SUSPENSION SUBCUTANEOUS
Qty: 30 ML | Refills: 5 | Status: ON HOLD
Start: 2021-09-06 | End: 2022-02-27 | Stop reason: SDUPTHER

## 2021-09-06 RX ORDER — LATANOPROST 50 UG/ML
SOLUTION/ DROPS OPHTHALMIC
COMMUNITY
Start: 2021-06-20

## 2021-09-06 RX ORDER — HUMAN INSULIN 100 [IU]/ML
INJECTION, SOLUTION SUBCUTANEOUS
Qty: 10 ML | Refills: 11 | Status: SHIPPED
Start: 2021-09-06

## 2021-09-06 RX ORDER — LANSOPRAZOLE 30 MG/1
CAPSULE, DELAYED RELEASE ORAL
COMMUNITY
End: 2021-09-06

## 2021-09-06 RX ORDER — METOCLOPRAMIDE 10 MG/1
TABLET ORAL
COMMUNITY
End: 2022-02-27 | Stop reason: HOSPADM

## 2021-09-06 RX ORDER — EZETIMIBE 10 MG/1
TABLET ORAL
COMMUNITY
Start: 2021-04-20

## 2021-09-06 RX ORDER — DIPHENOXYLATE HYDROCHLORIDE AND ATROPINE SULFATE 2.5; .025 MG/1; MG/1
TABLET ORAL
COMMUNITY
Start: 2021-05-18

## 2021-09-06 RX ORDER — OXYCODONE AND ACETAMINOPHEN 10; 325 MG/1; MG/1
TABLET ORAL
COMMUNITY
Start: 2021-06-18 | End: 2022-02-27 | Stop reason: HOSPADM

## 2021-12-27 ENCOUNTER — HOSPITAL ENCOUNTER (OUTPATIENT)
Dept: GENERAL RADIOLOGY | Facility: HOSPITAL | Age: 66
Discharge: HOME | End: 2021-12-27
Admitting: INTERNAL MEDICINE

## 2021-12-27 ENCOUNTER — TRANSCRIBE ORDERS (OUTPATIENT)
Dept: GENERAL RADIOLOGY | Facility: HOSPITAL | Age: 66
End: 2021-12-27

## 2021-12-27 DIAGNOSIS — M54.2 CERVICAL PAIN (NECK): ICD-10-CM

## 2021-12-27 DIAGNOSIS — M54.2 CERVICAL PAIN (NECK): Primary | ICD-10-CM

## 2021-12-27 DIAGNOSIS — M54.50 LUMBAR SPINE PAIN: ICD-10-CM

## 2021-12-27 PROCEDURE — 72050 X-RAY EXAM NECK SPINE 4/5VWS: CPT

## 2021-12-27 PROCEDURE — 72110 X-RAY EXAM L-2 SPINE 4/>VWS: CPT

## 2022-01-21 ENCOUNTER — TELEPHONE (OUTPATIENT)
Dept: ENDOCRINOLOGY | Facility: CLINIC | Age: 67
End: 2022-01-21

## 2022-01-22 ENCOUNTER — APPOINTMENT (OUTPATIENT)
Dept: GENERAL RADIOLOGY | Facility: HOSPITAL | Age: 67
End: 2022-01-22

## 2022-01-22 ENCOUNTER — HOSPITAL ENCOUNTER (OUTPATIENT)
Facility: HOSPITAL | Age: 67
Discharge: HOME | End: 2022-01-25
Attending: EMERGENCY MEDICINE | Admitting: INTERNAL MEDICINE

## 2022-01-22 ENCOUNTER — APPOINTMENT (OUTPATIENT)
Dept: CT IMAGING | Facility: HOSPITAL | Age: 67
End: 2022-01-22

## 2022-01-22 DIAGNOSIS — E13.10 KETOSIS DUE TO DIABETES: ICD-10-CM

## 2022-01-22 DIAGNOSIS — G89.4 CHRONIC PAIN SYNDROME: Chronic | ICD-10-CM

## 2022-01-22 DIAGNOSIS — K92.0 COFFEE GROUND EMESIS: ICD-10-CM

## 2022-01-22 DIAGNOSIS — R11.2 INTRACTABLE NAUSEA AND VOMITING: Primary | ICD-10-CM

## 2022-01-22 DIAGNOSIS — I10 HYPERTENSION, UNCONTROLLED: ICD-10-CM

## 2022-01-22 DIAGNOSIS — G89.29 CHRONIC ABDOMINAL PAIN: ICD-10-CM

## 2022-01-22 DIAGNOSIS — R10.9 CHRONIC ABDOMINAL PAIN: ICD-10-CM

## 2022-01-22 DIAGNOSIS — K31.84 GASTROPARESIS: ICD-10-CM

## 2022-01-22 LAB
ALBUMIN SERPL-MCNC: 4.9 G/DL (ref 3.5–5.2)
ALBUMIN/GLOB SERPL: 1.3 G/DL
ALP SERPL-CCNC: 101 U/L (ref 39–117)
ALT SERPL W P-5'-P-CCNC: 14 U/L (ref 1–33)
ANION GAP SERPL CALCULATED.3IONS-SCNC: 18 MMOL/L (ref 5–15)
ARTERIAL PATENCY WRIST A: ABNORMAL
AST SERPL-CCNC: 20 U/L (ref 1–32)
ATMOSPHERIC PRESS: ABNORMAL MM[HG]
B-OH-BUTYR SERPL-SCNC: 2.88 MMOL/L (ref 0.02–0.27)
BACTERIA UR QL AUTO: ABNORMAL /HPF
BASE EXCESS BLDA CALC-SCNC: -1 MMOL/L (ref 0–2)
BASOPHILS # BLD AUTO: 0.02 10*3/MM3 (ref 0–0.2)
BASOPHILS NFR BLD AUTO: 0.2 % (ref 0–1.5)
BDY SITE: ABNORMAL
BILIRUB SERPL-MCNC: 0.4 MG/DL (ref 0–1.2)
BILIRUB UR QL STRIP: NEGATIVE
BODY TEMPERATURE: 37 C
BUN SERPL-MCNC: 20 MG/DL (ref 8–23)
BUN/CREAT SERPL: 18 (ref 7–25)
CALCIUM SPEC-SCNC: 10.6 MG/DL (ref 8.6–10.5)
CHLORIDE SERPL-SCNC: 97 MMOL/L (ref 98–107)
CLARITY UR: CLEAR
CO2 BLDA-SCNC: 25.2 MMOL/L (ref 22–33)
CO2 SERPL-SCNC: 24 MMOL/L (ref 22–29)
COHGB MFR BLD: 0.4 % (ref 0–2)
COLOR UR: YELLOW
CREAT SERPL-MCNC: 1.11 MG/DL (ref 0.57–1)
DEPRECATED RDW RBC AUTO: 46.5 FL (ref 37–54)
EOSINOPHIL # BLD AUTO: 0 10*3/MM3 (ref 0–0.4)
EOSINOPHIL NFR BLD AUTO: 0 % (ref 0.3–6.2)
EPAP: 0
ERYTHROCYTE [DISTWIDTH] IN BLOOD BY AUTOMATED COUNT: 15.9 % (ref 12.3–15.4)
GFR SERPL CREATININE-BSD FRML MDRD: 49 ML/MIN/1.73
GLOBULIN UR ELPH-MCNC: 3.7 GM/DL
GLUCOSE SERPL-MCNC: 171 MG/DL (ref 65–99)
GLUCOSE UR STRIP-MCNC: ABNORMAL MG/DL
HCO3 BLDA-SCNC: 24 MMOL/L (ref 20–26)
HCT VFR BLD AUTO: 41.8 % (ref 34–46.6)
HCT VFR BLD CALC: 36.8 % (ref 38–51)
HGB BLD-MCNC: 13.5 G/DL (ref 12–15.9)
HGB BLDA-MCNC: 12 G/DL (ref 14–18)
HGB UR QL STRIP.AUTO: NEGATIVE
HOLD SPECIMEN: NORMAL
HYALINE CASTS UR QL AUTO: ABNORMAL /LPF
IMM GRANULOCYTES # BLD AUTO: 0.06 10*3/MM3 (ref 0–0.05)
IMM GRANULOCYTES NFR BLD AUTO: 0.5 % (ref 0–0.5)
INHALED O2 CONCENTRATION: 21 %
IPAP: 0
KETONES UR QL STRIP: ABNORMAL
LEUKOCYTE ESTERASE UR QL STRIP.AUTO: NEGATIVE
LIPASE SERPL-CCNC: 19 U/L (ref 13–60)
LYMPHOCYTES # BLD AUTO: 1.41 10*3/MM3 (ref 0.7–3.1)
LYMPHOCYTES NFR BLD AUTO: 11.2 % (ref 19.6–45.3)
MAGNESIUM SERPL-MCNC: 2.1 MG/DL (ref 1.6–2.4)
MCH RBC QN AUTO: 26.1 PG (ref 26.6–33)
MCHC RBC AUTO-ENTMCNC: 32.3 G/DL (ref 31.5–35.7)
MCV RBC AUTO: 80.9 FL (ref 79–97)
METHGB BLD QL: 0.8 % (ref 0–1.5)
MODALITY: ABNORMAL
MONOCYTES # BLD AUTO: 0.72 10*3/MM3 (ref 0.1–0.9)
MONOCYTES NFR BLD AUTO: 5.7 % (ref 5–12)
NEUTROPHILS NFR BLD AUTO: 10.43 10*3/MM3 (ref 1.7–7)
NEUTROPHILS NFR BLD AUTO: 82.4 % (ref 42.7–76)
NITRITE UR QL STRIP: NEGATIVE
NOTE: ABNORMAL
NRBC BLD AUTO-RTO: 0 /100 WBC (ref 0–0.2)
NT-PROBNP SERPL-MCNC: 1028 PG/ML (ref 0–900)
OXYHGB MFR BLDV: 94.6 % (ref 94–99)
PAW @ PEAK INSP FLOW SETTING VENT: 0 CMH2O
PCO2 BLDA: 40.1 MM HG (ref 35–45)
PCO2 TEMP ADJ BLD: 40.1 MM HG (ref 35–45)
PH BLDA: 7.38 PH UNITS (ref 7.35–7.45)
PH UR STRIP.AUTO: <=5 [PH] (ref 5–8)
PH, TEMP CORRECTED: 7.38 PH UNITS
PLATELET # BLD AUTO: 265 10*3/MM3 (ref 140–450)
PMV BLD AUTO: 11.1 FL (ref 6–12)
PO2 BLDA: 81.7 MM HG (ref 83–108)
PO2 TEMP ADJ BLD: 81.7 MM HG (ref 83–108)
POTASSIUM SERPL-SCNC: 4.2 MMOL/L (ref 3.5–5.2)
PROT SERPL-MCNC: 8.6 G/DL (ref 6–8.5)
PROT UR QL STRIP: ABNORMAL
RBC # BLD AUTO: 5.17 10*6/MM3 (ref 3.77–5.28)
RBC # UR STRIP: ABNORMAL /HPF
REF LAB TEST METHOD: ABNORMAL
SODIUM SERPL-SCNC: 139 MMOL/L (ref 136–145)
SP GR UR STRIP: 1.03 (ref 1–1.03)
SQUAMOUS #/AREA URNS HPF: ABNORMAL /HPF
TOTAL RATE: 0 BREATHS/MINUTE
TROPONIN T SERPL-MCNC: 0.02 NG/ML (ref 0–0.03)
TROPONIN T SERPL-MCNC: 0.04 NG/ML (ref 0–0.03)
UROBILINOGEN UR QL STRIP: ABNORMAL
WBC # UR STRIP: ABNORMAL /HPF
WBC NRBC COR # BLD: 12.64 10*3/MM3 (ref 3.4–10.8)
WHOLE BLOOD HOLD SPECIMEN: NORMAL
WHOLE BLOOD HOLD SPECIMEN: NORMAL

## 2022-01-22 PROCEDURE — 99284 EMERGENCY DEPT VISIT MOD MDM: CPT

## 2022-01-22 PROCEDURE — 25010000002 ONDANSETRON PER 1 MG: Performed by: PHYSICIAN ASSISTANT

## 2022-01-22 PROCEDURE — 82805 BLOOD GASES W/O2 SATURATION: CPT

## 2022-01-22 PROCEDURE — 25010000002 MORPHINE PER 10 MG: Performed by: EMERGENCY MEDICINE

## 2022-01-22 PROCEDURE — 85025 COMPLETE CBC W/AUTO DIFF WBC: CPT | Performed by: EMERGENCY MEDICINE

## 2022-01-22 PROCEDURE — C9803 HOPD COVID-19 SPEC COLLECT: HCPCS

## 2022-01-22 PROCEDURE — 25010000002 IOPAMIDOL 61 % SOLUTION: Performed by: EMERGENCY MEDICINE

## 2022-01-22 PROCEDURE — 93005 ELECTROCARDIOGRAM TRACING: CPT | Performed by: PHYSICIAN ASSISTANT

## 2022-01-22 PROCEDURE — 81001 URINALYSIS AUTO W/SCOPE: CPT | Performed by: EMERGENCY MEDICINE

## 2022-01-22 PROCEDURE — 83050 HGB METHEMOGLOBIN QUAN: CPT

## 2022-01-22 PROCEDURE — 83690 ASSAY OF LIPASE: CPT | Performed by: PHYSICIAN ASSISTANT

## 2022-01-22 PROCEDURE — 36600 WITHDRAWAL OF ARTERIAL BLOOD: CPT

## 2022-01-22 PROCEDURE — 82010 KETONE BODYS QUAN: CPT | Performed by: PHYSICIAN ASSISTANT

## 2022-01-22 PROCEDURE — 84484 ASSAY OF TROPONIN QUANT: CPT | Performed by: PHYSICIAN ASSISTANT

## 2022-01-22 PROCEDURE — 84484 ASSAY OF TROPONIN QUANT: CPT | Performed by: EMERGENCY MEDICINE

## 2022-01-22 PROCEDURE — 74177 CT ABD & PELVIS W/CONTRAST: CPT

## 2022-01-22 PROCEDURE — 83735 ASSAY OF MAGNESIUM: CPT | Performed by: EMERGENCY MEDICINE

## 2022-01-22 PROCEDURE — 87637 SARSCOV2&INF A&B&RSV AMP PRB: CPT | Performed by: INTERNAL MEDICINE

## 2022-01-22 PROCEDURE — 80053 COMPREHEN METABOLIC PANEL: CPT | Performed by: EMERGENCY MEDICINE

## 2022-01-22 PROCEDURE — 93005 ELECTROCARDIOGRAM TRACING: CPT | Performed by: EMERGENCY MEDICINE

## 2022-01-22 PROCEDURE — 84145 PROCALCITONIN (PCT): CPT | Performed by: INTERNAL MEDICINE

## 2022-01-22 PROCEDURE — 96375 TX/PRO/DX INJ NEW DRUG ADDON: CPT

## 2022-01-22 PROCEDURE — 71045 X-RAY EXAM CHEST 1 VIEW: CPT

## 2022-01-22 PROCEDURE — 25010000002 HYDROMORPHONE PER 4 MG: Performed by: EMERGENCY MEDICINE

## 2022-01-22 PROCEDURE — 96374 THER/PROPH/DIAG INJ IV PUSH: CPT

## 2022-01-22 PROCEDURE — 82375 ASSAY CARBOXYHB QUANT: CPT

## 2022-01-22 PROCEDURE — 83880 ASSAY OF NATRIURETIC PEPTIDE: CPT | Performed by: PHYSICIAN ASSISTANT

## 2022-01-22 RX ORDER — ONDANSETRON 2 MG/ML
4 INJECTION INTRAMUSCULAR; INTRAVENOUS ONCE
Status: COMPLETED | OUTPATIENT
Start: 2022-01-22 | End: 2022-01-22

## 2022-01-22 RX ORDER — MORPHINE SULFATE 2 MG/ML
2 INJECTION, SOLUTION INTRAMUSCULAR; INTRAVENOUS ONCE
Status: COMPLETED | OUTPATIENT
Start: 2022-01-22 | End: 2022-01-22

## 2022-01-22 RX ORDER — LABETALOL HYDROCHLORIDE 5 MG/ML
10 INJECTION, SOLUTION INTRAVENOUS ONCE
Status: COMPLETED | OUTPATIENT
Start: 2022-01-22 | End: 2022-01-22

## 2022-01-22 RX ORDER — SODIUM CHLORIDE, SODIUM LACTATE, POTASSIUM CHLORIDE, CALCIUM CHLORIDE 600; 310; 30; 20 MG/100ML; MG/100ML; MG/100ML; MG/100ML
250 INJECTION, SOLUTION INTRAVENOUS CONTINUOUS
Status: ACTIVE | OUTPATIENT
Start: 2022-01-22 | End: 2022-01-23

## 2022-01-22 RX ORDER — METOCLOPRAMIDE HYDROCHLORIDE 5 MG/ML
10 INJECTION INTRAMUSCULAR; INTRAVENOUS
Status: DISCONTINUED | OUTPATIENT
Start: 2022-01-23 | End: 2022-01-25 | Stop reason: HOSPADM

## 2022-01-22 RX ORDER — SODIUM CHLORIDE 0.9 % (FLUSH) 0.9 %
10 SYRINGE (ML) INJECTION AS NEEDED
Status: DISCONTINUED | OUTPATIENT
Start: 2022-01-22 | End: 2022-01-25 | Stop reason: HOSPADM

## 2022-01-22 RX ORDER — HYDROMORPHONE HYDROCHLORIDE 1 MG/ML
0.5 INJECTION, SOLUTION INTRAMUSCULAR; INTRAVENOUS; SUBCUTANEOUS ONCE
Status: COMPLETED | OUTPATIENT
Start: 2022-01-22 | End: 2022-01-22

## 2022-01-22 RX ORDER — METOCLOPRAMIDE HYDROCHLORIDE 5 MG/ML
10 INJECTION INTRAMUSCULAR; INTRAVENOUS ONCE
Status: COMPLETED | OUTPATIENT
Start: 2022-01-22 | End: 2022-01-23

## 2022-01-22 RX ADMIN — SODIUM CHLORIDE 1000 ML: 9 INJECTION, SOLUTION INTRAVENOUS at 19:24

## 2022-01-22 RX ADMIN — IOPAMIDOL 85 ML: 612 INJECTION, SOLUTION INTRAVENOUS at 22:05

## 2022-01-22 RX ADMIN — ONDANSETRON 4 MG: 2 INJECTION INTRAMUSCULAR; INTRAVENOUS at 19:25

## 2022-01-22 RX ADMIN — SODIUM CHLORIDE 1000 ML: 9 INJECTION, SOLUTION INTRAVENOUS at 22:48

## 2022-01-22 RX ADMIN — LABETALOL 20 MG/4 ML (5 MG/ML) INTRAVENOUS SYRINGE 10 MG: at 22:47

## 2022-01-22 RX ADMIN — MORPHINE SULFATE 2 MG: 2 INJECTION, SOLUTION INTRAMUSCULAR; INTRAVENOUS at 19:25

## 2022-01-22 RX ADMIN — HYDROMORPHONE HYDROCHLORIDE 0.5 MG: 1 INJECTION, SOLUTION INTRAMUSCULAR; INTRAVENOUS; SUBCUTANEOUS at 20:14

## 2022-01-22 NOTE — ED PROVIDER NOTES
Subjective   Ms. Delacruz is a 66-year-old female who presents to the emergency department with complaints of nausea and vomiting and generalized weakness.  The patient states that her nausea and vomiting started yesterday.  She has vomited multiple times.  No associated diarrhea.  No abdominal pain.  Normal urination.  No fever or chills.  The patient denies any chest pain, cough or shortness of breath.  The patient has a history of insulin-dependent diabetes type 2 and gastroparesis.  She has a gastric stimulator in place.  She states that it is not uncommon for her to have episodes like this.  She also has a history of hypertension.  She is a non-smoker.  No alcohol use.  She took Zofran at home with no relief.          Review of Systems   Constitutional: Negative for chills and fever.   HENT: Negative for sore throat.    Respiratory: Negative for cough and shortness of breath.    Cardiovascular: Negative for chest pain.   Gastrointestinal: Positive for nausea and vomiting. Negative for abdominal pain, blood in stool and diarrhea.   Endocrine: Negative for polydipsia, polyphagia and polyuria.   Genitourinary: Negative for dysuria and hematuria.   Musculoskeletal: Negative for neck pain.   Skin: Negative for rash.   Allergic/Immunologic: Negative for immunocompromised state.   Neurological: Positive for weakness (generalized). Negative for dizziness, light-headedness and headaches.   Hematological: Negative.    Psychiatric/Behavioral: Negative.        Past Medical History:   Diagnosis Date   • Arthritis    • CAP (community acquired pneumonia) 2015   • Chronic pain     on dilaudid x4 years   • Depression    • Diabetes (CMS/HCC)     type II   • Diabetic neuropathy (CMS/HCC)     gabapentin, lamictal; reports uses xanax for this when severe as well   • Gastroparesis    • GERD (gastroesophageal reflux disease)     PPI and pepcid   • HX: breast cancer 2000    s/p chemo   • Hypercholesteremia    • Hypertension 2002   •  Osteoporosis     reclast inj yearly   • Pyelonephritis 03/2019       Allergies   Allergen Reactions   • Cephalosporins Anaphylaxis   • Ciprofloxacin Anaphylaxis   • Codeine Anaphylaxis   • Kiwi Extract Anaphylaxis   • Metformin And Related Shortness Of Breath   • Pioglitazone Shortness Of Breath   • Sulbactam Anaphylaxis   • Ace Inhibitors Itching   • Amitriptyline Other (See Comments)   • Clarithromycin Hives, Nausea And Vomiting and GI Intolerance   • Crestor [Rosuvastatin Calcium] Myalgia   • Dexlansoprazole Other (See Comments)   • Empagliflozin Other (See Comments)   • Esomeprazole Magnesium Other (See Comments)     Gastric standstill   • Glimepiride Irritability   • Hydrocodone-Acetaminophen Other (See Comments)   • Latex Itching   • Omeprazole Other (See Comments) and Diarrhea     Gastris standstill   • Pitavastatin Itching   • Pravachol  [Pravastatin Sodium] Hives   • Sitagliptin Itching   • Topiramate Itching   • Vraylar  [Cariprazine Hcl] Itching       Past Surgical History:   Procedure Laterality Date   • ABDOMINAL HERNIA REPAIR  2001   • ABDOMINAL HERNIA REPAIR  2005   • APPENDECTOMY  1968   • DIAGNOSTIC LAPAROSCOPY  1988   • GASTRIC STIMULATOR IMPLANT SURGERY  2014   • GASTRIC STIMULATOR IMPLANT SURGERY  2015    replaced generator   • GASTRIC STIMULATOR IMPLANT SURGERY  2017    Replacement   • LAPAROSCOPIC CHOLECYSTECTOMY  1992   • LUMBAR LAMINECTOMY  1985    L4-L5   • LUMBAR LAMINECTOMY  1987    L3-L4   • LUMBAR LAMINECTOMY  1997    L3-L4   • LUMBAR LAMINECTOMY  2007    L3-L5   • MASTECTOMY Left 2000   • TONGUE SURGERY  2018   • TONSILLECTOMY AND ADENOIDECTOMY  1962   • TOTAL HIP ARTHROPLASTY REVISION  2015   • TRIGGER FINGER RELEASE Left 2017    ring finger   • URETEROSCOPY LASER LITHOTRIPSY WITH STENT INSERTION Right 03/13/2019    Dr. Cerna   • VENTRAL HERNIA REPAIR  2007   • VENTRAL HERNIA REPAIR  2013   • WRIST GANGLION EXCISION Right 1986       Family History   Problem Relation Age of Onset   •  Alcohol abuse Father    • Lung cancer Father    • Dementia Maternal Grandmother    • Hypertension Mother    • Arthritis Mother    • Osteoporosis Mother    • Coronary artery disease Brother    • Melanoma Brother        Social History     Socioeconomic History   • Marital status:    Tobacco Use   • Smoking status: Never Smoker   • Smokeless tobacco: Never Used   Substance and Sexual Activity   • Alcohol use: No   • Drug use: No   • Sexual activity: Not Currently     Partners: Female     Birth control/protection: Abstinence           Objective   Physical Exam  Constitutional:       General: She is not in acute distress.     Appearance: Normal appearance. She is not toxic-appearing or diaphoretic.   HENT:      Nose: Nose normal.      Mouth/Throat:      Mouth: Mucous membranes are moist.   Eyes:      General: No scleral icterus.     Conjunctiva/sclera: Conjunctivae normal.      Pupils: Pupils are equal, round, and reactive to light.   Cardiovascular:      Rate and Rhythm: Tachycardia present.      Pulses: Normal pulses.      Comments: Tachycardic at 128    Pulmonary:      Effort: Pulmonary effort is normal.      Breath sounds: Normal breath sounds. No wheezing, rhonchi or rales.   Abdominal:      General: Bowel sounds are normal.      Tenderness: There is no abdominal tenderness. There is no guarding or rebound.   Musculoskeletal:         General: No tenderness. Normal range of motion.      Cervical back: Normal range of motion.      Right lower leg: No edema.      Left lower leg: No edema.   Skin:     General: Skin is warm and dry.      Coloration: Skin is not pale.      Findings: No rash.   Neurological:      General: No focal deficit present.      Mental Status: She is alert and oriented to person, place, and time.   Psychiatric:         Mood and Affect: Mood normal.         Procedures           ED Course      The pt appears to feel bad.  She states her main issue is intense nausea.  She has a history of  "diabetic gastroparesis.   She states that she has been on Dilaudid at home for over 15 yrs for her chronic pain.  She states she is allergic to Reglan.   I spoke with her about how narcotics can worsen gastroparesis but she states \"it is the only thing that helps\".  She got little to no relief of her back pain and abdominal pain with Morphine 2mg IV.  I have ordered a small dose of Dilaudid 0.5mg IV and will get a CT abd/pelvis.  She states she was at [facility] yesterday and had CT of her back due to a fall.      Her labs show an elevated troponin at 0.035.  Her EKG shows no acute changes.  She denies chest pain.  I will get a second troponin at two hours.  Her white count is 12.64.  Beta-Hydroxybutyrate is 2.8.  Her bicarb is 24 with an anion gap of 18.  Glucose is 171.  Creatinine is 1.11.  White count is 12.64.      22:58 EST  Second troponin is down at 0.022.      CT abd/pelvis:  1. No clearly acute process in the abdomen or pelvis.  2. Appendix not visualized but no secondary signs of appendicitis.  3. Slight interval increase in size of a chronic fluid collection anterior to the rectus musculature. No new CT evidence to otherwise suggest infected fluid but this be correlated clinically.  4. Hepatomegaly and hepatic steatosis. Postoperative changes as described. Diverticulosis.     I spoke with the pt about her workup.  She states she really feels no better after fluids and antiemetic and pain meds.  She is noted to be sipping on water and seems to be keeping it down.  She has been hypertensive and tachycardic throughout her ED stay.  I have ordered an additional bolus of fluids and a dose of labetalol.      Given her continued nausea, her tachycardia, and her ketosis without acidosis, I think admission is warranted for continued hydration, nausea and pain control and blood pressure management.                                               MDM    Final diagnoses:   Intractable nausea and vomiting   Ketosis due " to diabetes (HCC)   Gastroparesis   Chronic abdominal pain   Hypertension, uncontrolled       ED Disposition  ED Disposition     ED Disposition Condition Comment    Decision to Admit            No follow-up provider specified.       Medication List      No changes were made to your prescriptions during this visit.          Brian Burton, PA  01/22/22 [id number]

## 2022-01-23 ENCOUNTER — APPOINTMENT (OUTPATIENT)
Dept: GENERAL RADIOLOGY | Facility: HOSPITAL | Age: 67
End: 2022-01-23

## 2022-01-23 PROBLEM — R79.89 ELEVATED SERUM CREATININE: Status: ACTIVE | Noted: 2022-01-23

## 2022-01-23 PROBLEM — E88.89 KETOSIS: Status: ACTIVE | Noted: 2022-01-23

## 2022-01-23 PROBLEM — K92.0 COFFEE GROUND EMESIS: Status: ACTIVE | Noted: 2022-01-22

## 2022-01-23 PROBLEM — R79.89 ELEVATED TROPONIN: Status: ACTIVE | Noted: 2022-01-23

## 2022-01-23 PROBLEM — R77.8 ELEVATED TROPONIN: Status: ACTIVE | Noted: 2022-01-23

## 2022-01-23 LAB
ANION GAP SERPL CALCULATED.3IONS-SCNC: 10 MMOL/L (ref 5–15)
ANION GAP SERPL CALCULATED.3IONS-SCNC: 14 MMOL/L (ref 5–15)
B-OH-BUTYR SERPL-SCNC: 1.66 MMOL/L (ref 0.02–0.27)
BASOPHILS # BLD AUTO: 0.04 10*3/MM3 (ref 0–0.2)
BASOPHILS NFR BLD AUTO: 0.3 % (ref 0–1.5)
BUN SERPL-MCNC: 16 MG/DL (ref 8–23)
BUN SERPL-MCNC: 19 MG/DL (ref 8–23)
BUN/CREAT SERPL: 18 (ref 7–25)
BUN/CREAT SERPL: 20.2 (ref 7–25)
CA-I SERPL ISE-MCNC: 1.27 MMOL/L (ref 1.12–1.32)
CALCIUM SPEC-SCNC: 9 MG/DL (ref 8.6–10.5)
CALCIUM SPEC-SCNC: 9.6 MG/DL (ref 8.6–10.5)
CHLORIDE SERPL-SCNC: 103 MMOL/L (ref 98–107)
CHLORIDE SERPL-SCNC: 103 MMOL/L (ref 98–107)
CO2 SERPL-SCNC: 23 MMOL/L (ref 22–29)
CO2 SERPL-SCNC: 24 MMOL/L (ref 22–29)
CREAT SERPL-MCNC: 0.89 MG/DL (ref 0.57–1)
CREAT SERPL-MCNC: 0.94 MG/DL (ref 0.57–1)
D-LACTATE SERPL-SCNC: 1.3 MMOL/L (ref 0.5–2)
DEPRECATED RDW RBC AUTO: 52.5 FL (ref 37–54)
EOSINOPHIL # BLD AUTO: 0.03 10*3/MM3 (ref 0–0.4)
EOSINOPHIL NFR BLD AUTO: 0.3 % (ref 0.3–6.2)
ERYTHROCYTE [DISTWIDTH] IN BLOOD BY AUTOMATED COUNT: 16.3 % (ref 12.3–15.4)
FLUAV RNA RESP QL NAA+PROBE: NOT DETECTED
FLUBV RNA RESP QL NAA+PROBE: NOT DETECTED
GFR SERPL CREATININE-BSD FRML MDRD: 60 ML/MIN/1.73
GFR SERPL CREATININE-BSD FRML MDRD: 63 ML/MIN/1.73
GLUCOSE BLDC GLUCOMTR-MCNC: 105 MG/DL (ref 70–130)
GLUCOSE BLDC GLUCOMTR-MCNC: 108 MG/DL (ref 70–130)
GLUCOSE BLDC GLUCOMTR-MCNC: 113 MG/DL (ref 70–130)
GLUCOSE BLDC GLUCOMTR-MCNC: 132 MG/DL (ref 70–130)
GLUCOSE BLDC GLUCOMTR-MCNC: 144 MG/DL (ref 70–130)
GLUCOSE BLDC GLUCOMTR-MCNC: 151 MG/DL (ref 70–130)
GLUCOSE SERPL-MCNC: 117 MG/DL (ref 65–99)
GLUCOSE SERPL-MCNC: 143 MG/DL (ref 65–99)
HBA1C MFR BLD: 5.6 % (ref 4.8–5.6)
HCT VFR BLD AUTO: 37.5 % (ref 34–46.6)
HGB BLD-MCNC: 11.2 G/DL (ref 12–15.9)
IMM GRANULOCYTES # BLD AUTO: 0.05 10*3/MM3 (ref 0–0.05)
IMM GRANULOCYTES NFR BLD AUTO: 0.4 % (ref 0–0.5)
LYMPHOCYTES # BLD AUTO: 3.38 10*3/MM3 (ref 0.7–3.1)
LYMPHOCYTES NFR BLD AUTO: 29.1 % (ref 19.6–45.3)
MCH RBC QN AUTO: 26.2 PG (ref 26.6–33)
MCHC RBC AUTO-ENTMCNC: 29.9 G/DL (ref 31.5–35.7)
MCV RBC AUTO: 87.6 FL (ref 79–97)
MONOCYTES # BLD AUTO: 1.04 10*3/MM3 (ref 0.1–0.9)
MONOCYTES NFR BLD AUTO: 8.9 % (ref 5–12)
NEUTROPHILS NFR BLD AUTO: 61 % (ref 42.7–76)
NEUTROPHILS NFR BLD AUTO: 7.09 10*3/MM3 (ref 1.7–7)
NRBC BLD AUTO-RTO: 0 /100 WBC (ref 0–0.2)
PLATELET # BLD AUTO: 213 10*3/MM3 (ref 140–450)
PMV BLD AUTO: 11.4 FL (ref 6–12)
POTASSIUM SERPL-SCNC: 3.8 MMOL/L (ref 3.5–5.2)
POTASSIUM SERPL-SCNC: 3.8 MMOL/L (ref 3.5–5.2)
PROCALCITONIN SERPL-MCNC: 0.12 NG/ML (ref 0–0.25)
RBC # BLD AUTO: 4.28 10*6/MM3 (ref 3.77–5.28)
RSV RNA NPH QL NAA+NON-PROBE: NOT DETECTED
SARS-COV-2 RNA RESP QL NAA+PROBE: NOT DETECTED
SODIUM SERPL-SCNC: 137 MMOL/L (ref 136–145)
SODIUM SERPL-SCNC: 140 MMOL/L (ref 136–145)
WBC NRBC COR # BLD: 11.63 10*3/MM3 (ref 3.4–10.8)

## 2022-01-23 PROCEDURE — G0378 HOSPITAL OBSERVATION PER HR: HCPCS

## 2022-01-23 PROCEDURE — 97162 PT EVAL MOD COMPLEX 30 MIN: CPT

## 2022-01-23 PROCEDURE — 83605 ASSAY OF LACTIC ACID: CPT | Performed by: INTERNAL MEDICINE

## 2022-01-23 PROCEDURE — 96372 THER/PROPH/DIAG INJ SC/IM: CPT

## 2022-01-23 PROCEDURE — 25010000002 KETOROLAC TROMETHAMINE PER 15 MG: Performed by: NURSE PRACTITIONER

## 2022-01-23 PROCEDURE — 82010 KETONE BODYS QUAN: CPT | Performed by: NURSE PRACTITIONER

## 2022-01-23 PROCEDURE — 82962 GLUCOSE BLOOD TEST: CPT

## 2022-01-23 PROCEDURE — 82330 ASSAY OF CALCIUM: CPT | Performed by: NURSE PRACTITIONER

## 2022-01-23 PROCEDURE — 99204 OFFICE O/P NEW MOD 45 MIN: CPT | Performed by: INTERNAL MEDICINE

## 2022-01-23 PROCEDURE — 93005 ELECTROCARDIOGRAM TRACING: CPT | Performed by: NURSE PRACTITIONER

## 2022-01-23 PROCEDURE — 72110 X-RAY EXAM L-2 SPINE 4/>VWS: CPT

## 2022-01-23 PROCEDURE — 85025 COMPLETE CBC W/AUTO DIFF WBC: CPT | Performed by: NURSE PRACTITIONER

## 2022-01-23 PROCEDURE — 80048 BASIC METABOLIC PNL TOTAL CA: CPT | Performed by: NURSE PRACTITIONER

## 2022-01-23 PROCEDURE — 73521 X-RAY EXAM HIPS BI 2 VIEWS: CPT

## 2022-01-23 PROCEDURE — 25010000002 HYDROMORPHONE PER 4 MG: Performed by: INTERNAL MEDICINE

## 2022-01-23 PROCEDURE — 25010000002 HEPARIN (PORCINE) PER 1000 UNITS: Performed by: NURSE PRACTITIONER

## 2022-01-23 PROCEDURE — 25010000002 METOCLOPRAMIDE PER 10 MG: Performed by: INTERNAL MEDICINE

## 2022-01-23 PROCEDURE — 25010000002 HYDROMORPHONE PER 4 MG: Performed by: NURSE PRACTITIONER

## 2022-01-23 PROCEDURE — 83036 HEMOGLOBIN GLYCOSYLATED A1C: CPT | Performed by: NURSE PRACTITIONER

## 2022-01-23 PROCEDURE — 25010000002 HYDROMORPHONE 1 MG/ML SOLUTION: Performed by: INTERNAL MEDICINE

## 2022-01-23 PROCEDURE — 99220 PR INITIAL OBSERVATION CARE/DAY 70 MINUTES: CPT | Performed by: INTERNAL MEDICINE

## 2022-01-23 PROCEDURE — 63710000001 INSULIN DETEMIR PER 5 UNITS: Performed by: NURSE PRACTITIONER

## 2022-01-23 PROCEDURE — 25010000002 METOCLOPRAMIDE PER 10 MG: Performed by: NURSE PRACTITIONER

## 2022-01-23 PROCEDURE — 25010000002 ONDANSETRON PER 1 MG: Performed by: NURSE PRACTITIONER

## 2022-01-23 PROCEDURE — 97116 GAIT TRAINING THERAPY: CPT

## 2022-01-23 PROCEDURE — 63710000001 ONDANSETRON PER 8 MG: Performed by: NURSE PRACTITIONER

## 2022-01-23 PROCEDURE — 96375 TX/PRO/DX INJ NEW DRUG ADDON: CPT

## 2022-01-23 PROCEDURE — 96376 TX/PRO/DX INJ SAME DRUG ADON: CPT

## 2022-01-23 PROCEDURE — 93010 ELECTROCARDIOGRAM REPORT: CPT | Performed by: INTERNAL MEDICINE

## 2022-01-23 RX ORDER — GABAPENTIN 300 MG/1
600 CAPSULE ORAL DAILY
Status: DISCONTINUED | OUTPATIENT
Start: 2022-01-23 | End: 2022-01-25 | Stop reason: HOSPADM

## 2022-01-23 RX ORDER — DEXTROSE MONOHYDRATE 25 G/50ML
25 INJECTION, SOLUTION INTRAVENOUS
Status: DISCONTINUED | OUTPATIENT
Start: 2022-01-23 | End: 2022-01-25 | Stop reason: HOSPADM

## 2022-01-23 RX ORDER — ACETAMINOPHEN 325 MG/1
650 TABLET ORAL EVERY 6 HOURS SCHEDULED
Status: DISCONTINUED | OUTPATIENT
Start: 2022-01-23 | End: 2022-01-25 | Stop reason: HOSPADM

## 2022-01-23 RX ORDER — METOPROLOL SUCCINATE 100 MG/1
100 TABLET, EXTENDED RELEASE ORAL
Status: DISCONTINUED | OUTPATIENT
Start: 2022-01-23 | End: 2022-01-25 | Stop reason: HOSPADM

## 2022-01-23 RX ORDER — HEPARIN SODIUM 5000 [USP'U]/ML
5000 INJECTION, SOLUTION INTRAVENOUS; SUBCUTANEOUS EVERY 8 HOURS SCHEDULED
Status: DISCONTINUED | OUTPATIENT
Start: 2022-01-23 | End: 2022-01-25 | Stop reason: HOSPADM

## 2022-01-23 RX ORDER — GABAPENTIN 400 MG/1
1200 CAPSULE ORAL NIGHTLY
Status: DISCONTINUED | OUTPATIENT
Start: 2022-01-23 | End: 2022-01-25 | Stop reason: HOSPADM

## 2022-01-23 RX ORDER — ONDANSETRON 2 MG/ML
4 INJECTION INTRAMUSCULAR; INTRAVENOUS EVERY 6 HOURS PRN
Status: DISCONTINUED | OUTPATIENT
Start: 2022-01-23 | End: 2022-01-25 | Stop reason: HOSPADM

## 2022-01-23 RX ORDER — HYDROMORPHONE HYDROCHLORIDE 2 MG/1
2 TABLET ORAL EVERY 6 HOURS PRN
Status: DISCONTINUED | OUTPATIENT
Start: 2022-01-23 | End: 2022-01-23

## 2022-01-23 RX ORDER — LIDOCAINE 50 MG/G
3 PATCH TOPICAL
Status: DISCONTINUED | OUTPATIENT
Start: 2022-01-23 | End: 2022-01-25 | Stop reason: HOSPADM

## 2022-01-23 RX ORDER — HYDROMORPHONE HYDROCHLORIDE 1 MG/ML
0.25 INJECTION, SOLUTION INTRAMUSCULAR; INTRAVENOUS; SUBCUTANEOUS ONCE
Status: COMPLETED | OUTPATIENT
Start: 2022-01-23 | End: 2022-01-23

## 2022-01-23 RX ORDER — ONDANSETRON 4 MG/1
4 TABLET, FILM COATED ORAL EVERY 6 HOURS PRN
Status: DISCONTINUED | OUTPATIENT
Start: 2022-01-23 | End: 2022-01-25 | Stop reason: HOSPADM

## 2022-01-23 RX ORDER — PANTOPRAZOLE SODIUM 40 MG/1
40 TABLET, DELAYED RELEASE ORAL DAILY
Status: DISCONTINUED | OUTPATIENT
Start: 2022-01-23 | End: 2022-01-23

## 2022-01-23 RX ORDER — KETOROLAC TROMETHAMINE 15 MG/ML
15 INJECTION, SOLUTION INTRAMUSCULAR; INTRAVENOUS ONCE
Status: COMPLETED | OUTPATIENT
Start: 2022-01-23 | End: 2022-01-23

## 2022-01-23 RX ORDER — HYDROMORPHONE HYDROCHLORIDE 1 MG/ML
0.5 INJECTION, SOLUTION INTRAMUSCULAR; INTRAVENOUS; SUBCUTANEOUS
Status: DISCONTINUED | OUTPATIENT
Start: 2022-01-23 | End: 2022-01-24

## 2022-01-23 RX ORDER — LAMOTRIGINE 100 MG/1
100 TABLET ORAL EVERY 12 HOURS SCHEDULED
Status: DISCONTINUED | OUTPATIENT
Start: 2022-01-23 | End: 2022-01-25 | Stop reason: HOSPADM

## 2022-01-23 RX ORDER — HYDROMORPHONE HYDROCHLORIDE 1 MG/ML
0.5 INJECTION, SOLUTION INTRAMUSCULAR; INTRAVENOUS; SUBCUTANEOUS ONCE AS NEEDED
Status: COMPLETED | OUTPATIENT
Start: 2022-01-23 | End: 2022-01-23

## 2022-01-23 RX ORDER — FLUTICASONE PROPIONATE 50 MCG
2 SPRAY, SUSPENSION (ML) NASAL DAILY PRN
Status: DISCONTINUED | OUTPATIENT
Start: 2022-01-23 | End: 2022-01-25 | Stop reason: HOSPADM

## 2022-01-23 RX ORDER — SODIUM CHLORIDE 0.9 % (FLUSH) 0.9 %
10 SYRINGE (ML) INJECTION AS NEEDED
Status: DISCONTINUED | OUTPATIENT
Start: 2022-01-23 | End: 2022-01-25 | Stop reason: HOSPADM

## 2022-01-23 RX ORDER — NICOTINE POLACRILEX 4 MG
15 LOZENGE BUCCAL
Status: DISCONTINUED | OUTPATIENT
Start: 2022-01-23 | End: 2022-01-25 | Stop reason: HOSPADM

## 2022-01-23 RX ORDER — SODIUM CHLORIDE 0.9 % (FLUSH) 0.9 %
10 SYRINGE (ML) INJECTION EVERY 12 HOURS SCHEDULED
Status: DISCONTINUED | OUTPATIENT
Start: 2022-01-23 | End: 2022-01-25 | Stop reason: HOSPADM

## 2022-01-23 RX ORDER — PANTOPRAZOLE SODIUM 40 MG/10ML
40 INJECTION, POWDER, LYOPHILIZED, FOR SOLUTION INTRAVENOUS
Status: DISCONTINUED | OUTPATIENT
Start: 2022-01-24 | End: 2022-01-25 | Stop reason: HOSPADM

## 2022-01-23 RX ORDER — MULTIPLE VITAMINS W/ MINERALS TAB 9MG-400MCG
1 TAB ORAL DAILY
Status: DISCONTINUED | OUTPATIENT
Start: 2022-01-23 | End: 2022-01-25 | Stop reason: HOSPADM

## 2022-01-23 RX ORDER — HYDROMORPHONE HYDROCHLORIDE 4 MG/1
4 TABLET ORAL EVERY 6 HOURS PRN
Status: DISCONTINUED | OUTPATIENT
Start: 2022-01-23 | End: 2022-01-24

## 2022-01-23 RX ADMIN — GABAPENTIN 1200 MG: 400 CAPSULE ORAL at 21:02

## 2022-01-23 RX ADMIN — LAMOTRIGINE 100 MG: 100 TABLET ORAL at 21:03

## 2022-01-23 RX ADMIN — HEPARIN SODIUM 5000 UNITS: 5000 INJECTION, SOLUTION INTRAVENOUS; SUBCUTANEOUS at 13:28

## 2022-01-23 RX ADMIN — ONDANSETRON 4 MG: 2 INJECTION INTRAMUSCULAR; INTRAVENOUS at 19:59

## 2022-01-23 RX ADMIN — SODIUM CHLORIDE, PRESERVATIVE FREE 10 ML: 5 INJECTION INTRAVENOUS at 08:23

## 2022-01-23 RX ADMIN — SODIUM CHLORIDE, PRESERVATIVE FREE 10 ML: 5 INJECTION INTRAVENOUS at 13:26

## 2022-01-23 RX ADMIN — SODIUM CHLORIDE, PRESERVATIVE FREE 10 ML: 5 INJECTION INTRAVENOUS at 20:01

## 2022-01-23 RX ADMIN — SODIUM CHLORIDE, PRESERVATIVE FREE 10 ML: 5 INJECTION INTRAVENOUS at 17:49

## 2022-01-23 RX ADMIN — HYDROMORPHONE HYDROCHLORIDE 0.25 MG: 1 INJECTION, SOLUTION INTRAMUSCULAR; INTRAVENOUS; SUBCUTANEOUS at 10:15

## 2022-01-23 RX ADMIN — LAMOTRIGINE 100 MG: 100 TABLET ORAL at 02:11

## 2022-01-23 RX ADMIN — KETOROLAC TROMETHAMINE 15 MG: 15 INJECTION, SOLUTION INTRAMUSCULAR; INTRAVENOUS at 06:30

## 2022-01-23 RX ADMIN — HEPARIN SODIUM 5000 UNITS: 5000 INJECTION, SOLUTION INTRAVENOUS; SUBCUTANEOUS at 06:30

## 2022-01-23 RX ADMIN — ONDANSETRON HYDROCHLORIDE 4 MG: 4 TABLET, FILM COATED ORAL at 02:11

## 2022-01-23 RX ADMIN — SODIUM CHLORIDE, POTASSIUM CHLORIDE, SODIUM LACTATE AND CALCIUM CHLORIDE 250 ML/HR: 600; 310; 30; 20 INJECTION, SOLUTION INTRAVENOUS at 09:32

## 2022-01-23 RX ADMIN — PANTOPRAZOLE SODIUM 40 MG: 40 TABLET, DELAYED RELEASE ORAL at 08:21

## 2022-01-23 RX ADMIN — HYDROMORPHONE HYDROCHLORIDE 0.5 MG: 1 INJECTION, SOLUTION INTRAMUSCULAR; INTRAVENOUS; SUBCUTANEOUS at 20:00

## 2022-01-23 RX ADMIN — HYDROMORPHONE HYDROCHLORIDE 4 MG: 4 TABLET ORAL at 22:55

## 2022-01-23 RX ADMIN — LAMOTRIGINE 100 MG: 100 TABLET ORAL at 09:01

## 2022-01-23 RX ADMIN — HYDROMORPHONE HYDROCHLORIDE 0.5 MG: 1 INJECTION, SOLUTION INTRAMUSCULAR; INTRAVENOUS; SUBCUTANEOUS at 21:46

## 2022-01-23 RX ADMIN — HYDROMORPHONE HYDROCHLORIDE 0.5 MG: 1 INJECTION, SOLUTION INTRAMUSCULAR; INTRAVENOUS; SUBCUTANEOUS at 17:49

## 2022-01-23 RX ADMIN — SODIUM CHLORIDE, POTASSIUM CHLORIDE, SODIUM LACTATE AND CALCIUM CHLORIDE 250 ML/HR: 600; 310; 30; 20 INJECTION, SOLUTION INTRAVENOUS at 00:39

## 2022-01-23 RX ADMIN — LIDOCAINE 3 PATCH: 50 PATCH CUTANEOUS at 14:31

## 2022-01-23 RX ADMIN — ONDANSETRON 4 MG: 2 INJECTION INTRAMUSCULAR; INTRAVENOUS at 12:15

## 2022-01-23 RX ADMIN — GABAPENTIN 600 MG: 300 CAPSULE ORAL at 08:21

## 2022-01-23 RX ADMIN — HYDROMORPHONE HYDROCHLORIDE 2 MG: 2 TABLET ORAL at 02:10

## 2022-01-23 RX ADMIN — HEPARIN SODIUM 5000 UNITS: 5000 INJECTION, SOLUTION INTRAVENOUS; SUBCUTANEOUS at 21:02

## 2022-01-23 RX ADMIN — GABAPENTIN 1200 MG: 400 CAPSULE ORAL at 02:11

## 2022-01-23 RX ADMIN — HYDROMORPHONE HYDROCHLORIDE 2 MG: 2 TABLET ORAL at 08:21

## 2022-01-23 RX ADMIN — METOCLOPRAMIDE 10 MG: 5 INJECTION, SOLUTION INTRAMUSCULAR; INTRAVENOUS at 17:42

## 2022-01-23 RX ADMIN — INSULIN DETEMIR 20 UNITS: 100 INJECTION, SOLUTION SUBCUTANEOUS at 08:24

## 2022-01-23 RX ADMIN — SODIUM CHLORIDE, PRESERVATIVE FREE 10 ML: 5 INJECTION INTRAVENOUS at 02:11

## 2022-01-23 RX ADMIN — METOCLOPRAMIDE 10 MG: 5 INJECTION, SOLUTION INTRAMUSCULAR; INTRAVENOUS at 12:15

## 2022-01-23 RX ADMIN — SODIUM CHLORIDE, PRESERVATIVE FREE 10 ML: 5 INJECTION INTRAVENOUS at 12:15

## 2022-01-23 RX ADMIN — HYDROMORPHONE HYDROCHLORIDE 0.5 MG: 1 INJECTION, SOLUTION INTRAMUSCULAR; INTRAVENOUS; SUBCUTANEOUS at 13:25

## 2022-01-23 RX ADMIN — ACETAMINOPHEN 650 MG: 325 TABLET, FILM COATED ORAL at 21:03

## 2022-01-23 RX ADMIN — HYDROMORPHONE HYDROCHLORIDE 0.25 MG: 1 INJECTION, SOLUTION INTRAMUSCULAR; INTRAVENOUS; SUBCUTANEOUS at 04:38

## 2022-01-23 RX ADMIN — ACETAMINOPHEN 650 MG: 325 TABLET, FILM COATED ORAL at 14:31

## 2022-01-23 RX ADMIN — METOCLOPRAMIDE 10 MG: 5 INJECTION, SOLUTION INTRAMUSCULAR; INTRAVENOUS at 21:02

## 2022-01-23 RX ADMIN — METOPROLOL SUCCINATE 100 MG: 100 TABLET, EXTENDED RELEASE ORAL at 08:21

## 2022-01-23 RX ADMIN — METOCLOPRAMIDE 10 MG: 5 INJECTION, SOLUTION INTRAMUSCULAR; INTRAVENOUS at 00:58

## 2022-01-23 RX ADMIN — HYDROMORPHONE HYDROCHLORIDE 4 MG: 4 TABLET ORAL at 14:31

## 2022-01-23 RX ADMIN — METOCLOPRAMIDE 10 MG: 5 INJECTION, SOLUTION INTRAMUSCULAR; INTRAVENOUS at 08:23

## 2022-01-23 NOTE — H&P
[facility] Medicine Services  HISTORY AND PHYSICAL    Patient Name: Cortney Delacruz  : 1955  MRN: [id number]  Primary Care Physician: Ronnie Garvey MD  Date of admission: 2022    Subjective   Subjective     Chief Complaint:  Nausea and vomiting    HPI:  Cortney Delacruz is a 66 y.o. female with a history of chronic pain, depression, T2DM, diabetic neuropathy, gastroparesis followed by Dr. Pruett at [facility], hypertension, hyperlipidemia, presents to the ED with complaints of nausea and vomiting that started Friday.  Patient reports that she has had to be hospitalized several times in the past for intractable nausea and vomiting which is mostly related to her gastroparesis.  She has diffuse intermittent abdominal pain described as burning pain that is worse with vomiting.  Patient is unable to keep anything down liquid or solid.  Patient reports that she lost her balance and has had 3 falls over the last 2-3 days.  She has bruising on her back but denies any injury.  No LOC or blow to the head.  Patient endorses a loss of appetite, nausea, vomiting, chronic back pain, dizziness, lightheadedness, generalized weakness.  She denies fever, chills, shortness of air, cough, chest pain, diarrhea, headaches, or any other complaints at this time.  CT abdomen pelvis shows no clearly acute process in the abdomen or pelvis.  Chest x-ray shows stable mild chronic appearing interstitial lung changes no new chest disease.  Labs reveal troponin 0.035 and 0.022, glucose 171, chloride 97, anion gap 18, creatinine 1.11, beta hydroxybutyrate 2.876.  Patient was given 2 L of saline and started on IV fluids in the ED.  Patient is being admitted to the hospital medicine service for further evaluation and management.    COVID Details:        Symptoms: [x] NONE [] Fever []  Cough [] Shortness of breath [] Change in taste or smell  The patient has a COVID HM Topic on their chart, and they  are fully vaccinated.    Review of Systems   Constitutional: Positive for appetite change. Negative for chills, fatigue and fever.   HENT: Negative.    Eyes: Negative.    Respiratory: Negative.    Cardiovascular: Negative.    Gastrointestinal: Positive for abdominal pain, nausea and vomiting. Negative for abdominal distention, constipation and diarrhea.   Endocrine: Negative.    Genitourinary: Negative.    Musculoskeletal: Positive for back pain (chronic). Negative for neck pain and neck stiffness.   Skin: Negative.    Allergic/Immunologic: Negative.    Neurological: Positive for dizziness, weakness (generalized) and light-headedness. Negative for seizures, syncope, speech difficulty, numbness and headaches.   Hematological: Negative.    Psychiatric/Behavioral: Negative.         All other systems reviewed and are negative.     Personal History     Past Medical History:   Diagnosis Date   • Arthritis    • CAP (community acquired pneumonia) 2015   • Chronic pain     on dilaudid x4 years   • Depression    • Diabetes (CMS/HCC)     type II   • Diabetic neuropathy (CMS/HCC)     gabapentin, lamictal; reports uses xanax for this when severe as well   • Gastroparesis    • GERD (gastroesophageal reflux disease)     PPI and pepcid   • HX: breast cancer 2000    s/p chemo   • Hypercholesteremia    • Hypertension 2002   • Osteoporosis     reclast inj yearly   • Pyelonephritis 03/2019       Past Surgical History:   Procedure Laterality Date   • ABDOMINAL HERNIA REPAIR  2001   • ABDOMINAL HERNIA REPAIR  2005   • APPENDECTOMY  1968   • DIAGNOSTIC LAPAROSCOPY  1988   • GASTRIC STIMULATOR IMPLANT SURGERY  2014   • GASTRIC STIMULATOR IMPLANT SURGERY  2015    replaced generator   • GASTRIC STIMULATOR IMPLANT SURGERY  2017    Replacement   • LAPAROSCOPIC CHOLECYSTECTOMY  1992   • LUMBAR LAMINECTOMY  1985    L4-L5   • LUMBAR LAMINECTOMY  1987    L3-L4   • LUMBAR LAMINECTOMY  1997    L3-L4   • LUMBAR LAMINECTOMY  2007    L3-L5   •  MASTECTOMY Left 2000   • TONGUE SURGERY  2018   • TONSILLECTOMY AND ADENOIDECTOMY  1962   • TOTAL HIP ARTHROPLASTY REVISION  2015   • TRIGGER FINGER RELEASE Left 2017    ring finger   • URETEROSCOPY LASER LITHOTRIPSY WITH STENT INSERTION Right 03/13/2019    Dr. Cerna   • VENTRAL HERNIA REPAIR  2007   • VENTRAL HERNIA REPAIR  2013   • WRIST GANGLION EXCISION Right 1986       Family History:  family history includes Alcohol abuse in her father; Arthritis in her mother; Coronary artery disease in her brother; Dementia in her maternal grandmother; Hypertension in her mother; Lung cancer in her father; Melanoma in her brother; Osteoporosis in her mother. Otherwise pertinent FHx was reviewed and unremarkable.     Social History:  reports that she has never smoked. She has never used smokeless tobacco. She reports that she does not drink alcohol and does not use drugs.  Social History     Social History Narrative    Lives in [place] with son       Medications:  ALPRAZolam, Cholecalciferol, HYDROmorphone, Insulin Syringe-Needle U-100, Probiotic Product, Saline Sensitive Eyes, albuterol sulfate HFA, diphenoxylate-atropine, ezetimibe, famotidine, fluticasone, furosemide, gabapentin, insulin NPH, insulin regular, lamoTRIgine, latanoprost, linaclotide, metoclopramide, metoprolol succinate XL, multivitamin with minerals, ondansetron, oxyCODONE-acetaminophen, pantoprazole, promethazine, sodium chloride, and tiZANidine    Allergies   Allergen Reactions   • Cephalosporins Anaphylaxis   • Ciprofloxacin Anaphylaxis   • Codeine Anaphylaxis   • Kiwi Extract Anaphylaxis   • Metformin And Related Shortness Of Breath   • Pioglitazone Shortness Of Breath   • Sulbactam Anaphylaxis   • Ace Inhibitors Itching   • Amitriptyline Other (See Comments)   • Clarithromycin Hives, Nausea And Vomiting and GI Intolerance   • Crestor [Rosuvastatin Calcium] Myalgia   • Dexlansoprazole Other (See Comments)   • Empagliflozin Other (See Comments)   •  Esomeprazole Magnesium Other (See Comments)     Gastric standstill   • Glimepiride Irritability   • Hydrocodone-Acetaminophen Other (See Comments)   • Latex Itching   • Omeprazole Other (See Comments) and Diarrhea     Gastris standstill   • Pitavastatin Itching   • Pravachol  [Pravastatin Sodium] Hives   • Sitagliptin Itching   • Topiramate Itching   • Vraylar  [Cariprazine Hcl] Itching       Objective   Objective     Vital Signs:   Temp:  [98.5 °F (36.9 °C)-98.9 °F (37.2 °C)] 98.9 °F (37.2 °C)  Heart Rate:  [106-128] 106  Resp:  [18-20] 18  BP: (130-195)/() 130/79    Physical Exam   Constitutional: Awake, alert, resting in bed, appears chronically ill  Eyes: PERRLA, sclerae anicteric, no conjunctival injection  HENT: NCAT, mucous membranes dry  Neck: Supple, no thyromegaly, no lymphadenopathy, trachea midline  Respiratory: Clear to auscultation bilaterally, nonlabored respirations   Cardiovascular: RRR, no murmurs, rubs, or gallops, palpable pedal pulses bilaterally  Gastrointestinal: Positive bowel sounds, soft, nontender, nondistended  Musculoskeletal: No bilateral ankle edema, no clubbing or cyanosis to extremities  Psychiatric: Appropriate affect, cooperative  Neurologic: Oriented x 3, strength symmetric in all extremities, Cranial Nerves grossly intact to confrontation, speech clear  Skin: No rashes      Result Review:  I have personally reviewed the results from the time of this admission to 01/23/22 12:41 AM EST and agree with these findings:  [x]  Laboratory  [x]  Microbiology  []  Radiology  [x]  EKG/Telemetry   []  Cardiology/Vascular   []  Pathology  [x]  Old records  []  Other:  Most notable findings include:       LAB RESULTS:      Lab 01/22/22 2111 01/22/22  1901   WBC  --  12.64*   HEMOGLOBIN  --  13.5   HEMATOCRIT  --  41.8   PLATELETS  --  265   NEUTROS ABS  --  10.43*   IMMATURE GRANS (ABS)  --  0.06*   LYMPHS ABS  --  1.41   MONOS ABS  --  0.72   EOS ABS  --  0.00   MCV  --  80.9    PROCALCITONIN 0.12  --          Lab 01/22/22  1901   SODIUM 139   POTASSIUM 4.2   CHLORIDE 97*   CO2 24.0   ANION GAP 18.0*   BUN 20   CREATININE 1.11*   GLUCOSE 171*   CALCIUM 10.6*   MAGNESIUM 2.1         Lab 01/22/22  1901   TOTAL PROTEIN 8.6*   ALBUMIN 4.90   GLOBULIN 3.7   ALT (SGPT) 14   AST (SGOT) 20   BILIRUBIN 0.4   ALK PHOS 101   LIPASE 19         Lab 01/22/22  2111 01/22/22  1901   PROBNP  --  1,028.0*   TROPONIN T 0.022 0.035*                 Lab 01/22/22 2328   PH, ARTERIAL 7.384   PCO2, ARTERIAL 40.1   PO2 ART 81.7*   FIO2 21   HCO3 ART 24.0   BASE EXCESS ART -1.0*   CARBOXYHEMOGLOBIN 0.4     UA    Urinalysis 1/22/22 1/22/22 2048 2048   Squamous Epithelial Cells, UA  3-6 (A)   Specific Gravity, UA 1.028    Ketones, UA 80 mg/dL (3+) (A)    Blood, UA Negative    Leukocytes, UA Negative    Nitrite, UA Negative    RBC, UA  0-2   WBC, UA  21-30 (A)   Bacteria, UA  None Seen   (A) Abnormal value            Microbiology Results (last 10 days)     Procedure Component Value - Date/Time    COVID PRE-OP / PRE-PROCEDURE SCREENING ORDER (NO ISOLATION) - Swab, Nasopharynx [[id number]]  (Normal) Collected: 01/22/22 2358    Lab Status: Final result Specimen: Swab from Nasopharynx Updated: 01/23/22 0052    Narrative:      The following orders were created for panel order COVID PRE-OP / PRE-PROCEDURE SCREENING ORDER (NO ISOLATION) - Swab, Nasopharynx.  Procedure                               Abnormality         Status                     ---------                               -----------         ------                     COVID-19, FLU A/B, RSV P...[[id number]]  Normal              Final result                 Please view results for these tests on the individual orders.    COVID-19, FLU A/B, RSV PCR - Swab, Nasopharynx [[id number]]  (Normal) Collected: 01/22/22 2358    Lab Status: Final result Specimen: Swab from Nasopharynx Updated: 01/23/22 0052     COVID19 Not Detected     Influenza A PCR Not Detected      Influenza B PCR Not Detected     RSV, PCR Not Detected    Narrative:      Fact sheet for providers: https://www.fda.gov/media/885200/download    Fact sheet for patients: https://www.fda.gov/media/832696/download    Test performed by PCR.          CT Abdomen Pelvis With Contrast    Result Date: 1/22/2022  CT Abdomen Pelvis W INDICATION: Generalized abdominal pain TECHNIQUE: CT of the abdomen and pelvis with IV contrast. Coronal and sagittal reconstructions were obtained.  Radiation dose reduction techniques included automated exposure control or exposure modulation based on body size. Count of known CT and cardiac nuc med studies performed in previous 12 months: 0. COMPARISON: 3/20/2019 FINDINGS: Abdomen: Included lung bases are clear no effusion or pericardial effusion. Small hiatal hernia. Normal caliber aorta and atherosclerotic change. The spleen and adrenal glands are negative and pancreas unremarkable. Gallbladder surgically absent and liver shows hepatic steatosis and is enlarged. There is a gastric stimulator present. The kidneys are nonobstructed. Tiny stone upper pole left kidney. No adenopathy. Pelvis: Streak artifact related to left hip replacement and the bladder is negative. No drainable fluid collection. There are scattered diverticula. The bowel is nonobstructed. Appendix not clearly demonstrated but no secondary sign of appendicitis. The inguinal canals are negative. Redemonstration of a single tic fluid collection anterior to the atrophic rectus musculature in the mid abdomen. This measures 14.3 cm transverse by 1.7 cm AP by 8.4 cm cranial caudal. This is stable to slightly more prominent than on the prior study but is a chronic finding. There is no new air within the fluid or other distinct CT evidence to suggest infection but this should be correlated clinically. There is no suspicious bone lesion. Degenerative changes.  noncontributory.     Impression: 1. No clearly acute process in the  abdomen or pelvis. 2. Appendix not visualized but no secondary signs of appendicitis. 3. Slight interval increase in size of a chronic fluid collection anterior to the rectus musculature. No new CT evidence to otherwise suggest infected fluid but this be correlated clinically. 4. Hepatomegaly and hepatic steatosis. Postoperative changes as described. Diverticulosis. Signer Name: Mauricio Arredondo MD  Signed: 1/22/2022 10:33 PM  Workstation Name: [facility]  [facility]    XR Chest 1 View    Result Date: 1/22/2022  EXAMINATION: XR CHEST 1 VW-  INDICATION: Weak/Dizzy/AMS triage protocol  COMPARISON: 6/3/2021  FINDINGS: Heart is normal in size. Vasculature appears upper limits of normal. Mild chronic appearing interstitial lung changes and a few calcified granulomas are again noted and appear unchanged. No new pulmonary parenchymal disease, effusion or pneumothorax is seen. Clips are again seen in the left axillary region..       Impression: Stable mild chronic appearing interstitial lung changes. No new chest disease is appreciated.    This report was finalized on 1/22/2022 6:56 PM by Dr. Lambert Esquivel MD.        Results for orders placed during the hospital encounter of 03/20/19    Adult Transthoracic Echo Complete W/ Cont if Necessary Per Protocol    Interpretation Summary  · Left ventricular systolic function is normal. Estimated EF = 60%.  · Left ventricular diastolic dysfunction (grade I a) consistent with impaired relaxation.      Assessment/Plan   Assessment & Plan       Intractable nausea and vomiting    Uncontrolled type 2 diabetes mellitus with complication, with long-term current use of insulin (HCC)    Gastroparesis    Chronic pain syndrome, on PO dilaudid at home    Hypertension    Diabetic neuropathy (HCC)    GERD (gastroesophageal reflux disease)    Elevated serum creatinine    Ketosis (HCC)    Elevated troponin    Cortney Delacruz is a 66 y.o. female with a history of chronic  pain, depression, T2DM, diabetic neuropathy, gastroparesis followed by Dr. Pruett at [facility], hypertension, hyperlipidemia, presents to the ED with complaints of nausea and vomiting that started Friday.    Assessment and plan:    Intractable nausea vomiting  Gastroparesis  -- CT abdomen pelvis shows no acute process.   --Followed by Dr. Pruett at [facility] gastroenterology  --S/p gastric stimulator  --Antiemetics  --Continue Reglan TID and HS.  Daily ekg for qtc monitoring.   -- IV fluids  -- Consult GI  -- A.m. labs    Ketosis, likely starvation/volume contraction  T2DM  --reports she is not on jardiance anymore making euglycemic dka unlikely  --Beta hydroxybutyrate 2.876--repeat now  --Glucose 171  --FSBG q6h  -- Was given 2 L of saline in the ED  -- LR at 250 ml an hour for 12 hours until ketosis resolves. Repeat bmp until gap closed  -- A1c    Elevated creatinine  -- Baseline creatinine 0.7-0.9  -- Creatinine 1.1  -- Was given 2 L of saline in the ED  -- IV fluids overnight  -- BMP in a.m.    Hypertensive urgency  -- Improved  -- Wean Cardene drip    Elevated troponin  -- First troponin 0.035, repeat troponin 0.022  -- Patient denies chest pain  -- Daily EKG to assess QT    Chronic back pain  -- On p.o. Dilaudid at home    GERD  -- Protonix    Fall  Generalized weakness  --fall precautions  --pt/ot consult    DVT prophylaxis: Heparin    CODE STATUS:  Full code  Level Of Support Discussed With: Patient  Code Status (Patient has no pulse and is not breathing): CPR (Attempt to Resuscitate)  Medical Interventions (Patient has pulse or is breathing): Full Support      This note has been completed as part of a split-shared workflow.   Signature: Electronically signed by USHA Mathew, 01/23/22, 1:44 AM EST.           Attending   Admission Attestation       I have seen and examined the patient, performing an independent face-to-face diagnostic evaluation with plan of care reviewed and developed with the advanced  practice clinician (APC).      Brief Summary Statement:   Cortney Delacruz is a 66 y.o. female with past medical history of diabetic neuropathy, type 2 diabetes, depression, gastroparesis, chronic pain, status post gastric stimulator, hypertension, hyperlipidemia presents ER with complaints of persistent nausea and vomiting since this past Friday.    Patient reports recurrent episodes of intractable nausea and vomiting related to her gastroparesis. She reports that she has been unable to keep anything down since her episode started on Friday. She reports worsening balance related to being dehydrated and has had 3 falls over the past 2 to 3 days. No head injury or loss of consciousness. She denies any fever, chills, cough, shortness of air, exposure to COVID-19.    Work-up in the ER showing no acute abnormality on CT of the abdomen pelvis. Chest x-ray without any acute changes. Patient noted to have elevated beta hydroxybutyrate. Labs now improving with IV fluids    Remainder of detailed HPI is as noted by APC and has been reviewed and/or edited by me for completeness.    Attending Physical Exam:  Constitutional: Awake, alert, nontoxic, chronic ill appearance  Eyes: PERRLA, sclerae anicteric, no conjunctival injection  HENT: NCAT, mucous membranes dry  Neck: Supple, no thyromegaly, no lymphadenopathy, trachea midline  Respiratory: Clear to auscultation bilaterally, nonlabored respirations   Cardiovascular: RRR, no murmurs, rubs, or gallops, palpable pedal pulses bilaterally  Gastrointestinal: Positive bowel sounds, soft, nontender, nondistended  Musculoskeletal: No bilateral ankle edema, no clubbing or cyanosis to extremities  Psychiatric: Appropriate affect, cooperative  Neurologic: Oriented x 3, strength symmetric in all extremities, Cranial Nerves grossly intact to confrontation, speech clear  Skin: No rashes      Brief Assessment/Plan :  See detailed assessment and plan developed with APC which I have  reviewed and/or edited for completeness.        Admission Status: I believe that this patient meets OBSERVATION status, however if further evaluation or treatment plans warrant, status may change.  Based upon current information, I predict patient's care encounter to be less than or equal to 2 midnights.          Renaldo Escobedo DO  01/23/22

## 2022-01-23 NOTE — PLAN OF CARE
Goal Outcome Evaluation:  Plan of Care Reviewed With: patient        Progress: improving  Outcome Summary: Eval completed IND w transfers, Gt to 60 feet RW and supervison slow pace wide LILIYA pain cheif limiting factor in mobility.

## 2022-01-23 NOTE — THERAPY EVALUATION
Patient Name: Cortney Delacruz  : 1955    MRN: [id number]                              Today's Date: 2022       Admit Date: 2022    Visit Dx:     ICD-10-CM ICD-9-CM   1. Intractable nausea and vomiting  R11.2 536.2   2. 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 Gastroparesis  K31.84 536.3   4. Chronic abdominal pain  R10.9 789.00    G89.29 338.29   5. Hypertension, uncontrolled  I10 401.9   6. Coffee ground emesis  K92.0 578.0     Patient Active Problem List   Diagnosis   • Uncontrolled type 2 diabetes mellitus with complication, with long-term current use of insulin ([facility]ly [facility])   • Gastroparesis   • Chronic pain syndrome, on PO dilaudid at home   • Hypertension   • Osteoporosis - PCP managing   • Diabetic neuropathy ([facility])   • GERD (gastroesophageal reflux disease)   • Diarrhea   • History of ESBL E. coli infection   • Annual GYN exam w/o problems   • Cystocele, uterine prolapse with rectocele   • Ganglion cyst   • Obesity with body mass index 30 or greater   • Trigger thumb of left hand   • Synovial cyst of multiple joints   • Intractable nausea and vomiting   • Elevated serum creatinine   • Ketosis ([facility])   • Elevated troponin   • Coffee ground emesis     Past Medical History:   Diagnosis Date   • Arthritis    • CAP (community acquired pneumonia)    • Chronic pain     on dilaudid x4 years   • Depression    • Diabetes (CMS/HCC)     type II   • Diabetic neuropathy (CMS/[facility])     gabapentin, lamictal; reports uses xanax for this when severe as well   • Gastroparesis    • GERD (gastroesophageal reflux disease)     PPI and pepcid   • HX: breast cancer     s/p chemo   • Hypercholesteremia    • Hypertension    • Osteoporosis     reclast inj yearly   • Pyelonephritis 2019     Past Surgical History:   Procedure Laterality Date   • ABDOMINAL HERNIA REPAIR     • ABDOMINAL HERNIA REPAIR     • APPENDECTOMY     • DIAGNOSTIC LAPAROSCOPY     • GASTRIC STIMULATOR IMPLANT  SURGERY  2014   • GASTRIC STIMULATOR IMPLANT SURGERY  2015    replaced generator   • GASTRIC STIMULATOR IMPLANT SURGERY  2017    Replacement   • LAPAROSCOPIC CHOLECYSTECTOMY  1992   • LUMBAR LAMINECTOMY  1985    L4-L5   • LUMBAR LAMINECTOMY  1987    L3-L4   • LUMBAR LAMINECTOMY  1997    L3-L4   • LUMBAR LAMINECTOMY  2007    L3-L5   • MASTECTOMY Left 2000   • TONGUE SURGERY  2018   • TONSILLECTOMY AND ADENOIDECTOMY  1962   • TOTAL HIP ARTHROPLASTY REVISION  2015   • TRIGGER FINGER RELEASE Left 2017    ring finger   • URETEROSCOPY LASER LITHOTRIPSY WITH STENT INSERTION Right 03/13/2019    Dr. Cerna   • VENTRAL HERNIA REPAIR  2007   • VENTRAL HERNIA REPAIR  2013   • WRIST GANGLION EXCISION Right 1986      General Information     Row Name 01/23/22 1507          Physical Therapy Time and Intention    Document Type evaluation  -CT     Mode of Treatment physical therapy  -CT     Row Name 01/23/22 1507          General Information    Patient Profile Reviewed yes  -CT     Prior Level of Function independent:; bed mobility; ADL's; home management; cooking; shopping; driving; cleaning; dressing; bathing  -CT     Barriers to Rehab previous functional deficit  -CT     Row Name 01/23/22 1507          Living Environment    Lives With child(eva), adult  -CT     Row Name 01/23/22 1507          Home Main Entrance    Number of Stairs, Main Entrance one  -CT     Row Name 01/23/22 1507          Stairs Within Home, Primary    Number of Stairs, Within Home, Primary none  -CT     Row Name 01/23/22 1507          Cognition    Orientation Status (Cognition) oriented x 4  -CT     Row Name 01/23/22 1507          Safety Issues, Functional Mobility    Safety Issues Affecting Function (Mobility) insight into deficits/self-awareness  -CT     Impairments Affecting Function (Mobility) endurance/activity tolerance  -CT           User Key  (r) = Recorded By, (t) = Taken By, (c) = Cosigned By    Initials Name Provider Type    CT Freddy Anthony  Roberto, PT Physical Therapist               Mobility     Row Name 01/23/22 1510          Bed Mobility    Bed Mobility bed mobility (all) activities  -CT     All Activities, [place] (Bed Mobility) independent  -CT     Row Name 01/23/22 1510          Bed-Chair Transfer    Bed-Chair [place] (Transfers) independent; modified independence  -CT     Row Name 01/23/22 1510          Sit-Stand Transfer    Sit-Stand [place] (Transfers) independent  -CT     Row Name 01/23/22 1510          Gait/Stairs (Locomotion)    [place] Level (Gait) supervision  -CT     Assistive Device (Gait) walker, front-wheeled  -CT     Distance in Feet (Gait) 60 ft with RW  -CT     Deviations/Abnormal Patterns (Gait) y[place] decreased; gait speed decreased; base of support, wide  -CT           User Key  (r) = Recorded By, (t) = Taken By, (c) = Cosigned By    Initials Name Provider Type    CT Freddy Anthony, PT Physical Therapist               Obj/Interventions     Row Name 01/23/22 1511          Range of Motion Comprehensive    General Range of Motion no range of motion deficits identified  -CT     Row Name 01/23/22 1511          Strength Comprehensive (MMT)    General Manual Muscle Testing (MMT) Assessment lower extremity strength deficits identified  -CT     Comment, General Manual Muscle Testing (MMT) Assessment 4/5 functionally  -CT     Row Name 01/23/22 1511          Motor Skills    Motor Skills functional endurance  -CT     Row Name 01/23/22 1511          Balance    Balance Assessment sitting static balance; standing static balance; sitting dynamic balance; standing dynamic balance  -CT     Static Sitting Balance WNL  -CT     Dynamic Sitting Balance WNL  -CT     Static Standing Balance WNL  -CT     Dynamic Standing Balance mild impairment  -CT     Balance Interventions sitting; standing; weight shifting activity; tandem gait  -CT           User Key  (r) = Recorded By, (t) = Taken By, (c) = Cosigned By    Initials  Name Provider Type    CT Freddy Anthony, PT Physical Therapist               Goals/Plan     Row Name 01/23/22 1516          Gait Training Goal 1 (PT)    Activity/Assistive Device (Gait Training Goal 1, PT) gait (walking locomotion)  -CT     [place] Level (Gait Training Goal 1, PT) independent  -CT     Distance (Gait Training Goal 1, PT) 300 ft with RW  -CT     Time Frame (Gait Training Goal 1, PT) long term goal (LTG); 5 days  -CT           User Key  (r) = Recorded By, (t) = Taken By, (c) = Cosigned By    Initials Name Provider Type    CT Freddy Anthony, PT Physical Therapist               Clinical Impression     Row Name 01/23/22 1512          Pain    Additional Documentation Pain Scale: Numbers Pre/Post-Treatment (Group)  -CT     Row Name 01/23/22 1512          Pain Scale: Numbers Pre/Post-Treatment    Pretreatment Pain Rating 7/10  -CT     Posttreatment Pain Rating 7/10  -CT     Pain Location abdomen; back  -CT     Pain Intervention(s) Medication (See MAR); Heat applied; Ambulation/increased activity; Repositioned  -CT     Row Name 01/23/22 1512          Plan of Care Review    Plan of Care Reviewed With patient  -CT     Progress improving  -CT     Outcome Summary Eval completed IND w transfers, Gt to 60 feet RW and supervison slow pace wide LILIYA pain cheif limiting factor in mobility.  -CT     Row Name 01/23/22 1512          Therapy Assessment/Plan (PT)    Rehab Potential (PT) good, to achieve stated therapy goals  -CT     Criteria for Skilled Interventions Met (PT) yes  -CT     Row Name 01/23/22 1512          Positioning and Restraints    Pre-Treatment Position in bed  -CT     Post Treatment Position bed  -CT     In Bed notified nsg; supine; call light within reach; exit alarm on  -CT           User Key  (r) = Recorded By, (t) = Taken By, (c) = Cosigned By    Initials Name Provider Type    CT Freddy Anthony, CHIKA Physical Therapist               Outcome Measures     Row Name 01/23/22  1517          How much help from another person do you currently need...    Turning from your back to your side while in flat bed without using bedrails? 4  -CT     Moving from lying on back to sitting on the side of a flat bed without bedrails? 4  -CT     Moving to and from a bed to a chair (including a wheelchair)? 4  -CT     Standing up from a chair using your arms (e.g., wheelchair, bedside chair)? 4  -CT     Climbing 3-5 steps with a railing? 3  -CT     To walk in hospital room? 3  -CT     AM-PAC 6 Clicks Score (PT) 22  -CT     Row Name 01/23/22 1517          Functional Assessment    Outcome Measure Options AM-PAC 6 Clicks Basic Mobility (PT)  -CT           User Key  (r) = Recorded By, (t) = Taken By, (c) = Cosigned By    Initials Name Provider Type    CT Freddy Anthony, CHIKA Physical Therapist                             Physical Therapy Education                 Title: PT OT SLP Therapies (In Progress)     Topic: Physical Therapy (In Progress)     Point: Mobility training (In Progress)     Learning Progress Summary           Patient Acceptance, E,D, NR by CT at 1/23/2022 1518                   Point: Home exercise program (In Progress)     Learning Progress Summary           Patient Acceptance, E,D, NR by CT at 1/23/2022 1518                   Point: Body mechanics (In Progress)     Learning Progress Summary           Patient Acceptance, E,D, NR by CT at 1/23/2022 1518                   Point: Precautions (In Progress)     Learning Progress Summary           Patient Acceptance, E,D, NR by CT at 1/23/2022 1518                               User Key     Initials Effective Dates Name Provider Type Discipline    CT 06/16/21 -  Freddy Anthony, PT Physical Therapist PT              PT Recommendation and Plan     Plan of Care Reviewed With: patient  Progress: improving  Outcome Summary: Eval completed IND w transfers, Gt to 60 feet RW and supervison slow pace wide LILIYA pain cheif limiting factor in  mobility.     Time Calculation:    PT Charges     Row Name 01/23/22 1519             Time Calculation    Start Time 1430  -CT      PT Received On 01/23/22  -CT      PT Goal Re-Cert Due Date 02/02/22  -CT              Time Calculation- PT    Total Timed Code Minutes- PT 30 minute(s)  -CT            User Key  (r) = Recorded By, (t) = Taken By, (c) = Cosigned By    Initials Name Provider Type    CT Freddy Anthony, PT Physical Therapist              Therapy Charges for Today     Code Description Service Date Service Provider Modifiers Qty    20849004766  GAIT TRAINING EA 15 MIN 1/23/2022 Freddy Anthony, PT GP 2    09074128081 HC PT EVAL MOD COMPLEXITY 2 1/23/2022 Freddy Anthony, PT GP 1          PT G-Codes  Outcome Measure Options: AM-PAC 6 Clicks Basic Mobility (PT)  AM-PAC 6 Clicks Score (PT): 22    Freddy Anthony PT  1/23/2022

## 2022-01-23 NOTE — CASE MANAGEMENT/SOCIAL WORK
Discharge Planning Assessment  [facility]     Patient Name: Cortney Delacruz  MRN: [id number]  Today's Date: 1/23/2022    Admit Date: 1/22/2022     Discharge Needs Assessment     Row Name 01/23/22 1155       Living Environment    Lives With child(eva), adult    Current Living Arrangements home/apartment/condo    Primary Care Provided by self    Provides Primary Care For no one    Family Caregiver if Needed child(eva), adult    Quality of Family Relationships unable to assess    Able to Return to Prior Arrangements yes       Transition Planning    Patient/Family Anticipates Transition to home with family    Transportation Anticipated family or friend will provide       Discharge Needs Assessment    Equipment Currently Used at Home walker, rolling               Discharge Plan     Row Name 01/23/22 1156       Plan    Plan IDP    Patient/Family in Agreement with Plan yes    Plan Comments I met with Ms. Delacruz at the bedside. Pt lives with son in [facility]. [facility] with ADL's, uses RW to ambulate. Still drives. No home O2/Cpap/HH. Pt has prescription for OP PT, has not gone yet. Has PCP-Dr. Ronnie Garvey. Confirmed Medicare, [employer], and RX coverage. Prescriptions filled at [facility] in [facility]. No LW/POA. Plans to D/C home, son or brother to transport. No needs ATT. CM will continue to follow.    Final Discharge Disposition Code 01 - home or self-care              Continued Care and Services - Admitted Since 1/22/2022    Coordination has not been started for this encounter.          Demographic Summary     Row Name 01/23/22 1155       General Information    Reason for Consult discharge planning       Contact Information    Permission Granted to Share Info With                Functional Status     Row Name 01/23/22 1155       Functional Status    Usual Activity Tolerance good    Current Activity Tolerance fair       Functional Status, IADL    Medications independent    Meal Preparation independent     Housekeeping independent    Laundry independent    Shopping independent               Psychosocial    No documentation.                Abuse/Neglect    No documentation.                Legal    No documentation.                Substance Abuse    No documentation.                Patient Forms    No documentation.                   Miriam Brown RN

## 2022-01-23 NOTE — CONSULTS
[facility]    Referring Provider: No ref. provider found    Primary Care Provider: Ronnie Garvey MD    Reason for Consultation: Coffee-ground emesis    Chief complaint nausea vomiting, coffee-ground emesis      History of present illness:  Cortney Delacruz is a 66 y.o. female who is admitted with nausea vomiting and dehydration.  She has known history of gastroparesis for the last 15 years.  She follows with Dr. Lebron at [facility].  She has a gastric stimulator.  The battery was replaced in September.  She states she can tell when the battery gets low that the stimulator seems to help her.  States her sugars have been under much better control.  Her last hemoglobin A1c was 5.6.  She denies any diarrhea.  Stools are loose however.  No constipation.  She does states gingerroot but does not think it helps much.  She also does not think metoclopramide helps much.  She has been on domperidone in the past and it was not effective as well.    Yesterday she had sudden onset of severe nausea and vomiting.  He was retching.  Says when she saw some coffee-ground emesis.  Denies any black stools.  No history peptic ulcer disease.  Denies NSAIDs.  States her last colonoscopy was last year.  She complains of pain in her knees and abdomen where she fell.    Allergies:  Cephalosporins, Ciprofloxacin, Codeine, Kiwi extract, Metformin and related, Pioglitazone, Sulbactam, Ace inhibitors, Amitriptyline, Clarithromycin, Crestor [rosuvastatin calcium], Dexlansoprazole, Empagliflozin, Esomeprazole magnesium, Glimepiride, Hydrocodone-acetaminophen, Latex, Omeprazole, Pitavastatin, Pravachol  [pravastatin sodium], Sitagliptin, Topiramate, and Vraylar  [cariprazine hcl]    Scheduled Meds:  acetaminophen, 650 mg, Oral, Q6H  gabapentin, 1,200 mg, Oral, Nightly  gabapentin, 600 mg, Oral, Daily  heparin (porcine), 5,000 Units, Subcutaneous, Q8H  insulin detemir, 20 Units, Subcutaneous, Daily  insulin lispro, 0-7 Units,  Subcutaneous, TID AC  lamoTRIgine, 100 mg, Oral, Q12H  lidocaine, 3 patch, Transdermal, Q24H  metoclopramide, 10 mg, Intravenous, 4x Daily AC & at Bedtime  metoprolol succinate XL, 100 mg, Oral, Q24H  multivitamin with minerals, 1 tablet, Oral, Daily  [START ON 1/24/2022] pantoprazole, 40 mg, Intravenous, Q AM  sodium chloride, 10 mL, Intravenous, Q12H         Infusions:       PRN Meds:  dextrose  •  dextrose  •  fluticasone  •  glucagon (human recombinant)  •  HYDROmorphone  •  ondansetron **OR** ondansetron  •  sodium chloride  •  [COMPLETED] Insert peripheral IV **AND** sodium chloride  •  sodium chloride    Home Meds:  Medications Prior to Admission   Medication Sig Dispense Refill Last Dose   • albuterol sulfate  (90 Base) MCG/ACT inhaler Inhale 2 puffs Every 6 (Six) Hours As Needed for Wheezing. 6.7 g 0    • ALPRAZolam (XANAX) 1 MG tablet Take 1 mg by mouth 3 (Three) Times a Day As Needed for anxiety.      • Cholecalciferol 125 MCG (5000 UT) tablet Vitamin D3 125 mcg (5,000 unit) tablet   Take 1 tablet every day by oral route with meals.      • diphenoxylate-atropine (LOMOTIL) 2.5-0.025 MG per tablet diphenoxylate-atropine 2.5 mg-0.025 mg tablet      • ezetimibe (ZETIA) 10 MG tablet       • famotidine (PEPCID) 20 MG tablet 20 mg At Night As Needed.      • fluticasone (FLONASE) 50 MCG/ACT nasal spray 2 sprays into the nostril(s) as directed by provider Daily. AS NEEDED      • furosemide (LASIX) 20 MG tablet Take 1 tablet by mouth Daily. (Patient taking differently: Take 20 mg by mouth As Needed.) 30 tablet 0    • gabapentin (NEURONTIN) 600 MG tablet Take 600 mg by mouth Every Morning. 1 TAB QAM, 2 TABS QPM      • HYDROmorphone (DILAUDID) 2 MG tablet 2 mg Every 6 (Six) Hours As Needed.      • insulin NPH (NovoLIN N) 100 UNIT/ML injection Take 60  Units is AM and 60 units nightly 30 mL 5    • insulin regular (NovoLIN R ReliOn) 100 UNIT/ML injection Take 10 units TID AC meals plus extra as directed up to 40  "units per day 10 mL 11    • Insulin Syringe-Needle U-100 30G X 5/16\" 0.5 ML misc 1 each 4 (Four) Times a Day. 100 each 11    • lamoTRIgine (LaMICtal) 100 MG tablet 100 mg 2 (Two) Times a Day.      • latanoprost (XALATAN) 0.005 % ophthalmic solution       • linaclotide (LINZESS) 72 MCG capsule capsule Take 1 capsule by mouth.      • metoclopramide (REGLAN) 10 MG tablet Take  by mouth.      • metoprolol succinate XL (TOPROL-XL) 100 MG 24 hr tablet Take 100 mg by mouth 2 (Two) Times a Day.      • Multiple Vitamins-Minerals (MULTIVITAMIN ADULT PO) Take 1 tablet by mouth Daily.      • ondansetron (ZOFRAN) 8 MG tablet Take 8 mg by mouth Every 8 (Eight) Hours As Needed for Nausea or Vomiting.      • oxyCODONE-acetaminophen (PERCOCET)  MG per tablet       • pantoprazole (PROTONIX) 40 MG EC tablet Take 40 mg by mouth Daily.      • Probiotic Product (PROBIOTIC PO) Take  by mouth.      • promethazine (PHENERGAN) 25 MG tablet Take 1 tablet by mouth Every 6 (Six) Hours As Needed for nausea or vomiting for up to 12 doses. (Patient taking differently: Take 25 mg by mouth Every 4 (Four) Hours As Needed for Nausea or Vomiting.) 12 tablet 0    • sodium chloride (OCEAN) 0.65 % nasal spray 1 spray into each nostril As Needed for Congestion.      • Soft Lens Products (SALINE SENSITIVE EYES) solution PRN      • tiZANidine (ZANAFLEX) 2 MG tablet 3 (Three) Times a Day.          ROS: Review of Systems   Constitutional: Negative for appetite change, chills, fever and unexpected weight change.   Respiratory: Negative for shortness of breath.    Cardiovascular: Negative for chest pain.   Gastrointestinal: Positive for nausea and vomiting. Negative for abdominal pain and constipation.   Musculoskeletal:        Complains of ankle pain, knee pain and abdominal pain from where she fell   All other systems reviewed and are negative.      PAST MED HX: Pt  has a past medical history of Arthritis, CAP (community acquired pneumonia) (2015), " "Chronic pain, Depression, Diabetes (CMS/HCC), Diabetic neuropathy (CMS/HCC), Gastroparesis, GERD (gastroesophageal reflux disease), breast cancer (2000), Hypercholesteremia, Hypertension (2002), Osteoporosis, and Pyelonephritis (03/2019).  PAST SURG HX: Pt  has a past surgical history that includes Gastric stimulator implant surgery (2014); Appendectomy (1968); Lumbar laminectomy (1985); Wrist ganglion excision (Right, 1986); Trigger finger release (Left, 2017); Mastectomy (Left, 2000); Tonsillectomy and adenoidectomy (1962); Ventral hernia repair (2007); Abdominal hernia repair (2001); Revision total hip arthroplasty (2015); ureteroscopy laser lithotripsy with stent insertion (Right, 03/13/2019); Ventral hernia repair (2013); Abdominal hernia repair (2005); Gastric stimulator implant surgery (2015); Lumbar laminectomy (1987); Lumbar laminectomy (1997); Lumbar laminectomy (2007); Laparoscopic cholecystectomy (1992); Diagnostic laparoscopy (1988); Gastric stimulator implant surgery (2017); and Tongue surgery (2018).  FAM HX: family history includes Alcohol abuse in her father; Arthritis in her mother; Coronary artery disease in her brother; Dementia in her maternal grandmother; Hypertension in her mother; Lung cancer in her father; Melanoma in her brother; Osteoporosis in her mother.  SOC HX: Pt  reports that she has never smoked. She has never used smokeless tobacco. She reports that she does not drink alcohol and does not use drugs.    /69 (BP Location: Right arm, Patient Position: Lying)   Pulse 94   Temp 98.3 °F (36.8 °C)   Resp 18   Ht 175.3 cm (69\")   Wt 113 kg (248 lb 12.8 oz)   LMP  (LMP Unknown)   SpO2 94%   BMI 36.74 kg/m²     Physical Exam  Wt Readings from Last 3 Encounters:   01/23/22 113 kg (248 lb 12.8 oz)   08/23/21 109 kg (240 lb 6.4 oz)   04/07/21 109 kg (240 lb)   ,body mass index is 36.74 kg/m².    General pleasant, well developed; obese; no acute distress.   ENT  Oral mucosa pink & " moist without thrush or lesions.    Neck Neck supple; trachea midline. No thyromegaly  Resp CTA; no rhonchi, rales, or wheezes.  Respiration effort normal  CV RRR; ; no M/R/G. No lower extremity edema  GI Abd soft, NT, obese, normal active bowel sounds.  No HSM.  No abd hernia, gastric stimulator palpated  Skin No rash; no lesions; no bruises.  Skin turgor normal  MSK No clubbing; no cyanosis.    Psych Oriented to time, place, and person.  Appropriate affect      Results Review:   I reviewed the patient's new clinical results.  I reviewed the patient's new imaging results and agree with the interpretation.    Lab Results   Component Value Date    WBC 11.63 (H) 01/23/2022    HGB 11.2 (L) 01/23/2022    HCT 37.5 01/23/2022    MCV 87.6 01/23/2022     01/23/2022       Lab Results   Component Value Date    GLUCOSE 143 (H) 01/23/2022    BUN 19 01/23/2022    CREATININE 0.94 01/23/2022    EGFRIFNONA 60 (L) 01/23/2022    BCR 20.2 01/23/2022    CO2 23.0 01/23/2022    CALCIUM 9.6 01/23/2022    ALBUMIN 4.90 01/22/2022    AST 20 01/22/2022    ALT 14 01/22/2022     CT scan per radiologist report  FINDINGS:  Abdomen: Included lung bases are clear no effusion or pericardial effusion. Small hiatal hernia. Normal caliber aorta and atherosclerotic change. The spleen and adrenal glands are negative and pancreas unremarkable. Gallbladder surgically absent and  liver shows hepatic steatosis and is enlarged. There is a gastric stimulator present. The kidneys are nonobstructed. Tiny stone upper pole left kidney. No adenopathy.     Pelvis: Streak artifact related to left hip replacement and the bladder is negative. No drainable fluid collection. There are scattered diverticula. The bowel is nonobstructed. Appendix not clearly demonstrated but no secondary sign of appendicitis. The  inguinal canals are negative.     Redemonstration of a single tic fluid collection anterior to the atrophic rectus musculature in the mid abdomen. This  measures 14.3 cm transverse by 1.7 cm AP by 8.4 cm cranial caudal. This is stable to slightly more prominent than on the prior study but  is a chronic finding. There is no new air within the fluid or other distinct CT evidence to suggest infection but this should be correlated clinically. There is no suspicious bone lesion. Degenerative changes.  noncontributory.     IMPRESSION:     1. No clearly acute process in the abdomen or pelvis.  2. Appendix not visualized but no secondary signs of appendicitis.  3. Slight interval increase in size of a chronic fluid collection anterior to the rectus musculature. No new CT evidence to otherwise suggest infected fluid but this be correlated clinically.  4. Hepatomegaly and hepatic steatosis. Postoperative changes as described. Diverticulosis.  Results for PERRY GONZALEZ (MRN [id number]) as of 1/23/2022 14:20   Ref. Range 1/22/2022 19:01 1/23/2022 07:25   WBC Latest Ref Range: 3.40 - 10.80 10*3/mm3 12.64 (H) 11.63 (H)   RBC Latest Ref Range: 3.77 - 5.28 10*6/mm3 5.17 4.28   Hemoglobin Latest Ref Range: 12.0 - 15.9 g/dL 13.5 11.2 (L)   Hematocrit Latest Ref Range: 34.0 - 46.6 % 41.8 37.5   RDW Latest Ref Range: 12.3 - 15.4 % 15.9 (H) 16.3 (H)   MCV Latest Ref Range: 79.0 - 97.0 fL 80.9 87.6   MCH Latest Ref Range: 26.6 - 33.0 pg 26.1 (L) 26.2 (L)   MCHC Latest Ref Range: 31.5 - 35.7 g/dL 32.3 29.9 (L)   MPV Latest Ref Range: 6.0 - 12.0 fL 11.1 11.4   Platelets Latest Ref Range: 140 - 450 10*3/mm3 265 213     Results for PERRY GONZALEZ (MRN [id number]) as of 1/23/2022 14:20   Ref. Range 1/22/2022 19:01 1/22/2022 21:11 1/23/2022 01:26   Glucose Latest Ref Range: 65 - 99 mg/dL 171 (H)  143 (H)   Sodium Latest Ref Range: 136 - 145 mmol/L 139  140   Potassium Latest Ref Range: 3.5 - 5.2 mmol/L 4.2  3.8   CO2 Latest Ref Range: 22.0 - 29.0 mmol/L 24.0  23.0   Chloride Latest Ref Range: 98 - 107 mmol/L 97 (L)  103   Anion Gap Latest Ref Range: 5.0 - 15.0 mmol/L 18.0  (H)  14.0   Creatinine Latest Ref Range: 0.57 - 1.00 mg/dL 1.11 (H)  0.94   BUN Latest Ref Range: 8 - 23 mg/dL 20  19   BUN/Creatinine Ratio Latest Ref Range: 7.0 - 25.0  18.0  20.2   Calcium Latest Ref Range: 8.6 - 10.5 mg/dL 10.6 (H)  9.6   Ionized Calcium Latest Ref Range: 1.12 - 1.32 mmol/L   1.27   eGFR Non  Am Latest Ref Range: >60 mL/min/1.73 49 (L)  60 (L)   Alkaline Phosphatase Latest Ref Range: 39 - 117 U/L 101     Total Protein Latest Ref Range: 6.0 - 8.5 g/dL 8.6 (H)     ALT (SGPT) Latest Ref Range: 1 - 33 U/L 14     AST (SGOT) Latest Ref Range: 1 - 32 U/L 20     Total Bilirubin Latest Ref Range: 0.0 - 1.2 mg/dL 0.4     Albumin Latest Ref Range: 3.50 - 5.20 g/dL 4.90     Globulin Latest Units: gm/dL 3.7       ASSESSMENTS/PLANS    1.  Gastroparesis  2.  Hematemesis  3.  Diabetes  4.  Peripheral neuropathy  5.  Diabetes type 2    Will change her Protonix to twice daily given IV until her gastroparesis is improved.  She needs to be discharged on Protonix twice a day to help decrease gastric secretions.  This will help her gastroparesis.    Plan EGD for a.m.      I discussed the patient's findings and my recommendations with patient    Eliezer Bernardo MD  01/23/22  14:16 EST

## 2022-01-23 NOTE — PROGRESS NOTES
[facility] Medicine Services  ADMISSION FOLLOW-UP NOTE          Patient admitted after midnight, H&P by my partner performed earlier on today's date reviewed.  Interim findings, labs, and charting also reviewed.        The [facility] Hospital Problem List has been managed and updated to include any new diagnoses:  Active Hospital Problems    Diagnosis  POA   • **Intractable nausea and vomiting [R11.2]  Yes   • Elevated serum creatinine [R79.89]  Yes   • Ketosis (HCC) [E88.89]  Yes   • Elevated troponin [R77.8]  Yes   • Diabetic neuropathy (HCC) [E11.40]  Yes   • GERD (gastroesophageal reflux disease) [K21.9]  Yes   • Uncontrolled type 2 diabetes mellitus with complication, with long-term current use of insulin (HCC) [E11.8, E11.65, Z79.4]  Not Applicable   • Gastroparesis [K31.84]  Yes   • Chronic pain syndrome, on PO dilaudid at home [G89.4]  Yes   • Hypertension [I10]  Yes      Resolved Hospital Problems   No resolved problems to display.         ADDITIONAL PLAN:  - detailed assessment and plan from admission reviewed    66 year old female with gastroparesis s/p gastric stimulator followed by Dr. Pruett at [facility], DM2, HTN, HLD who presented to the ED due to intractable nausea/vomiting similar to prior episodes that have been attributed to gastroparesis.    Intractable nausea vomiting  Gastroparesis  -Followed by Dr. Pruett at [facility] gastroenterology, s/p gastric stimulator  -Continue IV fluids, antiemetics and scheduled reglan  -Monitor QT     Starvation ketosis  T2DM  -A1c 5.6%  -Continue IV fluids  -Advance diet as tolerated  -Basal/bolus insulin     Elevated creatinine  -Improved with IV fluids     Hypertensive urgency  -Resolved  -Continue metoprolol     Elevated troponin  -Mildly elevated in absence of chest pain, resolved  -Likely demand ischemia with hypovolemia and intractable vomiting     Chronic back pain  -On p.o. Dilaudid at home-with acute pain after recent fall, increase dose  temporarily.  CT A/P does not show any fractures.  Counseled patient that narcotics are likely to further decrease GI motility.     GERD  -Continue protonix     Fall  Generalized weakness  -PT/OT evaluation       Nancy Madrid MD  01/23/22

## 2022-01-23 NOTE — PLAN OF CARE
"Goal Outcome Evaluation:  Plan of Care Reviewed With: patient        Progress: no change  Outcome Summary: Severe pain today (back, hips (L>R), left leg), states pain has been especially bad since she fell at home on 1/22/22. Dr. Madrid increased Rx w/ some improvement, heat pad did not help, lidocaine patches \"burn my skin\" (no visible skin changes, patches removed per pt's request). Nausea improved, still no appetite, drank clear liquids but this caused further nausea (relieved w/ Zofran). Sinus Tachycardia, bp stable, SpO2 stable (continuous monitoring while on narcotics) on room air. Plan EGD tomorrow.  "

## 2022-01-24 ENCOUNTER — APPOINTMENT (OUTPATIENT)
Dept: CT IMAGING | Facility: HOSPITAL | Age: 67
End: 2022-01-24

## 2022-01-24 ENCOUNTER — ANESTHESIA (OUTPATIENT)
Dept: GASTROENTEROLOGY | Facility: HOSPITAL | Age: 67
End: 2022-01-24

## 2022-01-24 ENCOUNTER — ANESTHESIA EVENT (OUTPATIENT)
Dept: GASTROENTEROLOGY | Facility: HOSPITAL | Age: 67
End: 2022-01-24

## 2022-01-24 LAB
ANION GAP SERPL CALCULATED.3IONS-SCNC: 16 MMOL/L (ref 5–15)
BUN SERPL-MCNC: 11 MG/DL (ref 8–23)
BUN/CREAT SERPL: 13.4 (ref 7–25)
CALCIUM SPEC-SCNC: 9.3 MG/DL (ref 8.6–10.5)
CHLORIDE SERPL-SCNC: 97 MMOL/L (ref 98–107)
CO2 SERPL-SCNC: 20 MMOL/L (ref 22–29)
CREAT SERPL-MCNC: 0.82 MG/DL (ref 0.57–1)
DEPRECATED RDW RBC AUTO: 48.9 FL (ref 37–54)
ERYTHROCYTE [DISTWIDTH] IN BLOOD BY AUTOMATED COUNT: 15.8 % (ref 12.3–15.4)
FLUAV RNA RESP QL NAA+PROBE: NOT DETECTED
FLUBV RNA RESP QL NAA+PROBE: NOT DETECTED
GFR SERPL CREATININE-BSD FRML MDRD: 70 ML/MIN/1.73
GLUCOSE BLDC GLUCOMTR-MCNC: 112 MG/DL (ref 70–130)
GLUCOSE BLDC GLUCOMTR-MCNC: 113 MG/DL (ref 70–130)
GLUCOSE BLDC GLUCOMTR-MCNC: 145 MG/DL (ref 70–130)
GLUCOSE BLDC GLUCOMTR-MCNC: 177 MG/DL (ref 70–130)
GLUCOSE BLDC GLUCOMTR-MCNC: 181 MG/DL (ref 70–130)
GLUCOSE SERPL-MCNC: 156 MG/DL (ref 65–99)
HCT VFR BLD AUTO: 36.2 % (ref 34–46.6)
HGB BLD-MCNC: 11.2 G/DL (ref 12–15.9)
MCH RBC QN AUTO: 26.2 PG (ref 26.6–33)
MCHC RBC AUTO-ENTMCNC: 30.9 G/DL (ref 31.5–35.7)
MCV RBC AUTO: 84.8 FL (ref 79–97)
PLATELET # BLD AUTO: 199 10*3/MM3 (ref 140–450)
PMV BLD AUTO: 11.5 FL (ref 6–12)
POTASSIUM SERPL-SCNC: 4.4 MMOL/L (ref 3.5–5.2)
QT INTERVAL: 324 MS
QT INTERVAL: 330 MS
QT INTERVAL: 330 MS
QT INTERVAL: 336 MS
QT INTERVAL: 354 MS
QTC INTERVAL: 448 MS
QTC INTERVAL: 454 MS
QTC INTERVAL: 460 MS
QTC INTERVAL: 461 MS
QTC INTERVAL: 468 MS
RBC # BLD AUTO: 4.27 10*6/MM3 (ref 3.77–5.28)
RSV RNA NPH QL NAA+NON-PROBE: NOT DETECTED
SARS-COV-2 RNA RESP QL NAA+PROBE: NOT DETECTED
SODIUM SERPL-SCNC: 133 MMOL/L (ref 136–145)
WBC NRBC COR # BLD: 8.55 10*3/MM3 (ref 3.4–10.8)

## 2022-01-24 PROCEDURE — 63710000001 INSULIN LISPRO (HUMAN) PER 5 UNITS: Performed by: INTERNAL MEDICINE

## 2022-01-24 PROCEDURE — 25010000002 SUCCINYLCHOLINE PER 20 MG: Performed by: NURSE ANESTHETIST, CERTIFIED REGISTERED

## 2022-01-24 PROCEDURE — 25010000002 PROPOFOL 10 MG/ML EMULSION: Performed by: NURSE ANESTHETIST, CERTIFIED REGISTERED

## 2022-01-24 PROCEDURE — 25010000002 DEXAMETHASONE PER 1 MG: Performed by: NURSE ANESTHETIST, CERTIFIED REGISTERED

## 2022-01-24 PROCEDURE — 63710000001 INSULIN DETEMIR PER 5 UNITS: Performed by: INTERNAL MEDICINE

## 2022-01-24 PROCEDURE — 25010000002 HEPARIN (PORCINE) PER 1000 UNITS: Performed by: NURSE PRACTITIONER

## 2022-01-24 PROCEDURE — 25010000002 ONDANSETRON PER 1 MG: Performed by: NURSE PRACTITIONER

## 2022-01-24 PROCEDURE — G0378 HOSPITAL OBSERVATION PER HR: HCPCS

## 2022-01-24 PROCEDURE — 25010000002 MORPHINE PER 10 MG: Performed by: NURSE PRACTITIONER

## 2022-01-24 PROCEDURE — 83605 ASSAY OF LACTIC ACID: CPT | Performed by: NURSE PRACTITIONER

## 2022-01-24 PROCEDURE — 25010000002 ONDANSETRON PER 1 MG: Performed by: INTERNAL MEDICINE

## 2022-01-24 PROCEDURE — 25010000002 HYDROMORPHONE PER 4 MG: Performed by: INTERNAL MEDICINE

## 2022-01-24 PROCEDURE — 74176 CT ABD & PELVIS W/O CONTRAST: CPT

## 2022-01-24 PROCEDURE — 43239 EGD BIOPSY SINGLE/MULTIPLE: CPT | Performed by: INTERNAL MEDICINE

## 2022-01-24 PROCEDURE — 97165 OT EVAL LOW COMPLEX 30 MIN: CPT

## 2022-01-24 PROCEDURE — 82962 GLUCOSE BLOOD TEST: CPT

## 2022-01-24 PROCEDURE — 25010000002 METOCLOPRAMIDE PER 10 MG: Performed by: INTERNAL MEDICINE

## 2022-01-24 PROCEDURE — 25010000002 HEPARIN (PORCINE) PER 1000 UNITS: Performed by: INTERNAL MEDICINE

## 2022-01-24 PROCEDURE — 93005 ELECTROCARDIOGRAM TRACING: CPT | Performed by: NURSE PRACTITIONER

## 2022-01-24 PROCEDURE — 85027 COMPLETE CBC AUTOMATED: CPT | Performed by: INTERNAL MEDICINE

## 2022-01-24 PROCEDURE — 87637 SARSCOV2&INF A&B&RSV AMP PRB: CPT | Performed by: NURSE PRACTITIONER

## 2022-01-24 PROCEDURE — 99225 PR SBSQ OBSERVATION CARE/DAY 25 MINUTES: CPT | Performed by: STUDENT IN AN ORGANIZED HEALTH CARE EDUCATION/TRAINING PROGRAM

## 2022-01-24 PROCEDURE — 88305 TISSUE EXAM BY PATHOLOGIST: CPT | Performed by: INTERNAL MEDICINE

## 2022-01-24 PROCEDURE — 96372 THER/PROPH/DIAG INJ SC/IM: CPT

## 2022-01-24 PROCEDURE — 80048 BASIC METABOLIC PNL TOTAL CA: CPT | Performed by: INTERNAL MEDICINE

## 2022-01-24 PROCEDURE — 25010000002 ONDANSETRON PER 1 MG: Performed by: NURSE ANESTHETIST, CERTIFIED REGISTERED

## 2022-01-24 PROCEDURE — 93010 ELECTROCARDIOGRAM REPORT: CPT | Performed by: INTERNAL MEDICINE

## 2022-01-24 PROCEDURE — 25010000002 HYDROMORPHONE 1 MG/ML SOLUTION: Performed by: STUDENT IN AN ORGANIZED HEALTH CARE EDUCATION/TRAINING PROGRAM

## 2022-01-24 PROCEDURE — 96376 TX/PRO/DX INJ SAME DRUG ADON: CPT

## 2022-01-24 RX ORDER — LABETALOL HYDROCHLORIDE 5 MG/ML
10 INJECTION, SOLUTION INTRAVENOUS ONCE
Status: COMPLETED | OUTPATIENT
Start: 2022-01-24 | End: 2022-01-24

## 2022-01-24 RX ORDER — ROCURONIUM BROMIDE 10 MG/ML
INJECTION, SOLUTION INTRAVENOUS AS NEEDED
Status: DISCONTINUED | OUTPATIENT
Start: 2022-01-24 | End: 2022-01-24 | Stop reason: SURG

## 2022-01-24 RX ORDER — IPRATROPIUM BROMIDE AND ALBUTEROL SULFATE 2.5; .5 MG/3ML; MG/3ML
3 SOLUTION RESPIRATORY (INHALATION) ONCE AS NEEDED
Status: DISCONTINUED | OUTPATIENT
Start: 2022-01-24 | End: 2022-01-24 | Stop reason: HOSPADM

## 2022-01-24 RX ORDER — ONDANSETRON 2 MG/ML
INJECTION INTRAMUSCULAR; INTRAVENOUS AS NEEDED
Status: DISCONTINUED | OUTPATIENT
Start: 2022-01-24 | End: 2022-01-24 | Stop reason: SURG

## 2022-01-24 RX ORDER — ESMOLOL HYDROCHLORIDE 10 MG/ML
INJECTION INTRAVENOUS AS NEEDED
Status: DISCONTINUED | OUTPATIENT
Start: 2022-01-24 | End: 2022-01-24 | Stop reason: SURG

## 2022-01-24 RX ORDER — MORPHINE SULFATE 2 MG/ML
1 INJECTION, SOLUTION INTRAMUSCULAR; INTRAVENOUS
Status: DISCONTINUED | OUTPATIENT
Start: 2022-01-24 | End: 2022-01-25 | Stop reason: HOSPADM

## 2022-01-24 RX ORDER — SUCCINYLCHOLINE CHLORIDE 20 MG/ML
INJECTION INTRAMUSCULAR; INTRAVENOUS AS NEEDED
Status: DISCONTINUED | OUTPATIENT
Start: 2022-01-24 | End: 2022-01-24 | Stop reason: SURG

## 2022-01-24 RX ORDER — ONDANSETRON 2 MG/ML
4 INJECTION INTRAMUSCULAR; INTRAVENOUS ONCE AS NEEDED
Status: DISCONTINUED | OUTPATIENT
Start: 2022-01-24 | End: 2022-01-24 | Stop reason: HOSPADM

## 2022-01-24 RX ORDER — LIDOCAINE HYDROCHLORIDE 10 MG/ML
INJECTION, SOLUTION EPIDURAL; INFILTRATION; INTRACAUDAL; PERINEURAL AS NEEDED
Status: DISCONTINUED | OUTPATIENT
Start: 2022-01-24 | End: 2022-01-24 | Stop reason: SURG

## 2022-01-24 RX ORDER — DEXAMETHASONE SODIUM PHOSPHATE 10 MG/ML
INJECTION INTRAMUSCULAR; INTRAVENOUS AS NEEDED
Status: DISCONTINUED | OUTPATIENT
Start: 2022-01-24 | End: 2022-01-24 | Stop reason: SURG

## 2022-01-24 RX ORDER — SODIUM CHLORIDE, SODIUM LACTATE, POTASSIUM CHLORIDE, CALCIUM CHLORIDE 600; 310; 30; 20 MG/100ML; MG/100ML; MG/100ML; MG/100ML
9 INJECTION, SOLUTION INTRAVENOUS CONTINUOUS
Status: DISCONTINUED | OUTPATIENT
Start: 2022-01-24 | End: 2022-01-25 | Stop reason: HOSPADM

## 2022-01-24 RX ORDER — 0.9 % SODIUM CHLORIDE 0.9 %
10 VIAL (ML) INJECTION ONCE
Status: COMPLETED | OUTPATIENT
Start: 2022-01-24 | End: 2022-01-24

## 2022-01-24 RX ORDER — PROPOFOL 10 MG/ML
VIAL (ML) INTRAVENOUS AS NEEDED
Status: DISCONTINUED | OUTPATIENT
Start: 2022-01-24 | End: 2022-01-24 | Stop reason: SURG

## 2022-01-24 RX ADMIN — SODIUM CHLORIDE, PRESERVATIVE FREE 10 ML: 5 INJECTION INTRAVENOUS at 08:05

## 2022-01-24 RX ADMIN — HEPARIN SODIUM 5000 UNITS: 5000 INJECTION, SOLUTION INTRAVENOUS; SUBCUTANEOUS at 05:09

## 2022-01-24 RX ADMIN — METOCLOPRAMIDE 10 MG: 5 INJECTION, SOLUTION INTRAMUSCULAR; INTRAVENOUS at 10:44

## 2022-01-24 RX ADMIN — METOPROLOL SUCCINATE 100 MG: 100 TABLET, EXTENDED RELEASE ORAL at 10:44

## 2022-01-24 RX ADMIN — Medication 1 TABLET: at 10:44

## 2022-01-24 RX ADMIN — ACETAMINOPHEN 650 MG: 325 TABLET, FILM COATED ORAL at 10:44

## 2022-01-24 RX ADMIN — INSULIN DETEMIR 20 UNITS: 100 INJECTION, SOLUTION SUBCUTANEOUS at 10:44

## 2022-01-24 RX ADMIN — METOCLOPRAMIDE 10 MG: 5 INJECTION, SOLUTION INTRAMUSCULAR; INTRAVENOUS at 18:24

## 2022-01-24 RX ADMIN — LAMOTRIGINE 100 MG: 100 TABLET ORAL at 10:43

## 2022-01-24 RX ADMIN — LIDOCAINE HYDROCHLORIDE 50 MG: 10 INJECTION, SOLUTION EPIDURAL; INFILTRATION; INTRACAUDAL; PERINEURAL at 09:22

## 2022-01-24 RX ADMIN — ONDANSETRON 4 MG: 2 INJECTION INTRAMUSCULAR; INTRAVENOUS at 03:16

## 2022-01-24 RX ADMIN — SUCCINYLCHOLINE CHLORIDE 140 MG: 20 INJECTION, SOLUTION INTRAMUSCULAR; INTRAVENOUS at 09:22

## 2022-01-24 RX ADMIN — GABAPENTIN 1200 MG: 400 CAPSULE ORAL at 21:53

## 2022-01-24 RX ADMIN — INSULIN LISPRO 2 UNITS: 100 INJECTION, SOLUTION INTRAVENOUS; SUBCUTANEOUS at 18:24

## 2022-01-24 RX ADMIN — LABETALOL 20 MG/4 ML (5 MG/ML) INTRAVENOUS SYRINGE 10 MG: at 18:24

## 2022-01-24 RX ADMIN — ROCURONIUM BROMIDE 20 MG: 10 INJECTION, SOLUTION INTRAVENOUS at 09:33

## 2022-01-24 RX ADMIN — SUGAMMADEX 200 MG: 100 INJECTION, SOLUTION INTRAVENOUS at 09:39

## 2022-01-24 RX ADMIN — ONDANSETRON 4 MG: 2 INJECTION INTRAMUSCULAR; INTRAVENOUS at 09:31

## 2022-01-24 RX ADMIN — HYDROMORPHONE HYDROCHLORIDE 1 MG: 1 INJECTION, SOLUTION INTRAMUSCULAR; INTRAVENOUS; SUBCUTANEOUS at 16:34

## 2022-01-24 RX ADMIN — SODIUM CHLORIDE, PRESERVATIVE FREE 10 ML: 5 INJECTION INTRAVENOUS at 10:44

## 2022-01-24 RX ADMIN — GABAPENTIN 600 MG: 300 CAPSULE ORAL at 10:44

## 2022-01-24 RX ADMIN — ESMOLOL HYDROCHLORIDE 100 MG: 10 INJECTION, SOLUTION INTRAVENOUS at 09:22

## 2022-01-24 RX ADMIN — HEPARIN SODIUM 5000 UNITS: 5000 INJECTION, SOLUTION INTRAVENOUS; SUBCUTANEOUS at 21:54

## 2022-01-24 RX ADMIN — SODIUM CHLORIDE, POTASSIUM CHLORIDE, SODIUM LACTATE AND CALCIUM CHLORIDE 9 ML/HR: 600; 310; 30; 20 INJECTION, SOLUTION INTRAVENOUS at 08:04

## 2022-01-24 RX ADMIN — PROPOFOL 150 MG: 10 INJECTION, EMULSION INTRAVENOUS at 09:22

## 2022-01-24 RX ADMIN — MORPHINE SULFATE 1 MG: 2 INJECTION, SOLUTION INTRAMUSCULAR; INTRAVENOUS at 07:21

## 2022-01-24 RX ADMIN — MORPHINE SULFATE 1 MG: 2 INJECTION, SOLUTION INTRAMUSCULAR; INTRAVENOUS at 03:03

## 2022-01-24 RX ADMIN — ACETAMINOPHEN 650 MG: 325 TABLET, FILM COATED ORAL at 01:07

## 2022-01-24 RX ADMIN — HYDROMORPHONE HYDROCHLORIDE 4 MG: 4 TABLET ORAL at 10:44

## 2022-01-24 RX ADMIN — HEPARIN SODIUM 5000 UNITS: 5000 INJECTION, SOLUTION INTRAVENOUS; SUBCUTANEOUS at 13:34

## 2022-01-24 RX ADMIN — LAMOTRIGINE 100 MG: 100 TABLET ORAL at 21:54

## 2022-01-24 RX ADMIN — HYDROMORPHONE HYDROCHLORIDE 0.5 MG: 1 INJECTION, SOLUTION INTRAMUSCULAR; INTRAVENOUS; SUBCUTANEOUS at 01:07

## 2022-01-24 RX ADMIN — SODIUM CHLORIDE, PRESERVATIVE FREE 10 ML: 5 INJECTION INTRAVENOUS at 21:54

## 2022-01-24 RX ADMIN — ONDANSETRON 4 MG: 2 INJECTION INTRAMUSCULAR; INTRAVENOUS at 13:34

## 2022-01-24 RX ADMIN — METOCLOPRAMIDE 10 MG: 5 INJECTION, SOLUTION INTRAMUSCULAR; INTRAVENOUS at 21:54

## 2022-01-24 RX ADMIN — MORPHINE SULFATE 1 MG: 2 INJECTION, SOLUTION INTRAMUSCULAR; INTRAVENOUS at 05:09

## 2022-01-24 RX ADMIN — DEXAMETHASONE SODIUM PHOSPHATE 8 MG: 10 INJECTION INTRAMUSCULAR; INTRAVENOUS at 09:31

## 2022-01-24 RX ADMIN — HYDROMORPHONE HYDROCHLORIDE 1 MG: 1 INJECTION, SOLUTION INTRAMUSCULAR; INTRAVENOUS; SUBCUTANEOUS at 12:19

## 2022-01-24 RX ADMIN — PANTOPRAZOLE SODIUM 40 MG: 40 INJECTION, POWDER, FOR SOLUTION INTRAVENOUS at 08:08

## 2022-01-24 RX ADMIN — ACETAMINOPHEN 650 MG: 325 TABLET, FILM COATED ORAL at 13:34

## 2022-01-24 NOTE — PROGRESS NOTES
[facility] Medicine Services  PROGRESS NOTE    Patient Name: Cortney Delacruz  : 1955  MRN: [id number]    Date of Admission: 2022  Primary Care Physician: Ronnie Garvey MD    Subjective   Subjective     CC:  Intractable nausea and vomiting    HPI:  She is complaining as back pain and continued abdominal pain.  Seen post EGD and she has not yet had a diet    ROS:  Gen- No fevers, chills  CV- No chest pain, palpitations  Resp- No cough, dyspnea  GI-abdominal pain         Objective   Objective     Vital Signs:   Temp:  [97.2 °F (36.2 °C)-98.8 °F (37.1 °C)] 98.8 °F (37.1 °C)  Heart Rate:  [] 109  Resp:  [18-22] 18  BP: (129-191)/() 191/100  Flow (L/min):  [2] 2     Physical Exam:  Constitutional: No acute distress, awake, alert, obese  HENT: NCAT, mucous membranes moist  Respiratory: Clear to auscultation bilaterally, respiratory effort normal   Cardiovascular: RRR, no murmurs, rubs, or gallops  Gastrointestinal: Positive bowel sounds, soft, tender to palpation, nondistended  Musculoskeletal: No bilateral ankle edema  Psychiatric: Appropriate affect, cooperative  Neurologic: Oriented x 3, no focal neuro deficits  Skin: No rashes      Results Reviewed:  LAB RESULTS:      Lab 22  0506 22  0725 22  0126 22  2111 22  1901   WBC 8.55 11.63*  --   --  12.64*   HEMOGLOBIN 11.2* 11.2*  --   --  13.5   HEMATOCRIT 36.2 37.5  --   --  41.8   PLATELETS 199 213  --   --  265   NEUTROS ABS  --  7.09*  --   --  10.43*   IMMATURE GRANS (ABS)  --  0.05  --   --  0.06*   LYMPHS ABS  --  3.38*  --   --  1.41   MONOS ABS  --  1.04*  --   --  0.72   EOS ABS  --  0.03  --   --  0.00   MCV 84.8 87.6  --   --  80.9   PROCALCITONIN  --   --   --  0.12  --    LACTATE  --   --  1.3  --   --          Lab 22  1113 22  1559 22  0746 22  0126 22  1901   SODIUM 133* 137  --  140 139   POTASSIUM 4.4 3.8  --  3.8 4.2   CHLORIDE 97*  103  --  103 97*   CO2 20.0* 24.0  --  23.0 24.0   ANION GAP 16.0* 10.0  --  14.0 18.0*   BUN 11 16  --  19 20   CREATININE 0.82 0.89  --  0.94 1.11*   GLUCOSE 156* 117*  --  143* 171*   CALCIUM 9.3 9.0  --  9.6 10.6*   IONIZED CALCIUM  --   --   --  1.27  --    MAGNESIUM  --   --   --   --  2.1   HEMOGLOBIN A1C  --   --  5.60  --   --          Lab 01/22/22  1901   TOTAL PROTEIN 8.6*   ALBUMIN 4.90   GLOBULIN 3.7   ALT (SGPT) 14   AST (SGOT) 20   BILIRUBIN 0.4   ALK PHOS 101   LIPASE 19         Lab 01/22/22  2111 01/22/22 1901   PROBNP  --  1,028.0*   TROPONIN T 0.022 0.035*                 Lab 01/22/22 2328   PH, ARTERIAL 7.384   PCO2, ARTERIAL 40.1   PO2 ART 81.7*   FIO2 21   HCO3 ART 24.0   BASE EXCESS ART -1.0*   CARBOXYHEMOGLOBIN 0.4     Brief Urine Lab Results  (Last result in the past 365 days)      Color   Clarity   Blood   Leuk Est   Nitrite   Protein   CREAT   Urine HCG        01/22/22 2048 Yellow   Clear   Negative   Negative   Negative   30 mg/dL (1+)                 Microbiology Results Abnormal     Procedure Component Value - Date/Time    COVID PRE-OP / PRE-PROCEDURE SCREENING ORDER (NO ISOLATION) - Swab, Nasopharynx [[id number]]  (Normal) Collected: 01/22/22 2358    Lab Status: Final result Specimen: Swab from Nasopharynx Updated: 01/23/22 0052    Narrative:      The following orders were created for panel order COVID PRE-OP / PRE-PROCEDURE SCREENING ORDER (NO ISOLATION) - Swab, Nasopharynx.  Procedure                               Abnormality         Status                     ---------                               -----------         ------                     COVID-19, FLU A/B, RSV P...[[id number]]  Normal              Final result                 Please view results for these tests on the individual orders.    COVID-19, FLU A/B, RSV PCR - Swab, Nasopharynx [[id number]]  (Normal) Collected: 01/22/22 2358    Lab Status: Final result Specimen: Swab from Nasopharynx Updated: 01/23/22 0052     COVID19  Not Detected     Influenza A PCR Not Detected     Influenza B PCR Not Detected     RSV, PCR Not Detected    Narrative:      Fact sheet for providers: https://www.fda.gov/media/687609/download    Fact sheet for patients: https://www.fda.gov/media/227888/download    Test performed by PCR.          CT Abdomen Pelvis With Contrast    Result Date: 1/22/2022  CT Abdomen Pelvis W INDICATION: Generalized abdominal pain TECHNIQUE: CT of the abdomen and pelvis with IV contrast. Coronal and sagittal reconstructions were obtained.  Radiation dose reduction techniques included automated exposure control or exposure modulation based on body size. Count of known CT and cardiac nuc med studies performed in previous 12 months: 0. COMPARISON: 3/20/2019 FINDINGS: Abdomen: Included lung bases are clear no effusion or pericardial effusion. Small hiatal hernia. Normal caliber aorta and atherosclerotic change. The spleen and adrenal glands are negative and pancreas unremarkable. Gallbladder surgically absent and liver shows hepatic steatosis and is enlarged. There is a gastric stimulator present. The kidneys are nonobstructed. Tiny stone upper pole left kidney. No adenopathy. Pelvis: Streak artifact related to left hip replacement and the bladder is negative. No drainable fluid collection. There are scattered diverticula. The bowel is nonobstructed. Appendix not clearly demonstrated but no secondary sign of appendicitis. The inguinal canals are negative. Redemonstration of a single tic fluid collection anterior to the atrophic rectus musculature in the mid abdomen. This measures 14.3 cm transverse by 1.7 cm AP by 8.4 cm cranial caudal. This is stable to slightly more prominent than on the prior study but is a chronic finding. There is no new air within the fluid or other distinct CT evidence to suggest infection but this should be correlated clinically. There is no suspicious bone lesion. Degenerative changes.  noncontributory.      Impression: 1. No clearly acute process in the abdomen or pelvis. 2. Appendix not visualized but no secondary signs of appendicitis. 3. Slight interval increase in size of a chronic fluid collection anterior to the rectus musculature. No new CT evidence to otherwise suggest infected fluid but this be correlated clinically. 4. Hepatomegaly and hepatic steatosis. Postoperative changes as described. Diverticulosis. Signer Name: Mauricio Arredondo MD  Signed: 1/22/2022 10:33 PM  Workstation Name: [facility]  [facility]    XR Chest 1 View    Result Date: 1/22/2022  EXAMINATION: XR CHEST 1 VW-  INDICATION: Weak/Dizzy/AMS triage protocol  COMPARISON: 6/3/2021  FINDINGS: Heart is normal in size. Vasculature appears upper limits of normal. Mild chronic appearing interstitial lung changes and a few calcified granulomas are again noted and appear unchanged. No new pulmonary parenchymal disease, effusion or pneumothorax is seen. Clips are again seen in the left axillary region..       Impression: Stable mild chronic appearing interstitial lung changes. No new chest disease is appreciated.    This report was finalized on 1/22/2022 6:56 PM by Dr. Lambert Esquivel MD.      XR Spine Lumbar Complete 4+VW    Result Date: 1/24/2022  DATE OF EXAM: 1/23/2022 8:10 PM  PROCEDURE: XR SPINE LUMBAR COMPLETE 4+VW-  INDICATIONS: Pain after fall; R11.2-Nausea with vomiting, unspecified; E13.10-Other specified diabetes mellitus with ketoacidosis without coma; K31.84-Gastroparesis; R10.9-Unspecified abdominal pain; G89.29-Other chronic pain; I10-Essential (primary) hypertension; K92.0-Hematemesis  COMPARISON: December 27, 2021  TECHNIQUE: A complete lumbar spine with a minimum of four radiologic views were obtained.    FINDINGS: For purposes of numbering it looks like T12 contains transitional ribs. It looks like there is developmental articulation of L5 with S1 on the right. There are marginal osteophytes about the disc space  at L3-4. There are degenerative endplate changes and vacuum disc phenomenon. There is facet arthropathy in the lower lumbar spine.. There is a stimulator device with leads extending into the right upper quadrant. The patient had a left hip arthroplasty. There are surgical clips in the upper quadrants of the abdomen.      Impression: 1.  Multilevel degenerative changes within the lower lumbar spine.  This report was finalized on 1/24/2022 8:10 AM by Luis Chun MD.      XR Hips Bilateral With or Without Pelvis 2 View    Result Date: 1/24/2022  XR HIPS BILATERAL W OR WO PELVIS 2 VIEW-  Date of Exam: 1/23/2022 8:15 PM  Indication: Fall, pain; R11.2-Nausea with vomiting, unspecified; E13.10-Other specified diabetes mellitus with ketoacidosis without coma; K31.84-Gastroparesis; R10.9-Unspecified abdominal pain; G89.29-Other chronic pain; I10-Essential (primary) hypertension; K92.0-Hematemesis.  Comparison: Radiograph 5/18/2015  Technique: 3 views of the hip were obtained.  FINDINGS:  There is no fracture or dislocation. Mild degenerative changes are present within the right hip joint. Postsurgical changes are seen from a total left hip arthroplasty. The hardware appears intact. No evidence of surrounding lucency. Mild degenerative changes are present within the pubic symphysis. Small amount of heterotopic ossification is seen along the lateral margin of the neck component of the total left hip arthroplasty on the left appears nonbridging. There are no aggressive osseous lesions.  The adjacent soft tissues are radiographically unremarkable. No erosions. No periostitis.          Impression: No acute osseous abnormality. Postsurgical changes are seen from a total left hip arthroplasty with no evidence of acute hardware failure. Stable mild osteoarthritic changes are present on the right.  This report was finalized on 1/24/2022 7:34 AM by Becky Francisco MD.        Results for orders placed during the hospital encounter of  03/20/19    Adult Transthoracic Echo Complete W/ Cont if Necessary Per Protocol    Interpretation Summary  · Left ventricular systolic function is normal. Estimated EF = 60%.  · Left ventricular diastolic dysfunction (grade I a) consistent with impaired relaxation.      I have reviewed the medications:  Scheduled Meds:acetaminophen, 650 mg, Oral, Q6H  gabapentin, 1,200 mg, Oral, Nightly  gabapentin, 600 mg, Oral, Daily  heparin (porcine), 5,000 Units, Subcutaneous, Q8H  insulin detemir, 20 Units, Subcutaneous, Daily  insulin lispro, 0-7 Units, Subcutaneous, TID AC  lamoTRIgine, 100 mg, Oral, Q12H  lidocaine, 3 patch, Transdermal, Q24H  metoclopramide, 10 mg, Intravenous, 4x Daily AC & at Bedtime  metoprolol succinate XL, 100 mg, Oral, Q24H  multivitamin with minerals, 1 tablet, Oral, Daily  pantoprazole, 40 mg, Intravenous, Q AM  sodium chloride, 10 mL, Intravenous, Q12H      Continuous Infusions:lactated ringers, 9 mL/hr, Last Rate: Stopped (01/24/22 1217)      PRN Meds:.dextrose  •  dextrose  •  fluticasone  •  glucagon (human recombinant)  •  HYDROmorphone  •  Morphine  •  ondansetron **OR** ondansetron  •  sodium chloride  •  [COMPLETED] Insert peripheral IV **AND** sodium chloride  •  sodium chloride    Assessment/Plan   Assessment & Plan     Active Hospital Problems    Diagnosis  POA   • **Intractable nausea and vomiting [R11.2]  Yes   • Elevated serum creatinine [R79.89]  Yes   • Ketosis (HCC) [E88.89]  Yes   • Elevated troponin [R77.8]  Yes   • Coffee ground emesis [K92.0]  Unknown   • Diabetic neuropathy (HCC) [E11.40]  Yes   • GERD (gastroesophageal reflux disease) [K21.9]  Yes   • Uncontrolled type 2 diabetes mellitus with complication, with long-term current use of insulin (HCC) [E11.8, E11.65, Z79.4]  Not Applicable   • Gastroparesis [K31.84]  Yes   • Chronic pain syndrome, on PO dilaudid at home [G89.4]  Yes   • Hypertension [I10]  Yes      Resolved Hospital Problems   No resolved problems to display.         Brief Hospital Course to date:  Cortney Delacruz is a 66 y.o. female with gastroparesis s/p gastric stimulator followed by Dr. Pruett at [facility], DM2, HTN, HLD who presented to the ED due to intractable nausea/vomiting similar to prior episodes that have been attributed to gastroparesis.     Intractable nausea vomiting  Gastroparesis  -Followed by Dr. Pruett at [facility] gastroenterology, s/p gastric stimulator  -Continue IV fluids, antiemetics and scheduled reglan  -Protonix increased by GI  -Monitor QT  -Status post EGD today reviewed preliminary report which showed antral gastritis which was mild, follow-up biopsy     Starvation ketosis  T2DM  -A1c 5.6%  -Continue IV fluids  -Advance diet as tolerated  -Basal/bolus insulin     Elevated creatinine  -Improved with IV fluids     Hypertensive urgency  -Resolved  -Continue metoprolol     Elevated troponin  -Mildly elevated in absence of chest pain, resolved  -Likely demand ischemia with hypovolemia and intractable vomiting     Chronic back pain  -On p.o. Dilaudid at home-with acute pain after recent fall, increase dose temporarily to IV.  CT A/P does not show any fractures.  Counseled patient that narcotics are likely to further decrease GI motility, he voiced understanding     GERD  -Continue protonix     Fall  Generalized weakness  -PT/OT evaluation      DVT prophylaxis:  Medical DVT prophylaxis orders are present.       AM-PAC 6 Clicks Score (PT): 22 (01/24/22 1044)    Disposition: I expect the patient to be discharged likely in 1 to 2 days seeing how she does with diet advancement    CODE STATUS:   Code Status and Medical Interventions:   Ordered at: 01/23/22 0144     Level Of Support Discussed With:    Patient     Code Status (Patient has no pulse and is not breathing):    CPR (Attempt to Resuscitate)     Medical Interventions (Patient has pulse or is breathing):    Full Support       Dana Oneal MD  01/24/22

## 2022-01-24 NOTE — THERAPY EVALUATION
Patient Name: Cortney Delacruz  : 1955    MRN: [id number]                              Today's Date: 2022       Admit Date: 2022    Visit Dx:     ICD-10-CM ICD-9-CM   1. Intractable nausea and vomiting  R11.2 536.2   2. 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 Gastroparesis  K31.84 536.3   4. Chronic abdominal pain  R10.9 789.00    G89.29 338.29   5. Hypertension, uncontrolled  I10 401.9   6. Coffee ground emesis  K92.0 578.0     Patient Active Problem List   Diagnosis   • Uncontrolled type 2 diabetes mellitus with complication, with long-term current use of insulin ([facility])   • Gastroparesis   • Chronic pain syndrome, on PO dilaudid at home   • Hypertension   • Osteoporosis - PCP managing   • Diabetic neuropathy ([facility])   • GERD (gastroesophageal reflux disease)   • Diarrhea   • History of ESBL E. coli infection   • Annual GYN exam w/o problems   • Cystocele, uterine prolapse with rectocele   • Ganglion cyst   • Obesity with body mass index 30 or greater   • Trigger thumb of left hand   • Synovial cyst of multiple joints   • Intractable nausea and vomiting   • Elevated serum creatinine   • Ketosis ([facility])   • Elevated troponin   • Coffee ground emesis     Past Medical History:   Diagnosis Date   • Arthritis    • CAP (community acquired pneumonia)    • Chronic pain     on dilaudid x4 years   • Depression    • Diabetes ([facility])     type II   • Diabetic neuropathy ([facility])     gabapentin, lamictal; reports uses xanax for this when severe as well   • Gastroparesis    • GERD (gastroesophageal reflux disease)     PPI and pepcid   • HX: breast cancer     s/p chemo   • Hypercholesteremia    • Hypertension    • Osteoporosis     reclast inj yearly   • Pyelonephritis 2019     Past Surgical History:   Procedure Laterality Date   • ABDOMINAL HERNIA REPAIR     • ABDOMINAL HERNIA REPAIR     • APPENDECTOMY     • DIAGNOSTIC LAPAROSCOPY     • GASTRIC STIMULATOR IMPLANT SURGERY      • GASTRIC STIMULATOR IMPLANT SURGERY  2015    replaced generator   • GASTRIC STIMULATOR IMPLANT SURGERY  2017    Replacement   • LAPAROSCOPIC CHOLECYSTECTOMY  1992   • LUMBAR LAMINECTOMY  1985    L4-L5   • LUMBAR LAMINECTOMY  1987    L3-L4   • LUMBAR LAMINECTOMY  1997    L3-L4   • LUMBAR LAMINECTOMY  2007    L3-L5   • MASTECTOMY Left 2000   • TONGUE SURGERY  2018   • TONSILLECTOMY AND ADENOIDECTOMY  1962   • TOTAL HIP ARTHROPLASTY REVISION  2015   • TRIGGER FINGER RELEASE Left 2017    ring finger   • URETEROSCOPY LASER LITHOTRIPSY WITH STENT INSERTION Right 03/13/2019    Dr. Cerna   • VENTRAL HERNIA REPAIR  2007   • VENTRAL HERNIA REPAIR  2013   • WRIST GANGLION EXCISION Right 1986      General Information     Row Name 01/24/22 1332          OT Time and Intention    Document Type evaluation  -HK     Mode of Treatment occupational therapy  -     Row Name 01/24/22 1332          General Information    Patient Profile Reviewed yes  -HK     Prior Level of Function independent:; all household mobility; community mobility; gait; transfer; bed mobility; ADL's  per pt report  -     Existing Precautions/Restrictions fall  -HK     Barriers to Rehab medically complex; previous functional deficit; cognitive status  -     Row Name 01/24/22 1332          Living Environment    Lives With child(eva), adult  -     Row Name 01/24/22 1332          Home Main Entrance    Number of Stairs, Main Entrance one  -HK     Stair Railings, Main Entrance none  -HK     Row Name 01/24/22 1332          Stairs Within Home, Primary    Number of Stairs, Within Home, Primary none  -HK     Stair Railings, Within Home, Primary none  -HK     Stairs Comment, Within Home, Primary Pt has walk in shower with shower seat.  -     Row Name 01/24/22 1332          Cognition    Orientation Status (Cognition) oriented to; person; place; situation; disoriented to; time  -     Row Name 01/24/22 1332          Safety Issues, Functional Mobility    Safety  Issues Affecting Function (Mobility) safety precautions follow-through/compliance; safety precaution awareness; insight into deficits/self-awareness; awareness of need for assistance; judgment; sequencing abilities  -     Impairments Affecting Function (Mobility) endurance/activity tolerance  -           User Key  (r) = Recorded By, (t) = Taken By, (c) = Cosigned By    Initials Name Provider Type     Felicity Vincent, OT Occupational Therapist                 Mobility/ADL's     Row Name 01/24/22 1333          Bed Mobility    Bed Mobility scooting/bridging; supine-sit  -HK     Scooting/Bridging [place] (Bed Mobility) supervision; verbal cues  -     Supine-Sit [place] (Bed Mobility) supervision; verbal cues  -HK     Bed Mobility, Safety Issues decreased use of arms for pushing/pulling; decreased use of legs for bridging/pushing; impaired trunk control for bed mobility  -     Assistive Device (Bed Mobility) bed rails; head of bed elevated  -     Row Name 01/24/22 1333          Transfers    Transfers sit-stand transfer  -     Comment (Transfers) Verbal cues for safe hand placement and sequencing. Declines to sit in chair due to nausea and fatigue.  -     Sit-Stand [place] (Transfers) contact guard; verbal cues  -     Row Name 01/24/22 1333          Sit-Stand Transfer    Assistive Device (Sit-Stand Transfers) walker, front-wheeled  -     Row Name 01/24/22 1333          Functional Mobility    Functional Mobility- Ind. Level not tested  -     Row Name 01/24/22 1333          Activities of Daily Living    BADL Assessment/Intervention lower body dressing  -     Row Name 01/24/22 1333          Lower Body Dressing Assessment/Training    Comment (Lower Body Dressing) Pt declined to complete this date however states that she has reacher and sock aid at home that she uses when needed.  -           User Key  (r) = Recorded By, (t) = Taken By, (c) = Cosigned By    Initials Name Provider Type      Felicity Vincent OT Occupational Therapist               Obj/Interventions     Row Name 01/24/22 1334          Sensory Assessment (Somatosensory)    Sensory Assessment (Somatosensory) sensation intact  declines all numbness and tingling  -     Row Name 01/24/22 1334          Vision Assessment/Intervention    Visual Impairment/Limitations WFL  -     Row Name 01/24/22 1334          Range of Motion Comprehensive    General Range of Motion no range of motion deficits identified  -     Comment, General Range of Motion B UE ROM WNL  -     Row Name 01/24/22 1334          Strength Comprehensive (MMT)    General Manual Muscle Testing (MMT) Assessment other (see comments)  -     Comment, General Manual Muscle Testing (MMT) Assessment Unable to formally assess due to pts onset of back pain from fall. Unable to withstand MMT due to pain. Grossly 4/5.  -     Row Name 01/24/22 1334          Motor Skills    Motor Skills coordination  -     Coordination WFL  -     Row Name 01/24/22 1334          Balance    Balance Assessment sitting static balance; sitting dynamic balance; standing static balance; standing dynamic balance  -     Static Sitting Balance WNL; unsupported; sitting, edge of bed  -HK     Dynamic Sitting Balance WFL; unsupported; sitting, edge of bed  -HK     Static Standing Balance WFL; standing; supported  -HK     Dynamic Standing Balance WFL; supported; standing  -HK     Balance Interventions sitting; dynamic reaching  -           User Key  (r) = Recorded By, (t) = Taken By, (c) = Cosigned By    Initials Name Provider Type     Felicity Vincent OT Occupational Therapist               Goals/Plan     Row Name 01/24/22 133          Bed Mobility Goal 1 (OT)    Activity/Assistive Device (Bed Mobility Goal 1, OT) sit to supine/supine to sit; scooting  -     [place] Level/Cues Needed (Bed Mobility Goal 1, OT) independent  -HK     Time Frame (Bed Mobility Goal 1, OT) by discharge; long term goal  (LTG)  -HK     Progress/Outcomes (Bed Mobility Goal 1, OT) goal ongoing  -     Row Name 01/24/22 1338          Transfer Goal 1 (OT)    Activity/Assistive Device (Transfer Goal 1, OT) sit-to-stand/stand-to-sit; toilet  -HK     [place] Level/Cues Needed (Transfer Goal 1, OT) contact guard assist; verbal cues required  -HK     Time Frame (Transfer Goal 1, OT) by discharge; long term goal (LTG)  -HK     Progress/Outcome (Transfer Goal 1, OT) goal ongoing  -     Row Name 01/24/22 1338          Toileting Goal 1 (OT)    Activity/Device (Toileting Goal 1, OT) adjust/manage clothing; perform perineal hygiene  -HK     [place] Level/Cues Needed (Toileting Goal 1, OT) minimum assist (75% or more patient effort); verbal cues required  -HK     Time Frame (Toileting Goal 1, OT) by discharge; long term goal (LTG)  -HK     Progress/Outcome (Toileting Goal 1, OT) goal ongoing  -[facility] Name 01/24/22 1338          Grooming Goal 1 (OT)    Activity/Device (Grooming Goal 1, OT) hair care; oral care; wash face, hands  -HK     [place] (Grooming Goal 1, OT) minimum assist (75% or more patient effort); verbal cues required  -HK     Time Frame (Grooming Goal 1, OT) by discharge; long term goal (LTG)  -HK     Progress/Outcome (Grooming Goal 1, OT) goal ongoing  -[facility] Name 01/24/22 1338          Therapy Assessment/Plan (OT)    Planned Therapy Interventions (OT) adaptive equipment training; BADL retraining; functional balance retraining; occupation/activity based interventions; transfer/mobility retraining; ROM/therapeutic exercise  -           User Key  (r) = Recorded By, (t) = Taken By, (c) = Cosigned By    Initials Name Provider Type     Felicity Vincent, OT Occupational Therapist               Clinical Impression     Row Name 01/24/22 4870          Pain Assessment    Additional Documentation Pain Scale: FACES Pre/Post-Treatment (Group)  -[facility] Name 01/24/22 6675          Pain Scale: FACES Pre/Post-Treatment     Pain: FACES Scale, Pretreatment 4-->hurts little more  -HK     Posttreatment Pain Rating 4-->hurts little more  -HK     Pain Location - Side Bilateral  -HK     Pain Location - Orientation lower  -HK     Pain Location back  -HK     Row Name 01/24/22 0225          Plan of Care Review    Plan of Care Reviewed With patient  -HK     Progress no change  -HK     Outcome Summary OT eval complete. Pt is pleasent and cooperative however limited by lethargy and nausea. Advanced to EOB with supervision and completed sit to stand with CGA and RW. Pt decliend to attempt LBD at this time however reports she has reacher and sock aid at home. B UE ROM WNL. Unable to formally assessed strength due to low back pain. Expect 4/5. Recommend d/c home with assist and HH.  -     Row Name 01/24/22 5902          Therapy Assessment/Plan (OT)    Patient/Family Therapy Goal Statement (OT) Pt would like to improve and return home.  -HK     Rehab Potential (OT) good, to achieve stated therapy goals  -     Criteria for Skilled Therapeutic Interventions Met (OT) yes; skilled treatment is necessary  -     Therapy Frequency (OT) daily  -     Row Name 01/24/22 3317          Therapy Plan Review/Discharge Plan (OT)    Anticipated Discharge Disposition (OT) home with assist; home with home health  -     Row Name 01/24/22 7226          Vital Signs    Pre Systolic BP Rehab 191  -HK     Pre Treatment Diastolic   -HK     Post Systolic BP Rehab 193  -HK     Post Treatment Diastolic BP 95  -HK     Pretreatment Heart Rate (beats/min) 105  -HK     Intratreatment Heart Rate (beats/min) 179  -HK     Posttreatment Heart Rate (beats/min) 127  -HK     O2 Delivery Pre Treatment room air  -HK     O2 Delivery Intra Treatment room air  -HK     O2 Delivery Post Treatment room air  -HK     Pre Patient Position Supine  -HK     Intra Patient Position Standing  -HK     Post Patient Position Supine  -HK     Row Name 01/24/22 3240          Positioning and  Restraints    Pre-Treatment Position in bed  -HK     Post Treatment Position bed  -HK     In Bed notified nsg; supine; call light within reach; encouraged to call for assist; exit alarm on  -HK           User Key  (r) = Recorded By, (t) = Taken By, (c) = Cosigned By    Initials Name Provider Type    Felicity Kang OT Occupational Therapist               Outcome Measures     Row Name 01/24/22 5839          How much help from another is currently needed...    Putting on and taking off regular lower body clothing? 2  -HK     Bathing (including washing, rinsing, and drying) 3  -HK     Toileting (which includes using toilet bed pan or urinal) 3  -HK     Putting on and taking off regular upper body clothing 4  -HK     Taking care of personal grooming (such as brushing teeth) 3  -HK     Eating meals 3  -HK     AM-PAC 6 Clicks Score (OT) 18  -HK     Row Name 01/24/22 1044          How much help from another person do you currently need...    Turning from your back to your side while in flat bed without using bedrails? 4  -EO     Moving from lying on back to sitting on the side of a flat bed without bedrails? 4  -EO     Moving to and from a bed to a chair (including a wheelchair)? 4  -EO     Standing up from a chair using your arms (e.g., wheelchair, bedside chair)? 4  -EO     Climbing 3-5 steps with a railing? 3  -EO     To walk in hospital room? 3  -EO     AM-PAC 6 Clicks Score (PT) 22  -EO     Row Name 01/24/22 5929          Functional Assessment    Outcome Measure Options AM-PAC 6 Clicks Daily Activity (OT)  -HK           User Key  (r) = Recorded By, (t) = Taken By, (c) = Cosigned By    Initials Name Provider Type    Felicity Kang OT Occupational Therapist    Leonor Babcock RN Registered Nurse                Occupational Therapy Education                 Title: PT OT SLP Therapies (In Progress)     Topic: Occupational Therapy (In Progress)     Point: ADL training (Done)     Description:   Instruct learner(s)  on proper safety adaptation and remediation techniques during self care or transfers.   Instruct in proper use of assistive devices.              Learning Progress Summary           Patient Acceptance, E,TB,D, VU,NR by  at 1/24/2022 1339                   Point: Home exercise program (Not Started)     Description:   Instruct learner(s) on appropriate technique for monitoring, assisting and/or progressing therapeutic exercises/activities.              Learner Progress:  Not documented in this visit.          Point: Precautions (Done)     Description:   Instruct learner(s) on prescribed precautions during self-care and functional transfers.              Learning Progress Summary           Patient Acceptance, E,TB,D, VU,NR by  at 1/24/2022 1339                   Point: Body mechanics (Done)     Description:   Instruct learner(s) on proper positioning and spine alignment during self-care, functional mobility activities and/or exercises.              Learning Progress Summary           Patient Acceptance, E,TB,D, VU,NR by  at 1/24/2022 1339                               User Key     Initials Effective Dates Name Provider Type Discipline     06/16/21 -  Felicity Vincent, OT Occupational Therapist OT              OT Recommendation and Plan  Planned Therapy Interventions (OT): adaptive equipment training, BADL retraining, functional balance retraining, occupation/activity based interventions, transfer/mobility retraining, ROM/therapeutic exercise  Therapy Frequency (OT): daily  Plan of Care Review  Plan of Care Reviewed With: patient  Progress: no change  Outcome Summary: OT eval complete. Pt is pleasent and cooperative however limited by lethargy and nausea. Advanced to EOB with supervision and completed sit to stand with CGA and RW. Pt decliend to attempt LBD at this time however reports she has reacher and sock aid at home. B UE ROM WNL. Unable to formally assessed strength due to low back pain. Expect 4/5.  Recommend d/c home with assist and HH.     Time Calculation:    Time Calculation- OT     Row Name 01/24/22 1305             Time Calculation- OT    OT Start Time 1305  -HK      OT Received On 01/24/22  -HK      OT Goal Re-Cert Due Date 02/03/22  -HK              Untimed Charges    OT Eval/Re-eval Minutes 46  -HK              Total Minutes    Untimed Charges Total Minutes 46  -HK       Total Minutes 46  -HK            User Key  (r) = Recorded By, (t) = Taken By, (c) = Cosigned By    Initials Name Provider Type    Felicity Kang OT Occupational Therapist              Therapy Charges for Today     Code Description Service Date Service Provider Modifiers Qty    14706287872 HC OT EVAL LOW COMPLEXITY 4 1/24/2022 Felicity Vincent OT GO 1               Felicity Vincent OT  1/24/2022

## 2022-01-24 NOTE — ANESTHESIA POSTPROCEDURE EVALUATION
Patient: Cortney Delacruz    Procedure Summary     Date: 01/24/22 Room / Location:  [facility] /  [facility]    Anesthesia Start: 0918 Anesthesia Stop:     Procedure: ESOPHAGOGASTRODUODENOSCOPY (N/A ) Diagnosis:       Coffee ground emesis      (Coffee ground emesis [K92.0])    Surgeons: Eliezer Bernardo MD Provider: Og Mcintyre Jr., MD    Anesthesia Type: general ASA Status: 3          Anesthesia Type: general    Vitals  No vitals data found for the desired time range.          Post Anesthesia Care and Evaluation    Patient location during evaluation: PACU  Patient participation: complete - patient participated  Level of consciousness: responsive to verbal stimuli  Pain score: 0  Pain management: adequate  Airway patency: patent  Anesthetic complications: No anesthetic complications  PONV Status: none  Cardiovascular status: hemodynamically stable and acceptable  Respiratory status: nonlabored ventilation, acceptable, nasal cannula and spontaneous ventilation  Hydration status: acceptable    Comments: VSS  No anesthesia care post op

## 2022-01-24 NOTE — ANESTHESIA PREPROCEDURE EVALUATION
Anesthesia Evaluation     Patient summary reviewed and Nursing notes reviewed   no history of anesthetic complications:  NPO Solid Status: > 8 hours  NPO Liquid Status: > 2 hours           Airway   Mallampati: III  TM distance: >3 FB  Neck ROM: full  Possible difficult intubation  Dental - normal exam     Pulmonary - normal exam   (-) not a smoker  Cardiovascular - normal exam    (+) hypertension, hyperlipidemia,       Neuro/Psych  (+) psychiatric history,     GI/Hepatic/Renal/Endo    (+) obesity,  GERD, GI bleeding (?) , diabetes mellitus,     ROS Comment: Chronic gastroparesis s/p stimulator.     Musculoskeletal     (+) chronic pain,   Abdominal    Substance History      OB/GYN          Other      history of cancer (breast)                  Anesthesia Plan    ASA 3     general   Rapid sequence(ETT)  intravenous induction     Anesthetic plan, all risks, benefits, and alternatives have been provided, discussed and informed consent has been obtained with: patient.    Plan discussed with CRNA.        CODE STATUS:    Level Of Support Discussed With: Patient  Code Status (Patient has no pulse and is not breathing): CPR (Attempt to Resuscitate)  Medical Interventions (Patient has pulse or is breathing): Full Support

## 2022-01-24 NOTE — ANESTHESIA PROCEDURE NOTES
Airway  Urgency: elective    Date/Time: 1/24/2022 9:24 AM  Airway not difficult    General Information and Staff    Patient location during procedure: OR  Anesthesiologist: Og Mcintyre Jr., MD  CRNA: David Saenz CRNA    Indications and Patient Condition  Indications for airway management: airway protection    Preoxygenated: yes  MILS not maintained throughout  Mask difficulty assessment: 0 - not attempted    Final Airway Details  Final airway type: endotracheal airway      Successful airway: ETT  Cuffed: yes   Successful intubation technique: video laryngoscopy and RSI  Facilitating devices/methods: cricoid pressure  Endotracheal tube insertion site: oral  Blade: Parks  Blade size: 3  ETT size (mm): 7.5  Cormack-Lehane Classification: grade I - full view of glottis  Placement verified by: chest auscultation and capnometry   Measured from: lips  ETT/EBT  to lips (cm): 22  Number of attempts at approach: 2  Assessment: lips, teeth, and gum same as pre-op and atraumatic intubation    Additional Comments  Negative epigastric sounds, Breath sound equal bilaterally with symmetric chest rise and fall    First look with MAC 3 produced Grade IIIa view, threaded Bougie but ETT would not pass. Second attempt with Parks 3 produced Grade I view and successful intubation. VSS throughout

## 2022-01-24 NOTE — PLAN OF CARE
Goal Outcome Evaluation:  Plan of Care Reviewed With: patient        Progress: no change  Outcome Summary: OT eval complete. Pt is pleasent and cooperative however limited by lethargy and nausea. Advanced to EOB with supervision and completed sit to stand with CGA and RW. Pt decliend to attempt LBD at this time however reports she has reacher and sock aid at home. B UE ROM WNL. Unable to formally assessed strength due to low back pain. Expect 4/5. Recommend d/c home with assist and HH.

## 2022-01-25 VITALS
RESPIRATION RATE: 18 BRPM | HEIGHT: 69 IN | SYSTOLIC BLOOD PRESSURE: 142 MMHG | DIASTOLIC BLOOD PRESSURE: 73 MMHG | HEART RATE: 106 BPM | BODY MASS INDEX: 36.85 KG/M2 | WEIGHT: 248.8 LBS | TEMPERATURE: 98.5 F | OXYGEN SATURATION: 98 %

## 2022-01-25 PROBLEM — K92.0 COFFEE GROUND EMESIS: Status: RESOLVED | Noted: 2022-01-22 | Resolved: 2022-01-25

## 2022-01-25 LAB
CYTO UR: NORMAL
D-LACTATE SERPL-SCNC: 1.3 MMOL/L (ref 0.5–2)
GLUCOSE BLDC GLUCOMTR-MCNC: 143 MG/DL (ref 70–130)
GLUCOSE BLDC GLUCOMTR-MCNC: 167 MG/DL (ref 70–130)
LAB AP CASE REPORT: NORMAL
LAB AP CLINICAL INFORMATION: NORMAL
PATH REPORT.FINAL DX SPEC: NORMAL
PATH REPORT.GROSS SPEC: NORMAL

## 2022-01-25 PROCEDURE — 25010000002 METOCLOPRAMIDE PER 10 MG: Performed by: INTERNAL MEDICINE

## 2022-01-25 PROCEDURE — 63710000001 INSULIN LISPRO (HUMAN) PER 5 UNITS: Performed by: INTERNAL MEDICINE

## 2022-01-25 PROCEDURE — 99217 PR OBSERVATION CARE DISCHARGE MANAGEMENT: CPT | Performed by: STUDENT IN AN ORGANIZED HEALTH CARE EDUCATION/TRAINING PROGRAM

## 2022-01-25 PROCEDURE — 97116 GAIT TRAINING THERAPY: CPT

## 2022-01-25 PROCEDURE — 82962 GLUCOSE BLOOD TEST: CPT

## 2022-01-25 PROCEDURE — 97110 THERAPEUTIC EXERCISES: CPT

## 2022-01-25 PROCEDURE — G0378 HOSPITAL OBSERVATION PER HR: HCPCS

## 2022-01-25 PROCEDURE — 25010000002 HEPARIN (PORCINE) PER 1000 UNITS: Performed by: INTERNAL MEDICINE

## 2022-01-25 PROCEDURE — 63710000001 INSULIN DETEMIR PER 5 UNITS: Performed by: INTERNAL MEDICINE

## 2022-01-25 PROCEDURE — 25010000002 HYDROMORPHONE 1 MG/ML SOLUTION: Performed by: STUDENT IN AN ORGANIZED HEALTH CARE EDUCATION/TRAINING PROGRAM

## 2022-01-25 PROCEDURE — 25010000002 ONDANSETRON PER 1 MG: Performed by: INTERNAL MEDICINE

## 2022-01-25 RX ORDER — METOPROLOL SUCCINATE 100 MG/1
100 TABLET, EXTENDED RELEASE ORAL
Qty: 30 TABLET | Refills: 3 | Status: ON HOLD | OUTPATIENT
Start: 2022-01-26 | End: 2022-02-27 | Stop reason: SDUPTHER

## 2022-01-25 RX ORDER — METOCLOPRAMIDE HYDROCHLORIDE 5 MG/ML
10 INJECTION INTRAMUSCULAR; INTRAVENOUS
Qty: 24 ML | Refills: 0 | Status: SHIPPED | OUTPATIENT
Start: 2022-01-25 | End: 2022-02-27 | Stop reason: HOSPADM

## 2022-01-25 RX ORDER — LIDOCAINE 50 MG/G
3 PATCH TOPICAL
Qty: 30 EACH | Refills: 0 | Status: SHIPPED | OUTPATIENT
Start: 2022-01-26 | End: 2022-02-27 | Stop reason: HOSPADM

## 2022-01-25 RX ORDER — PANTOPRAZOLE SODIUM 40 MG/1
40 TABLET, DELAYED RELEASE ORAL 2 TIMES DAILY
Qty: 60 TABLET | Refills: 0 | Status: SHIPPED | OUTPATIENT
Start: 2022-01-25

## 2022-01-25 RX ADMIN — PANTOPRAZOLE SODIUM 40 MG: 40 INJECTION, POWDER, FOR SOLUTION INTRAVENOUS at 05:42

## 2022-01-25 RX ADMIN — ONDANSETRON 4 MG: 2 INJECTION INTRAMUSCULAR; INTRAVENOUS at 02:27

## 2022-01-25 RX ADMIN — HEPARIN SODIUM 5000 UNITS: 5000 INJECTION, SOLUTION INTRAVENOUS; SUBCUTANEOUS at 05:42

## 2022-01-25 RX ADMIN — INSULIN DETEMIR 20 UNITS: 100 INJECTION, SOLUTION SUBCUTANEOUS at 08:19

## 2022-01-25 RX ADMIN — ACETAMINOPHEN 650 MG: 325 TABLET, FILM COATED ORAL at 13:14

## 2022-01-25 RX ADMIN — METOPROLOL SUCCINATE 100 MG: 100 TABLET, EXTENDED RELEASE ORAL at 09:50

## 2022-01-25 RX ADMIN — ONDANSETRON 4 MG: 2 INJECTION INTRAMUSCULAR; INTRAVENOUS at 08:16

## 2022-01-25 RX ADMIN — METOCLOPRAMIDE 10 MG: 5 INJECTION, SOLUTION INTRAMUSCULAR; INTRAVENOUS at 13:14

## 2022-01-25 RX ADMIN — GABAPENTIN 600 MG: 300 CAPSULE ORAL at 09:51

## 2022-01-25 RX ADMIN — HYDROMORPHONE HYDROCHLORIDE 1 MG: 1 INJECTION, SOLUTION INTRAMUSCULAR; INTRAVENOUS; SUBCUTANEOUS at 08:16

## 2022-01-25 RX ADMIN — INSULIN LISPRO 2 UNITS: 100 INJECTION, SOLUTION INTRAVENOUS; SUBCUTANEOUS at 08:17

## 2022-01-25 RX ADMIN — HYDROMORPHONE HYDROCHLORIDE 1 MG: 1 INJECTION, SOLUTION INTRAMUSCULAR; INTRAVENOUS; SUBCUTANEOUS at 03:05

## 2022-01-25 RX ADMIN — METOCLOPRAMIDE 10 MG: 5 INJECTION, SOLUTION INTRAMUSCULAR; INTRAVENOUS at 08:16

## 2022-01-25 RX ADMIN — ACETAMINOPHEN 650 MG: 325 TABLET, FILM COATED ORAL at 09:50

## 2022-01-25 RX ADMIN — HYDROMORPHONE HYDROCHLORIDE 1 MG: 1 INJECTION, SOLUTION INTRAMUSCULAR; INTRAVENOUS; SUBCUTANEOUS at 15:31

## 2022-01-25 RX ADMIN — LAMOTRIGINE 100 MG: 100 TABLET ORAL at 09:50

## 2022-01-25 RX ADMIN — Medication 1 TABLET: at 09:50

## 2022-01-25 RX ADMIN — SODIUM CHLORIDE, PRESERVATIVE FREE 10 ML: 5 INJECTION INTRAVENOUS at 08:16

## 2022-01-25 NOTE — CASE MANAGEMENT/SOCIAL WORK
Case Management Discharge Note      Final Note: I met with Mrs. Delacruz at the bedside. She is anticipating hospital discharge today. She reports that she has been supposed to go to outpatient PT for her chronic back pain, but has been unable to drive to her appointments due to the pain. She would like therapy to see her at home. I called a referral to [facility] and faxed orders to them for PT and OT. They can provide these services at home. They will admit her on 1-27. Mrs. Delacruz denies having any additional discharge needs. Her son will transport her home by car.         Selected Continued Care - Admitted Since 1/22/2022     Destination    No services have been selected for the patient.              Durable Medical Equipment    No services have been selected for the patient.              Dialysis/Infusion    No services have been selected for the patient.              Home Medical Care Coordination complete.    Service Provider Selected Services Address Phone Fax Patient Preferred    [facility]  Home Health Services [address] [place] [id number] [phone] --          Therapy    No services have been selected for the patient.              Community Resources    No services have been selected for the patient.              Community & DME    No services have been selected for the patient.                       Final Discharge Disposition Code: 06 - home with home health care

## 2022-01-25 NOTE — PLAN OF CARE
Goal Outcome Evaluation:  Plan of Care Reviewed With: patient        Progress: improving  Outcome Summary: Patient demonstrated independence with bed mobility and was able to increase her ambulation to 150 feet. NO c/o nausea today. She is going home

## 2022-01-25 NOTE — DISCHARGE SUMMARY
[facility] Medicine Services  DISCHARGE SUMMARY    Patient Name: Cortney Delacruz  : 1955  MRN: [id number]    Date of Admission: 2022  6:05 PM  Date of Discharge:  2022  Primary Care Physician: Ronnie Garvey MD    Consults     Date and Time Order Name Status Description    2022  1:44 AM Inpatient Gastroenterology Consult Completed           Hospital Course     Presenting Problem:   Intractable nausea and vomiting [R11.2]    Active Hospital Problems    Diagnosis  POA   • **Intractable nausea and vomiting [R11.2]  Yes   • Elevated serum creatinine [R79.89]  Yes   • Ketosis (HCC) [E88.89]  Yes   • Elevated troponin [R77.8]  Yes   • Diabetic neuropathy (HCC) [E11.40]  Yes   • GERD (gastroesophageal reflux disease) [K21.9]  Yes   • Uncontrolled type 2 diabetes mellitus with complication, with long-term current use of insulin (HCC) [E11.8, E11.65, Z79.4]  Not Applicable   • Gastroparesis [K31.84]  Yes   • Chronic pain syndrome, on PO dilaudid at home [G89.4]  Yes   • Hypertension [I10]  Yes      Resolved Hospital Problems    Diagnosis Date Resolved POA   • Coffee ground emesis [K92.0] 2022 Unknown          Hospital Course:  Cortney Delacruz is a 66 y.o. female with gastroparesis s/p gastric stimulator followed by Dr. Pruett at [facility], DM2, HTN, HLD who presented to the ED due to intractable nausea/vomiting similar to prior episodes that have been attributed to gastroparesis.     Intractable nausea vomiting  Gastroparesis  -Followed by Dr. Pruett at [facility] gastroenterology, s/p gastric stimulator  -Proved with IV fluids, antiemetics and scheduled Reglan.  -Protonix increased by GI and she will be discharged on Protonix twice daily to help decrease gastric secretions.  -Monitor QT  -Status post EGD showed antral gastritis which was mild.  Can follow-up biopsy results outpatient with GI.    Starvation ketosis  T2DM  -A1c 5.6%  -Continue IV fluids  -Advance  diet as tolerated.  She was tolerating a diet on discharge  -Basal/bolus insulin     Elevated creatinine  -Improved with IV fluids     Hypertensive urgency  -Resolved  -Continue metoprolol     Elevated troponin  -Mildly elevated in absence of chest pain, resolved  -Likely demand ischemia with hypovolemia and intractable vomiting     Chronic back pain  -On p.o. Dilaudid at home-with acute pain after recent fall, increased dose temporarily to IV.  CT A/P does not show any fractures.  Counseled patient that narcotics are likely to further decrease GI motility, she voiced understanding     GERD  -Continue protonix, increase to twice daily     Fall  Generalized weakness  -PT/OT evaluation      Discharge Follow Up Recommendations for outpatient labs/diagnostics:  GI    Day of Discharge     HPI:   Feeling better, tolerating diet    Review of Systems  Gen- No fevers, chills  CV- No chest pain, palpitations  Resp- No cough, dyspnea  GI- No N/V/D, abd pain improved        Vital Signs:   Temp:  [98.4 °F (36.9 °C)-99.4 °F (37.4 °C)] 98.5 °F (36.9 °C)  Heart Rate:  [] 106  Resp:  [18-19] 18  BP: (132-204)/() 142/73      Physical Exam:  Constitutional: No acute distress, awake, alert  HENT: NCAT, mucous membranes moist  Respiratory: Clear to auscultation bilaterally, respiratory effort normal   Cardiovascular: RRR, no murmurs, rubs, or gallops  Gastrointestinal: Positive bowel sounds, soft, nontender, nondistended, obese  Musculoskeletal: No bilateral ankle edema  Psychiatric: Appropriate affect, cooperative  Neurologic: Oriented x 3, strength symmetric in all extremities, Cranial Nerves grossly intact to confrontation, speech clear  Skin: No rashes      Pertinent  and/or Most Recent Results     LAB RESULTS:      Lab 01/24/22  2359 01/24/22  0506 01/23/22  0725 01/23/22  0126 01/22/22  2111 01/22/22  1901   WBC  --  8.55 11.63*  --   --  12.64*   HEMOGLOBIN  --  11.2* 11.2*  --   --  13.5   HEMATOCRIT  --  36.2 37.5   --   --  41.8   PLATELETS  --  199 213  --   --  265   NEUTROS ABS  --   --  7.09*  --   --  10.43*   IMMATURE GRANS (ABS)  --   --  0.05  --   --  0.06*   LYMPHS ABS  --   --  3.38*  --   --  1.41   MONOS ABS  --   --  1.04*  --   --  0.72   EOS ABS  --   --  0.03  --   --  0.00   MCV  --  84.8 87.6  --   --  80.9   PROCALCITONIN  --   --   --   --  0.12  --    LACTATE 1.3  --   --  1.3  --   --          Lab 01/24/22  1113 01/23/22  1559 01/23/22  0746 01/23/22  0126 01/22/22  1901   SODIUM 133* 137  --  140 139   POTASSIUM 4.4 3.8  --  3.8 4.2   CHLORIDE 97* 103  --  103 97*   CO2 20.0* 24.0  --  23.0 24.0   ANION GAP 16.0* 10.0  --  14.0 18.0*   BUN 11 16  --  19 20   CREATININE 0.82 0.89  --  0.94 1.11*   GLUCOSE 156* 117*  --  143* 171*   CALCIUM 9.3 9.0  --  9.6 10.6*   IONIZED CALCIUM  --   --   --  1.27  --    MAGNESIUM  --   --   --   --  2.1   HEMOGLOBIN A1C  --   --  5.60  --   --          Lab 01/22/22  1901   TOTAL PROTEIN 8.6*   ALBUMIN 4.90   GLOBULIN 3.7   ALT (SGPT) 14   AST (SGOT) 20   BILIRUBIN 0.4   ALK PHOS 101   LIPASE 19         Lab 01/22/22  2111 01/22/22  1901   PROBNP  --  1,028.0*   TROPONIN T 0.022 0.035*                 Lab 01/22/22 2328   PH, ARTERIAL 7.384   PCO2, ARTERIAL 40.1   PO2 ART 81.7*   FIO2 21   HCO3 ART 24.0   BASE EXCESS ART -1.0*   CARBOXYHEMOGLOBIN 0.4     Brief Urine Lab Results  (Last result in the past 365 days)      Color   Clarity   Blood   Leuk Est   Nitrite   Protein   CREAT   Urine HCG        01/22/22 2048 Yellow   Clear   Negative   Negative   Negative   30 mg/dL (1+)               Microbiology Results (last 10 days)     Procedure Component Value - Date/Time    COVID PRE-OP / PRE-PROCEDURE SCREENING ORDER (NO ISOLATION) - Swab, Nasopharynx [[id number]]  (Normal) Collected: 01/24/22 8514    Lab Status: Final result Specimen: Swab from Nasopharynx Updated: 01/24/22 4840    Narrative:      The following orders were created for panel order COVID PRE-OP / PRE-PROCEDURE  SCREENING ORDER (NO ISOLATION) - Swab, Nasopharynx.  Procedure                               Abnormality         Status                     ---------                               -----------         ------                     COVID-19, FLU A/B, RSV P...[[id number]]  Normal              Final result                 Please view results for these tests on the individual orders.    COVID-19, FLU A/B, RSV PCR - Swab, Nasopharynx [[id number]]  (Normal) Collected: 01/24/22 2251    Lab Status: Final result Specimen: Swab from Nasopharynx Updated: 01/24/22 2342     COVID19 Not Detected     Influenza A PCR Not Detected     Influenza B PCR Not Detected     RSV, PCR Not Detected    Narrative:      Fact sheet for providers: https://www.fda.gov/media/219264/download    Fact sheet for patients: https://www.fda.gov/media/992842/download    Test performed by PCR.    COVID PRE-OP / PRE-PROCEDURE SCREENING ORDER (NO ISOLATION) - Swab, Nasopharynx [[id number]]  (Normal) Collected: 01/22/22 2358    Lab Status: Final result Specimen: Swab from Nasopharynx Updated: 01/23/22 0052    Narrative:      The following orders were created for panel order COVID PRE-OP / PRE-PROCEDURE SCREENING ORDER (NO ISOLATION) - Swab, Nasopharynx.  Procedure                               Abnormality         Status                     ---------                               -----------         ------                     COVID-19, FLU A/B, RSV P...[[id number]]  Normal              Final result                 Please view results for these tests on the individual orders.    COVID-19, FLU A/B, RSV PCR - Swab, Nasopharynx [[id number]]  (Normal) Collected: 01/22/22 2358    Lab Status: Final result Specimen: Swab from Nasopharynx Updated: 01/23/22 0052     COVID19 Not Detected     Influenza A PCR Not Detected     Influenza B PCR Not Detected     RSV, PCR Not Detected    Narrative:      Fact sheet for providers: https://www.fda.gov/media/404683/download    Fact sheet for  patients: https://www.[employer].gov/media/606659/download    Test performed by [facility].          CT Abdomen Pelvis Without Contrast    Result Date: 1/24/2022  CT ABDOMEN PELVIS WO CONTRAST Date of Exam: 1/24/2022 23:04 EST Indication: Worsening abdominal pain, nausea and vomiting.. Comparison Exams: 1/22/2022. Technique: Routine transaxial cuts were obtained through the abdomen and pelvis without administration of contrast per physician order. Reconstructed coronal and sagittal cuts were obtained. Automatic exposure control and iterative reconstruction methods  were used for dose reduction. FINDINGS: There is a stable extra-abdominal fluid collection within the atrophic rectus abdominis muscles beginning at the level the mid abdomen and extending to the mid pelvis. The fluid collection measures 16.3 cm in the coronal plane and 1.9 cm in thickness. It  measures a 8.5 cm in craniocaudal height. This likely represents an old thick-walled postoperative seroma within the anterior abdominal wall. The patient has a gastric stimulator in place. The gallbladder is surgically absent. The liver is enlarged. The  spleen, pancreas, adrenal glands, and right kidney are normal. There is a 4 mm nonobstructing stone in the upper pole of the left kidney. The uterus, adnexal structures, and urinary bladder are unremarkable. There are no dilated loops of bowel to indicate an obstructive process. There is no abnormal bowel wall thickening. The appendix is not seen well. It is either surgically absent or small in size. There are atherosclerotic vascular calcifications throughout the abdomen and pelvis. There is scattered artifact from the patient's left hip arthroplasty. There are no suspicious osteolytic or sclerotic lesions within the bony structures. There is some streaky density seen throughout the extra-abdominal and extrapelvic fat which may represent interval development of mild anasarca. There is minor scarring in the inferior lingula.      1. Stable extra-abdominal fluid collection within the atrophic rectus abdominis muscles felt to represent an old thick walled postoperative seroma. 2. Interval development of mild anasarca within the abdomen and pelvis. 3. 4 mm nonobstructing stone in the upper pole of the left kidney. 4. Stable hepatomegaly. 5. The exam is limited by noncontrasted technique. Other than new mild anasarca, there are no acute findings. Electronically Signed: Kermit Solis MD 1/24/2022 23:37 EST    CT Abdomen Pelvis With Contrast    Result Date: 1/22/2022  CT Abdomen Pelvis W INDICATION: Generalized abdominal pain TECHNIQUE: CT of the abdomen and pelvis with IV contrast. Coronal and sagittal reconstructions were obtained.  Radiation dose reduction techniques included automated exposure control or exposure modulation based on body size. Count of known CT and cardiac nuc med studies performed in previous 12 months: 0. COMPARISON: 3/20/2019 FINDINGS: Abdomen: Included lung bases are clear no effusion or pericardial effusion. Small hiatal hernia. Normal caliber aorta and atherosclerotic change. The spleen and adrenal glands are negative and pancreas unremarkable. Gallbladder surgically absent and liver shows hepatic steatosis and is enlarged. There is a gastric stimulator present. The kidneys are nonobstructed. Tiny stone upper pole left kidney. No adenopathy. Pelvis: Streak artifact related to left hip replacement and the bladder is negative. No drainable fluid collection. There are scattered diverticula. The bowel is nonobstructed. Appendix not clearly demonstrated but no secondary sign of appendicitis. The inguinal canals are negative. Redemonstration of a single tic fluid collection anterior to the atrophic rectus musculature in the mid abdomen. This measures 14.3 cm transverse by 1.7 cm AP by 8.4 cm cranial caudal. This is stable to slightly more prominent than on the prior study but is a chronic finding. There is no new air within  the fluid or other distinct CT evidence to suggest infection but this should be correlated clinically. There is no suspicious bone lesion. Degenerative changes.  noncontributory.     1. No clearly acute process in the abdomen or pelvis. 2. Appendix not visualized but no secondary signs of appendicitis. 3. Slight interval increase in size of a chronic fluid collection anterior to the rectus musculature. No new CT evidence to otherwise suggest infected fluid but this be correlated clinically. 4. Hepatomegaly and hepatic steatosis. Postoperative changes as described. Diverticulosis. Signer Name: Mauricio Arredondo MD  Signed: 1/22/2022 10:33 PM  Workstation Name: [employer]  [facility]    XR Chest 1 View    Result Date: 1/22/2022  EXAMINATION: XR CHEST 1 VW-  INDICATION: Weak/Dizzy/AMS triage protocol  COMPARISON: 6/3/2021  FINDINGS: Heart is normal in size. Vasculature appears upper limits of normal. Mild chronic appearing interstitial lung changes and a few calcified granulomas are again noted and appear unchanged. No new pulmonary parenchymal disease, effusion or pneumothorax is seen. Clips are again seen in the left axillary region..       Stable mild chronic appearing interstitial lung changes. No new chest disease is appreciated.    This report was finalized on 1/22/2022 6:56 PM by Dr. Lambert Esquivel MD.      XR Spine Lumbar Complete 4+VW    Result Date: 1/24/2022  DATE OF EXAM: 1/23/2022 8:10 PM  PROCEDURE: XR SPINE LUMBAR COMPLETE 4+VW-  INDICATIONS: Pain after fall; R11.2-Nausea with vomiting, unspecified; E13.10-Other specified diabetes mellitus with ketoacidosis without coma; K31.84-Gastroparesis; R10.9-Unspecified abdominal pain; G89.29-Other chronic pain; I10-Essential (primary) hypertension; K92.0-Hematemesis  COMPARISON: December 27, 2021  TECHNIQUE: A complete lumbar spine with a minimum of four radiologic views were obtained.    FINDINGS: For purposes of numbering it looks like  T12 contains transitional ribs. It looks like there is developmental articulation of L5 with S1 on the right. There are marginal osteophytes about the disc space at L3-4. There are degenerative endplate changes and vacuum disc phenomenon. There is facet arthropathy in the lower lumbar spine.. There is a stimulator device with leads extending into the right upper quadrant. The patient had a left hip arthroplasty. There are surgical clips in the upper quadrants of the abdomen.      1.  Multilevel degenerative changes within the lower lumbar spine.  This report was finalized on 1/24/2022 8:10 AM by Luis Chun MD.      XR Hips Bilateral With or Without Pelvis 2 View    Result Date: 1/24/2022  XR HIPS BILATERAL W OR WO PELVIS 2 VIEW-  Date of Exam: 1/23/2022 8:15 PM  Indication: Fall, pain; R11.2-Nausea with vomiting, unspecified; E13.10-Other specified diabetes mellitus with ketoacidosis without coma; K31.84-Gastroparesis; R10.9-Unspecified abdominal pain; G89.29-Other chronic pain; I10-Essential (primary) hypertension; K92.0-Hematemesis.  Comparison: Radiograph 5/18/2015  Technique: 3 views of the hip were obtained.  FINDINGS:  There is no fracture or dislocation. Mild degenerative changes are present within the right hip joint. Postsurgical changes are seen from a total left hip arthroplasty. The hardware appears intact. No evidence of surrounding lucency. Mild degenerative changes are present within the pubic symphysis. Small amount of heterotopic ossification is seen along the lateral margin of the neck component of the total left hip arthroplasty on the left appears nonbridging. There are no aggressive osseous lesions.  The adjacent soft tissues are radiographically unremarkable. No erosions. No periostitis.          No acute osseous abnormality. Postsurgical changes are seen from a total left hip arthroplasty with no evidence of acute hardware failure. Stable mild osteoarthritic changes are present on the right.   This report was finalized on 1/24/2022 7:34 AM by Becky Francisco MD.                Results for orders placed during the hospital encounter of 03/20/19    Adult Transthoracic Echo Complete W/ Cont if Necessary Per Protocol    Interpretation Summary  · Left ventricular systolic function is normal. Estimated EF = 60%.  · Left ventricular diastolic dysfunction (grade I a) consistent with impaired relaxation.      Plan for Follow-up of Pending Labs/Results: Follow-up biopsy results with GI  Pending Labs     Order Current Status    Tissue Pathology Exam In process        Discharge Details        Discharge Medications      New Medications      Instructions Start Date   lidocaine 5 %  Commonly known as: LIDODERM   3 patches, Transdermal, Every 24 Hours Scheduled, Remove & Discard patch within 12 hours or as directed by MD   Start Date: January 26, 2022        Changes to Medications      Instructions Start Date   furosemide 20 MG tablet  Commonly known as: LASIX  What changed:   · when to take this  · reasons to take this   20 mg, Oral, Daily      metoclopramide 10 MG tablet  Commonly known as: REGLAN  What changed: Another medication with the same name was added. Make sure you understand how and when to take each.   Oral      metoclopramide 5 MG/ML injection  Commonly known as: REGLAN  What changed: You were already taking a medication with the same name, and this prescription was added. Make sure you understand how and when to take each.   10 mg, Intravenous, 4 Times Daily Before Meals & Nightly      metoprolol succinate  MG 24 hr tablet  Commonly known as: TOPROL-XL  What changed: when to take this   100 mg, Oral, Every 24 Hours Scheduled   Start Date: January 26, 2022     pantoprazole 40 MG EC tablet  Commonly known as: Protonix  What changed: when to take this   40 mg, Oral, 2 Times Daily      promethazine 25 MG tablet  Commonly known as: PHENERGAN  What changed: when to take this   25 mg, Oral, Every 6 Hours  "PRN         Continue These Medications      Instructions Start Date   albuterol sulfate  (90 Base) MCG/ACT inhaler  Commonly known as: PROVENTIL HFA;VENTOLIN HFA;PROAIR HFA   2 puffs, Inhalation, Every 6 Hours PRN      ALPRAZolam 1 MG tablet  Commonly known as: XANAX   1 mg, Oral, 3 Times Daily PRN      Cholecalciferol 125 MCG (5000 UT) tablet   Vitamin D3 125 mcg (5,000 unit) tablet   Take 1 tablet every day by oral route with meals.      diphenoxylate-atropine 2.5-0.025 MG per tablet  Commonly known as: LOMOTIL   diphenoxylate-atropine 2.5 mg-0.025 mg tablet      ezetimibe 10 MG tablet  Commonly known as: ZETIA   No dose, route, or frequency recorded.      fluticasone 50 MCG/ACT nasal spray  Commonly known as: FLONASE   2 sprays, Nasal, Daily, AS NEEDED       gabapentin 600 MG tablet  Commonly known as: NEURONTIN   600 mg, Oral, Every Morning, 1 TAB QAM, 2 TABS QPM      HYDROmorphone 2 MG tablet  Commonly known as: DILAUDID   2 mg, Every 6 Hours PRN      Insulin Syringe-Needle U-100 30G X 5/16\" 0.5 ML misc   1 each, Does not apply, 4 Times Daily      lamoTRIgine 100 MG tablet  Commonly known as: LaMICtal   100 mg, 2 Times Daily      latanoprost 0.005 % ophthalmic solution  Commonly known as: XALATAN   No dose, route, or frequency recorded.      linaclotide 72 MCG capsule capsule  Commonly known as: LINZESS   1 capsule, Oral      multivitamin with minerals tablet tablet   1 tablet, Oral, Daily      NovoLIN N 100 UNIT/ML injection  Generic drug: insulin NPH   Take 60  Units is AM and 60 units nightly      NovoLIN R ReliOn 100 UNIT/ML injection  Generic drug: insulin regular   Take 10 units TID AC meals plus extra as directed up to 40 units per day      ondansetron 8 MG tablet  Commonly known as: ZOFRAN   8 mg, Oral, Every 8 Hours PRN      oxyCODONE-acetaminophen  MG per tablet  Commonly known as: PERCOCET   No dose, route, or frequency recorded.      PROBIOTIC PO   Oral      Saline Sensitive Eyes " solution   Does not apply, PRN       sodium chloride 0.65 % nasal spray   1 spray, Nasal, As Needed      tiZANidine 2 MG tablet  Commonly known as: ZANAFLEX   3 Times Daily         Stop These Medications    famotidine 20 MG tablet  Commonly known as: PEPCID            Allergies   Allergen Reactions   • Cephalosporins Anaphylaxis   • Ciprofloxacin Anaphylaxis   • Codeine Anaphylaxis   • Kiwi Extract Anaphylaxis   • Metformin And Related Shortness Of Breath   • Pioglitazone Shortness Of Breath   • Sulbactam Anaphylaxis   • Ace Inhibitors Itching   • Amitriptyline Other (See Comments)   • Clarithromycin Hives, Nausea And Vomiting and GI Intolerance   • Crestor [Rosuvastatin Calcium] Myalgia   • Dexlansoprazole Other (See Comments)   • Empagliflozin Other (See Comments)   • Esomeprazole Magnesium Other (See Comments)     Gastric standstill   • Glimepiride Irritability   • Hydrocodone-Acetaminophen Other (See Comments)   • Latex Itching   • Omeprazole Other (See Comments) and Diarrhea     Gastris standstill   • Pitavastatin Itching   • Pravachol  [Pravastatin Sodium] Hives   • Sitagliptin Itching   • Topiramate Itching   • Vraylar  [Cariprazine Hcl] Itching         Discharge Disposition:  Home or Self Care    Diet:  Hospital:  Diet Order   Procedures   • Diet Regular; GI Soft, Consistent Carbohydrate       Activity:  As tolerated    Restrictions or Other Recommendations:  None       CODE STATUS:    Code Status and Medical Interventions:   Ordered at: 01/23/22 0144     Level Of Support Discussed With:    Patient     Code Status (Patient has no pulse and is not breathing):    CPR (Attempt to Resuscitate)     Medical Interventions (Patient has pulse or is breathing):    Full Support       Future Appointments   Date Time Provider Department Center   5/25/2022 12:45 PM Nancy Shepherd MD [facility]       Additional Instructions for the Follow-ups that You Need to Schedule     Ambulatory Referral to Home Health   As  directed      Face to Face Visit Date: 1/25/2022    Follow-up provider for Plan of Care?: I treated the patient in an acute care facility and will not continue treatment after discharge.    Follow-up provider: KAT FORMAN [[id number]]    Reason/Clinical Findings: chronic back pain    Describe mobility limitations that make leaving home difficult: impaired physical mobility and gait endurance    Nursing/Therapeutic Services Requested: Physical Therapy Occupational Therapy    PT orders: Therapeutic exercise Gait Training Strengthening    Weight Bearing Status: As Tolerated    Occupational orders: Activities of daily living Energy conservation Home safety assessment    Frequency: 1 Week 1                     Dana Oneal MD  01/25/22      Time Spent on Discharge:  I spent  40  minutes on this discharge activity which included: face-to-face encounter with the patient, reviewing the data in the system, coordination of the care with the nursing staff as well as consultants, documentation, and entering orders.

## 2022-01-25 NOTE — THERAPY DISCHARGE NOTE
Patient Name: Cortney Delacruz  : 1955    MRN: [id number]                              Today's Date: 2022       Admit Date: 2022    Visit Dx:     ICD-10-CM ICD-9-CM   1. Intractable nausea and vomiting  R11.2 536.2   2. 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 Gastroparesis  K31.84 536.3   4. Chronic abdominal pain  R10.9 789.00    G89.29 338.29   5. Hypertension, uncontrolled  I10 401.9   6. Coffee ground emesis  K92.0 578.0   7. Chronic pain syndrome, on PO dilaudid at home  G89.4 338.4     Patient Active Problem List   Diagnosis   • Uncontrolled type 2 diabetes mellitus with complication, with long-term current use of insulin ([facility])   • Gastroparesis   • Chronic pain syndrome, on PO dilaudid at home   • Hypertension   • Osteoporosis - PCP managing   • Diabetic neuropathy ([facility])   • GERD (gastroesophageal reflux disease)   • Diarrhea   • History of ESBL E. coli infection   • Annual GYN exam w/o problems   • Cystocele, uterine prolapse with rectocele   • Ganglion cyst   • Obesity with body mass index 30 or greater   • Trigger thumb of left hand   • Synovial cyst of multiple joints   • Intractable nausea and vomiting   • Elevated serum creatinine   • Ketosis ([facility])   • Elevated troponin   • Coffee ground emesis     Past Medical History:   Diagnosis Date   • Arthritis    • CAP (community acquired pneumonia)    • Chronic pain     on dilaudid x4 years   • Depression    • Diabetes ([facility])     type II   • Diabetic neuropathy ([facility])     gabapentin, lamictal; reports uses xanax for this when severe as well   • Gastroparesis    • GERD (gastroesophageal reflux disease)     PPI and pepcid   • HX: breast cancer     s/p chemo   • Hypercholesteremia    • Hypertension    • Osteoporosis     reclast inj yearly   • Pyelonephritis 2019     Past Surgical History:   Procedure Laterality Date   • ABDOMINAL HERNIA REPAIR     • ABDOMINAL HERNIA REPAIR     • APPENDECTOMY     •  DIAGNOSTIC LAPAROSCOPY  1988   • ENDOSCOPY N/A 1/24/2022    Procedure: ESOPHAGOGASTRODUODENOSCOPY;  Surgeon: Eliezer Bernardo MD;  Location: [facility] ENDOSCOPY;  Service: Gastroenterology;  Laterality: N/A;   • GASTRIC STIMULATOR IMPLANT SURGERY  2014   • GASTRIC STIMULATOR IMPLANT SURGERY  2015    replaced generator   • GASTRIC STIMULATOR IMPLANT SURGERY  2017    Replacement   • LAPAROSCOPIC CHOLECYSTECTOMY  1992   • LUMBAR LAMINECTOMY  1985    L4-L5   • LUMBAR LAMINECTOMY  1987    L3-L4   • LUMBAR LAMINECTOMY  1997    L3-L4   • LUMBAR LAMINECTOMY  2007    L3-L5   • MASTECTOMY Left 2000   • TONGUE SURGERY  2018   • TONSILLECTOMY AND ADENOIDECTOMY  1962   • TOTAL HIP ARTHROPLASTY REVISION  2015   • TRIGGER FINGER RELEASE Left 2017    ring finger   • URETEROSCOPY LASER LITHOTRIPSY WITH STENT INSERTION Right 03/13/2019    Dr. Cerna   • VENTRAL HERNIA REPAIR  2007   • VENTRAL HERNIA REPAIR  2013   • WRIST GANGLION EXCISION Right 1986      General Information     Row Name 01/25/22 1142          Physical Therapy Time and Intention    Document Type discharge treatment  -SC     Mode of Treatment physical therapy  -SC     Row Name 01/25/22 1142          General Information    Patient Profile Reviewed yes  -SC     Existing Precautions/Restrictions fall  -SC     Row Name 01/25/22 1142          Cognition    Orientation Status (Cognition) oriented x 3  -SC     Row Name 01/25/22 1142          Safety Issues, Functional Mobility    Impairments Affecting Function (Mobility) endurance/activity tolerance  -SC     Comment, Safety Issues/Impairments (Mobility) `alert, following commands  -SC           User Key  (r) = Recorded By, (t) = Taken By, (c) = Cosigned By    Initials Name Provider Type    SC Miriam Cannon PT Physical Therapist               Mobility     Row Name 01/25/22 1142          Bed Mobility    Bed Mobility scooting/bridging; supine-sit; sit-supine  -SC     All Activities, [place] (Bed Mobility) independent  -SC      Scooting/Bridging [place] (Bed Mobility) independent  -SC     Supine-Sit [place] (Bed Mobility) modified independence  -SC     Sit-Supine [place] (Bed Mobility) modified independence  -SC     Assistive Device (Bed Mobility) head of bed elevated; bed rails  -SC     Comment (Bed Mobility) able to get in/oob  -[facility] Name 01/25/22 1142          Transfers    Comment (Transfers) STS with good technique  -SC     Row Name 01/25/22 1142          Sit-Stand Transfer    Sit-Stand [place] (Transfers) modified [facility]     Assistive Device (Sit-Stand Transfers) walker, front-wheeled  -[facility] Name 01/25/22 1142          Gait/Stairs (Locomotion)    [place] Level (Gait) modified [facility]     Assistive Device (Gait) walker, front-wheeled  -SC     Distance in Feet (Gait) 150  -SC     Deviations/Abnormal Patterns (Gait) yadira decreased; gait speed decreased; base of support, wide  -SC     Bilateral Gait Deviations forward flexed posture  -SC     Comment (Gait/Stairs) Cues for upright posture. Demonstrated good control of walker  -SC           User Key  (r) = Recorded By, (t) = Taken By, (c) = Cosigned By    Initials Name Provider Type    SC Miriam Cannon, PT Physical Therapist               Obj/Interventions     [facility] Name 01/25/22 1143          Motor Skills    Therapeutic Exercise hip; knee; ankle  -[facility] Name 01/25/22 1143          Hip (Therapeutic Exercise)    Hip (Therapeutic Exercise) AROM (active range of motion)  MIP  -[facility] Name 01/25/22 1143          Knee (Therapeutic Exercise)    Knee (Therapeutic Exercise) AROM (active range of motion)  -SC     Knee AROM (Therapeutic Exercise) flexion; extension; sitting; bilateral  -[facility] Name 01/25/22 1143          Ankle (Therapeutic Exercise)    Ankle (Therapeutic Exercise) AROM (active range of motion)  -SC     Ankle AROM (Therapeutic Exercise) bilateral; dorsiflexion; plantarflexion  -[facility] Name 01/25/22 1143           Balance    Balance Assessment standing dynamic balance  -SC     Dynamic Standing Balance WFL; supported  -SC     Comment, Balance uses walker  -SC           User Key  (r) = Recorded By, (t) = Taken By, (c) = Cosigned By    Initials Name Provider Type    SC Miriam Cannon, PT Physical Therapist               Goals/Plan     [facility] Name 01/25/22 1144          Gait Training Goal 1 (PT)    Activity/Assistive Device (Gait Training Goal 1, PT) gait (walking locomotion)  -SC     [place] Level (Gait Training Goal 1, PT) independent  -SC     Distance (Gait Training Goal 1, PT) 300 ft with RW  -SC     Time Frame (Gait Training Goal 1, PT) long term goal (LTG); 5 days  -SC     Progress/Outcome (Gait Training Goal 1, PT) goal partially met  -SC           User Key  (r) = Recorded By, (t) = Taken By, (c) = Cosigned By    Initials Name Provider Type    SC Miriam Cannon, PT Physical Therapist               Clinical Impression     [facility] Name 01/25/22 1144          Pain Scale: Numbers Pre/Post-Treatment    Pretreatment Pain Rating 2/10  -SC     Posttreatment Pain Rating 2/10  -SC     Pain Location back  -[facility] Name 01/25/22 1144          Plan of Care Review    Plan of Care Reviewed With patient  -SC     Progress improving  -SC     Outcome Summary Patient demonstrated independence with bed mobility and was able to increase her ambulation to 150 feet. NO c/o nausea today. She is going home  -[facility] Name 01/25/22 1144          Therapy Assessment/Plan (PT)    Rehab Potential (PT) good, to achieve stated therapy goals  -SC     Criteria for Skilled Interventions Met (PT) yes  -[facility] Name 01/25/22 1144          Vital Signs    Intratreatment Heart Rate (beats/min) 130  -SC     Posttreatment Heart Rate (beats/min) 98  -SC           User Key  (r) = Recorded By, (t) = Taken By, (c) = Cosigned By    Initials Name Provider Type    SC Miriam Cannon, PT Physical Therapist               Outcome Measures     [facility] Name  01/25/22 1146 01/25/22 0800       How much help from another person do you currently need...    Turning from your back to your side while in flat bed without using bedrails? 4  -SC 4  -EO    Moving from lying on back to sitting on the side of a flat bed without bedrails? 4  -SC 4  -EO    Moving to and from a bed to a chair (including a wheelchair)? 4  -SC 4  -EO    Standing up from a chair using your arms (e.g., wheelchair, bedside chair)? 4  -SC 4  -EO    Climbing 3-5 steps with a railing? 3  -SC 3  -EO    To walk in hospital room? 4  -SC 3  -EO    AM-PAC 6 Clicks Score (PT) 23  -SC 22  -EO    Row Name 01/25/22 1146          Functional Assessment    Outcome Measure Options AM-PAC 6 Clicks Basic Mobility (PT)  -SC           User Key  (r) = Recorded By, (t) = Taken By, (c) = Cosigned By    Initials Name Provider Type    Miriam Bernal, PT Physical Therapist    Leonor Babcock, RN Registered Nurse              Physical Therapy Education                 Title: PT OT SLP Therapies (In Progress)     Topic: Physical Therapy (Done)     Point: Mobility training (Done)     Learning Progress Summary           Patient Eager, E,D, VU,DU by SC at 1/25/2022 1146    Comment: reviewed HEP    Acceptance, E,D, NR by CT at 1/23/2022 1518                   Point: Home exercise program (Done)     Learning Progress Summary           Patient Eager, E,D, VU,DU by SC at 1/25/2022 1146    Comment: reviewed HEP    Acceptance, E,D, NR by CT at 1/23/2022 1518                   Point: Body mechanics (Done)     Learning Progress Summary           Patient Eager, E,D, VU,DU by SC at 1/25/2022 1146    Comment: reviewed HEP    Acceptance, E,D, NR by CT at 1/23/2022 1518                   Point: Precautions (Done)     Learning Progress Summary           Patient Eager, E,D, VU,DU by SC at 1/25/2022 1146    Comment: reviewed HEP    Acceptance, E,D, NR by CT at 1/23/2022 1518                               User Key     Initials Effective Dates Name  Provider Type Discipline    SC 06/16/21 -  Miriam Cannon PT Physical Therapist PT    CT 06/16/21 -  Freddy Anthony PT Physical Therapist PT              PT Recommendation and Plan     Plan of Care Reviewed With: patient  Progress: improving  Outcome Summary: Patient demonstrated independence with bed mobility and was able to increase her ambulation to 150 feet. NO c/o nausea today. She is going home     Time Calculation:    PT Charges     Row Name 01/25/22 0941             Time Calculation    Start Time 0941  -SC      PT Received On 01/25/22  -SC              Time Calculation- PT    Total Timed Code Minutes- PT 23 minute(s)  -SC              Timed Charges    25458 - PT Therapeutic Exercise Minutes 8  -SC      41937 - Gait Training Minutes  15  -SC              Total Minutes    Timed Charges Total Minutes 23  -SC       Total Minutes 23  -SC            User Key  (r) = Recorded By, (t) = Taken By, (c) = Cosigned By    Initials Name Provider Type    SC Miriam Cannon, PT Physical Therapist              Therapy Charges for Today     Code Description Service Date Service Provider Modifiers Qty    43121410411  PT THER PROC EA 15 MIN 1/25/2022 Miriam Cannon PT GP 1    [id number] HC GAIT TRAINING EA 15 MIN 1/25/2022 Miriam Cannon, PT GP 1          PT G-Codes  Outcome Measure Options: AM-PAC 6 Clicks Basic Mobility (PT)  AM-PAC 6 Clicks Score (PT): 23  AM-PAC 6 Clicks Score (OT): 18    PT Discharge Summary  Anticipated Discharge Disposition (PT): home  Reason for Discharge: Discharge from facility    Miriam Cannon PT  1/25/2022

## 2022-01-25 NOTE — DISCHARGE PLACEMENT REQUEST
"Case management [phone]  Perry Delacruz (66 y.o. Female)             Date of Birth Social Security Number Address Home Phone MRN    1955  [address] [id number] [phone] [id number]    Hindu Marital Status             None        Admission Date Admission Type Admitting Provider Attending Provider Department, Room/Bed    1/22/22 Emergency Dana Oneal MD Olariu, Eva, MD [facility] 3E, S346/1    Discharge Date Discharge Disposition Discharge Destination                         Attending Provider: Dana Oneal MD    Allergies: Cephalosporins, Ciprofloxacin, Codeine, Kiwi Extract, Metformin And Related, Pioglitazone, Sulbactam, Ace Inhibitors, Amitriptyline, Clarithromycin, Crestor [Rosuvastatin Calcium], Dexlansoprazole, Empagliflozin, Esomeprazole Magnesium, Glimepiride, Hydrocodone-acetaminophen, Latex, Omeprazole, Pitavastatin, Pravachol  [Pravastatin Sodium], Sitagliptin, Topiramate, Vraylar  [Cariprazine Hcl]    Isolation: None   Infection: None   Code Status: CPR   Advance Care Planning Activity    Ht: 175.3 cm (69\")   Wt: 113 kg (248 lb 12.8 oz)    Admission Cmt: None   Principal Problem: Intractable nausea and vomiting [R11.2]                 Active Insurance as of 1/22/2022     Primary Coverage     Payor Plan Insurance Group Employer/Plan Group    MEDICARE MEDICARE A & B      Payor Plan Address Payor Plan Phone Number Payor Plan Fax Number Effective Dates    [address] [phone]  6/1/2013 - None Entered    [facility] [address]       Subscriber Name Subscriber Birth Date Member ID       PERRY DELACRUZ 1955 [id number]           Secondary Coverage     Payor Plan Insurance Group Employer/Plan Group    [facility] SUP [id number]     Payor Plan Address Payor Plan Phone Number Payor Plan Fax Number Effective Dates    [address] [facility] [phone]  2/1/2021 - None Entered    [facility] [address]       Subscriber Name Subscriber Birth " Date Member ID       CORTNEY DELACRUZ 1955 [id number]                 Emergency Contacts      (Rel.) Home Phone Work Phone Mobile Phone    Joss Delacruz (Son) -- [phone]          [address]  [address] [facility] [address]  Phone:  [phone]  Fax:  [phone] Date: 2022      Ambulatory Referral to Home Health     Patient:  Cortney Delacruz MRN:  [id number]   [address] [facility] [address] :  1955  SSN:    Phone: [phone] Sex:  F      INSURANCE PAYOR PLAN GROUP # SUBSCRIBER ID   Primary:  Secondary:    MEDICARE  [facility] [id number]  [id number]    [id number]  [id number]      Referring Provider Information:  PATRICK WILLIS Phone: [phone] Fax: [phone]      Referral Information:   # Visits:  999 Referral Type: Home Health [42]   Urgency:  Routine Referral Reason: Specialty Services Required   Start Date: 2022 End Date:  To be determined by Insurer   Diagnosis: Chronic pain syndrome (G89.4 [ICD-10-CM] 338.4 [ICD-9-CM])      Refer to Dept:   Refer to Provider:   Refer to Facility:       Face to Face Visit Date: 2022  Follow-up provider for Plan of Care? I treated the patient in an acute care facility and will not continue treatment after discharge.  Follow-up provider: KAT FORMAN [[id number]]  Reason/Clinical Findings: chronic back pain  Describe mobility limitations that make leaving home difficult: impaired physical mobility and gait endurance  Nursing/Therapeutic Services Requested: Physical Therapy  Nursing/Therapeutic Services Requested: Occupational Therapy  PT orders: Therapeutic exercise  PT orders: Gait Training  PT orders: Strengthening  Weight Bearing Status: As Tolerated  Occupational orders: Activities of daily living  Occupational orders: Energy conservation  Occupational orders: Home safety assessment  Frequency: 1 Week 1     This document serves as a request of  services and does not constitute Insurance authorization or approval of services.  To determine eligibility, please contact the members Insurance carrier to verify and review coverage.     If you have medical questions regarding this request for services. Please contact [address] at [phone] during normal business hours.        Authorizing Provider:Dana Oneal MD  Authorizing Provider's NPI: [id number]  Order Entered By: Prashanth Shoemaker RN 2022 10:33 AM     Electronically signed by: Dana Oneal MD 2022 10:33 AM            History & Physical      Renaldo Escobedo DO at 22 0040              [facility] Medicine Services  HISTORY AND PHYSICAL    Patient Name: Cortney Delacruz  : 1955  MRN: [id number]  Primary Care Physician: Ronnie Garvey MD  Date of admission: 2022    Subjective   Subjective     Chief Complaint:  Nausea and vomiting    HPI:  Cortney Delacruz is a 66 y.o. female with a history of chronic pain, depression, T2DM, diabetic neuropathy, gastroparesis followed by Dr. Pruett at [facility], hypertension, hyperlipidemia, presents to the ED with complaints of nausea and vomiting that started Friday.  Patient reports that she has had to be hospitalized several times in the past for intractable nausea and vomiting which is mostly related to her gastroparesis.  She has diffuse intermittent abdominal pain described as burning pain that is worse with vomiting.  Patient is unable to keep anything down liquid or solid.  Patient reports that she lost her balance and has had 3 falls over the last 2-3 days.  She has bruising on her back but denies any injury.  No LOC or blow to the head.  Patient endorses a loss of appetite, nausea, vomiting, chronic back pain, dizziness, lightheadedness, generalized weakness.  She denies fever, chills, shortness of air, cough, chest pain, diarrhea, headaches, or any other complaints at this time.  CT  abdomen pelvis shows no clearly acute process in the abdomen or pelvis.  Chest x-ray shows stable mild chronic appearing interstitial lung changes no new chest disease.  Labs reveal troponin 0.035 and 0.022, glucose 171, chloride 97, anion gap 18, creatinine 1.11, beta hydroxybutyrate 2.876.  Patient was given 2 L of saline and started on IV fluids in the ED.  Patient is being admitted to the hospital medicine service for further evaluation and management.    COVID Details:        Symptoms: [x] NONE [] Fever []  Cough [] Shortness of breath [] Change in taste or smell  The patient has a COVID HM Topic on their chart, and they are fully vaccinated.    Review of Systems   Constitutional: Positive for appetite change. Negative for chills, fatigue and fever.   HENT: Negative.    Eyes: Negative.    Respiratory: Negative.    Cardiovascular: Negative.    Gastrointestinal: Positive for abdominal pain, nausea and vomiting. Negative for abdominal distention, constipation and diarrhea.   Endocrine: Negative.    Genitourinary: Negative.    Musculoskeletal: Positive for back pain (chronic). Negative for neck pain and neck stiffness.   Skin: Negative.    Allergic/Immunologic: Negative.    Neurological: Positive for dizziness, weakness (generalized) and light-headedness. Negative for seizures, syncope, speech difficulty, numbness and headaches.   Hematological: Negative.    Psychiatric/Behavioral: Negative.         All other systems reviewed and are negative.     Personal History     Past Medical History:   Diagnosis Date   • Arthritis    • CAP (community acquired pneumonia) 2015   • Chronic pain     on dilaudid x4 years   • Depression    • Diabetes (CMS/HCC)     type II   • Diabetic neuropathy (CMS/HCC)     gabapentin, lamictal; reports uses xanax for this when severe as well   • Gastroparesis    • GERD (gastroesophageal reflux disease)     PPI and pepcid   • HX: breast cancer 2000    s/p chemo   • Hypercholesteremia    •  Hypertension 2002   • Osteoporosis     reclast inj yearly   • Pyelonephritis 03/2019       Past Surgical History:   Procedure Laterality Date   • ABDOMINAL HERNIA REPAIR  2001   • ABDOMINAL HERNIA REPAIR  2005   • APPENDECTOMY  1968   • DIAGNOSTIC LAPAROSCOPY  1988   • GASTRIC STIMULATOR IMPLANT SURGERY  2014   • GASTRIC STIMULATOR IMPLANT SURGERY  2015    replaced generator   • GASTRIC STIMULATOR IMPLANT SURGERY  2017    Replacement   • LAPAROSCOPIC CHOLECYSTECTOMY  1992   • LUMBAR LAMINECTOMY  1985    L4-L5   • LUMBAR LAMINECTOMY  1987    L3-L4   • LUMBAR LAMINECTOMY  1997    L3-L4   • LUMBAR LAMINECTOMY  2007    L3-L5   • MASTECTOMY Left 2000   • TONGUE SURGERY  2018   • TONSILLECTOMY AND ADENOIDECTOMY  1962   • TOTAL HIP ARTHROPLASTY REVISION  2015   • TRIGGER FINGER RELEASE Left 2017    ring finger   • URETEROSCOPY LASER LITHOTRIPSY WITH STENT INSERTION Right 03/13/2019    Dr. Cerna   • VENTRAL HERNIA REPAIR  2007   • VENTRAL HERNIA REPAIR  2013   • WRIST GANGLION EXCISION Right 1986       Family History:  family history includes Alcohol abuse in her father; Arthritis in her mother; Coronary artery disease in her brother; Dementia in her maternal grandmother; Hypertension in her mother; Lung cancer in her father; Melanoma in her brother; Osteoporosis in her mother. Otherwise pertinent FHx was reviewed and unremarkable.     Social History:  reports that she has never smoked. She has never used smokeless tobacco. She reports that she does not drink alcohol and does not use drugs.  Social History     Social History Narrative    Lives in [place] with son       Medications:  ALPRAZolam, Cholecalciferol, HYDROmorphone, Insulin Syringe-Needle U-100, Probiotic Product, Saline Sensitive Eyes, albuterol sulfate HFA, diphenoxylate-atropine, ezetimibe, famotidine, fluticasone, furosemide, gabapentin, insulin NPH, insulin regular, lamoTRIgine, latanoprost, linaclotide, metoclopramide, metoprolol succinate XL,  multivitamin with minerals, ondansetron, oxyCODONE-acetaminophen, pantoprazole, promethazine, sodium chloride, and tiZANidine    Allergies   Allergen Reactions   • Cephalosporins Anaphylaxis   • Ciprofloxacin Anaphylaxis   • Codeine Anaphylaxis   • Kiwi Extract Anaphylaxis   • Metformin And Related Shortness Of Breath   • Pioglitazone Shortness Of Breath   • Sulbactam Anaphylaxis   • Ace Inhibitors Itching   • Amitriptyline Other (See Comments)   • Clarithromycin Hives, Nausea And Vomiting and GI Intolerance   • Crestor [Rosuvastatin Calcium] Myalgia   • Dexlansoprazole Other (See Comments)   • Empagliflozin Other (See Comments)   • Esomeprazole Magnesium Other (See Comments)     Gastric standstill   • Glimepiride Irritability   • Hydrocodone-Acetaminophen Other (See Comments)   • Latex Itching   • Omeprazole Other (See Comments) and Diarrhea     Gastris standstill   • Pitavastatin Itching   • Pravachol  [Pravastatin Sodium] Hives   • Sitagliptin Itching   • Topiramate Itching   • Vraylar  [Cariprazine Hcl] Itching       Objective   Objective     Vital Signs:   Temp:  [98.5 °F (36.9 °C)-98.9 °F (37.2 °C)] 98.9 °F (37.2 °C)  Heart Rate:  [106-128] 106  Resp:  [18-20] 18  BP: (130-195)/() 130/79    Physical Exam   Constitutional: Awake, alert, resting in bed, appears chronically ill  Eyes: PERRLA, sclerae anicteric, no conjunctival injection  HENT: NCAT, mucous membranes dry  Neck: Supple, no thyromegaly, no lymphadenopathy, trachea midline  Respiratory: Clear to auscultation bilaterally, nonlabored respirations   Cardiovascular: RRR, no murmurs, rubs, or gallops, palpable pedal pulses bilaterally  Gastrointestinal: Positive bowel sounds, soft, nontender, nondistended  Musculoskeletal: No bilateral ankle edema, no clubbing or cyanosis to extremities  Psychiatric: Appropriate affect, cooperative  Neurologic: Oriented x 3, strength symmetric in all extremities, Cranial Nerves grossly intact to confrontation,  speech clear  Skin: No rashes      Result Review:  I have personally reviewed the results from the time of this admission to 01/23/22 12:41 AM EST and agree with these findings:  [x]  Laboratory  [x]  Microbiology  []  Radiology  [x]  EKG/Telemetry   []  Cardiology/Vascular   []  Pathology  [x]  Old records  []  Other:  Most notable findings include:       LAB RESULTS:      Lab 01/22/22 2111 01/22/22  1901   WBC  --  12.64*   HEMOGLOBIN  --  13.5   HEMATOCRIT  --  41.8   PLATELETS  --  265   NEUTROS ABS  --  10.43*   IMMATURE GRANS (ABS)  --  0.06*   LYMPHS ABS  --  1.41   MONOS ABS  --  0.72   EOS ABS  --  0.00   MCV  --  80.9   PROCALCITONIN 0.12  --          Lab 01/22/22  1901   SODIUM 139   POTASSIUM 4.2   CHLORIDE 97*   CO2 24.0   ANION GAP 18.0*   BUN 20   CREATININE 1.11*   GLUCOSE 171*   CALCIUM 10.6*   MAGNESIUM 2.1         Lab 01/22/22 1901   TOTAL PROTEIN 8.6*   ALBUMIN 4.90   GLOBULIN 3.7   ALT (SGPT) 14   AST (SGOT) 20   BILIRUBIN 0.4   ALK PHOS 101   LIPASE 19         Lab 01/22/22 2111 01/22/22 1901   PROBNP  --  1,028.0*   TROPONIN T 0.022 0.035*                 Lab 01/22/22  2328   PH, ARTERIAL 7.384   PCO2, ARTERIAL 40.1   PO2 ART 81.7*   FIO2 21   HCO3 ART 24.0   BASE EXCESS ART -1.0*   CARBOXYHEMOGLOBIN 0.4     UA    Urinalysis 1/22/22 1/22/22    2048 2048   Squamous Epithelial Cells, UA  3-6 (A)   Specific Gravity, UA 1.028    Ketones, UA 80 mg/dL (3+) (A)    Blood, UA Negative    Leukocytes, UA Negative    Nitrite, UA Negative    RBC, UA  0-2   WBC, UA  21-30 (A)   Bacteria, UA  None Seen   (A) Abnormal value            Microbiology Results (last 10 days)     Procedure Component Value - Date/Time    COVID PRE-OP / PRE-PROCEDURE SCREENING ORDER (NO ISOLATION) - Swab, Nasopharynx [[id number]]  (Normal) Collected: 01/22/22 2990    Lab Status: Final result Specimen: Swab from Nasopharynx Updated: 01/23/22 0052    Narrative:      The following orders were created for panel order COVID PRE-OP /  PRE-PROCEDURE SCREENING ORDER (NO ISOLATION) - Swab, Nasopharynx.  Procedure                               Abnormality         Status                     ---------                               -----------         ------                     COVID-19, FLU A/B, RSV P...[[id number]]  Normal              Final result                 Please view results for these tests on the individual orders.    COVID-19, FLU A/B, RSV PCR - Swab, Nasopharynx [[id number]]  (Normal) Collected: 01/22/22 0811    Lab Status: Final result Specimen: Swab from Nasopharynx Updated: 01/23/22 0052     COVID19 Not Detected     Influenza A PCR Not Detected     Influenza B PCR Not Detected     RSV, PCR Not Detected    Narrative:      Fact sheet for providers: https://www.fda.gov/media/947579/download    Fact sheet for patients: https://www.fda.gov/media/929658/download    Test performed by PCR.          CT Abdomen Pelvis With Contrast    Result Date: 1/22/2022  CT Abdomen Pelvis W INDICATION: Generalized abdominal pain TECHNIQUE: CT of the abdomen and pelvis with IV contrast. Coronal and sagittal reconstructions were obtained.  Radiation dose reduction techniques included automated exposure control or exposure modulation based on body size. Count of known CT and cardiac nuc med studies performed in previous 12 months: 0. COMPARISON: 3/20/2019 FINDINGS: Abdomen: Included lung bases are clear no effusion or pericardial effusion. Small hiatal hernia. Normal caliber aorta and atherosclerotic change. The spleen and adrenal glands are negative and pancreas unremarkable. Gallbladder surgically absent and liver shows hepatic steatosis and is enlarged. There is a gastric stimulator present. The kidneys are nonobstructed. Tiny stone upper pole left kidney. No adenopathy. Pelvis: Streak artifact related to left hip replacement and the bladder is negative. No drainable fluid collection. There are scattered diverticula. The bowel is nonobstructed. Appendix not  clearly demonstrated but no secondary sign of appendicitis. The inguinal canals are negative. Redemonstration of a single tic fluid collection anterior to the atrophic rectus musculature in the mid abdomen. This measures 14.3 cm transverse by 1.7 cm AP by 8.4 cm cranial caudal. This is stable to slightly more prominent than on the prior study but is a chronic finding. There is no new air within the fluid or other distinct CT evidence to suggest infection but this should be correlated clinically. There is no suspicious bone lesion. Degenerative changes.  noncontributory.     Impression: 1. No clearly acute process in the abdomen or pelvis. 2. Appendix not visualized but no secondary signs of appendicitis. 3. Slight interval increase in size of a chronic fluid collection anterior to the rectus musculature. No new CT evidence to otherwise suggest infected fluid but this be correlated clinically. 4. Hepatomegaly and hepatic steatosis. Postoperative changes as described. Diverticulosis. Signer Name: Mauricio Arredondo MD  Signed: 1/22/2022 10:33 PM  Workstation Name: [facility]  [facility]    XR Chest 1 View    Result Date: 1/22/2022  EXAMINATION: XR CHEST 1 VW-  INDICATION: Weak/Dizzy/AMS triage protocol  COMPARISON: 6/3/2021  FINDINGS: Heart is normal in size. Vasculature appears upper limits of normal. Mild chronic appearing interstitial lung changes and a few calcified granulomas are again noted and appear unchanged. No new pulmonary parenchymal disease, effusion or pneumothorax is seen. Clips are again seen in the left axillary region..       Impression: Stable mild chronic appearing interstitial lung changes. No new chest disease is appreciated.    This report was finalized on 1/22/2022 6:56 PM by Dr. Lambert Esquivel MD.        Results for orders placed during the hospital encounter of 03/20/19    Adult Transthoracic Echo Complete W/ Cont if Necessary Per Protocol    Interpretation Summary  ·  Left ventricular systolic function is normal. Estimated EF = 60%.  · Left ventricular diastolic dysfunction (grade I a) consistent with impaired relaxation.      Assessment/Plan   Assessment & Plan       Intractable nausea and vomiting    Uncontrolled type 2 diabetes mellitus with complication, with long-term current use of insulin (HCC)    Gastroparesis    Chronic pain syndrome, on PO dilaudid at home    Hypertension    Diabetic neuropathy (HCC)    GERD (gastroesophageal reflux disease)    Elevated serum creatinine    Ketosis (HCC)    Elevated troponin    Cortney Delacruz is a 66 y.o. female with a history of chronic pain, depression, T2DM, diabetic neuropathy, [facility] , hypertension, hyperlipidemia, presents to the ED with complaints of nausea and vomiting that started Friday.    Assessment and plan:    Intractable nausea vomiting  Gastroparesis  -- CT abdomen pelvis shows no acute process.   --[facility]  --S/p gastric stimulator  --Antiemetics  --Continue Reglan TID and HS.  Daily ekg for qtc monitoring.   -- IV fluids  -- Consult GI  -- A.m. labs    Ketosis, likely starvation/volume contraction  T2DM  --reports she is not on jardiance anymore making euglycemic dka unlikely  --Beta hydroxybutyrate 2.876--repeat now  --Glucose 171  --FSBG q6h  -- Was given 2 L of saline in the ED  -- LR at 250 ml an hour for 12 hours until ketosis resolves. Repeat bmp until gap closed  -- A1c    Elevated creatinine  -- Baseline creatinine 0.7-0.9  -- Creatinine 1.1  -- Was given 2 L of saline in the ED  -- IV fluids overnight  -- BMP in a.m.    Hypertensive urgency  -- Improved  -- Wean Cardene drip    Elevated troponin  -- First troponin 0.035, repeat troponin 0.022  -- Patient denies chest pain  -- Daily EKG to assess QT    Chronic back pain  -- On p.o. Dilaudid at home    GERD  -- Protonix    Fall  Generalized weakness  --fall precautions  --pt/ot  consult    DVT prophylaxis: Heparin    CODE STATUS:  Full code  Level Of Support Discussed With: Patient  Code Status (Patient has no pulse and is not breathing): CPR (Attempt to Resuscitate)  Medical Interventions (Patient has pulse or is breathing): Full Support      This note has been completed as part of a split-shared workflow.   Signature: Electronically signed by USHA Mathew, 01/23/22, 1:44 AM EST.           Attending   Admission Attestation       I have seen and examined the patient, performing an independent face-to-face diagnostic evaluation with plan of care reviewed and developed with the advanced practice clinician (APC).      Brief Summary Statement:   Cortney Delacruz is a 66 y.o. female with past medical history of diabetic neuropathy, type 2 diabetes, depression, gastroparesis, chronic pain, status post gastric stimulator, hypertension, hyperlipidemia presents ER with complaints of persistent nausea and vomiting since this past Friday.    Patient reports recurrent episodes of intractable nausea and vomiting related to her gastroparesis. She reports that she has been unable to keep anything down since her episode started on Friday. She reports worsening balance related to being dehydrated and has had 3 falls over the past 2 to 3 days. No head injury or loss of consciousness. She denies any fever, chills, cough, shortness of air, exposure to COVID-19.    Work-up in the ER showing no acute abnormality on CT of the abdomen pelvis. Chest x-ray without any acute changes. Patient noted to have elevated beta hydroxybutyrate. Labs now improving with IV fluids    Remainder of detailed HPI is as noted by APC and has been reviewed and/or edited by me for completeness.    Attending Physical Exam:  Constitutional: Awake, alert, nontoxic, chronic ill appearance  Eyes: PERRLA, sclerae anicteric, no conjunctival injection  HENT: NCAT, mucous membranes dry  Neck: Supple, no thyromegaly, no  lymphadenopathy, trachea midline  Respiratory: Clear to auscultation bilaterally, nonlabored respirations   Cardiovascular: RRR, no murmurs, rubs, or gallops, palpable pedal pulses bilaterally  Gastrointestinal: Positive bowel sounds, soft, nontender, nondistended  Musculoskeletal: No bilateral ankle edema, no clubbing or cyanosis to extremities  Psychiatric: Appropriate affect, cooperative  Neurologic: Oriented x 3, strength symmetric in all extremities, Cranial Nerves grossly intact to confrontation, speech clear  Skin: No rashes      Brief Assessment/Plan :  See detailed assessment and plan developed with APC which I have reviewed and/or edited for completeness.        Admission Status: I believe that this patient meets OBSERVATION status, however if further evaluation or treatment plans warrant, status may change.  Based upon current information, I predict patient's care encounter to be less than or equal to 2 midnights.          Renaldo Escobedo DO  01/23/22                        Electronically signed by Renaldo Escobedo DO at 01/23/22 0276

## 2022-02-07 ENCOUNTER — TRANSCRIBE ORDERS (OUTPATIENT)
Dept: ADMINISTRATIVE | Facility: HOSPITAL | Age: 67
End: 2022-02-07

## 2022-02-07 DIAGNOSIS — M54.50 LOW BACK PAIN, UNSPECIFIED BACK PAIN LATERALITY, UNSPECIFIED CHRONICITY, UNSPECIFIED WHETHER SCIATICA PRESENT: Primary | ICD-10-CM

## 2022-02-15 ENCOUNTER — APPOINTMENT (OUTPATIENT)
Dept: CT IMAGING | Facility: HOSPITAL | Age: 67
End: 2022-02-15

## 2022-02-15 ENCOUNTER — APPOINTMENT (OUTPATIENT)
Dept: GENERAL RADIOLOGY | Facility: HOSPITAL | Age: 67
End: 2022-02-15

## 2022-02-15 ENCOUNTER — HOSPITAL ENCOUNTER (INPATIENT)
Facility: HOSPITAL | Age: 67
LOS: 11 days | Discharge: HOME HEALTH | End: 2022-02-27
Attending: EMERGENCY MEDICINE | Admitting: INTERNAL MEDICINE

## 2022-02-15 DIAGNOSIS — K31.84 GASTROPARESIS: ICD-10-CM

## 2022-02-15 DIAGNOSIS — N28.9 ACUTE RENAL INSUFFICIENCY: ICD-10-CM

## 2022-02-15 DIAGNOSIS — R19.5 OCCULT GI BLEEDING: ICD-10-CM

## 2022-02-15 DIAGNOSIS — N39.0 ACUTE UTI (URINARY TRACT INFECTION): ICD-10-CM

## 2022-02-15 DIAGNOSIS — Z01.419 WELL WOMAN EXAM: ICD-10-CM

## 2022-02-15 DIAGNOSIS — R91.1 PULMONARY NODULE: ICD-10-CM

## 2022-02-15 DIAGNOSIS — G89.4 CHRONIC PAIN SYNDROME: ICD-10-CM

## 2022-02-15 DIAGNOSIS — M81.0 OSTEOPOROSIS, UNSPECIFIED OSTEOPOROSIS TYPE, UNSPECIFIED PATHOLOGICAL FRACTURE PRESENCE: Chronic | ICD-10-CM

## 2022-02-15 DIAGNOSIS — R11.2 INTRACTABLE NAUSEA AND VOMITING: Primary | ICD-10-CM

## 2022-02-15 DIAGNOSIS — E88.89 KETOSIS: ICD-10-CM

## 2022-02-15 DIAGNOSIS — R77.8 ELEVATED TROPONIN: ICD-10-CM

## 2022-02-15 DIAGNOSIS — R79.89 ELEVATED SERUM CREATININE: ICD-10-CM

## 2022-02-15 DIAGNOSIS — M67.40 GANGLION CYST: ICD-10-CM

## 2022-02-15 DIAGNOSIS — Z74.09 IMPAIRED FUNCTIONAL MOBILITY, BALANCE, GAIT, AND ENDURANCE: ICD-10-CM

## 2022-02-15 DIAGNOSIS — M71.39: ICD-10-CM

## 2022-02-15 DIAGNOSIS — I10 PRIMARY HYPERTENSION: ICD-10-CM

## 2022-02-15 DIAGNOSIS — K21.9 GASTROESOPHAGEAL REFLUX DISEASE WITHOUT ESOPHAGITIS: ICD-10-CM

## 2022-02-15 DIAGNOSIS — E66.9 OBESITY WITH BODY MASS INDEX 30 OR GREATER: ICD-10-CM

## 2022-02-15 DIAGNOSIS — M65.312 TRIGGER THUMB OF LEFT HAND: ICD-10-CM

## 2022-02-15 DIAGNOSIS — Z86.19 HISTORY OF ESBL E. COLI INFECTION: ICD-10-CM

## 2022-02-15 DIAGNOSIS — IMO0002 UNCONTROLLED TYPE 2 DIABETES MELLITUS WITH COMPLICATION, WITH LONG-TERM CURRENT USE OF INSULIN: ICD-10-CM

## 2022-02-15 DIAGNOSIS — R03.0 ELEVATED BLOOD PRESSURE READING: ICD-10-CM

## 2022-02-15 DIAGNOSIS — N81.2 UTEROVAGINAL PROLAPSE, INCOMPLETE: ICD-10-CM

## 2022-02-15 PROBLEM — E11.40 DIABETIC NEUROPATHY (HCC): Status: ACTIVE | Noted: 2017-07-14

## 2022-02-15 PROBLEM — K92.2 GI BLEED: Status: ACTIVE | Noted: 2022-02-15

## 2022-02-15 LAB
ALBUMIN SERPL-MCNC: 4.5 G/DL (ref 3.5–5.2)
ALBUMIN/GLOB SERPL: 1.1 G/DL
ALP SERPL-CCNC: 121 U/L (ref 39–117)
ALT SERPL W P-5'-P-CCNC: 11 U/L (ref 1–33)
ANION GAP SERPL CALCULATED.3IONS-SCNC: 18 MMOL/L (ref 5–15)
AST SERPL-CCNC: 14 U/L (ref 1–32)
BACTERIA UR QL AUTO: ABNORMAL /HPF
BASOPHILS # BLD AUTO: 0.05 10*3/MM3 (ref 0–0.2)
BASOPHILS NFR BLD AUTO: 0.4 % (ref 0–1.5)
BILIRUB SERPL-MCNC: 0.4 MG/DL (ref 0–1.2)
BILIRUB UR QL STRIP: NEGATIVE
BUN SERPL-MCNC: 18 MG/DL (ref 8–23)
BUN/CREAT SERPL: 12.9 (ref 7–25)
CALCIUM SPEC-SCNC: 10.6 MG/DL (ref 8.6–10.5)
CHLORIDE SERPL-SCNC: 93 MMOL/L (ref 98–107)
CLARITY UR: ABNORMAL
CO2 SERPL-SCNC: 25 MMOL/L (ref 22–29)
COLOR UR: YELLOW
CREAT SERPL-MCNC: 1.4 MG/DL (ref 0.57–1)
D-LACTATE SERPL-SCNC: 1.6 MMOL/L (ref 0.5–2)
DEPRECATED RDW RBC AUTO: 48.7 FL (ref 37–54)
DEVELOPER EXPIRATION DATE: ABNORMAL
DEVELOPER LOT NUMBER: ABNORMAL
EOSINOPHIL # BLD AUTO: 0.08 10*3/MM3 (ref 0–0.4)
EOSINOPHIL NFR BLD AUTO: 0.6 % (ref 0.3–6.2)
ERYTHROCYTE [DISTWIDTH] IN BLOOD BY AUTOMATED COUNT: 17.1 % (ref 12.3–15.4)
EXPIRATION DATE: ABNORMAL
FECAL OCCULT BLOOD SCREEN, POC: POSITIVE
GFR SERPL CREATININE-BSD FRML MDRD: 38 ML/MIN/1.73
GLOBULIN UR ELPH-MCNC: 4.1 GM/DL
GLUCOSE SERPL-MCNC: 213 MG/DL (ref 65–99)
GLUCOSE UR STRIP-MCNC: ABNORMAL MG/DL
HCT VFR BLD AUTO: 41.9 % (ref 34–46.6)
HCT VFR BLD AUTO: 47.3 % (ref 34–46.6)
HGB BLD-MCNC: 13.3 G/DL (ref 12–15.9)
HGB BLD-MCNC: 14.8 G/DL (ref 12–15.9)
HGB UR QL STRIP.AUTO: NEGATIVE
HOLD SPECIMEN: NORMAL
HYALINE CASTS UR QL AUTO: ABNORMAL /LPF
IMM GRANULOCYTES # BLD AUTO: 0.04 10*3/MM3 (ref 0–0.05)
IMM GRANULOCYTES NFR BLD AUTO: 0.3 % (ref 0–0.5)
KETONES UR QL STRIP: ABNORMAL
LEUKOCYTE ESTERASE UR QL STRIP.AUTO: NEGATIVE
LIPASE SERPL-CCNC: 29 U/L (ref 13–60)
LYMPHOCYTES # BLD AUTO: 2.97 10*3/MM3 (ref 0.7–3.1)
LYMPHOCYTES NFR BLD AUTO: 21.8 % (ref 19.6–45.3)
Lab: ABNORMAL
MCH RBC QN AUTO: 25.7 PG (ref 26.6–33)
MCHC RBC AUTO-ENTMCNC: 31.3 G/DL (ref 31.5–35.7)
MCV RBC AUTO: 82.3 FL (ref 79–97)
MONOCYTES # BLD AUTO: 1.07 10*3/MM3 (ref 0.1–0.9)
MONOCYTES NFR BLD AUTO: 7.9 % (ref 5–12)
MUCOUS THREADS URNS QL MICRO: ABNORMAL /HPF
NEGATIVE CONTROL: NEGATIVE
NEUTROPHILS NFR BLD AUTO: 69 % (ref 42.7–76)
NEUTROPHILS NFR BLD AUTO: 9.42 10*3/MM3 (ref 1.7–7)
NITRITE UR QL STRIP: NEGATIVE
NRBC BLD AUTO-RTO: 0 /100 WBC (ref 0–0.2)
PH UR STRIP.AUTO: 5.5 [PH] (ref 5–8)
PLATELET # BLD AUTO: 335 10*3/MM3 (ref 140–450)
PMV BLD AUTO: 11.4 FL (ref 6–12)
POSITIVE CONTROL: POSITIVE
POTASSIUM SERPL-SCNC: 4.4 MMOL/L (ref 3.5–5.2)
PROT SERPL-MCNC: 8.6 G/DL (ref 6–8.5)
PROT UR QL STRIP: ABNORMAL
RBC # BLD AUTO: 5.75 10*6/MM3 (ref 3.77–5.28)
RBC # UR STRIP: ABNORMAL /HPF
REF LAB TEST METHOD: ABNORMAL
SODIUM SERPL-SCNC: 136 MMOL/L (ref 136–145)
SP GR UR STRIP: 1.03 (ref 1–1.03)
SQUAMOUS #/AREA URNS HPF: ABNORMAL /HPF
UROBILINOGEN UR QL STRIP: ABNORMAL
WBC # UR STRIP: ABNORMAL /HPF
WBC NRBC COR # BLD: 13.63 10*3/MM3 (ref 3.4–10.8)
WHOLE BLOOD HOLD SPECIMEN: NORMAL
WHOLE BLOOD HOLD SPECIMEN: NORMAL

## 2022-02-15 PROCEDURE — 82270 OCCULT BLOOD FECES: CPT | Performed by: EMERGENCY MEDICINE

## 2022-02-15 PROCEDURE — 73502 X-RAY EXAM HIP UNI 2-3 VIEWS: CPT

## 2022-02-15 PROCEDURE — 80053 COMPREHEN METABOLIC PANEL: CPT

## 2022-02-15 PROCEDURE — 99223 1ST HOSP IP/OBS HIGH 75: CPT | Performed by: INTERNAL MEDICINE

## 2022-02-15 PROCEDURE — 25010000002 ONDANSETRON PER 1 MG: Performed by: EMERGENCY MEDICINE

## 2022-02-15 PROCEDURE — 87077 CULTURE AEROBIC IDENTIFY: CPT | Performed by: PHYSICIAN ASSISTANT

## 2022-02-15 PROCEDURE — 25010000002 HYDROMORPHONE PER 4 MG: Performed by: EMERGENCY MEDICINE

## 2022-02-15 PROCEDURE — 83605 ASSAY OF LACTIC ACID: CPT

## 2022-02-15 PROCEDURE — 63710000001 ONDANSETRON PER 8 MG: Performed by: PHYSICIAN ASSISTANT

## 2022-02-15 PROCEDURE — 87086 URINE CULTURE/COLONY COUNT: CPT | Performed by: PHYSICIAN ASSISTANT

## 2022-02-15 PROCEDURE — 83690 ASSAY OF LIPASE: CPT

## 2022-02-15 PROCEDURE — U0004 COV-19 TEST NON-CDC HGH THRU: HCPCS | Performed by: PHYSICIAN ASSISTANT

## 2022-02-15 PROCEDURE — 93005 ELECTROCARDIOGRAM TRACING: CPT | Performed by: PHYSICIAN ASSISTANT

## 2022-02-15 PROCEDURE — G0378 HOSPITAL OBSERVATION PER HR: HCPCS

## 2022-02-15 PROCEDURE — 87186 SC STD MICRODIL/AGAR DIL: CPT | Performed by: PHYSICIAN ASSISTANT

## 2022-02-15 PROCEDURE — U0005 INFEC AGEN DETEC AMPLI PROBE: HCPCS | Performed by: PHYSICIAN ASSISTANT

## 2022-02-15 PROCEDURE — 85014 HEMATOCRIT: CPT | Performed by: PHYSICIAN ASSISTANT

## 2022-02-15 PROCEDURE — 25010000002 ONDANSETRON PER 1 MG

## 2022-02-15 PROCEDURE — 99284 EMERGENCY DEPT VISIT MOD MDM: CPT

## 2022-02-15 PROCEDURE — 93010 ELECTROCARDIOGRAM REPORT: CPT | Performed by: INTERNAL MEDICINE

## 2022-02-15 PROCEDURE — 74176 CT ABD & PELVIS W/O CONTRAST: CPT

## 2022-02-15 PROCEDURE — 63710000001 INSULIN ISOPHANE HUMAN PER 5 UNITS: Performed by: PHYSICIAN ASSISTANT

## 2022-02-15 PROCEDURE — 81001 URINALYSIS AUTO W/SCOPE: CPT | Performed by: EMERGENCY MEDICINE

## 2022-02-15 PROCEDURE — 85018 HEMOGLOBIN: CPT | Performed by: PHYSICIAN ASSISTANT

## 2022-02-15 PROCEDURE — 85025 COMPLETE CBC W/AUTO DIFF WBC: CPT

## 2022-02-15 RX ORDER — DEXTROSE MONOHYDRATE 25 G/50ML
25 INJECTION, SOLUTION INTRAVENOUS
Status: DISCONTINUED | OUTPATIENT
Start: 2022-02-15 | End: 2022-02-16 | Stop reason: SDUPTHER

## 2022-02-15 RX ORDER — HYDROMORPHONE HYDROCHLORIDE 1 MG/ML
0.25 INJECTION, SOLUTION INTRAMUSCULAR; INTRAVENOUS; SUBCUTANEOUS ONCE
Status: COMPLETED | OUTPATIENT
Start: 2022-02-15 | End: 2022-02-15

## 2022-02-15 RX ORDER — SODIUM CHLORIDE 0.9 % (FLUSH) 0.9 %
10 SYRINGE (ML) INJECTION AS NEEDED
Status: DISCONTINUED | OUTPATIENT
Start: 2022-02-15 | End: 2022-02-27 | Stop reason: HOSPADM

## 2022-02-15 RX ORDER — ONDANSETRON 2 MG/ML
INJECTION INTRAMUSCULAR; INTRAVENOUS
Status: COMPLETED
Start: 2022-02-15 | End: 2022-02-15

## 2022-02-15 RX ORDER — LAMOTRIGINE 100 MG/1
100 TABLET ORAL EVERY 12 HOURS SCHEDULED
Status: DISCONTINUED | OUTPATIENT
Start: 2022-02-15 | End: 2022-02-27 | Stop reason: HOSPADM

## 2022-02-15 RX ORDER — SODIUM CHLORIDE 9 MG/ML
10 INJECTION INTRAVENOUS AS NEEDED
Status: DISCONTINUED | OUTPATIENT
Start: 2022-02-15 | End: 2022-02-27 | Stop reason: HOSPADM

## 2022-02-15 RX ORDER — METOPROLOL SUCCINATE 100 MG/1
100 TABLET, EXTENDED RELEASE ORAL
Status: DISCONTINUED | OUTPATIENT
Start: 2022-02-16 | End: 2022-02-20

## 2022-02-15 RX ORDER — TIZANIDINE 4 MG/1
2 TABLET ORAL EVERY 8 HOURS PRN
Status: DISCONTINUED | OUTPATIENT
Start: 2022-02-15 | End: 2022-02-27 | Stop reason: HOSPADM

## 2022-02-15 RX ORDER — GABAPENTIN 300 MG/1
600 CAPSULE ORAL EVERY 12 HOURS SCHEDULED
Status: DISCONTINUED | OUTPATIENT
Start: 2022-02-15 | End: 2022-02-27 | Stop reason: HOSPADM

## 2022-02-15 RX ORDER — AMOXICILLIN 250 MG
2 CAPSULE ORAL 2 TIMES DAILY
Status: DISCONTINUED | OUTPATIENT
Start: 2022-02-16 | End: 2022-02-18

## 2022-02-15 RX ORDER — POLYETHYLENE GLYCOL 3350 17 G/17G
17 POWDER, FOR SOLUTION ORAL DAILY PRN
Status: DISCONTINUED | OUTPATIENT
Start: 2022-02-15 | End: 2022-02-18

## 2022-02-15 RX ORDER — ONDANSETRON 2 MG/ML
4 INJECTION INTRAMUSCULAR; INTRAVENOUS EVERY 6 HOURS PRN
Status: DISCONTINUED | OUTPATIENT
Start: 2022-02-15 | End: 2022-02-27 | Stop reason: HOSPADM

## 2022-02-15 RX ORDER — SODIUM CHLORIDE 0.9 % (FLUSH) 0.9 %
10 SYRINGE (ML) INJECTION EVERY 12 HOURS SCHEDULED
Status: DISCONTINUED | OUTPATIENT
Start: 2022-02-15 | End: 2022-02-27 | Stop reason: HOSPADM

## 2022-02-15 RX ORDER — PANTOPRAZOLE SODIUM 40 MG/10ML
40 INJECTION, POWDER, LYOPHILIZED, FOR SOLUTION INTRAVENOUS
Status: DISCONTINUED | OUTPATIENT
Start: 2022-02-16 | End: 2022-02-16

## 2022-02-15 RX ORDER — ACETAMINOPHEN 325 MG/1
650 TABLET ORAL EVERY 4 HOURS PRN
Status: DISCONTINUED | OUTPATIENT
Start: 2022-02-15 | End: 2022-02-27 | Stop reason: HOSPADM

## 2022-02-15 RX ORDER — NICOTINE POLACRILEX 4 MG
15 LOZENGE BUCCAL
Status: DISCONTINUED | OUTPATIENT
Start: 2022-02-15 | End: 2022-02-16 | Stop reason: SDUPTHER

## 2022-02-15 RX ORDER — HYDROMORPHONE HYDROCHLORIDE 2 MG/1
2 TABLET ORAL EVERY 6 HOURS PRN
Status: DISCONTINUED | OUTPATIENT
Start: 2022-02-15 | End: 2022-02-22

## 2022-02-15 RX ORDER — BISACODYL 5 MG/1
5 TABLET, DELAYED RELEASE ORAL DAILY PRN
Status: DISCONTINUED | OUTPATIENT
Start: 2022-02-15 | End: 2022-02-18

## 2022-02-15 RX ORDER — ONDANSETRON 2 MG/ML
4 INJECTION INTRAMUSCULAR; INTRAVENOUS ONCE
Status: COMPLETED | OUTPATIENT
Start: 2022-02-15 | End: 2022-02-15

## 2022-02-15 RX ORDER — BISACODYL 10 MG
10 SUPPOSITORY, RECTAL RECTAL DAILY PRN
Status: DISCONTINUED | OUTPATIENT
Start: 2022-02-15 | End: 2022-02-18

## 2022-02-15 RX ORDER — SODIUM CHLORIDE 9 MG/ML
50 INJECTION, SOLUTION INTRAVENOUS CONTINUOUS
Status: ACTIVE | OUTPATIENT
Start: 2022-02-15 | End: 2022-02-18

## 2022-02-15 RX ORDER — CHOLECALCIFEROL (VITAMIN D3) 125 MCG
5 CAPSULE ORAL NIGHTLY PRN
Status: DISCONTINUED | OUTPATIENT
Start: 2022-02-15 | End: 2022-02-27 | Stop reason: HOSPADM

## 2022-02-15 RX ORDER — GRANULES FOR ORAL 3 G/1
3 POWDER ORAL ONCE
Status: COMPLETED | OUTPATIENT
Start: 2022-02-15 | End: 2022-02-15

## 2022-02-15 RX ORDER — PROMETHAZINE HYDROCHLORIDE 25 MG/1
25 TABLET ORAL EVERY 4 HOURS PRN
Status: DISCONTINUED | OUTPATIENT
Start: 2022-02-15 | End: 2022-02-27 | Stop reason: HOSPADM

## 2022-02-15 RX ORDER — ONDANSETRON 4 MG/1
4 TABLET, FILM COATED ORAL EVERY 6 HOURS PRN
Status: DISCONTINUED | OUTPATIENT
Start: 2022-02-15 | End: 2022-02-27 | Stop reason: HOSPADM

## 2022-02-15 RX ADMIN — SODIUM CHLORIDE 50 ML/HR: 9 INJECTION, SOLUTION INTRAVENOUS at 23:01

## 2022-02-15 RX ADMIN — GRANULES FOR ORAL SOLUTION 3 G: 3 POWDER ORAL at 22:52

## 2022-02-15 RX ADMIN — ONDANSETRON 4 MG: 2 INJECTION INTRAMUSCULAR; INTRAVENOUS at 15:46

## 2022-02-15 RX ADMIN — ONDANSETRON HYDROCHLORIDE 4 MG: 4 TABLET, FILM COATED ORAL at 22:51

## 2022-02-15 RX ADMIN — SODIUM CHLORIDE 1000 ML: 9 INJECTION, SOLUTION INTRAVENOUS at 15:46

## 2022-02-15 RX ADMIN — GABAPENTIN 600 MG: 300 CAPSULE ORAL at 22:51

## 2022-02-15 RX ADMIN — HYDROMORPHONE HYDROCHLORIDE 0.25 MG: 1 INJECTION, SOLUTION INTRAMUSCULAR; INTRAVENOUS; SUBCUTANEOUS at 18:15

## 2022-02-15 RX ADMIN — SODIUM CHLORIDE, PRESERVATIVE FREE 10 ML: 5 INJECTION INTRAVENOUS at 23:01

## 2022-02-15 RX ADMIN — SODIUM CHLORIDE 1000 ML: 9 INJECTION, SOLUTION INTRAVENOUS at 16:10

## 2022-02-15 RX ADMIN — LAMOTRIGINE 100 MG: 100 TABLET ORAL at 22:51

## 2022-02-15 RX ADMIN — ONDANSETRON 4 MG: 2 INJECTION INTRAMUSCULAR; INTRAVENOUS at 18:14

## 2022-02-15 RX ADMIN — HYDROMORPHONE HYDROCHLORIDE 2 MG: 2 TABLET ORAL at 22:51

## 2022-02-15 RX ADMIN — INSULIN HUMAN 30 UNITS: 100 INJECTION, SUSPENSION SUBCUTANEOUS at 22:52

## 2022-02-16 LAB
ANION GAP SERPL CALCULATED.3IONS-SCNC: 14 MMOL/L (ref 5–15)
BASOPHILS # BLD AUTO: 0.04 10*3/MM3 (ref 0–0.2)
BASOPHILS NFR BLD AUTO: 0.4 % (ref 0–1.5)
BUN SERPL-MCNC: 21 MG/DL (ref 8–23)
BUN/CREAT SERPL: 14.9 (ref 7–25)
CALCIUM SPEC-SCNC: 9.3 MG/DL (ref 8.6–10.5)
CHLORIDE SERPL-SCNC: 102 MMOL/L (ref 98–107)
CHOLEST SERPL-MCNC: 120 MG/DL (ref 0–200)
CO2 SERPL-SCNC: 24 MMOL/L (ref 22–29)
CREAT SERPL-MCNC: 1.41 MG/DL (ref 0.57–1)
DEPRECATED RDW RBC AUTO: 51.8 FL (ref 37–54)
EOSINOPHIL # BLD AUTO: 0.27 10*3/MM3 (ref 0–0.4)
EOSINOPHIL NFR BLD AUTO: 2.5 % (ref 0.3–6.2)
ERYTHROCYTE [DISTWIDTH] IN BLOOD BY AUTOMATED COUNT: 16.7 % (ref 12.3–15.4)
GFR SERPL CREATININE-BSD FRML MDRD: 37 ML/MIN/1.73
GLUCOSE BLDC GLUCOMTR-MCNC: 114 MG/DL (ref 70–130)
GLUCOSE BLDC GLUCOMTR-MCNC: 134 MG/DL (ref 70–130)
GLUCOSE BLDC GLUCOMTR-MCNC: 93 MG/DL (ref 70–130)
GLUCOSE SERPL-MCNC: 130 MG/DL (ref 65–99)
HCT VFR BLD AUTO: 39.2 % (ref 34–46.6)
HCT VFR BLD AUTO: 40.4 % (ref 34–46.6)
HCT VFR BLD AUTO: 40.4 % (ref 34–46.6)
HDLC SERPL-MCNC: 30 MG/DL (ref 40–60)
HGB BLD-MCNC: 12.3 G/DL (ref 12–15.9)
HGB BLD-MCNC: 12.3 G/DL (ref 12–15.9)
HGB BLD-MCNC: 12.4 G/DL (ref 12–15.9)
IMM GRANULOCYTES # BLD AUTO: 0.03 10*3/MM3 (ref 0–0.05)
IMM GRANULOCYTES NFR BLD AUTO: 0.3 % (ref 0–0.5)
LDLC SERPL CALC-MCNC: 56 MG/DL (ref 0–100)
LDLC/HDLC SERPL: 1.61 {RATIO}
LYMPHOCYTES # BLD AUTO: 3.9 10*3/MM3 (ref 0.7–3.1)
LYMPHOCYTES NFR BLD AUTO: 36.8 % (ref 19.6–45.3)
MCH RBC QN AUTO: 25.9 PG (ref 26.6–33)
MCHC RBC AUTO-ENTMCNC: 30.4 G/DL (ref 31.5–35.7)
MCV RBC AUTO: 85.1 FL (ref 79–97)
MONOCYTES # BLD AUTO: 0.86 10*3/MM3 (ref 0.1–0.9)
MONOCYTES NFR BLD AUTO: 8.1 % (ref 5–12)
NEUTROPHILS NFR BLD AUTO: 5.51 10*3/MM3 (ref 1.7–7)
NEUTROPHILS NFR BLD AUTO: 51.9 % (ref 42.7–76)
NRBC BLD AUTO-RTO: 0 /100 WBC (ref 0–0.2)
PLATELET # BLD AUTO: 235 10*3/MM3 (ref 140–450)
PMV BLD AUTO: 11.6 FL (ref 6–12)
POTASSIUM SERPL-SCNC: 3.7 MMOL/L (ref 3.5–5.2)
RBC # BLD AUTO: 4.75 10*6/MM3 (ref 3.77–5.28)
SARS-COV-2 RNA PNL SPEC NAA+PROBE: NOT DETECTED
SODIUM SERPL-SCNC: 140 MMOL/L (ref 136–145)
TRIGL SERPL-MCNC: 209 MG/DL (ref 0–150)
VLDLC SERPL-MCNC: 34 MG/DL (ref 5–40)
WBC NRBC COR # BLD: 10.61 10*3/MM3 (ref 3.4–10.8)

## 2022-02-16 PROCEDURE — 82962 GLUCOSE BLOOD TEST: CPT

## 2022-02-16 PROCEDURE — 85018 HEMOGLOBIN: CPT | Performed by: INTERNAL MEDICINE

## 2022-02-16 PROCEDURE — 80061 LIPID PANEL: CPT | Performed by: PHYSICIAN ASSISTANT

## 2022-02-16 PROCEDURE — 25010000002 HYDROMORPHONE PER 4 MG: Performed by: INTERNAL MEDICINE

## 2022-02-16 PROCEDURE — 97162 PT EVAL MOD COMPLEX 30 MIN: CPT

## 2022-02-16 PROCEDURE — 85014 HEMATOCRIT: CPT | Performed by: PHYSICIAN ASSISTANT

## 2022-02-16 PROCEDURE — 85014 HEMATOCRIT: CPT | Performed by: INTERNAL MEDICINE

## 2022-02-16 PROCEDURE — 25010000002 METHYLNALTREXONE 12 MG/0.6ML SOLUTION: Performed by: NURSE PRACTITIONER

## 2022-02-16 PROCEDURE — 99223 1ST HOSP IP/OBS HIGH 75: CPT | Performed by: NURSE PRACTITIONER

## 2022-02-16 PROCEDURE — 80048 BASIC METABOLIC PNL TOTAL CA: CPT | Performed by: PHYSICIAN ASSISTANT

## 2022-02-16 PROCEDURE — 97165 OT EVAL LOW COMPLEX 30 MIN: CPT

## 2022-02-16 PROCEDURE — 99233 SBSQ HOSP IP/OBS HIGH 50: CPT | Performed by: INTERNAL MEDICINE

## 2022-02-16 PROCEDURE — 25010000002 ONDANSETRON PER 1 MG: Performed by: PHYSICIAN ASSISTANT

## 2022-02-16 PROCEDURE — 85018 HEMOGLOBIN: CPT | Performed by: PHYSICIAN ASSISTANT

## 2022-02-16 PROCEDURE — 85025 COMPLETE CBC W/AUTO DIFF WBC: CPT | Performed by: PHYSICIAN ASSISTANT

## 2022-02-16 RX ORDER — ALPRAZOLAM 0.5 MG/1
0.5 TABLET ORAL 3 TIMES DAILY PRN
Status: DISPENSED | OUTPATIENT
Start: 2022-02-16 | End: 2022-02-23

## 2022-02-16 RX ORDER — HYDROMORPHONE HYDROCHLORIDE 1 MG/ML
0.5 INJECTION, SOLUTION INTRAMUSCULAR; INTRAVENOUS; SUBCUTANEOUS
Status: DISCONTINUED | OUTPATIENT
Start: 2022-02-16 | End: 2022-02-17

## 2022-02-16 RX ORDER — DEXTROSE MONOHYDRATE 25 G/50ML
25 INJECTION, SOLUTION INTRAVENOUS
Status: DISCONTINUED | OUTPATIENT
Start: 2022-02-16 | End: 2022-02-27 | Stop reason: HOSPADM

## 2022-02-16 RX ORDER — PANTOPRAZOLE SODIUM 40 MG/10ML
40 INJECTION, POWDER, LYOPHILIZED, FOR SOLUTION INTRAVENOUS EVERY 12 HOURS SCHEDULED
Status: DISCONTINUED | OUTPATIENT
Start: 2022-02-16 | End: 2022-02-25

## 2022-02-16 RX ORDER — NICOTINE POLACRILEX 4 MG
15 LOZENGE BUCCAL
Status: DISCONTINUED | OUTPATIENT
Start: 2022-02-16 | End: 2022-02-27 | Stop reason: HOSPADM

## 2022-02-16 RX ADMIN — LAMOTRIGINE 100 MG: 100 TABLET ORAL at 08:16

## 2022-02-16 RX ADMIN — ACETAMINOPHEN 650 MG: 325 TABLET, FILM COATED ORAL at 10:16

## 2022-02-16 RX ADMIN — ONDANSETRON 4 MG: 2 INJECTION INTRAMUSCULAR; INTRAVENOUS at 07:09

## 2022-02-16 RX ADMIN — SODIUM CHLORIDE, PRESERVATIVE FREE 10 ML: 5 INJECTION INTRAVENOUS at 08:16

## 2022-02-16 RX ADMIN — TIZANIDINE 2 MG: 4 TABLET ORAL at 22:17

## 2022-02-16 RX ADMIN — PANTOPRAZOLE SODIUM 40 MG: 40 INJECTION, POWDER, FOR SOLUTION INTRAVENOUS at 05:20

## 2022-02-16 RX ADMIN — METHYLNALTREXONE BROMIDE 12 MG: 12 INJECTION, SOLUTION SUBCUTANEOUS at 20:33

## 2022-02-16 RX ADMIN — METOPROLOL SUCCINATE 100 MG: 100 TABLET, EXTENDED RELEASE ORAL at 08:16

## 2022-02-16 RX ADMIN — ONDANSETRON 4 MG: 2 INJECTION INTRAMUSCULAR; INTRAVENOUS at 14:45

## 2022-02-16 RX ADMIN — ALPRAZOLAM 0.5 MG: 0.5 TABLET ORAL at 22:17

## 2022-02-16 RX ADMIN — INSULIN HUMAN 30 UNITS: 100 INJECTION, SUSPENSION SUBCUTANEOUS at 08:18

## 2022-02-16 RX ADMIN — SENNOSIDES AND DOCUSATE SODIUM 2 TABLET: 50; 8.6 TABLET ORAL at 08:16

## 2022-02-16 RX ADMIN — HYDROMORPHONE HYDROCHLORIDE 2 MG: 2 TABLET ORAL at 05:41

## 2022-02-16 RX ADMIN — HYDROMORPHONE HYDROCHLORIDE 0.5 MG: 1 INJECTION, SOLUTION INTRAMUSCULAR; INTRAVENOUS; SUBCUTANEOUS at 16:03

## 2022-02-16 RX ADMIN — TIZANIDINE 2 MG: 4 TABLET ORAL at 10:16

## 2022-02-16 RX ADMIN — SODIUM CHLORIDE, PRESERVATIVE FREE 10 ML: 5 INJECTION INTRAVENOUS at 20:35

## 2022-02-16 RX ADMIN — GABAPENTIN 600 MG: 300 CAPSULE ORAL at 08:16

## 2022-02-16 RX ADMIN — PANTOPRAZOLE SODIUM 40 MG: 40 INJECTION, POWDER, FOR SOLUTION INTRAVENOUS at 20:34

## 2022-02-16 RX ADMIN — HYDROMORPHONE HYDROCHLORIDE 2 MG: 2 TABLET ORAL at 14:45

## 2022-02-16 RX ADMIN — SENNOSIDES AND DOCUSATE SODIUM 2 TABLET: 50; 8.6 TABLET ORAL at 20:33

## 2022-02-16 RX ADMIN — INSULIN HUMAN 30 UNITS: 100 INJECTION, SUSPENSION SUBCUTANEOUS at 20:34

## 2022-02-16 RX ADMIN — ALPRAZOLAM 0.5 MG: 0.5 TABLET ORAL at 12:13

## 2022-02-16 RX ADMIN — TIZANIDINE 2 MG: 4 TABLET ORAL at 01:54

## 2022-02-16 RX ADMIN — SODIUM CHLORIDE 50 ML/HR: 9 INJECTION, SOLUTION INTRAVENOUS at 08:17

## 2022-02-16 RX ADMIN — HYDROMORPHONE HYDROCHLORIDE 0.5 MG: 1 INJECTION, SOLUTION INTRAMUSCULAR; INTRAVENOUS; SUBCUTANEOUS at 12:13

## 2022-02-16 RX ADMIN — GABAPENTIN 600 MG: 300 CAPSULE ORAL at 20:36

## 2022-02-16 RX ADMIN — LAMOTRIGINE 100 MG: 100 TABLET ORAL at 20:34

## 2022-02-16 RX ADMIN — HYDROMORPHONE HYDROCHLORIDE 0.5 MG: 1 INJECTION, SOLUTION INTRAMUSCULAR; INTRAVENOUS; SUBCUTANEOUS at 20:35

## 2022-02-17 ENCOUNTER — APPOINTMENT (OUTPATIENT)
Dept: GENERAL RADIOLOGY | Facility: HOSPITAL | Age: 67
End: 2022-02-17

## 2022-02-17 LAB
ALBUMIN SERPL-MCNC: 3.9 G/DL (ref 3.5–5.2)
ANION GAP SERPL CALCULATED.3IONS-SCNC: 12 MMOL/L (ref 5–15)
BACTERIA SPEC AEROBE CULT: ABNORMAL
BUN SERPL-MCNC: 15 MG/DL (ref 8–23)
BUN/CREAT SERPL: 16 (ref 7–25)
CALCIUM SPEC-SCNC: 9.2 MG/DL (ref 8.6–10.5)
CHLORIDE SERPL-SCNC: 104 MMOL/L (ref 98–107)
CO2 SERPL-SCNC: 23 MMOL/L (ref 22–29)
CREAT SERPL-MCNC: 0.94 MG/DL (ref 0.57–1)
GFR SERPL CREATININE-BSD FRML MDRD: 60 ML/MIN/1.73
GLUCOSE BLDC GLUCOMTR-MCNC: 68 MG/DL (ref 70–130)
GLUCOSE BLDC GLUCOMTR-MCNC: 73 MG/DL (ref 70–130)
GLUCOSE BLDC GLUCOMTR-MCNC: 75 MG/DL (ref 70–130)
GLUCOSE BLDC GLUCOMTR-MCNC: 82 MG/DL (ref 70–130)
GLUCOSE BLDC GLUCOMTR-MCNC: 83 MG/DL (ref 70–130)
GLUCOSE SERPL-MCNC: 96 MG/DL (ref 65–99)
HCT VFR BLD AUTO: 39.4 % (ref 34–46.6)
HCT VFR BLD AUTO: 41.7 % (ref 34–46.6)
HGB BLD-MCNC: 12.3 G/DL (ref 12–15.9)
HGB BLD-MCNC: 12.4 G/DL (ref 12–15.9)
PHOSPHATE SERPL-MCNC: 4.1 MG/DL (ref 2.5–4.5)
POTASSIUM SERPL-SCNC: 4.1 MMOL/L (ref 3.5–5.2)
QT INTERVAL: 366 MS
QTC INTERVAL: 452 MS
SODIUM SERPL-SCNC: 139 MMOL/L (ref 136–145)

## 2022-02-17 PROCEDURE — 85014 HEMATOCRIT: CPT | Performed by: INTERNAL MEDICINE

## 2022-02-17 PROCEDURE — 63710000001 INSULIN ISOPHANE HUMAN PER 5 UNITS: Performed by: INTERNAL MEDICINE

## 2022-02-17 PROCEDURE — 85018 HEMOGLOBIN: CPT | Performed by: INTERNAL MEDICINE

## 2022-02-17 PROCEDURE — 80069 RENAL FUNCTION PANEL: CPT | Performed by: INTERNAL MEDICINE

## 2022-02-17 PROCEDURE — 82962 GLUCOSE BLOOD TEST: CPT

## 2022-02-17 PROCEDURE — 99232 SBSQ HOSP IP/OBS MODERATE 35: CPT | Performed by: INTERNAL MEDICINE

## 2022-02-17 PROCEDURE — 25010000002 ONDANSETRON PER 1 MG: Performed by: PHYSICIAN ASSISTANT

## 2022-02-17 PROCEDURE — 25010000002 HYDROMORPHONE 1 MG/ML SOLUTION: Performed by: INTERNAL MEDICINE

## 2022-02-17 PROCEDURE — 99232 SBSQ HOSP IP/OBS MODERATE 35: CPT | Performed by: PHYSICIAN ASSISTANT

## 2022-02-17 PROCEDURE — 25010000002 HYDROMORPHONE PER 4 MG: Performed by: INTERNAL MEDICINE

## 2022-02-17 RX ORDER — HYDROMORPHONE HYDROCHLORIDE 1 MG/ML
0.5 INJECTION, SOLUTION INTRAMUSCULAR; INTRAVENOUS; SUBCUTANEOUS EVERY 6 HOURS PRN
Status: DISCONTINUED | OUTPATIENT
Start: 2022-02-17 | End: 2022-02-17

## 2022-02-17 RX ADMIN — HYDROMORPHONE HYDROCHLORIDE 0.5 MG: 1 INJECTION, SOLUTION INTRAMUSCULAR; INTRAVENOUS; SUBCUTANEOUS at 05:34

## 2022-02-17 RX ADMIN — SODIUM CHLORIDE, PRESERVATIVE FREE 10 ML: 5 INJECTION INTRAVENOUS at 21:08

## 2022-02-17 RX ADMIN — HYDROMORPHONE HYDROCHLORIDE 2 MG: 2 TABLET ORAL at 14:38

## 2022-02-17 RX ADMIN — HYDROMORPHONE HYDROCHLORIDE 2 MG: 2 TABLET ORAL at 02:45

## 2022-02-17 RX ADMIN — HYDROMORPHONE HYDROCHLORIDE 2 MG: 2 TABLET ORAL at 08:24

## 2022-02-17 RX ADMIN — PANTOPRAZOLE SODIUM 40 MG: 40 INJECTION, POWDER, FOR SOLUTION INTRAVENOUS at 21:06

## 2022-02-17 RX ADMIN — TIZANIDINE 2 MG: 4 TABLET ORAL at 10:07

## 2022-02-17 RX ADMIN — HYDROMORPHONE HYDROCHLORIDE 1 MG: 1 INJECTION, SOLUTION INTRAMUSCULAR; INTRAVENOUS; SUBCUTANEOUS at 17:00

## 2022-02-17 RX ADMIN — INSULIN HUMAN 30 UNITS: 100 INJECTION, SUSPENSION SUBCUTANEOUS at 08:26

## 2022-02-17 RX ADMIN — ONDANSETRON 4 MG: 2 INJECTION INTRAMUSCULAR; INTRAVENOUS at 14:38

## 2022-02-17 RX ADMIN — ALPRAZOLAM 0.5 MG: 0.5 TABLET ORAL at 21:08

## 2022-02-17 RX ADMIN — ONDANSETRON 4 MG: 2 INJECTION INTRAMUSCULAR; INTRAVENOUS at 08:23

## 2022-02-17 RX ADMIN — GABAPENTIN 600 MG: 300 CAPSULE ORAL at 08:25

## 2022-02-17 RX ADMIN — HYDROMORPHONE HYDROCHLORIDE 2 MG: 2 TABLET ORAL at 23:03

## 2022-02-17 RX ADMIN — ALPRAZOLAM 0.5 MG: 0.5 TABLET ORAL at 08:24

## 2022-02-17 RX ADMIN — SENNOSIDES AND DOCUSATE SODIUM 2 TABLET: 50; 8.6 TABLET ORAL at 21:07

## 2022-02-17 RX ADMIN — ONDANSETRON 4 MG: 2 INJECTION INTRAMUSCULAR; INTRAVENOUS at 21:07

## 2022-02-17 RX ADMIN — SENNOSIDES AND DOCUSATE SODIUM 2 TABLET: 50; 8.6 TABLET ORAL at 08:25

## 2022-02-17 RX ADMIN — METOPROLOL SUCCINATE 100 MG: 100 TABLET, EXTENDED RELEASE ORAL at 08:24

## 2022-02-17 RX ADMIN — LAMOTRIGINE 100 MG: 100 TABLET ORAL at 21:07

## 2022-02-17 RX ADMIN — SODIUM CHLORIDE 50 ML/HR: 9 INJECTION, SOLUTION INTRAVENOUS at 08:23

## 2022-02-17 RX ADMIN — HYDROMORPHONE HYDROCHLORIDE 0.5 MG: 1 INJECTION, SOLUTION INTRAMUSCULAR; INTRAVENOUS; SUBCUTANEOUS at 01:39

## 2022-02-17 RX ADMIN — SODIUM CHLORIDE, PRESERVATIVE FREE 10 ML: 5 INJECTION INTRAVENOUS at 08:25

## 2022-02-17 RX ADMIN — LAMOTRIGINE 100 MG: 100 TABLET ORAL at 08:24

## 2022-02-17 RX ADMIN — TIZANIDINE 2 MG: 4 TABLET ORAL at 21:08

## 2022-02-17 RX ADMIN — PANTOPRAZOLE SODIUM 40 MG: 40 INJECTION, POWDER, FOR SOLUTION INTRAVENOUS at 08:25

## 2022-02-17 RX ADMIN — HYDROMORPHONE HYDROCHLORIDE 1 MG: 1 INJECTION, SOLUTION INTRAMUSCULAR; INTRAVENOUS; SUBCUTANEOUS at 10:07

## 2022-02-17 RX ADMIN — GABAPENTIN 600 MG: 300 CAPSULE ORAL at 21:07

## 2022-02-17 RX ADMIN — INSULIN HUMAN 25 UNITS: 100 INJECTION, SUSPENSION SUBCUTANEOUS at 21:29

## 2022-02-17 RX ADMIN — HYDROMORPHONE HYDROCHLORIDE 1 MG: 1 INJECTION, SOLUTION INTRAMUSCULAR; INTRAVENOUS; SUBCUTANEOUS at 21:08

## 2022-02-17 RX ADMIN — ONDANSETRON 4 MG: 2 INJECTION INTRAMUSCULAR; INTRAVENOUS at 01:39

## 2022-02-18 LAB
GLUCOSE BLDC GLUCOMTR-MCNC: 129 MG/DL (ref 70–130)
GLUCOSE BLDC GLUCOMTR-MCNC: 130 MG/DL (ref 70–130)
GLUCOSE BLDC GLUCOMTR-MCNC: 80 MG/DL (ref 70–130)
GLUCOSE BLDC GLUCOMTR-MCNC: 91 MG/DL (ref 70–130)
HCT VFR BLD AUTO: 40.5 % (ref 34–46.6)
HGB BLD-MCNC: 12.5 G/DL (ref 12–15.9)

## 2022-02-18 PROCEDURE — 25010000002 METHYLNALTREXONE 12 MG/0.6ML SOLUTION: Performed by: NURSE PRACTITIONER

## 2022-02-18 PROCEDURE — 82962 GLUCOSE BLOOD TEST: CPT

## 2022-02-18 PROCEDURE — 97530 THERAPEUTIC ACTIVITIES: CPT

## 2022-02-18 PROCEDURE — 99232 SBSQ HOSP IP/OBS MODERATE 35: CPT | Performed by: PHYSICIAN ASSISTANT

## 2022-02-18 PROCEDURE — 97535 SELF CARE MNGMENT TRAINING: CPT

## 2022-02-18 PROCEDURE — 85018 HEMOGLOBIN: CPT | Performed by: INTERNAL MEDICINE

## 2022-02-18 PROCEDURE — 99232 SBSQ HOSP IP/OBS MODERATE 35: CPT | Performed by: INTERNAL MEDICINE

## 2022-02-18 PROCEDURE — 85014 HEMATOCRIT: CPT | Performed by: INTERNAL MEDICINE

## 2022-02-18 PROCEDURE — 97110 THERAPEUTIC EXERCISES: CPT

## 2022-02-18 PROCEDURE — 63710000001 PROMETHAZINE PER 25 MG: Performed by: PHYSICIAN ASSISTANT

## 2022-02-18 PROCEDURE — 25010000002 HYDROMORPHONE 1 MG/ML SOLUTION: Performed by: INTERNAL MEDICINE

## 2022-02-18 PROCEDURE — 25010000002 ONDANSETRON PER 1 MG: Performed by: PHYSICIAN ASSISTANT

## 2022-02-18 RX ORDER — LATANOPROST 50 UG/ML
1 SOLUTION/ DROPS OPHTHALMIC NIGHTLY
Status: DISCONTINUED | OUTPATIENT
Start: 2022-02-18 | End: 2022-02-27 | Stop reason: HOSPADM

## 2022-02-18 RX ADMIN — HYDROMORPHONE HYDROCHLORIDE 1 MG: 1 INJECTION, SOLUTION INTRAMUSCULAR; INTRAVENOUS; SUBCUTANEOUS at 10:03

## 2022-02-18 RX ADMIN — PANTOPRAZOLE SODIUM 40 MG: 40 INJECTION, POWDER, FOR SOLUTION INTRAVENOUS at 09:16

## 2022-02-18 RX ADMIN — GABAPENTIN 600 MG: 300 CAPSULE ORAL at 21:11

## 2022-02-18 RX ADMIN — METOPROLOL SUCCINATE 100 MG: 100 TABLET, EXTENDED RELEASE ORAL at 09:13

## 2022-02-18 RX ADMIN — HYDROMORPHONE HYDROCHLORIDE 1 MG: 1 INJECTION, SOLUTION INTRAMUSCULAR; INTRAVENOUS; SUBCUTANEOUS at 21:12

## 2022-02-18 RX ADMIN — HYDROMORPHONE HYDROCHLORIDE 2 MG: 2 TABLET ORAL at 22:26

## 2022-02-18 RX ADMIN — PANTOPRAZOLE SODIUM 40 MG: 40 INJECTION, POWDER, FOR SOLUTION INTRAVENOUS at 21:11

## 2022-02-18 RX ADMIN — INSULIN HUMAN 25 UNITS: 100 INJECTION, SUSPENSION SUBCUTANEOUS at 21:00

## 2022-02-18 RX ADMIN — HYDROMORPHONE HYDROCHLORIDE 1 MG: 1 INJECTION, SOLUTION INTRAMUSCULAR; INTRAVENOUS; SUBCUTANEOUS at 03:10

## 2022-02-18 RX ADMIN — INSULIN HUMAN 25 UNITS: 100 INJECTION, SUSPENSION SUBCUTANEOUS at 09:14

## 2022-02-18 RX ADMIN — SODIUM CHLORIDE, PRESERVATIVE FREE 10 ML: 5 INJECTION INTRAVENOUS at 21:37

## 2022-02-18 RX ADMIN — TIZANIDINE 2 MG: 4 TABLET ORAL at 21:12

## 2022-02-18 RX ADMIN — ALPRAZOLAM 0.5 MG: 0.5 TABLET ORAL at 21:12

## 2022-02-18 RX ADMIN — SENNOSIDES AND DOCUSATE SODIUM 2 TABLET: 50; 8.6 TABLET ORAL at 09:13

## 2022-02-18 RX ADMIN — HYDROMORPHONE HYDROCHLORIDE 2 MG: 2 TABLET ORAL at 12:31

## 2022-02-18 RX ADMIN — GABAPENTIN 600 MG: 300 CAPSULE ORAL at 09:13

## 2022-02-18 RX ADMIN — TIZANIDINE 2 MG: 4 TABLET ORAL at 06:39

## 2022-02-18 RX ADMIN — ONDANSETRON 4 MG: 2 INJECTION INTRAMUSCULAR; INTRAVENOUS at 21:12

## 2022-02-18 RX ADMIN — ONDANSETRON 4 MG: 2 INJECTION INTRAMUSCULAR; INTRAVENOUS at 03:10

## 2022-02-18 RX ADMIN — SODIUM CHLORIDE 50 ML/HR: 9 INJECTION, SOLUTION INTRAVENOUS at 12:32

## 2022-02-18 RX ADMIN — ALPRAZOLAM 0.5 MG: 0.5 TABLET ORAL at 06:39

## 2022-02-18 RX ADMIN — SODIUM CHLORIDE, PRESERVATIVE FREE 10 ML: 5 INJECTION INTRAVENOUS at 09:14

## 2022-02-18 RX ADMIN — HYDROMORPHONE HYDROCHLORIDE 1 MG: 1 INJECTION, SOLUTION INTRAMUSCULAR; INTRAVENOUS; SUBCUTANEOUS at 14:14

## 2022-02-18 RX ADMIN — LAMOTRIGINE 100 MG: 100 TABLET ORAL at 21:12

## 2022-02-18 RX ADMIN — LAMOTRIGINE 100 MG: 100 TABLET ORAL at 09:13

## 2022-02-18 RX ADMIN — METHYLNALTREXONE BROMIDE 12 MG: 12 INJECTION, SOLUTION SUBCUTANEOUS at 09:15

## 2022-02-18 RX ADMIN — PROMETHAZINE HYDROCHLORIDE 25 MG: 25 TABLET ORAL at 12:31

## 2022-02-18 RX ADMIN — ONDANSETRON 4 MG: 2 INJECTION INTRAMUSCULAR; INTRAVENOUS at 10:03

## 2022-02-19 LAB
ALBUMIN SERPL-MCNC: 3.4 G/DL (ref 3.5–5.2)
ALBUMIN/GLOB SERPL: 1.2 G/DL
ALP SERPL-CCNC: 99 U/L (ref 39–117)
ALT SERPL W P-5'-P-CCNC: 17 U/L (ref 1–33)
ANION GAP SERPL CALCULATED.3IONS-SCNC: 10 MMOL/L (ref 5–15)
AST SERPL-CCNC: 30 U/L (ref 1–32)
BASOPHILS # BLD AUTO: 0.02 10*3/MM3 (ref 0–0.2)
BASOPHILS NFR BLD AUTO: 0.3 % (ref 0–1.5)
BILIRUB SERPL-MCNC: 0.3 MG/DL (ref 0–1.2)
BUN SERPL-MCNC: 6 MG/DL (ref 8–23)
BUN/CREAT SERPL: 6.8 (ref 7–25)
CALCIUM SPEC-SCNC: 9 MG/DL (ref 8.6–10.5)
CHLORIDE SERPL-SCNC: 104 MMOL/L (ref 98–107)
CO2 SERPL-SCNC: 23 MMOL/L (ref 22–29)
CREAT SERPL-MCNC: 0.88 MG/DL (ref 0.57–1)
DEPRECATED RDW RBC AUTO: 53.3 FL (ref 37–54)
EOSINOPHIL # BLD AUTO: 0.33 10*3/MM3 (ref 0–0.4)
EOSINOPHIL NFR BLD AUTO: 4.6 % (ref 0.3–6.2)
ERYTHROCYTE [DISTWIDTH] IN BLOOD BY AUTOMATED COUNT: 17 % (ref 12.3–15.4)
GFR SERPL CREATININE-BSD FRML MDRD: 64 ML/MIN/1.73
GLOBULIN UR ELPH-MCNC: 2.9 GM/DL
GLUCOSE BLDC GLUCOMTR-MCNC: 108 MG/DL (ref 70–130)
GLUCOSE BLDC GLUCOMTR-MCNC: 64 MG/DL (ref 70–130)
GLUCOSE BLDC GLUCOMTR-MCNC: 73 MG/DL (ref 70–130)
GLUCOSE BLDC GLUCOMTR-MCNC: 85 MG/DL (ref 70–130)
GLUCOSE SERPL-MCNC: 92 MG/DL (ref 65–99)
HCT VFR BLD AUTO: 40.5 % (ref 34–46.6)
HGB BLD-MCNC: 12.2 G/DL (ref 12–15.9)
IMM GRANULOCYTES # BLD AUTO: 0.02 10*3/MM3 (ref 0–0.05)
IMM GRANULOCYTES NFR BLD AUTO: 0.3 % (ref 0–0.5)
LYMPHOCYTES # BLD AUTO: 2.12 10*3/MM3 (ref 0.7–3.1)
LYMPHOCYTES NFR BLD AUTO: 29.7 % (ref 19.6–45.3)
MCH RBC QN AUTO: 26 PG (ref 26.6–33)
MCHC RBC AUTO-ENTMCNC: 30.1 G/DL (ref 31.5–35.7)
MCV RBC AUTO: 86.4 FL (ref 79–97)
MONOCYTES # BLD AUTO: 0.59 10*3/MM3 (ref 0.1–0.9)
MONOCYTES NFR BLD AUTO: 8.3 % (ref 5–12)
NEUTROPHILS NFR BLD AUTO: 4.05 10*3/MM3 (ref 1.7–7)
NEUTROPHILS NFR BLD AUTO: 56.8 % (ref 42.7–76)
NRBC BLD AUTO-RTO: 0 /100 WBC (ref 0–0.2)
PLATELET # BLD AUTO: 172 10*3/MM3 (ref 140–450)
PMV BLD AUTO: 11.5 FL (ref 6–12)
POTASSIUM SERPL-SCNC: 4.3 MMOL/L (ref 3.5–5.2)
PROT SERPL-MCNC: 6.3 G/DL (ref 6–8.5)
RBC # BLD AUTO: 4.69 10*6/MM3 (ref 3.77–5.28)
SODIUM SERPL-SCNC: 137 MMOL/L (ref 136–145)
WBC NRBC COR # BLD: 7.13 10*3/MM3 (ref 3.4–10.8)

## 2022-02-19 PROCEDURE — 85025 COMPLETE CBC W/AUTO DIFF WBC: CPT | Performed by: INTERNAL MEDICINE

## 2022-02-19 PROCEDURE — 25010000002 METOCLOPRAMIDE PER 10 MG: Performed by: NURSE PRACTITIONER

## 2022-02-19 PROCEDURE — 25010000002 ONDANSETRON PER 1 MG: Performed by: PHYSICIAN ASSISTANT

## 2022-02-19 PROCEDURE — 80053 COMPREHEN METABOLIC PANEL: CPT | Performed by: INTERNAL MEDICINE

## 2022-02-19 PROCEDURE — 82962 GLUCOSE BLOOD TEST: CPT

## 2022-02-19 PROCEDURE — 25010000002 HYDROMORPHONE 1 MG/ML SOLUTION: Performed by: INTERNAL MEDICINE

## 2022-02-19 PROCEDURE — 99232 SBSQ HOSP IP/OBS MODERATE 35: CPT | Performed by: NURSE PRACTITIONER

## 2022-02-19 PROCEDURE — 63710000001 ONDANSETRON PER 8 MG: Performed by: PHYSICIAN ASSISTANT

## 2022-02-19 PROCEDURE — 99232 SBSQ HOSP IP/OBS MODERATE 35: CPT | Performed by: INTERNAL MEDICINE

## 2022-02-19 RX ORDER — METOCLOPRAMIDE HYDROCHLORIDE 5 MG/ML
5 INJECTION INTRAMUSCULAR; INTRAVENOUS ONCE
Status: COMPLETED | OUTPATIENT
Start: 2022-02-19 | End: 2022-02-19

## 2022-02-19 RX ADMIN — ALPRAZOLAM 0.5 MG: 0.5 TABLET ORAL at 09:14

## 2022-02-19 RX ADMIN — TIZANIDINE 2 MG: 4 TABLET ORAL at 05:51

## 2022-02-19 RX ADMIN — LAMOTRIGINE 100 MG: 100 TABLET ORAL at 09:14

## 2022-02-19 RX ADMIN — LAMOTRIGINE 100 MG: 100 TABLET ORAL at 20:01

## 2022-02-19 RX ADMIN — ACETAMINOPHEN 650 MG: 325 TABLET, FILM COATED ORAL at 23:03

## 2022-02-19 RX ADMIN — ONDANSETRON 4 MG: 2 INJECTION INTRAMUSCULAR; INTRAVENOUS at 04:11

## 2022-02-19 RX ADMIN — LATANOPROST 1 DROP: 50 SOLUTION OPHTHALMIC at 20:01

## 2022-02-19 RX ADMIN — PANTOPRAZOLE SODIUM 40 MG: 40 INJECTION, POWDER, FOR SOLUTION INTRAVENOUS at 09:14

## 2022-02-19 RX ADMIN — GABAPENTIN 600 MG: 300 CAPSULE ORAL at 09:14

## 2022-02-19 RX ADMIN — SODIUM CHLORIDE, PRESERVATIVE FREE 10 ML: 5 INJECTION INTRAVENOUS at 20:02

## 2022-02-19 RX ADMIN — ONDANSETRON 4 MG: 2 INJECTION INTRAMUSCULAR; INTRAVENOUS at 09:46

## 2022-02-19 RX ADMIN — HYDROMORPHONE HYDROCHLORIDE 2 MG: 2 TABLET ORAL at 12:08

## 2022-02-19 RX ADMIN — HYDROMORPHONE HYDROCHLORIDE 1 MG: 1 INJECTION, SOLUTION INTRAMUSCULAR; INTRAVENOUS; SUBCUTANEOUS at 01:14

## 2022-02-19 RX ADMIN — LATANOPROST 1 DROP: 50 SOLUTION OPHTHALMIC at 00:00

## 2022-02-19 RX ADMIN — METOCLOPRAMIDE 5 MG: 5 INJECTION, SOLUTION INTRAMUSCULAR; INTRAVENOUS at 15:57

## 2022-02-19 RX ADMIN — HYDROMORPHONE HYDROCHLORIDE 1 MG: 1 INJECTION, SOLUTION INTRAMUSCULAR; INTRAVENOUS; SUBCUTANEOUS at 05:51

## 2022-02-19 RX ADMIN — TIZANIDINE 2 MG: 4 TABLET ORAL at 15:45

## 2022-02-19 RX ADMIN — METOPROLOL SUCCINATE 100 MG: 100 TABLET, EXTENDED RELEASE ORAL at 09:14

## 2022-02-19 RX ADMIN — GABAPENTIN 600 MG: 300 CAPSULE ORAL at 20:01

## 2022-02-19 RX ADMIN — HYDROMORPHONE HYDROCHLORIDE 1 MG: 1 INJECTION, SOLUTION INTRAMUSCULAR; INTRAVENOUS; SUBCUTANEOUS at 13:04

## 2022-02-19 RX ADMIN — HYDROMORPHONE HYDROCHLORIDE 2 MG: 2 TABLET ORAL at 04:11

## 2022-02-19 RX ADMIN — ONDANSETRON HYDROCHLORIDE 4 MG: 4 TABLET, FILM COATED ORAL at 20:01

## 2022-02-19 RX ADMIN — INSULIN HUMAN 20 UNITS: 100 INJECTION, SUSPENSION SUBCUTANEOUS at 20:12

## 2022-02-19 RX ADMIN — ALPRAZOLAM 0.5 MG: 0.5 TABLET ORAL at 20:01

## 2022-02-19 RX ADMIN — TIZANIDINE 2 MG: 4 TABLET ORAL at 22:46

## 2022-02-19 RX ADMIN — Medication 550 MG: at 17:31

## 2022-02-19 RX ADMIN — SODIUM CHLORIDE, PRESERVATIVE FREE 10 ML: 5 INJECTION INTRAVENOUS at 09:15

## 2022-02-19 RX ADMIN — HYDROMORPHONE HYDROCHLORIDE 2 MG: 2 TABLET ORAL at 19:52

## 2022-02-19 RX ADMIN — HYDROMORPHONE HYDROCHLORIDE 1 MG: 1 INJECTION, SOLUTION INTRAMUSCULAR; INTRAVENOUS; SUBCUTANEOUS at 22:46

## 2022-02-19 RX ADMIN — PANTOPRAZOLE SODIUM 40 MG: 40 INJECTION, POWDER, FOR SOLUTION INTRAVENOUS at 20:01

## 2022-02-20 LAB
GLUCOSE BLDC GLUCOMTR-MCNC: 122 MG/DL (ref 70–130)
GLUCOSE BLDC GLUCOMTR-MCNC: 95 MG/DL (ref 70–130)
GLUCOSE BLDC GLUCOMTR-MCNC: 97 MG/DL (ref 70–130)

## 2022-02-20 PROCEDURE — 99232 SBSQ HOSP IP/OBS MODERATE 35: CPT | Performed by: INTERNAL MEDICINE

## 2022-02-20 PROCEDURE — 25010000002 HYDROMORPHONE 1 MG/ML SOLUTION: Performed by: INTERNAL MEDICINE

## 2022-02-20 PROCEDURE — 82962 GLUCOSE BLOOD TEST: CPT

## 2022-02-20 PROCEDURE — 25010000002 ONDANSETRON PER 1 MG: Performed by: PHYSICIAN ASSISTANT

## 2022-02-20 PROCEDURE — 63710000001 PROMETHAZINE PER 25 MG: Performed by: PHYSICIAN ASSISTANT

## 2022-02-20 PROCEDURE — 99232 SBSQ HOSP IP/OBS MODERATE 35: CPT | Performed by: NURSE PRACTITIONER

## 2022-02-20 RX ORDER — METOPROLOL SUCCINATE 100 MG/1
100 TABLET, EXTENDED RELEASE ORAL EVERY 12 HOURS SCHEDULED
Status: DISCONTINUED | OUTPATIENT
Start: 2022-02-20 | End: 2022-02-27 | Stop reason: HOSPADM

## 2022-02-20 RX ADMIN — ONDANSETRON 4 MG: 2 INJECTION INTRAMUSCULAR; INTRAVENOUS at 18:52

## 2022-02-20 RX ADMIN — GABAPENTIN 600 MG: 300 CAPSULE ORAL at 09:03

## 2022-02-20 RX ADMIN — ALPRAZOLAM 0.5 MG: 0.5 TABLET ORAL at 21:19

## 2022-02-20 RX ADMIN — HYDROMORPHONE HYDROCHLORIDE 1 MG: 1 INJECTION, SOLUTION INTRAMUSCULAR; INTRAVENOUS; SUBCUTANEOUS at 18:52

## 2022-02-20 RX ADMIN — PANTOPRAZOLE SODIUM 40 MG: 40 INJECTION, POWDER, FOR SOLUTION INTRAVENOUS at 09:04

## 2022-02-20 RX ADMIN — HYDROMORPHONE HYDROCHLORIDE 1 MG: 1 INJECTION, SOLUTION INTRAMUSCULAR; INTRAVENOUS; SUBCUTANEOUS at 09:03

## 2022-02-20 RX ADMIN — TIZANIDINE 2 MG: 4 TABLET ORAL at 16:38

## 2022-02-20 RX ADMIN — PANTOPRAZOLE SODIUM 40 MG: 40 INJECTION, POWDER, FOR SOLUTION INTRAVENOUS at 20:41

## 2022-02-20 RX ADMIN — SODIUM CHLORIDE, PRESERVATIVE FREE 10 ML: 5 INJECTION INTRAVENOUS at 09:04

## 2022-02-20 RX ADMIN — GABAPENTIN 600 MG: 300 CAPSULE ORAL at 20:41

## 2022-02-20 RX ADMIN — ONDANSETRON 4 MG: 2 INJECTION INTRAMUSCULAR; INTRAVENOUS at 13:30

## 2022-02-20 RX ADMIN — HYDROMORPHONE HYDROCHLORIDE 1 MG: 1 INJECTION, SOLUTION INTRAMUSCULAR; INTRAVENOUS; SUBCUTANEOUS at 13:27

## 2022-02-20 RX ADMIN — HYDROMORPHONE HYDROCHLORIDE 2 MG: 2 TABLET ORAL at 16:36

## 2022-02-20 RX ADMIN — PROMETHAZINE HYDROCHLORIDE 25 MG: 25 TABLET ORAL at 09:07

## 2022-02-20 RX ADMIN — LAMOTRIGINE 100 MG: 100 TABLET ORAL at 20:41

## 2022-02-20 RX ADMIN — METOPROLOL SUCCINATE 100 MG: 100 TABLET, EXTENDED RELEASE ORAL at 20:41

## 2022-02-20 RX ADMIN — LATANOPROST 1 DROP: 50 SOLUTION OPHTHALMIC at 20:41

## 2022-02-20 RX ADMIN — ONDANSETRON 4 MG: 2 INJECTION INTRAMUSCULAR; INTRAVENOUS at 05:29

## 2022-02-20 RX ADMIN — INSULIN HUMAN 20 UNITS: 100 INJECTION, SUSPENSION SUBCUTANEOUS at 20:42

## 2022-02-20 RX ADMIN — HYDROMORPHONE HYDROCHLORIDE 2 MG: 2 TABLET ORAL at 10:37

## 2022-02-20 RX ADMIN — METOPROLOL SUCCINATE 100 MG: 100 TABLET, EXTENDED RELEASE ORAL at 09:03

## 2022-02-20 RX ADMIN — LAMOTRIGINE 100 MG: 100 TABLET ORAL at 09:04

## 2022-02-20 RX ADMIN — HYDROMORPHONE HYDROCHLORIDE 1 MG: 1 INJECTION, SOLUTION INTRAMUSCULAR; INTRAVENOUS; SUBCUTANEOUS at 05:29

## 2022-02-21 LAB
BACTERIA UR QL AUTO: ABNORMAL /HPF
BILIRUB UR QL STRIP: NEGATIVE
CLARITY UR: ABNORMAL
COLOR UR: YELLOW
GLUCOSE BLDC GLUCOMTR-MCNC: 116 MG/DL (ref 70–130)
GLUCOSE BLDC GLUCOMTR-MCNC: 117 MG/DL (ref 70–130)
GLUCOSE BLDC GLUCOMTR-MCNC: 145 MG/DL (ref 70–130)
GLUCOSE BLDC GLUCOMTR-MCNC: 156 MG/DL (ref 70–130)
GLUCOSE UR STRIP-MCNC: ABNORMAL MG/DL
HGB UR QL STRIP.AUTO: ABNORMAL
HYALINE CASTS UR QL AUTO: ABNORMAL /LPF
KETONES UR QL STRIP: ABNORMAL
LEUKOCYTE ESTERASE UR QL STRIP.AUTO: ABNORMAL
NITRITE UR QL STRIP: NEGATIVE
PH UR STRIP.AUTO: 5.5 [PH] (ref 5–8)
PROT UR QL STRIP: ABNORMAL
RBC # UR STRIP: ABNORMAL /HPF
REF LAB TEST METHOD: ABNORMAL
RENAL EPI CELLS #/AREA URNS HPF: ABNORMAL /HPF
SP GR UR STRIP: 1.01 (ref 1–1.03)
SQUAMOUS #/AREA URNS HPF: ABNORMAL /HPF
UROBILINOGEN UR QL STRIP: ABNORMAL
WBC # UR STRIP: ABNORMAL /HPF

## 2022-02-21 PROCEDURE — 63710000001 INSULIN LISPRO (HUMAN) PER 5 UNITS: Performed by: INTERNAL MEDICINE

## 2022-02-21 PROCEDURE — 99232 SBSQ HOSP IP/OBS MODERATE 35: CPT | Performed by: INTERNAL MEDICINE

## 2022-02-21 PROCEDURE — 87086 URINE CULTURE/COLONY COUNT: CPT | Performed by: INTERNAL MEDICINE

## 2022-02-21 PROCEDURE — 82962 GLUCOSE BLOOD TEST: CPT

## 2022-02-21 PROCEDURE — 25010000002 HYDROMORPHONE 1 MG/ML SOLUTION: Performed by: INTERNAL MEDICINE

## 2022-02-21 PROCEDURE — 87186 SC STD MICRODIL/AGAR DIL: CPT | Performed by: INTERNAL MEDICINE

## 2022-02-21 PROCEDURE — 63710000001 PROMETHAZINE PER 25 MG: Performed by: PHYSICIAN ASSISTANT

## 2022-02-21 PROCEDURE — 87077 CULTURE AEROBIC IDENTIFY: CPT | Performed by: INTERNAL MEDICINE

## 2022-02-21 PROCEDURE — 25010000002 ONDANSETRON PER 1 MG: Performed by: PHYSICIAN ASSISTANT

## 2022-02-21 PROCEDURE — 81001 URINALYSIS AUTO W/SCOPE: CPT | Performed by: INTERNAL MEDICINE

## 2022-02-21 RX ADMIN — PANTOPRAZOLE SODIUM 40 MG: 40 INJECTION, POWDER, FOR SOLUTION INTRAVENOUS at 21:13

## 2022-02-21 RX ADMIN — METOPROLOL SUCCINATE 100 MG: 100 TABLET, EXTENDED RELEASE ORAL at 21:12

## 2022-02-21 RX ADMIN — ONDANSETRON 4 MG: 2 INJECTION INTRAMUSCULAR; INTRAVENOUS at 23:30

## 2022-02-21 RX ADMIN — PROMETHAZINE HYDROCHLORIDE 25 MG: 25 TABLET ORAL at 21:21

## 2022-02-21 RX ADMIN — HYDROMORPHONE HYDROCHLORIDE 2 MG: 2 TABLET ORAL at 04:01

## 2022-02-21 RX ADMIN — METOPROLOL SUCCINATE 100 MG: 100 TABLET, EXTENDED RELEASE ORAL at 09:31

## 2022-02-21 RX ADMIN — ONDANSETRON 4 MG: 2 INJECTION INTRAMUSCULAR; INTRAVENOUS at 12:38

## 2022-02-21 RX ADMIN — PROMETHAZINE HYDROCHLORIDE 25 MG: 25 TABLET ORAL at 04:04

## 2022-02-21 RX ADMIN — SODIUM CHLORIDE, PRESERVATIVE FREE 10 ML: 5 INJECTION INTRAVENOUS at 09:40

## 2022-02-21 RX ADMIN — PROMETHAZINE HYDROCHLORIDE 25 MG: 25 TABLET ORAL at 11:41

## 2022-02-21 RX ADMIN — INSULIN LISPRO 2 UNITS: 100 INJECTION, SOLUTION INTRAVENOUS; SUBCUTANEOUS at 09:33

## 2022-02-21 RX ADMIN — ONDANSETRON 4 MG: 2 INJECTION INTRAMUSCULAR; INTRAVENOUS at 06:23

## 2022-02-21 RX ADMIN — LAMOTRIGINE 100 MG: 100 TABLET ORAL at 21:12

## 2022-02-21 RX ADMIN — HYDROMORPHONE HYDROCHLORIDE 1 MG: 1 INJECTION, SOLUTION INTRAMUSCULAR; INTRAVENOUS; SUBCUTANEOUS at 00:42

## 2022-02-21 RX ADMIN — INSULIN HUMAN 20 UNITS: 100 INJECTION, SUSPENSION SUBCUTANEOUS at 21:12

## 2022-02-21 RX ADMIN — GABAPENTIN 600 MG: 300 CAPSULE ORAL at 09:31

## 2022-02-21 RX ADMIN — TIZANIDINE 2 MG: 4 TABLET ORAL at 09:32

## 2022-02-21 RX ADMIN — HYDROMORPHONE HYDROCHLORIDE 1 MG: 1 INJECTION, SOLUTION INTRAMUSCULAR; INTRAVENOUS; SUBCUTANEOUS at 23:30

## 2022-02-21 RX ADMIN — PANTOPRAZOLE SODIUM 40 MG: 40 INJECTION, POWDER, FOR SOLUTION INTRAVENOUS at 09:32

## 2022-02-21 RX ADMIN — LATANOPROST 1 DROP: 50 SOLUTION OPHTHALMIC at 21:13

## 2022-02-21 RX ADMIN — LAMOTRIGINE 100 MG: 100 TABLET ORAL at 09:32

## 2022-02-21 RX ADMIN — Medication 550 MG: at 12:38

## 2022-02-21 RX ADMIN — HYDROMORPHONE HYDROCHLORIDE 1 MG: 1 INJECTION, SOLUTION INTRAMUSCULAR; INTRAVENOUS; SUBCUTANEOUS at 06:23

## 2022-02-21 RX ADMIN — HYDROMORPHONE HYDROCHLORIDE 1 MG: 1 INJECTION, SOLUTION INTRAMUSCULAR; INTRAVENOUS; SUBCUTANEOUS at 11:38

## 2022-02-21 RX ADMIN — GABAPENTIN 600 MG: 300 CAPSULE ORAL at 21:12

## 2022-02-21 RX ADMIN — HYDROMORPHONE HYDROCHLORIDE 2 MG: 2 TABLET ORAL at 21:21

## 2022-02-21 RX ADMIN — ONDANSETRON 4 MG: 2 INJECTION INTRAMUSCULAR; INTRAVENOUS at 00:42

## 2022-02-21 RX ADMIN — INSULIN HUMAN 20 UNITS: 100 INJECTION, SUSPENSION SUBCUTANEOUS at 09:32

## 2022-02-21 RX ADMIN — ONDANSETRON 4 MG: 2 INJECTION INTRAMUSCULAR; INTRAVENOUS at 17:55

## 2022-02-21 RX ADMIN — Medication 550 MG: at 18:00

## 2022-02-21 RX ADMIN — HYDROMORPHONE HYDROCHLORIDE 1 MG: 1 INJECTION, SOLUTION INTRAMUSCULAR; INTRAVENOUS; SUBCUTANEOUS at 17:55

## 2022-02-21 RX ADMIN — HYDROMORPHONE HYDROCHLORIDE 2 MG: 2 TABLET ORAL at 14:53

## 2022-02-22 PROBLEM — E44.0 MODERATE MALNUTRITION (HCC): Status: ACTIVE | Noted: 2022-02-22

## 2022-02-22 LAB
ALBUMIN SERPL-MCNC: 3.6 G/DL (ref 3.5–5.2)
ALBUMIN/GLOB SERPL: 1.3 G/DL
ALP SERPL-CCNC: 96 U/L (ref 39–117)
ALT SERPL W P-5'-P-CCNC: 13 U/L (ref 1–33)
ANION GAP SERPL CALCULATED.3IONS-SCNC: 11 MMOL/L (ref 5–15)
AST SERPL-CCNC: 15 U/L (ref 1–32)
BASOPHILS # BLD AUTO: 0.03 10*3/MM3 (ref 0–0.2)
BASOPHILS NFR BLD AUTO: 0.3 % (ref 0–1.5)
BILIRUB SERPL-MCNC: 0.3 MG/DL (ref 0–1.2)
BUN SERPL-MCNC: 6 MG/DL (ref 8–23)
BUN/CREAT SERPL: 6.4 (ref 7–25)
CALCIUM SPEC-SCNC: 9.1 MG/DL (ref 8.6–10.5)
CHLORIDE SERPL-SCNC: 100 MMOL/L (ref 98–107)
CO2 SERPL-SCNC: 26 MMOL/L (ref 22–29)
CREAT SERPL-MCNC: 0.94 MG/DL (ref 0.57–1)
DEPRECATED RDW RBC AUTO: 50.8 FL (ref 37–54)
EOSINOPHIL # BLD AUTO: 0.26 10*3/MM3 (ref 0–0.4)
EOSINOPHIL NFR BLD AUTO: 2.9 % (ref 0.3–6.2)
ERYTHROCYTE [DISTWIDTH] IN BLOOD BY AUTOMATED COUNT: 17 % (ref 12.3–15.4)
GFR SERPL CREATININE-BSD FRML MDRD: 60 ML/MIN/1.73
GLOBULIN UR ELPH-MCNC: 2.8 GM/DL
GLUCOSE BLDC GLUCOMTR-MCNC: 107 MG/DL (ref 70–130)
GLUCOSE BLDC GLUCOMTR-MCNC: 117 MG/DL (ref 70–130)
GLUCOSE BLDC GLUCOMTR-MCNC: 148 MG/DL (ref 70–130)
GLUCOSE BLDC GLUCOMTR-MCNC: 207 MG/DL (ref 70–130)
GLUCOSE SERPL-MCNC: 160 MG/DL (ref 65–99)
HCT VFR BLD AUTO: 37.5 % (ref 34–46.6)
HGB BLD-MCNC: 12 G/DL (ref 12–15.9)
IMM GRANULOCYTES # BLD AUTO: 0.03 10*3/MM3 (ref 0–0.05)
IMM GRANULOCYTES NFR BLD AUTO: 0.3 % (ref 0–0.5)
LYMPHOCYTES # BLD AUTO: 1.08 10*3/MM3 (ref 0.7–3.1)
LYMPHOCYTES NFR BLD AUTO: 11.9 % (ref 19.6–45.3)
MCH RBC QN AUTO: 26.5 PG (ref 26.6–33)
MCHC RBC AUTO-ENTMCNC: 32 G/DL (ref 31.5–35.7)
MCV RBC AUTO: 82.8 FL (ref 79–97)
MONOCYTES # BLD AUTO: 0.65 10*3/MM3 (ref 0.1–0.9)
MONOCYTES NFR BLD AUTO: 7.2 % (ref 5–12)
NEUTROPHILS NFR BLD AUTO: 7.02 10*3/MM3 (ref 1.7–7)
NEUTROPHILS NFR BLD AUTO: 77.4 % (ref 42.7–76)
NRBC BLD AUTO-RTO: 0 /100 WBC (ref 0–0.2)
PLATELET # BLD AUTO: 188 10*3/MM3 (ref 140–450)
PMV BLD AUTO: 12.8 FL (ref 6–12)
POTASSIUM SERPL-SCNC: 4.2 MMOL/L (ref 3.5–5.2)
PROT SERPL-MCNC: 6.4 G/DL (ref 6–8.5)
RBC # BLD AUTO: 4.53 10*6/MM3 (ref 3.77–5.28)
SODIUM SERPL-SCNC: 137 MMOL/L (ref 136–145)
TSH SERPL DL<=0.05 MIU/L-ACNC: 0.79 UIU/ML (ref 0.27–4.2)
WBC NRBC COR # BLD: 9.07 10*3/MM3 (ref 3.4–10.8)

## 2022-02-22 PROCEDURE — 25010000002 HYDROMORPHONE 1 MG/ML SOLUTION: Performed by: INTERNAL MEDICINE

## 2022-02-22 PROCEDURE — 84443 ASSAY THYROID STIM HORMONE: CPT | Performed by: PHYSICIAN ASSISTANT

## 2022-02-22 PROCEDURE — 99233 SBSQ HOSP IP/OBS HIGH 50: CPT | Performed by: INTERNAL MEDICINE

## 2022-02-22 PROCEDURE — 85025 COMPLETE CBC W/AUTO DIFF WBC: CPT | Performed by: PHYSICIAN ASSISTANT

## 2022-02-22 PROCEDURE — 82962 GLUCOSE BLOOD TEST: CPT

## 2022-02-22 PROCEDURE — 63710000001 INSULIN LISPRO (HUMAN) PER 5 UNITS: Performed by: INTERNAL MEDICINE

## 2022-02-22 PROCEDURE — 80053 COMPREHEN METABOLIC PANEL: CPT | Performed by: PHYSICIAN ASSISTANT

## 2022-02-22 PROCEDURE — 99232 SBSQ HOSP IP/OBS MODERATE 35: CPT | Performed by: PHYSICIAN ASSISTANT

## 2022-02-22 PROCEDURE — 25010000002 ONDANSETRON PER 1 MG: Performed by: PHYSICIAN ASSISTANT

## 2022-02-22 PROCEDURE — 63710000001 PROMETHAZINE PER 25 MG: Performed by: PHYSICIAN ASSISTANT

## 2022-02-22 RX ORDER — NITROFURANTOIN 25; 75 MG/1; MG/1
100 CAPSULE ORAL EVERY 12 HOURS SCHEDULED
Status: DISCONTINUED | OUTPATIENT
Start: 2022-02-22 | End: 2022-02-22

## 2022-02-22 RX ORDER — HYDROMORPHONE HYDROCHLORIDE 2 MG/1
4 TABLET ORAL EVERY 6 HOURS PRN
Status: DISCONTINUED | OUTPATIENT
Start: 2022-02-22 | End: 2022-02-27 | Stop reason: HOSPADM

## 2022-02-22 RX ORDER — SCOLOPAMINE TRANSDERMAL SYSTEM 1 MG/1
1 PATCH, EXTENDED RELEASE TRANSDERMAL
Status: DISCONTINUED | OUTPATIENT
Start: 2022-02-22 | End: 2022-02-27 | Stop reason: HOSPADM

## 2022-02-22 RX ORDER — GRANULES FOR ORAL 3 G/1
3 POWDER ORAL ONCE
Status: COMPLETED | OUTPATIENT
Start: 2022-02-22 | End: 2022-02-22

## 2022-02-22 RX ADMIN — PANTOPRAZOLE SODIUM 40 MG: 40 INJECTION, POWDER, FOR SOLUTION INTRAVENOUS at 22:07

## 2022-02-22 RX ADMIN — SODIUM CHLORIDE, PRESERVATIVE FREE 10 ML: 5 INJECTION INTRAVENOUS at 09:08

## 2022-02-22 RX ADMIN — ACETAMINOPHEN 650 MG: 325 TABLET, FILM COATED ORAL at 14:28

## 2022-02-22 RX ADMIN — GRANULES FOR ORAL SOLUTION 3 G: 3 POWDER ORAL at 12:22

## 2022-02-22 RX ADMIN — HYDROMORPHONE HYDROCHLORIDE 1 MG: 1 INJECTION, SOLUTION INTRAMUSCULAR; INTRAVENOUS; SUBCUTANEOUS at 08:42

## 2022-02-22 RX ADMIN — Medication 550 MG: at 17:09

## 2022-02-22 RX ADMIN — PANTOPRAZOLE SODIUM 40 MG: 40 INJECTION, POWDER, FOR SOLUTION INTRAVENOUS at 09:07

## 2022-02-22 RX ADMIN — TIZANIDINE 2 MG: 4 TABLET ORAL at 21:53

## 2022-02-22 RX ADMIN — SCOPALAMINE 1 PATCH: 1 PATCH, EXTENDED RELEASE TRANSDERMAL at 12:21

## 2022-02-22 RX ADMIN — LAMOTRIGINE 100 MG: 100 TABLET ORAL at 09:08

## 2022-02-22 RX ADMIN — METOPROLOL SUCCINATE 100 MG: 100 TABLET, EXTENDED RELEASE ORAL at 09:08

## 2022-02-22 RX ADMIN — Medication 550 MG: at 12:20

## 2022-02-22 RX ADMIN — INSULIN LISPRO 4 UNITS: 100 INJECTION, SOLUTION INTRAVENOUS; SUBCUTANEOUS at 17:26

## 2022-02-22 RX ADMIN — Medication 550 MG: at 09:08

## 2022-02-22 RX ADMIN — ONDANSETRON 4 MG: 2 INJECTION INTRAMUSCULAR; INTRAVENOUS at 08:42

## 2022-02-22 RX ADMIN — GABAPENTIN 600 MG: 300 CAPSULE ORAL at 09:07

## 2022-02-22 RX ADMIN — LATANOPROST 1 DROP: 50 SOLUTION OPHTHALMIC at 22:07

## 2022-02-22 RX ADMIN — PROMETHAZINE HYDROCHLORIDE 25 MG: 25 TABLET ORAL at 04:32

## 2022-02-22 RX ADMIN — ALPRAZOLAM 0.5 MG: 0.5 TABLET ORAL at 21:53

## 2022-02-22 RX ADMIN — TIZANIDINE 2 MG: 4 TABLET ORAL at 10:29

## 2022-02-22 RX ADMIN — INSULIN HUMAN 20 UNITS: 100 INJECTION, SUSPENSION SUBCUTANEOUS at 21:57

## 2022-02-22 RX ADMIN — LAMOTRIGINE 100 MG: 100 TABLET ORAL at 21:59

## 2022-02-22 RX ADMIN — GABAPENTIN 600 MG: 300 CAPSULE ORAL at 21:53

## 2022-02-22 RX ADMIN — HYDROMORPHONE HYDROCHLORIDE 1 MG: 1 INJECTION, SOLUTION INTRAMUSCULAR; INTRAVENOUS; SUBCUTANEOUS at 04:36

## 2022-02-22 RX ADMIN — HYDROMORPHONE HYDROCHLORIDE 4 MG: 2 TABLET ORAL at 18:16

## 2022-02-22 RX ADMIN — ONDANSETRON 4 MG: 2 INJECTION INTRAMUSCULAR; INTRAVENOUS at 17:07

## 2022-02-22 RX ADMIN — HYDROMORPHONE HYDROCHLORIDE 2 MG: 2 TABLET ORAL at 10:29

## 2022-02-22 RX ADMIN — PROMETHAZINE HYDROCHLORIDE 25 MG: 25 TABLET ORAL at 21:53

## 2022-02-22 RX ADMIN — METOPROLOL SUCCINATE 100 MG: 100 TABLET, EXTENDED RELEASE ORAL at 21:59

## 2022-02-23 LAB
BACTERIA SPEC AEROBE CULT: ABNORMAL
GLUCOSE BLDC GLUCOMTR-MCNC: 125 MG/DL (ref 70–130)
GLUCOSE BLDC GLUCOMTR-MCNC: 128 MG/DL (ref 70–130)
GLUCOSE BLDC GLUCOMTR-MCNC: 143 MG/DL (ref 70–130)
GLUCOSE BLDC GLUCOMTR-MCNC: 197 MG/DL (ref 70–130)

## 2022-02-23 PROCEDURE — 97535 SELF CARE MNGMENT TRAINING: CPT

## 2022-02-23 PROCEDURE — 63710000001 INSULIN ISOPHANE HUMAN PER 5 UNITS: Performed by: INTERNAL MEDICINE

## 2022-02-23 PROCEDURE — 82962 GLUCOSE BLOOD TEST: CPT

## 2022-02-23 PROCEDURE — 25010000002 HYDROMORPHONE PER 4 MG: Performed by: INTERNAL MEDICINE

## 2022-02-23 PROCEDURE — 97116 GAIT TRAINING THERAPY: CPT | Performed by: PHYSICAL THERAPIST

## 2022-02-23 PROCEDURE — 97110 THERAPEUTIC EXERCISES: CPT | Performed by: PHYSICAL THERAPIST

## 2022-02-23 PROCEDURE — 99232 SBSQ HOSP IP/OBS MODERATE 35: CPT | Performed by: INTERNAL MEDICINE

## 2022-02-23 PROCEDURE — 63710000001 PROMETHAZINE PER 25 MG: Performed by: PHYSICIAN ASSISTANT

## 2022-02-23 PROCEDURE — 25010000002 ONDANSETRON PER 1 MG: Performed by: PHYSICIAN ASSISTANT

## 2022-02-23 RX ORDER — HYDROMORPHONE HYDROCHLORIDE 1 MG/ML
0.5 INJECTION, SOLUTION INTRAMUSCULAR; INTRAVENOUS; SUBCUTANEOUS EVERY 4 HOURS PRN
Status: DISCONTINUED | OUTPATIENT
Start: 2022-02-23 | End: 2022-02-24

## 2022-02-23 RX ORDER — ALPRAZOLAM 0.5 MG/1
0.5 TABLET ORAL 3 TIMES DAILY PRN
Status: DISCONTINUED | OUTPATIENT
Start: 2022-02-23 | End: 2022-02-27 | Stop reason: HOSPADM

## 2022-02-23 RX ADMIN — HYDROMORPHONE HYDROCHLORIDE 4 MG: 2 TABLET ORAL at 14:29

## 2022-02-23 RX ADMIN — PANTOPRAZOLE SODIUM 40 MG: 40 INJECTION, POWDER, FOR SOLUTION INTRAVENOUS at 20:45

## 2022-02-23 RX ADMIN — ALPRAZOLAM 0.5 MG: 0.5 TABLET ORAL at 11:39

## 2022-02-23 RX ADMIN — PROMETHAZINE HYDROCHLORIDE 25 MG: 25 TABLET ORAL at 23:30

## 2022-02-23 RX ADMIN — METOPROLOL SUCCINATE 100 MG: 100 TABLET, EXTENDED RELEASE ORAL at 09:32

## 2022-02-23 RX ADMIN — PROMETHAZINE HYDROCHLORIDE 25 MG: 25 TABLET ORAL at 16:22

## 2022-02-23 RX ADMIN — Medication 550 MG: at 09:32

## 2022-02-23 RX ADMIN — HYDROMORPHONE HYDROCHLORIDE 0.5 MG: 1 INJECTION, SOLUTION INTRAMUSCULAR; INTRAVENOUS; SUBCUTANEOUS at 20:45

## 2022-02-23 RX ADMIN — INSULIN HUMAN 20 UNITS: 100 INJECTION, SUSPENSION SUBCUTANEOUS at 21:29

## 2022-02-23 RX ADMIN — HYDROMORPHONE HYDROCHLORIDE 0.5 MG: 1 INJECTION, SOLUTION INTRAMUSCULAR; INTRAVENOUS; SUBCUTANEOUS at 16:20

## 2022-02-23 RX ADMIN — HYDROMORPHONE HYDROCHLORIDE 4 MG: 2 TABLET ORAL at 23:30

## 2022-02-23 RX ADMIN — ACETAMINOPHEN 650 MG: 325 TABLET, FILM COATED ORAL at 20:02

## 2022-02-23 RX ADMIN — LAMOTRIGINE 100 MG: 100 TABLET ORAL at 20:45

## 2022-02-23 RX ADMIN — METOPROLOL SUCCINATE 100 MG: 100 TABLET, EXTENDED RELEASE ORAL at 20:45

## 2022-02-23 RX ADMIN — GABAPENTIN 600 MG: 300 CAPSULE ORAL at 09:32

## 2022-02-23 RX ADMIN — ONDANSETRON 4 MG: 2 INJECTION INTRAMUSCULAR; INTRAVENOUS at 14:29

## 2022-02-23 RX ADMIN — ONDANSETRON 4 MG: 2 INJECTION INTRAMUSCULAR; INTRAVENOUS at 07:48

## 2022-02-23 RX ADMIN — SODIUM CHLORIDE, PRESERVATIVE FREE 10 ML: 5 INJECTION INTRAVENOUS at 20:46

## 2022-02-23 RX ADMIN — TIZANIDINE 2 MG: 4 TABLET ORAL at 11:26

## 2022-02-23 RX ADMIN — INSULIN HUMAN 20 UNITS: 100 INJECTION, SUSPENSION SUBCUTANEOUS at 10:47

## 2022-02-23 RX ADMIN — Medication 550 MG: at 12:22

## 2022-02-23 RX ADMIN — Medication 550 MG: at 16:21

## 2022-02-23 RX ADMIN — LAMOTRIGINE 100 MG: 100 TABLET ORAL at 09:32

## 2022-02-23 RX ADMIN — LATANOPROST 1 DROP: 50 SOLUTION OPHTHALMIC at 20:46

## 2022-02-23 RX ADMIN — PANTOPRAZOLE SODIUM 40 MG: 40 INJECTION, POWDER, FOR SOLUTION INTRAVENOUS at 09:32

## 2022-02-23 RX ADMIN — GABAPENTIN 600 MG: 300 CAPSULE ORAL at 20:46

## 2022-02-23 RX ADMIN — HYDROMORPHONE HYDROCHLORIDE 4 MG: 2 TABLET ORAL at 07:48

## 2022-02-23 RX ADMIN — ONDANSETRON 4 MG: 2 INJECTION INTRAMUSCULAR; INTRAVENOUS at 20:06

## 2022-02-23 RX ADMIN — PROMETHAZINE HYDROCHLORIDE 25 MG: 25 TABLET ORAL at 11:26

## 2022-02-24 LAB
GLUCOSE BLDC GLUCOMTR-MCNC: 102 MG/DL (ref 70–130)
GLUCOSE BLDC GLUCOMTR-MCNC: 110 MG/DL (ref 70–130)
GLUCOSE BLDC GLUCOMTR-MCNC: 157 MG/DL (ref 70–130)
GLUCOSE BLDC GLUCOMTR-MCNC: 179 MG/DL (ref 70–130)

## 2022-02-24 PROCEDURE — 63710000001 PROMETHAZINE PER 25 MG: Performed by: PHYSICIAN ASSISTANT

## 2022-02-24 PROCEDURE — 82962 GLUCOSE BLOOD TEST: CPT

## 2022-02-24 PROCEDURE — 25010000002 HYDROMORPHONE PER 4 MG: Performed by: INTERNAL MEDICINE

## 2022-02-24 PROCEDURE — 25010000002 ONDANSETRON PER 1 MG: Performed by: PHYSICIAN ASSISTANT

## 2022-02-24 PROCEDURE — 99233 SBSQ HOSP IP/OBS HIGH 50: CPT | Performed by: INTERNAL MEDICINE

## 2022-02-24 PROCEDURE — 63710000001 INSULIN LISPRO (HUMAN) PER 5 UNITS: Performed by: INTERNAL MEDICINE

## 2022-02-24 PROCEDURE — 63710000001 INSULIN ISOPHANE HUMAN PER 5 UNITS: Performed by: INTERNAL MEDICINE

## 2022-02-24 RX ORDER — HYDROMORPHONE HYDROCHLORIDE 1 MG/ML
0.25 INJECTION, SOLUTION INTRAMUSCULAR; INTRAVENOUS; SUBCUTANEOUS DAILY PRN
Status: DISCONTINUED | OUTPATIENT
Start: 2022-02-24 | End: 2022-02-27 | Stop reason: HOSPADM

## 2022-02-24 RX ORDER — HYDROMORPHONE HYDROCHLORIDE 1 MG/ML
0.25 INJECTION, SOLUTION INTRAMUSCULAR; INTRAVENOUS; SUBCUTANEOUS EVERY 4 HOURS PRN
Status: DISCONTINUED | OUTPATIENT
Start: 2022-02-24 | End: 2022-02-24

## 2022-02-24 RX ADMIN — ONDANSETRON 4 MG: 2 INJECTION INTRAMUSCULAR; INTRAVENOUS at 09:39

## 2022-02-24 RX ADMIN — METOPROLOL SUCCINATE 100 MG: 100 TABLET, EXTENDED RELEASE ORAL at 21:16

## 2022-02-24 RX ADMIN — GABAPENTIN 600 MG: 300 CAPSULE ORAL at 09:38

## 2022-02-24 RX ADMIN — ONDANSETRON 4 MG: 2 INJECTION INTRAMUSCULAR; INTRAVENOUS at 01:33

## 2022-02-24 RX ADMIN — Medication 550 MG: at 17:38

## 2022-02-24 RX ADMIN — INSULIN HUMAN 20 UNITS: 100 INJECTION, SUSPENSION SUBCUTANEOUS at 22:44

## 2022-02-24 RX ADMIN — Medication 550 MG: at 09:39

## 2022-02-24 RX ADMIN — PROMETHAZINE HYDROCHLORIDE 25 MG: 25 TABLET ORAL at 22:50

## 2022-02-24 RX ADMIN — PANTOPRAZOLE SODIUM 40 MG: 40 INJECTION, POWDER, FOR SOLUTION INTRAVENOUS at 09:39

## 2022-02-24 RX ADMIN — PROMETHAZINE HYDROCHLORIDE 25 MG: 25 TABLET ORAL at 06:07

## 2022-02-24 RX ADMIN — LAMOTRIGINE 100 MG: 100 TABLET ORAL at 21:16

## 2022-02-24 RX ADMIN — TIZANIDINE 2 MG: 4 TABLET ORAL at 09:39

## 2022-02-24 RX ADMIN — GABAPENTIN 600 MG: 300 CAPSULE ORAL at 21:15

## 2022-02-24 RX ADMIN — INSULIN LISPRO 2 UNITS: 100 INJECTION, SOLUTION INTRAVENOUS; SUBCUTANEOUS at 11:26

## 2022-02-24 RX ADMIN — Medication 550 MG: at 12:27

## 2022-02-24 RX ADMIN — PANTOPRAZOLE SODIUM 40 MG: 40 INJECTION, POWDER, FOR SOLUTION INTRAVENOUS at 21:15

## 2022-02-24 RX ADMIN — HYDROMORPHONE HYDROCHLORIDE 4 MG: 2 TABLET ORAL at 09:54

## 2022-02-24 RX ADMIN — INSULIN LISPRO 2 UNITS: 100 INJECTION, SOLUTION INTRAVENOUS; SUBCUTANEOUS at 17:37

## 2022-02-24 RX ADMIN — ONDANSETRON 4 MG: 2 INJECTION INTRAMUSCULAR; INTRAVENOUS at 21:28

## 2022-02-24 RX ADMIN — HYDROMORPHONE HYDROCHLORIDE 4 MG: 2 TABLET ORAL at 15:51

## 2022-02-24 RX ADMIN — HYDROMORPHONE HYDROCHLORIDE 0.5 MG: 1 INJECTION, SOLUTION INTRAMUSCULAR; INTRAVENOUS; SUBCUTANEOUS at 01:30

## 2022-02-24 RX ADMIN — HYDROMORPHONE HYDROCHLORIDE 0.25 MG: 1 INJECTION, SOLUTION INTRAMUSCULAR; INTRAVENOUS; SUBCUTANEOUS at 12:26

## 2022-02-24 RX ADMIN — LATANOPROST 1 DROP: 50 SOLUTION OPHTHALMIC at 21:17

## 2022-02-24 RX ADMIN — LAMOTRIGINE 100 MG: 100 TABLET ORAL at 09:38

## 2022-02-24 RX ADMIN — SODIUM CHLORIDE, PRESERVATIVE FREE 10 ML: 5 INJECTION INTRAVENOUS at 21:17

## 2022-02-24 RX ADMIN — METOPROLOL SUCCINATE 100 MG: 100 TABLET, EXTENDED RELEASE ORAL at 09:38

## 2022-02-24 RX ADMIN — TIZANIDINE 2 MG: 4 TABLET ORAL at 22:50

## 2022-02-24 RX ADMIN — ALPRAZOLAM 0.5 MG: 0.5 TABLET ORAL at 22:50

## 2022-02-24 RX ADMIN — HYDROMORPHONE HYDROCHLORIDE 4 MG: 2 TABLET ORAL at 21:28

## 2022-02-24 RX ADMIN — HYDROMORPHONE HYDROCHLORIDE 0.5 MG: 1 INJECTION, SOLUTION INTRAMUSCULAR; INTRAVENOUS; SUBCUTANEOUS at 06:06

## 2022-02-24 RX ADMIN — INSULIN HUMAN 20 UNITS: 100 INJECTION, SUSPENSION SUBCUTANEOUS at 09:30

## 2022-02-25 ENCOUNTER — APPOINTMENT (OUTPATIENT)
Dept: GENERAL RADIOLOGY | Facility: HOSPITAL | Age: 67
End: 2022-02-25

## 2022-02-25 ENCOUNTER — APPOINTMENT (OUTPATIENT)
Dept: CT IMAGING | Facility: HOSPITAL | Age: 67
End: 2022-02-25

## 2022-02-25 LAB
APPEARANCE CSF: CLEAR
COLOR CSF: COLORLESS
COLOR SPUN CSF: COLORLESS
GLUCOSE BLDC GLUCOMTR-MCNC: 136 MG/DL (ref 70–130)
GLUCOSE BLDC GLUCOMTR-MCNC: 154 MG/DL (ref 70–130)
GLUCOSE BLDC GLUCOMTR-MCNC: 156 MG/DL (ref 70–130)
GLUCOSE BLDC GLUCOMTR-MCNC: 169 MG/DL (ref 70–130)
GLUCOSE CSF-MCNC: 93 MG/DL (ref 40–70)
PROT CSF-MCNC: 47.2 MG/DL (ref 15–45)
RBC # CSF MANUAL: 0 /MM3 (ref 0–5)
SPECIMEN VOL CSF: 10 ML
TUBE # CSF: 3
WBC # CSF MANUAL: 3 /MM3 (ref 0–5)

## 2022-02-25 PROCEDURE — 62304 MYELOGRAPHY LUMBAR INJECTION: CPT

## 2022-02-25 PROCEDURE — 72240 MYELOGRAPHY NECK SPINE: CPT

## 2022-02-25 PROCEDURE — 82962 GLUCOSE BLOOD TEST: CPT

## 2022-02-25 PROCEDURE — 63710000001 ONDANSETRON PER 8 MG: Performed by: PHYSICIAN ASSISTANT

## 2022-02-25 PROCEDURE — 009U3ZX DRAINAGE OF SPINAL CANAL, PERCUTANEOUS APPROACH, DIAGNOSTIC: ICD-10-PCS | Performed by: RADIOLOGY

## 2022-02-25 PROCEDURE — 89050 BODY FLUID CELL COUNT: CPT | Performed by: INTERNAL MEDICINE

## 2022-02-25 PROCEDURE — 97116 GAIT TRAINING THERAPY: CPT | Performed by: PHYSICAL THERAPIST

## 2022-02-25 PROCEDURE — 25010000002 ONDANSETRON PER 1 MG: Performed by: PHYSICIAN ASSISTANT

## 2022-02-25 PROCEDURE — 99232 SBSQ HOSP IP/OBS MODERATE 35: CPT | Performed by: INTERNAL MEDICINE

## 2022-02-25 PROCEDURE — 0 IOPAMIDOL 41 % SOLUTION: Performed by: INTERNAL MEDICINE

## 2022-02-25 PROCEDURE — B01B1ZZ FLUOROSCOPY OF SPINAL CORD USING LOW OSMOLAR CONTRAST: ICD-10-PCS | Performed by: RADIOLOGY

## 2022-02-25 PROCEDURE — 82945 GLUCOSE OTHER FLUID: CPT | Performed by: INTERNAL MEDICINE

## 2022-02-25 PROCEDURE — 72132 CT LUMBAR SPINE W/DYE: CPT

## 2022-02-25 PROCEDURE — 0 LIDOCAINE 1 % SOLUTION: Performed by: PHYSICIAN ASSISTANT

## 2022-02-25 PROCEDURE — 84157 ASSAY OF PROTEIN OTHER: CPT | Performed by: INTERNAL MEDICINE

## 2022-02-25 RX ORDER — PANTOPRAZOLE SODIUM 40 MG/1
40 TABLET, DELAYED RELEASE ORAL EVERY 12 HOURS SCHEDULED
Status: DISCONTINUED | OUTPATIENT
Start: 2022-02-25 | End: 2022-02-27 | Stop reason: HOSPADM

## 2022-02-25 RX ORDER — LIDOCAINE HYDROCHLORIDE 10 MG/ML
5 INJECTION, SOLUTION INFILTRATION; PERINEURAL ONCE
Status: COMPLETED | OUTPATIENT
Start: 2022-02-25 | End: 2022-02-25

## 2022-02-25 RX ADMIN — LIDOCAINE HYDROCHLORIDE 5 ML: 10 INJECTION, SOLUTION INFILTRATION; PERINEURAL at 10:07

## 2022-02-25 RX ADMIN — LAMOTRIGINE 100 MG: 100 TABLET ORAL at 09:00

## 2022-02-25 RX ADMIN — IOPAMIDOL 20 ML: 408 INJECTION, SOLUTION INTRATHECAL at 10:08

## 2022-02-25 RX ADMIN — HYDROMORPHONE HYDROCHLORIDE 4 MG: 2 TABLET ORAL at 18:10

## 2022-02-25 RX ADMIN — SODIUM CHLORIDE, PRESERVATIVE FREE 10 ML: 5 INJECTION INTRAVENOUS at 21:23

## 2022-02-25 RX ADMIN — GABAPENTIN 600 MG: 300 CAPSULE ORAL at 09:00

## 2022-02-25 RX ADMIN — TIZANIDINE 2 MG: 4 TABLET ORAL at 09:00

## 2022-02-25 RX ADMIN — TIZANIDINE 2 MG: 4 TABLET ORAL at 21:21

## 2022-02-25 RX ADMIN — ACETAMINOPHEN 650 MG: 325 TABLET, FILM COATED ORAL at 09:00

## 2022-02-25 RX ADMIN — GABAPENTIN 600 MG: 300 CAPSULE ORAL at 21:14

## 2022-02-25 RX ADMIN — HYDROMORPHONE HYDROCHLORIDE 4 MG: 2 TABLET ORAL at 05:24

## 2022-02-25 RX ADMIN — METOPROLOL SUCCINATE 100 MG: 100 TABLET, EXTENDED RELEASE ORAL at 09:00

## 2022-02-25 RX ADMIN — HYDROMORPHONE HYDROCHLORIDE 4 MG: 2 TABLET ORAL at 11:31

## 2022-02-25 RX ADMIN — Medication 550 MG: at 12:05

## 2022-02-25 RX ADMIN — INSULIN HUMAN 20 UNITS: 100 INJECTION, SUSPENSION SUBCUTANEOUS at 21:15

## 2022-02-25 RX ADMIN — LATANOPROST 1 DROP: 50 SOLUTION OPHTHALMIC at 21:23

## 2022-02-25 RX ADMIN — LAMOTRIGINE 100 MG: 100 TABLET ORAL at 21:14

## 2022-02-25 RX ADMIN — ONDANSETRON HYDROCHLORIDE 4 MG: 4 TABLET, FILM COATED ORAL at 16:30

## 2022-02-25 RX ADMIN — Medication 550 MG: at 09:00

## 2022-02-25 RX ADMIN — METOPROLOL SUCCINATE 100 MG: 100 TABLET, EXTENDED RELEASE ORAL at 21:14

## 2022-02-25 RX ADMIN — ALPRAZOLAM 0.5 MG: 0.5 TABLET ORAL at 21:21

## 2022-02-25 RX ADMIN — PANTOPRAZOLE SODIUM 40 MG: 40 TABLET, DELAYED RELEASE ORAL at 21:15

## 2022-02-25 RX ADMIN — ONDANSETRON 4 MG: 2 INJECTION INTRAMUSCULAR; INTRAVENOUS at 05:24

## 2022-02-26 LAB
GLUCOSE BLDC GLUCOMTR-MCNC: 124 MG/DL (ref 70–130)
GLUCOSE BLDC GLUCOMTR-MCNC: 129 MG/DL (ref 70–130)
GLUCOSE BLDC GLUCOMTR-MCNC: 140 MG/DL (ref 70–130)
GLUCOSE BLDC GLUCOMTR-MCNC: 152 MG/DL (ref 70–130)

## 2022-02-26 PROCEDURE — 97535 SELF CARE MNGMENT TRAINING: CPT

## 2022-02-26 PROCEDURE — 99232 SBSQ HOSP IP/OBS MODERATE 35: CPT | Performed by: INTERNAL MEDICINE

## 2022-02-26 PROCEDURE — 63710000001 PROMETHAZINE PER 25 MG: Performed by: PHYSICIAN ASSISTANT

## 2022-02-26 PROCEDURE — 82962 GLUCOSE BLOOD TEST: CPT

## 2022-02-26 PROCEDURE — 25010000002 HYDROMORPHONE PER 4 MG: Performed by: INTERNAL MEDICINE

## 2022-02-26 PROCEDURE — 63710000001 INSULIN ISOPHANE HUMAN PER 5 UNITS: Performed by: INTERNAL MEDICINE

## 2022-02-26 PROCEDURE — 25010000002 ONDANSETRON PER 1 MG: Performed by: PHYSICIAN ASSISTANT

## 2022-02-26 RX ADMIN — ACETAMINOPHEN 650 MG: 325 TABLET, FILM COATED ORAL at 17:04

## 2022-02-26 RX ADMIN — PANTOPRAZOLE SODIUM 40 MG: 40 TABLET, DELAYED RELEASE ORAL at 08:26

## 2022-02-26 RX ADMIN — LAMOTRIGINE 100 MG: 100 TABLET ORAL at 20:14

## 2022-02-26 RX ADMIN — SODIUM CHLORIDE, PRESERVATIVE FREE 10 ML: 5 INJECTION INTRAVENOUS at 21:00

## 2022-02-26 RX ADMIN — METOPROLOL SUCCINATE 100 MG: 100 TABLET, EXTENDED RELEASE ORAL at 20:14

## 2022-02-26 RX ADMIN — ONDANSETRON 4 MG: 2 INJECTION INTRAMUSCULAR; INTRAVENOUS at 06:40

## 2022-02-26 RX ADMIN — GABAPENTIN 600 MG: 300 CAPSULE ORAL at 20:14

## 2022-02-26 RX ADMIN — PROMETHAZINE HYDROCHLORIDE 25 MG: 25 TABLET ORAL at 20:14

## 2022-02-26 RX ADMIN — ALPRAZOLAM 0.5 MG: 0.5 TABLET ORAL at 08:35

## 2022-02-26 RX ADMIN — LAMOTRIGINE 100 MG: 100 TABLET ORAL at 08:26

## 2022-02-26 RX ADMIN — HYDROMORPHONE HYDROCHLORIDE 0.25 MG: 1 INJECTION, SOLUTION INTRAMUSCULAR; INTRAVENOUS; SUBCUTANEOUS at 20:13

## 2022-02-26 RX ADMIN — INSULIN HUMAN 20 UNITS: 100 INJECTION, SUSPENSION SUBCUTANEOUS at 20:55

## 2022-02-26 RX ADMIN — HYDROMORPHONE HYDROCHLORIDE 4 MG: 2 TABLET ORAL at 14:15

## 2022-02-26 RX ADMIN — INSULIN HUMAN 20 UNITS: 100 INJECTION, SUSPENSION SUBCUTANEOUS at 09:04

## 2022-02-26 RX ADMIN — Medication 550 MG: at 08:27

## 2022-02-26 RX ADMIN — TIZANIDINE 2 MG: 4 TABLET ORAL at 08:34

## 2022-02-26 RX ADMIN — Medication 550 MG: at 17:05

## 2022-02-26 RX ADMIN — LATANOPROST 1 DROP: 50 SOLUTION OPHTHALMIC at 20:14

## 2022-02-26 RX ADMIN — SODIUM CHLORIDE, PRESERVATIVE FREE 10 ML: 5 INJECTION INTRAVENOUS at 08:27

## 2022-02-26 RX ADMIN — GABAPENTIN 600 MG: 300 CAPSULE ORAL at 08:26

## 2022-02-26 RX ADMIN — METOPROLOL SUCCINATE 100 MG: 100 TABLET, EXTENDED RELEASE ORAL at 08:26

## 2022-02-26 RX ADMIN — PANTOPRAZOLE SODIUM 40 MG: 40 TABLET, DELAYED RELEASE ORAL at 20:14

## 2022-02-26 RX ADMIN — Medication 550 MG: at 12:11

## 2022-02-26 RX ADMIN — HYDROMORPHONE HYDROCHLORIDE 4 MG: 2 TABLET ORAL at 06:40

## 2022-02-27 ENCOUNTER — READMISSION MANAGEMENT (OUTPATIENT)
Dept: CALL CENTER | Facility: HOSPITAL | Age: 67
End: 2022-02-27

## 2022-02-27 VITALS
HEIGHT: 69 IN | DIASTOLIC BLOOD PRESSURE: 58 MMHG | WEIGHT: 229.9 LBS | BODY MASS INDEX: 34.05 KG/M2 | RESPIRATION RATE: 18 BRPM | TEMPERATURE: 98 F | HEART RATE: 66 BPM | OXYGEN SATURATION: 95 % | SYSTOLIC BLOOD PRESSURE: 127 MMHG

## 2022-02-27 LAB
GLUCOSE BLDC GLUCOMTR-MCNC: 125 MG/DL (ref 70–130)
GLUCOSE BLDC GLUCOMTR-MCNC: 151 MG/DL (ref 70–130)

## 2022-02-27 PROCEDURE — 63710000001 INSULIN LISPRO (HUMAN) PER 5 UNITS: Performed by: INTERNAL MEDICINE

## 2022-02-27 PROCEDURE — 99239 HOSP IP/OBS DSCHRG MGMT >30: CPT | Performed by: NURSE PRACTITIONER

## 2022-02-27 PROCEDURE — 63710000001 PROMETHAZINE PER 25 MG: Performed by: PHYSICIAN ASSISTANT

## 2022-02-27 PROCEDURE — 25010000002 ONDANSETRON PER 1 MG: Performed by: PHYSICIAN ASSISTANT

## 2022-02-27 PROCEDURE — 82962 GLUCOSE BLOOD TEST: CPT

## 2022-02-27 RX ORDER — FUROSEMIDE 20 MG/1
20 TABLET ORAL AS NEEDED
Start: 2022-02-27

## 2022-02-27 RX ORDER — HYDROMORPHONE HYDROCHLORIDE 2 MG/1
2 TABLET ORAL EVERY 6 HOURS PRN
Qty: 12 TABLET | Refills: 0
Start: 2022-02-27

## 2022-02-27 RX ORDER — SCOLOPAMINE TRANSDERMAL SYSTEM 1 MG/1
1 PATCH, EXTENDED RELEASE TRANSDERMAL
Qty: 4 EACH | Refills: 1 | Status: SHIPPED | OUTPATIENT
Start: 2022-02-27 | End: 2023-03-05

## 2022-02-27 RX ORDER — HUMAN INSULIN 100 [IU]/ML
INJECTION, SUSPENSION SUBCUTANEOUS
Qty: 30 ML | Refills: 5
Start: 2022-02-27 | End: 2023-03-06

## 2022-02-27 RX ORDER — METOPROLOL SUCCINATE 100 MG/1
100 TABLET, EXTENDED RELEASE ORAL 2 TIMES DAILY
Start: 2022-02-27

## 2022-02-27 RX ADMIN — LAMOTRIGINE 100 MG: 100 TABLET ORAL at 09:24

## 2022-02-27 RX ADMIN — PROMETHAZINE HYDROCHLORIDE 25 MG: 25 TABLET ORAL at 06:22

## 2022-02-27 RX ADMIN — INSULIN LISPRO 2 UNITS: 100 INJECTION, SOLUTION INTRAVENOUS; SUBCUTANEOUS at 11:26

## 2022-02-27 RX ADMIN — ALPRAZOLAM 0.5 MG: 0.5 TABLET ORAL at 01:03

## 2022-02-27 RX ADMIN — HYDROMORPHONE HYDROCHLORIDE 4 MG: 2 TABLET ORAL at 09:25

## 2022-02-27 RX ADMIN — Medication 550 MG: at 09:24

## 2022-02-27 RX ADMIN — ONDANSETRON 4 MG: 2 INJECTION INTRAMUSCULAR; INTRAVENOUS at 01:03

## 2022-02-27 RX ADMIN — SODIUM CHLORIDE, PRESERVATIVE FREE 10 ML: 5 INJECTION INTRAVENOUS at 09:26

## 2022-02-27 RX ADMIN — INSULIN HUMAN 20 UNITS: 100 INJECTION, SUSPENSION SUBCUTANEOUS at 09:34

## 2022-02-27 RX ADMIN — PANTOPRAZOLE SODIUM 40 MG: 40 TABLET, DELAYED RELEASE ORAL at 09:24

## 2022-02-27 RX ADMIN — TIZANIDINE 2 MG: 4 TABLET ORAL at 01:04

## 2022-02-27 RX ADMIN — ACETAMINOPHEN 650 MG: 325 TABLET, FILM COATED ORAL at 06:22

## 2022-02-27 RX ADMIN — HYDROMORPHONE HYDROCHLORIDE 4 MG: 2 TABLET ORAL at 01:03

## 2022-02-27 RX ADMIN — GABAPENTIN 600 MG: 300 CAPSULE ORAL at 09:22

## 2022-02-28 NOTE — OUTREACH NOTE
Prep Survey      Responses   [facility] discharged from? [facility]   Is LACE score < 7 ? No   Emergency Room discharge w/ pulse ox? No   Eligibility Readm Mgmt   Discharge diagnosis Intractable nausea and vomiting Diabetic gastroparesis s/p remote gastric pacemaker   Does the patient have one of the following disease processes/diagnoses(primary or secondary)? Other   Does the patient have Home health ordered? Yes   What is the Home health agency?  [facility]    Is there a DME ordered? No   Prep survey completed? Yes          Merle Stevens RN

## 2022-03-01 ENCOUNTER — READMISSION MANAGEMENT (OUTPATIENT)
Dept: CALL CENTER | Facility: HOSPITAL | Age: 67
End: 2022-03-01

## 2022-03-01 NOTE — OUTREACH NOTE
Medical Week 1 Survey      Responses   [facility] patient discharged from? [place]   Does the patient have one of the following disease processes/diagnoses(primary or secondary)? Other   Week 1 attempt successful? Yes   Call start time 1540   Call end time 1543   Discharge diagnosis Intractable nausea and vomiting Diabetic gastroparesis s/p remote gastric pacemaker   Person spoke with today (if not patient) and relationship patient   Meds reviewed with patient/caregiver? Yes   Is the patient having any side effects they believe may be caused by any medication additions or changes? No   Does the patient have all medications ordered at discharge? Yes   Is the patient taking all medications as directed (includes completed medication regime)? Yes   Does the patient have a primary care provider?  Yes   Does the patient have an appointment with their PCP within 7 days of discharge? Greater than 7 days   Comments regarding PCP Pt will see PCP on 03/11/22   What is preventing the patient from scheduling follow up appointments within 7 days of discharge? Earlier appointment not available   What is the Home health agency?  [facility]    Has home health visited the patient within 72 hours of discharge? Yes   Home health comments  has visited   Psychosocial issues? No   Did the patient receive a copy of their discharge instructions? Yes   Nursing interventions Reviewed instructions with patient   What is the patient's perception of their health status since discharge? Improving   Is the patient/caregiver able to teach back the hierarchy of who to call/visit for symptoms/problems? PCP, Specialist, Home health nurse, Urgent Care, ED, 911 Yes   If the patient is a current smoker, are they able to teach back resources for cessation? Not a smoker   Additional teach back comments pt reports she is tired.     Week 1 call completed? Yes   Wrap up additional comments Pt denies any needs/concerns at this  time.           Sapna Neville RN

## 2022-03-09 ENCOUNTER — READMISSION MANAGEMENT (OUTPATIENT)
Dept: CALL CENTER | Facility: HOSPITAL | Age: 67
End: 2022-03-09

## 2022-03-09 NOTE — OUTREACH NOTE
"Medical Week 2 Survey    Flowsheet Row Responses   [facility] patient discharged from? [place]   Does the patient have one of the following disease processes/diagnoses(primary or secondary)? Other   Week 2 attempt successful? Yes   Call start time 1248   Discharge diagnosis Intractable nausea and vomiting Diabetic gastroparesis s/p remote gastric pacemaker   Call end time 1250   Is patient permission given to speak with other caregiver? Yes   List who call center can speak with son   Meds reviewed with patient/caregiver? Yes   Is the patient taking all medications as directed (includes completed medication regime)? Yes   Has the patient kept scheduled appointments due by today? Yes   What is the Home health agency?  [facility]    Has home health visited the patient within 72 hours of discharge? Yes   Home health comments PT and nursing.   Psychosocial issues? No   Comments Pt c/o left leg weakness, using walker for ambulation. Her knee \"gives way\", MD aware. N/V improved. Appetite has improved somewhat.    What is the patient's perception of their health status since discharge? Improving   Is the patient/caregiver able to teach back signs and symptoms related to disease process for when to call PCP? Yes   Week 2 Call Completed? Yes   Wrap up additional comments Pt denies any needs/concerns at this time.           TENISHA LOERA - Registered Nurse  "

## 2022-03-17 ENCOUNTER — READMISSION MANAGEMENT (OUTPATIENT)
Dept: CALL CENTER | Facility: HOSPITAL | Age: 67
End: 2022-03-17

## 2022-03-17 NOTE — OUTREACH NOTE
Medical Week 3 Survey    Flowsheet Row Responses   [facility] patient discharged from? [place]   Does the patient have one of the following disease processes/diagnoses(primary or secondary)? Other   Week 3 attempt successful? Yes   Call start time 1415   Call end time 1420   Discharge diagnosis Intractable nausea and vomiting Diabetic gastroparesis s/p remote gastric pacemaker   Meds reviewed with patient/caregiver? Yes   Is the patient taking all medications as directed (includes completed medication regime)? Yes   Has the patient kept scheduled appointments due by today? Yes   What is the Home health agency?  Home health not hospice   Has home health visited the patient within 72 hours of discharge? Yes   Home health comments PT/OT   What is the patient's perception of their health status since discharge? Improving   Week 3 Call Completed? Yes   Graduated Yes   Is the patient interested in additional calls from an ambulatory ?  NOTE:  applies to high risk patients requiring additional follow-up. No   Did the patient feel the follow up calls were helpful during their recovery period? Yes   Was the number of calls appropriate? Yes   Graduated/Revoked comments She feels she has all she needs   Wrap up additional comments SW coming out to see about getting ramps in her home.  Has continuous glucose monitor and takes her own vital signs, is retired nurse. Her biggest challenge is her left left. Myelogram in hospital, will be seeing orthopedic and neurology.           CAMI COOLEY - Registered Nurse

## 2022-07-28 ENCOUNTER — TELEPHONE (OUTPATIENT)
Dept: ENDOCRINOLOGY | Facility: CLINIC | Age: 67
End: 2022-07-28

## 2022-07-28 NOTE — TELEPHONE ENCOUNTER
PATIENT IS HAVING ISSUES GETTING DEXCOM G6 TRANSMITTERS AND SENSORS DELIVERED TO HER. SHE NEEDS TO BE ADVISED ON HOW TO ADDRESS GETTING THESE DELIVERED TO HER. PATIENTS NUMBER [phone]. SHE NEEDS TO FIND ANOTHER SUPPLIER TO SEND THE PRESCRIPTIONS TO SO THEY CAN BE DELIVERED.

## 2022-10-10 ENCOUNTER — OFFICE VISIT (OUTPATIENT)
Dept: ENDOCRINOLOGY | Facility: CLINIC | Age: 67
End: 2022-10-10

## 2022-10-10 VITALS
BODY MASS INDEX: 38.36 KG/M2 | HEIGHT: 69 IN | WEIGHT: 259 LBS | HEART RATE: 63 BPM | SYSTOLIC BLOOD PRESSURE: 132 MMHG | DIASTOLIC BLOOD PRESSURE: 76 MMHG | OXYGEN SATURATION: 98 %

## 2022-10-10 DIAGNOSIS — Z79.4 TYPE 2 DIABETES MELLITUS WITH COMPLICATION, WITH LONG-TERM CURRENT USE OF INSULIN: Primary | ICD-10-CM

## 2022-10-10 DIAGNOSIS — E11.8 TYPE 2 DIABETES MELLITUS WITH COMPLICATION, WITH LONG-TERM CURRENT USE OF INSULIN: Primary | ICD-10-CM

## 2022-10-10 LAB
EXPIRATION DATE: NORMAL
GLUCOSE BLDC GLUCOMTR-MCNC: 98 MG/DL (ref 70–130)
HBA1C MFR BLD: 5.3 %
Lab: NORMAL

## 2022-10-10 PROCEDURE — 99213 OFFICE O/P EST LOW 20 MIN: CPT | Performed by: INTERNAL MEDICINE

## 2022-10-10 PROCEDURE — 3044F HG A1C LEVEL LT 7.0%: CPT | Performed by: INTERNAL MEDICINE

## 2022-10-10 PROCEDURE — 95251 CONT GLUC MNTR ANALYSIS I&R: CPT | Performed by: INTERNAL MEDICINE

## 2022-10-10 PROCEDURE — 83036 HEMOGLOBIN GLYCOSYLATED A1C: CPT | Performed by: INTERNAL MEDICINE

## 2022-11-05 RX ORDER — INSULIN ASPART 100 [IU]/ML
INJECTION, SOLUTION INTRAVENOUS; SUBCUTANEOUS
Qty: 15 ML | Refills: 5 | Status: SHIPPED | OUTPATIENT
Start: 2022-11-05 | End: 2023-03-05

## 2022-11-05 RX ORDER — GLUCAGON INJECTION, SOLUTION 1 MG/.2ML
1 INJECTION, SOLUTION SUBCUTANEOUS ONCE AS NEEDED
Qty: 0.2 ML | Refills: 5 | Status: SHIPPED | OUTPATIENT
Start: 2022-11-05 | End: 2023-03-05

## 2023-03-02 ENCOUNTER — TRANSCRIBE ORDERS (OUTPATIENT)
Dept: ADMINISTRATIVE | Facility: HOSPITAL | Age: 68
End: 2023-03-02
Payer: MEDICARE

## 2023-03-02 ENCOUNTER — HOSPITAL ENCOUNTER (OUTPATIENT)
Dept: GENERAL RADIOLOGY | Facility: HOSPITAL | Age: 68
Discharge: HOME | End: 2023-03-02
Admitting: INTERNAL MEDICINE
Payer: MEDICARE

## 2023-03-02 DIAGNOSIS — R20.0 ANESTHESIA OF SKIN: Primary | ICD-10-CM

## 2023-03-02 DIAGNOSIS — M54.12 CERVICAL RADICULOPATHY: Primary | ICD-10-CM

## 2023-03-02 PROCEDURE — 72052 X-RAY EXAM NECK SPINE 6/>VWS: CPT

## 2023-03-06 ENCOUNTER — OFFICE VISIT (OUTPATIENT)
Dept: ENDOCRINOLOGY | Facility: CLINIC | Age: 68
End: 2023-03-06
Payer: MEDICARE

## 2023-03-06 VITALS
BODY MASS INDEX: 37.92 KG/M2 | HEART RATE: 67 BPM | OXYGEN SATURATION: 98 % | WEIGHT: 256 LBS | HEIGHT: 69 IN | DIASTOLIC BLOOD PRESSURE: 76 MMHG | SYSTOLIC BLOOD PRESSURE: 128 MMHG

## 2023-03-06 DIAGNOSIS — Z79.4 TYPE 2 DIABETES MELLITUS WITH OTHER SPECIFIED COMPLICATION, WITH LONG-TERM CURRENT USE OF INSULIN: Primary | ICD-10-CM

## 2023-03-06 DIAGNOSIS — E11.69 TYPE 2 DIABETES MELLITUS WITH OTHER SPECIFIED COMPLICATION, WITH LONG-TERM CURRENT USE OF INSULIN: Primary | ICD-10-CM

## 2023-03-06 PROBLEM — R79.89 ELEVATED SERUM CREATININE: Status: RESOLVED | Noted: 2022-02-15 | Resolved: 2023-03-06

## 2023-03-06 LAB — GLUCOSE BLDC GLUCOMTR-MCNC: 117 MG/DL (ref 70–130)

## 2023-03-06 PROCEDURE — 99214 OFFICE O/P EST MOD 30 MIN: CPT | Performed by: INTERNAL MEDICINE

## 2023-03-06 PROCEDURE — 95251 CONT GLUC MNTR ANALYSIS I&R: CPT | Performed by: INTERNAL MEDICINE

## 2023-03-06 RX ORDER — HUMAN INSULIN 100 [IU]/ML
INJECTION, SUSPENSION SUBCUTANEOUS
Qty: 30 ML | Refills: 5
Start: 2023-03-06

## 2023-03-06 RX ORDER — FLUOXETINE HYDROCHLORIDE 40 MG/1
CAPSULE ORAL
COMMUNITY

## 2023-03-06 NOTE — PROGRESS NOTES
Chief Complaint   Patient presents with   • Diabetes     Follow up        HPI:   Cortney Delacruz is a 67 y.o.female who returns to [facility]ocrine Clinic for f/u evaluation and management of her Type 2 DM. Last visit 10/10/2022. Her history is as follows:    Interim Events:   - Reports gastric stimulator generator battery is decreasing. Having more nausea and occasional vomiting  - Had labs at PCP's office last week.  - Pt has been using Walker due to frequent falls secondary to her peripheral neuropathy  - Has Ophtho appointment scheduled in 04/2023    1) Type 2 DM with associated complications of peripheral neuropathy, gastroparesis, NPDR, CKD stage 2:   - Diagnosed in 2001  - Initially on metformin and actos.   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 She is followed at [facility] gastroenterology (Dr. Pruett). Is s/p gastric stimulator placement.   - Gastroparesis causing variable meal schedule / tolerance and prolonged postprandial hyperglycemia.  - Was on basal-bolus therapy with analogue insulins. Switched to NPH/Reg in 01/2021 at her initial Endocrine Visit  - Is no longer on SGLT-2 inhibitor due to cost and frequent UTI  - Novolog was too expensive and did not provide better glycemic control in comparison to regular inaulin    Current DM Medications:  - NPH: 40 units qAM, 25 units qHS.   - Regular: 8-10 units with meals + CF of 1 units: 50 > 150    Glucometer / Dexcom CGM:  SG > 250: 1%  (goal < 5%)  SG (181 - 250): 6% (goal < 25%)  SG (70 - 180): 91%  (Goal > 70%)   SG (54 - 69): 2% (goal < 4%)  SG < 54: <% (goal < 1%)  Avg Glucose: 127  Glucose Variability: 28.4%  (goal </= 35%)  Glucose Management Indicator: 6.3%    Overall with good glycemic. Rare post-prandial hypoglycemia as pt must wait to administer regular insulin after she eats. Occasional post-prandial hyperglycemia for same reason.    DM Health Maintenance:  Ophtho:will obtain outside  "records  Podiatry: no resent visit  Monofilament / Foot exam: (03/2023)   Lipids: (02/2023) Tchol 151, , HDL 44, LDL 87 - not on a statin, on zetia  Urine Microalb/Cr ratio: (09/2022) 3.9  CMP: (02/2023) Cr 1.04, GFR 59, LFTs WNL  CBC: (09/2022) Hgb 13.7  TSH: (06/2021) 0.78    Review of Systems   Constitutional: Positive for fatigue.        Wt loss, mild   HENT: Negative.    Eyes: Positive for itching.        Dry eyes   Respiratory: Positive for shortness of breath and wheezing.    Cardiovascular: Positive for leg swelling (ankles). Negative for palpitations.   Gastrointestinal: Positive for abdominal pain, nausea and vomiting (occasional). Negative for constipation and diarrhea.   Endocrine: Negative.  Negative for polydipsia.        Hot flashes   Genitourinary: Positive for frequency.   Musculoskeletal: Positive for arthralgias.        Chronic pain   Skin: Negative.    Allergic/Immunologic: Negative.    Neurological: Positive for numbness.   Hematological: Bruises/bleeds easily.   Psychiatric/Behavioral: Positive for sleep disturbance.        H/o depression       The following portions of the patient's history were reviewed and updated as appropriate: allergies, current medications, past family history, past medical history, past social history, past surgical history and problem list.      /76   Pulse 67   Ht 175.3 cm (69\")   Wt 116 kg (256 lb)   LMP  (LMP Unknown)   SpO2 98%   BMI 37.80 kg/m²   Physical Exam  Vitals reviewed.   Constitutional:       General: She is not in acute distress.     Appearance: Normal appearance. She is well-developed. She is not diaphoretic.      Comments: BMI 38.25   HENT:      Head: Normocephalic.   Eyes:      Conjunctiva/sclera: Conjunctivae normal.      Pupils: Pupils are equal, round, and reactive to light.   Neck:      Thyroid: No thyromegaly.      Trachea: No tracheal deviation.      Comments: No palpable thyroid nodules    Cardiovascular:      Rate and Rhythm: " Normal rate and regular rhythm.      Pulses:           Dorsalis pedis pulses are 2+ on the right side and 2+ on the left side.        Posterior tibial pulses are 2+ on the right side and 2+ on the left side.      Heart sounds: Normal heart sounds. No murmur heard.  Pulmonary:      Effort: Pulmonary effort is normal. No respiratory distress.      Breath sounds: Normal breath sounds.   Abdominal:      General: Bowel sounds are normal.      Palpations: Abdomen is soft. There is no mass.      Tenderness: There is abdominal tenderness (with deep palpation).      Comments: No scarring at insulin injection sites  Stimulator noted on right   Musculoskeletal:      Right foot: No deformity, Charcot foot or foot drop.      Left foot: No deformity, Charcot foot or foot drop.      Comments: Using Walker   Feet:      Right foot:      Protective Sensation: 5 sites tested. 1 site sensed.     Skin integrity: Warmth and callus present. No ulcer, blister, skin breakdown or erythema.      Toenail Condition: Right toenails are normal.      Left foot:      Protective Sensation: 5 sites tested. 1 site sensed.     Skin integrity: Warmth and callus present. No ulcer, blister, skin breakdown or erythema.      Toenail Condition: Left toenails are normal.   Lymphadenopathy:      Cervical: No cervical adenopathy.   Skin:     General: Skin is warm and dry.      Findings: No erythema.   Neurological:      Mental Status: She is alert and oriented to person, place, and time.      Cranial Nerves: No cranial nerve deficit.   Psychiatric:         Behavior: Behavior normal.       LABS/IMAGING: outside records reviewed and summarized in HPI  Office Visit on 03/06/2023   Component Date Value Ref Range Status   • Glucose 03/06/2023 117  70 - 130 mg/dL Final       ASSESSMENT/PLAN:    1) Type 2 DM with associated complications of peripheral neuropathy, gastroparesis, NPDR, mild CKD:   - Controlled at this time, A1C% 5.7 in 02/2023. CGM estimated A1C% is  6.3%, glucose time in range (70 - 180) was 91%, goal is > 70%    CGM reviewed, see HPI    Will have pt temporarily decrease insulin to:  - NPH: 35 units qAM, 20 units qHS while having increased nausea and decreased PO intake  - Continue Regular: 8-10 units with meals + CF of 1 units: 50 > 150    - Advised pt NOT to take regular insulin or correction factor more frequently than every 4-6 hours or stacking will occur which leads to hypoglycemia.    - has glucagon Rx     Recent labs from PCP's office completed 02/27/2023 reviewed and summarized in HPI.    RTC 5 months    Signed: Nancy Shepherd MD

## 2023-03-30 ENCOUNTER — HOSPITAL ENCOUNTER (OUTPATIENT)
Dept: CT IMAGING | Facility: HOSPITAL | Age: 68
Discharge: HOME | End: 2023-03-30
Payer: MEDICARE

## 2023-03-30 ENCOUNTER — HOSPITAL ENCOUNTER (OUTPATIENT)
Dept: GENERAL RADIOLOGY | Facility: HOSPITAL | Age: 68
Discharge: HOME | End: 2023-03-30
Payer: MEDICARE

## 2023-03-30 VITALS
HEIGHT: 68 IN | TEMPERATURE: 97.2 F | OXYGEN SATURATION: 95 % | HEART RATE: 60 BPM | WEIGHT: 248 LBS | BODY MASS INDEX: 37.59 KG/M2 | RESPIRATION RATE: 18 BRPM | SYSTOLIC BLOOD PRESSURE: 146 MMHG | DIASTOLIC BLOOD PRESSURE: 70 MMHG

## 2023-03-30 DIAGNOSIS — R20.0 ANESTHESIA OF SKIN: ICD-10-CM

## 2023-03-30 PROCEDURE — 62302 MYELOGRAPHY LUMBAR INJECTION: CPT

## 2023-03-30 PROCEDURE — 72126 CT NECK SPINE W/DYE: CPT

## 2023-03-30 PROCEDURE — 72240 MYELOGRAPHY NECK SPINE: CPT

## 2023-03-30 PROCEDURE — 0 LIDOCAINE 1 % SOLUTION: Performed by: INTERNAL MEDICINE

## 2023-03-30 PROCEDURE — 25510000001 IOPAMIDOL 61 % SOLUTION: Performed by: INTERNAL MEDICINE

## 2023-03-30 RX ORDER — LIDOCAINE HYDROCHLORIDE 10 MG/ML
5 INJECTION, SOLUTION INFILTRATION; PERINEURAL ONCE
Status: COMPLETED | OUTPATIENT
Start: 2023-03-30 | End: 2023-03-30

## 2023-03-30 RX ADMIN — IOPAMIDOL 15 ML: 612 INJECTION, SOLUTION INTRATHECAL at 09:33

## 2023-03-30 RX ADMIN — LIDOCAINE HYDROCHLORIDE 5 ML: 10 INJECTION, SOLUTION INFILTRATION; PERINEURAL at 09:33

## 2023-03-30 NOTE — POST-PROCEDURE NOTE
Radiology Procedure    Pre-procedure: procedure, risks discussed with patient. Patient indicated understanding and consented to procedure.     Procedure Performed:  Cervical myelogram     IV Sedation and/or Anesthesia:  No    Complications: none    Preliminary Findings: pendign    Specimen Removed: none    Estimated Blood Loss:  0ml    Post-Procedure Diagnosis: pending    Post-Procedure Plan: ct C spine, encourage fluids, bed rest x 2 hours    Standard Discharge Instructions Given:yes     HUDSON Chavez  03/30/23  09:24 EDT

## 2023-08-11 RX ORDER — SODIUM, POTASSIUM,MAG SULFATES 17.5-3.13G
2 SOLUTION, RECONSTITUTED, ORAL ORAL TAKE AS DIRECTED
Qty: 354 ML | Refills: 0 | Status: SHIPPED | OUTPATIENT
Start: 2023-08-11

## 2023-08-17 ENCOUNTER — TELEPHONE (OUTPATIENT)
Dept: GASTROENTEROLOGY | Facility: CLINIC | Age: 68
End: 2023-08-17

## 2023-08-17 NOTE — TELEPHONE ENCOUNTER
Caller: Cortney Delacruz    Relationship to patient: Self    Best call back number: [phone]    Patient is needing: PATIENT CALLED NEEDS TO RESCHEDULE PROCEDURE WITH DR BA ON 8-25-23 JUST RETURNED HOME FROM ED GASTRIC STIMULATOR QUIT WORKING AND UNABLE TO HOLD ANYTHING DOWN.

## 2023-10-10 ENCOUNTER — TELEPHONE (OUTPATIENT)
Dept: GASTROENTEROLOGY | Facility: CLINIC | Age: 68
End: 2023-10-10

## 2023-10-10 NOTE — TELEPHONE ENCOUNTER
Hub staff attempted to follow warm transfer process and was unsuccessful     Caller: Cortney Delacruz    Relationship to patient: Self    Best call back number: [phone]    Patient is needing: PT CALLED TO RESCHEDULE COLONOSCOPY// PLEASE CALL PT BACK

## 2023-11-21 ENCOUNTER — OFFICE VISIT (OUTPATIENT)
Dept: ENDOCRINOLOGY | Facility: CLINIC | Age: 68
End: 2023-11-21
Payer: MEDICARE

## 2023-11-21 VITALS
HEIGHT: 68 IN | OXYGEN SATURATION: 100 % | SYSTOLIC BLOOD PRESSURE: 146 MMHG | HEART RATE: 106 BPM | WEIGHT: 269 LBS | BODY MASS INDEX: 40.77 KG/M2 | DIASTOLIC BLOOD PRESSURE: 76 MMHG

## 2023-11-21 DIAGNOSIS — Z79.4 TYPE 2 DIABETES MELLITUS WITH OTHER SPECIFIED COMPLICATION, WITH LONG-TERM CURRENT USE OF INSULIN: Primary | ICD-10-CM

## 2023-11-21 DIAGNOSIS — E11.69 TYPE 2 DIABETES MELLITUS WITH OTHER SPECIFIED COMPLICATION, WITH LONG-TERM CURRENT USE OF INSULIN: Primary | ICD-10-CM

## 2023-11-21 LAB
EXPIRATION DATE: ABNORMAL
GLUCOSE BLDC GLUCOMTR-MCNC: 156 MG/DL (ref 70–130)
HBA1C MFR BLD: 6.4 % (ref 4.5–5.7)
Lab: ABNORMAL

## 2023-11-21 PROCEDURE — 83036 HEMOGLOBIN GLYCOSYLATED A1C: CPT | Performed by: INTERNAL MEDICINE

## 2023-11-21 PROCEDURE — 99214 OFFICE O/P EST MOD 30 MIN: CPT | Performed by: INTERNAL MEDICINE

## 2023-11-21 PROCEDURE — 3077F SYST BP >= 140 MM HG: CPT | Performed by: INTERNAL MEDICINE

## 2023-11-21 PROCEDURE — 3078F DIAST BP <80 MM HG: CPT | Performed by: INTERNAL MEDICINE

## 2023-11-21 PROCEDURE — 3044F HG A1C LEVEL LT 7.0%: CPT | Performed by: INTERNAL MEDICINE

## 2023-11-21 PROCEDURE — 82947 ASSAY GLUCOSE BLOOD QUANT: CPT | Performed by: INTERNAL MEDICINE

## 2023-11-21 RX ORDER — METFORMIN HYDROCHLORIDE 500 MG/1
500 TABLET, EXTENDED RELEASE ORAL
Qty: 30 TABLET | Refills: 5 | Status: SHIPPED | OUTPATIENT
Start: 2023-11-21

## 2023-11-21 NOTE — LETTER
November 29, 2023     Ronnie Garvey MD  [address]  [address]  [facility] [address]    Patient: Cortney Delacruz   YOB: 1955   Date of Visit: 11/21/2023     Dear Ronnie Garvey MD:       I had the pleasure of seeing your patient, Cortney Delacruz.   . Below are the relevant portions of my assessment and plan of care.    If you have questions, please do not hesitate to call me.      Sincerely,        Nancy Shepherd MD        CC: No Recipients    Nancy Shepherd MD  11/29/23 0815  Sign when Signing Visit  Chief Complaint   Patient presents with   • Diabetes     Follow up        HPI:   Cortney Delacruz is a 67 y.o.female who returns to Endocrine Clinic for f/u evaluation and management of her Type 2 DM. Last visit 03/06/2022. Her history is as follows:    Interim Events:   - Pt's follow-up visit delayed due to complications after gastric stimulator battery replacement. S/P battery replacement on 10/13/2023. S/P wound dehiscence on 10/20/2023     - Will complete labs at PCP's office in 12/2023  - Now that her regular diet has resumed, she has required a larger amount of insulin. Pt increased her insulin doses on her own and did not contact me regarding these insulin changes    1) Type 2 DM with associated complications of peripheral neuropathy, gastroparesis, NPDR, CKD stage 2:   - Diagnosed in 2001  - Initially on metformin and actos.   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 She is followed at [facility] gastroenterology (Dr. Pruett). Is s/p gastric stimulator placement.   - Gastroparesis causing variable meal schedule / tolerance and prolonged postprandial hyperglycemia.  - Was on basal-bolus therapy with analogue insulins. Switched to NPH/Reg in 01/2021 at her initial Endocrine Visit  - Is no longer on SGLT-2 inhibitor due to cost and frequent UTI  - Novolog was too expensive and did not provide better glycemic  control in comparison to regular inaulin    Current DM Medications: pt determined these doses on her own  - NPH: 60 units qAM, 50 units qHS.   - Regular: 20 units with meals + CF of 2 units: 50 > 150 (is taking 30 min prior to meals)    Glucometer / Dexcom CGM:  SG > 250: 3%  (goal < 5%)  SG (181 - 250): 9% (goal < 25%)  SG (70 - 180): 86%  (Goal > 70%)   SG (54 - 69): 2% (goal < 4%)  SG < 54: <1 % (goal < 1%)  Avg Glucose: 146  Glucose Variability: 31.4%  (goal </= 35%)  Glucose Management Indicator: 6.8%    Overall with good glycemic. Occasional post-prandial hyperglycemia with higher carb meals     DM Health Maintenance:  Ophtho:will obtain outside records  Podiatry: no resent visit  Monofilament / Foot exam: (03/2023)   Lipids: (02/2023) Tchol 151, , HDL 44, LDL 87 - not on a statin, on zetia  Urine Microalb/Cr ratio: (09/2022) 3.9  CMP: (08/2023) Cr 1.12, GFR 54, ALT 21, AST 41  CBC: (08/2023) Hgb 14.4  TSH: (06/2021) 0.78    Review of Systems   Constitutional:  Positive for fatigue.        Wt gain   HENT: Negative.     Eyes:  Positive for itching.        Dry eyes   Respiratory:  Positive for shortness of breath and wheezing.    Cardiovascular:  Positive for leg swelling (ankles). Negative for palpitations.   Gastrointestinal:  Positive for nausea (occasional). Negative for abdominal pain, constipation, diarrhea and vomiting.   Endocrine: Negative.  Negative for polydipsia.   Genitourinary:  Positive for frequency.   Musculoskeletal:  Positive for arthralgias.        Chronic pain   Skin: Negative.    Allergic/Immunologic: Negative.    Neurological:  Positive for numbness.   Hematological:  Bruises/bleeds easily.   Psychiatric/Behavioral:  Positive for sleep disturbance.         H/o depression       The following portions of the patient's history were reviewed and updated as appropriate: allergies, current medications, past family history, past medical history, past social history, past surgical history  "and problem list.      /76   Pulse 106   Ht 172.7 cm (68\")   Wt 122 kg (269 lb)   LMP  (LMP Unknown)   SpO2 100%   BMI 40.90 kg/m²   Physical Exam  Vitals reviewed.   Constitutional:       General: She is not in acute distress.     Appearance: Normal appearance. She is well-developed. She is not diaphoretic.      Comments: BMI 38.25   HENT:      Head: Normocephalic.   Eyes:      Conjunctiva/sclera: Conjunctivae normal.      Pupils: Pupils are equal, round, and reactive to light.   Neck:      Thyroid: No thyromegaly.      Trachea: No tracheal deviation.      Comments: No palpable thyroid nodules    Cardiovascular:      Rate and Rhythm: Normal rate and regular rhythm.      Heart sounds: Normal heart sounds. No murmur heard.  Pulmonary:      Effort: Pulmonary effort is normal. No respiratory distress.      Breath sounds: Normal breath sounds.   Abdominal:      General: Bowel sounds are normal.      Palpations: Abdomen is soft. There is no mass.      Tenderness: There is abdominal tenderness (with deep palpation).      Comments: No scarring at insulin injection sites  Stimulator noted on right   Musculoskeletal:      Comments: Using Walker   Lymphadenopathy:      Cervical: No cervical adenopathy.   Skin:     General: Skin is warm and dry.      Findings: No erythema.   Neurological:      Mental Status: She is alert and oriented to person, place, and time.      Cranial Nerves: No cranial nerve deficit.   Psychiatric:         Behavior: Behavior normal.       LABS/IMAGING: outside records reviewed and summarized in HPI  Office Visit on 11/21/2023   Component Date Value Ref Range Status   • Hemoglobin A1C 11/21/2023 6.4 (A)  4.5 - 5.7 % Final   • Lot Number 11/21/2023 [id number]   Final   • Expiration Date 11/21/2023 7/2/25   Final   • Glucose 11/21/2023 156 (A)  70 - 130 mg/dL Final       ASSESSMENT/PLAN:    1) Type 2 DM with associated complications of peripheral neuropathy, gastroparesis, NPDR, mild CKD:   - " Controlled at this time, A1C% 6.4. CGM estimated A1C% is 6.8%, glucose time in range (70 - 180) was 86%, goal is > 70%    CGM reviewed, see HPI    Now that pt's gastric stimulator is working and pt can eat her usual diet, will see if Humalog U-200 insulin will work better at mealtimes than regular insulin,    Will change insulin regimen to:  - NPH: 70 units qAM, 60 units qHS  - Humalog U-200: 20 units with meals + CF of 4 units: 50 > 50    - Advised pt NOT to take humalog insulin or correction factor more frequently than every 4 hours or stacking will occur which leads to hypoglycemia. Also strongly advised pt to call the office with concerns about BG readings rather than self titrate her insulin does.     - has glucagon Rx   - pt will be completing labs at PCP's office in 12/2023    RTC 03/18/2024    Electronically Signed: Nancy Shepherd MD

## 2023-11-21 NOTE — PROGRESS NOTES
Chief Complaint   Patient presents with    Diabetes     Follow up        HPI:   Cortney Delacruz is a 67 y.o.female who returns to [facility] for f/u evaluation and management of her Type 2 DM. Last visit 03/06/2022. Her history is as follows:    Interim Events:   - Pt's follow-up visit delayed due to complications after gastric stimulator battery replacement. S/P battery replacement on 10/13/2023. S/P wound dehiscence on 10/20/2023     - Will complete labs at PCP's office in 12/2023  - Now that her regular diet has resumed, she has required a larger amount of insulin. Pt increased her insulin doses on her own and did not contact me regarding these insulin changes    1) Type 2 DM with associated complications of peripheral neuropathy, gastroparesis, NPDR, CKD stage 2:   - Diagnosed in 2001  - Initially on metformin and actos.   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 She is followed at [facility] gastroenterology (Dr. Pruett). Is s/p gastric stimulator placement.   - Gastroparesis causing variable meal schedule / tolerance and prolonged postprandial hyperglycemia.  - Was on basal-bolus therapy with analogue insulins. Switched to NPH/Reg in 01/2021 at her initial Endocrine Visit  - Is no longer on SGLT-2 inhibitor due to cost and frequent UTI  - Novolog was too expensive and did not provide better glycemic control in comparison to regular inaulin    Current DM Medications: pt determined these doses on her own  - NPH: 60 units qAM, 50 units qHS.   - Regular: 20 units with meals + CF of 2 units: 50 > 150 (is taking 30 min prior to meals)    Glucometer / Dexcom CGM:  SG > 250: 3%  (goal < 5%)  SG (181 - 250): 9% (goal < 25%)  SG (70 - 180): 86%  (Goal > 70%)   SG (54 - 69): 2% (goal < 4%)  SG < 54: <1 % (goal < 1%)  Avg Glucose: 146  Glucose Variability: 31.4%  (goal </= 35%)  Glucose Management Indicator: 6.8%    Overall with good glycemic.  "Occasional post-prandial hyperglycemia with higher carb meals     DM Health Maintenance:  Ophtho:will obtain outside records  Podiatry: no resent visit  Monofilament / Foot exam: (03/2023)   Lipids: (02/2023) Tchol 151, , HDL 44, LDL 87 - not on a statin, on zetia  Urine Microalb/Cr ratio: (09/2022) 3.9  CMP: (08/2023) Cr 1.12, GFR 54, ALT 21, AST 41  CBC: (08/2023) Hgb 14.4  TSH: (06/2021) 0.78    Review of Systems   Constitutional:  Positive for fatigue.        Wt gain   HENT: Negative.     Eyes:  Positive for itching.        Dry eyes   Respiratory:  Positive for shortness of breath and wheezing.    Cardiovascular:  Positive for leg swelling (ankles). Negative for palpitations.   Gastrointestinal:  Positive for nausea (occasional). Negative for abdominal pain, constipation, diarrhea and vomiting.   Endocrine: Negative.  Negative for polydipsia.   Genitourinary:  Positive for frequency.   Musculoskeletal:  Positive for arthralgias.        Chronic pain   Skin: Negative.    Allergic/Immunologic: Negative.    Neurological:  Positive for numbness.   Hematological:  Bruises/bleeds easily.   Psychiatric/Behavioral:  Positive for sleep disturbance.         H/o depression       The following portions of the patient's history were reviewed and updated as appropriate: allergies, current medications, past family history, past medical history, past social history, past surgical history and problem list.      /76   Pulse 106   Ht 172.7 cm (68\")   Wt 122 kg (269 lb)   LMP  (LMP Unknown)   SpO2 100%   BMI 40.90 kg/m²   Physical Exam  Vitals reviewed.   Constitutional:       General: She is not in acute distress.     Appearance: Normal appearance. She is well-developed. She is not diaphoretic.      Comments: BMI 38.25   HENT:      Head: Normocephalic.   Eyes:      Conjunctiva/sclera: Conjunctivae normal.      Pupils: Pupils are equal, round, and reactive to light.   Neck:      Thyroid: No thyromegaly.      " Trachea: No tracheal deviation.      Comments: No palpable thyroid nodules    Cardiovascular:      Rate and Rhythm: Normal rate and regular rhythm.      Heart sounds: Normal heart sounds. No murmur heard.  Pulmonary:      Effort: Pulmonary effort is normal. No respiratory distress.      Breath sounds: Normal breath sounds.   Abdominal:      General: Bowel sounds are normal.      Palpations: Abdomen is soft. There is no mass.      Tenderness: There is abdominal tenderness (with deep palpation).      Comments: No scarring at insulin injection sites  Stimulator noted on right   Musculoskeletal:      Comments: Using Walker   Lymphadenopathy:      Cervical: No cervical adenopathy.   Skin:     General: Skin is warm and dry.      Findings: No erythema.   Neurological:      Mental Status: She is alert and oriented to person, place, and time.      Cranial Nerves: No cranial nerve deficit.   Psychiatric:         Behavior: Behavior normal.       LABS/IMAGING: outside records reviewed and summarized in HPI  Office Visit on 11/21/2023   Component Date Value Ref Range Status    Hemoglobin A1C 11/21/2023 6.4 (A)  4.5 - 5.7 % Final    Lot Number 11/21/2023 [id number]   Final    Expiration Date 11/21/2023 7/2/25   Final    Glucose 11/21/2023 156 (A)  70 - 130 mg/dL Final       ASSESSMENT/PLAN:    1) Type 2 DM with associated complications of peripheral neuropathy, gastroparesis, NPDR, mild CKD:   - Controlled at this time, A1C% 6.4. CGM estimated A1C% is 6.8%, glucose time in range (70 - 180) was 86%, goal is > 70%    CGM reviewed, see HPI    Now that pt's gastric stimulator is working and pt can eat her usual diet, will see if Humalog U-200 insulin will work better at mealtimes than regular insulin,    Will change insulin regimen to:  - NPH: 70 units qAM, 60 units qHS  - Humalog U-200: 20 units with meals + CF of 4 units: 50 > 50    - Advised pt NOT to take humalog insulin or correction factor more frequently than every 4 hours or  stacking will occur which leads to hypoglycemia. Also strongly advised pt to call the office with concerns about BG readings rather than self titrate her insulin does.     - has glucagon Rx   - pt will be completing labs at PCP's office in 12/2023    RTC 03/18/2024    Electronically Signed: Nancy Shepherd MD

## 2023-11-28 RX ORDER — INSULIN LISPRO 200 [IU]/ML
30 INJECTION, SOLUTION SUBCUTANEOUS
Qty: 18 ML | Refills: 5 | Status: SHIPPED | OUTPATIENT
Start: 2023-11-28

## 2023-11-30 ENCOUNTER — PRIOR AUTHORIZATION (OUTPATIENT)
Dept: ENDOCRINOLOGY | Facility: CLINIC | Age: 68
End: 2023-11-30
Payer: MEDICARE

## 2023-11-30 NOTE — TELEPHONE ENCOUNTER
Cortney Delacruz (Key: [id number])  PA Case ID #: [id number]  Rx #: [id number]  Need Help? Call us at [phone]  Outcome  Approved on November 29  PA Case: [id number], Status: Approved, Coverage Starts on: 1/1/2023 12:00:00 AM, Coverage Ends on: 12/31/2024 12:00:00 AM. Questions? Contact [phone].  Authorization Expiration Date: 12/30/2024  Drug  HumaLOG KwikPen 200UNIT/ML pen-injectors  ePA cloud logo  Form  [employer] Electronic PA Form

## 2023-12-14 ENCOUNTER — TRANSCRIBE ORDERS (OUTPATIENT)
Dept: ADMINISTRATIVE | Facility: HOSPITAL | Age: 68
End: 2023-12-14
Payer: MEDICARE

## 2023-12-14 DIAGNOSIS — T81.89XA OTHER COMPLICATIONS OF PROCEDURES, NOT ELSEWHERE CLASSIFIED, INITIAL ENCOUNTER: Primary | ICD-10-CM

## 2023-12-29 ENCOUNTER — OUTSIDE FACILITY SERVICE (OUTPATIENT)
Dept: GASTROENTEROLOGY | Facility: CLINIC | Age: 68
End: 2023-12-29
Payer: MEDICARE

## 2023-12-29 PROCEDURE — 88305 TISSUE EXAM BY PATHOLOGIST: CPT

## 2023-12-29 PROCEDURE — 45385 COLONOSCOPY W/LESION REMOVAL: CPT | Performed by: INTERNAL MEDICINE

## 2024-01-02 ENCOUNTER — LAB REQUISITION (OUTPATIENT)
Dept: LAB | Facility: HOSPITAL | Age: 69
End: 2024-01-02
Payer: MEDICARE

## 2024-01-02 DIAGNOSIS — D12.2 BENIGN NEOPLASM OF ASCENDING COLON: ICD-10-CM

## 2024-01-02 DIAGNOSIS — Z86.010 PERSONAL HISTORY OF COLONIC POLYPS: ICD-10-CM

## 2024-01-02 DIAGNOSIS — D12.3 BENIGN NEOPLASM OF TRANSVERSE COLON: ICD-10-CM

## 2024-01-02 DIAGNOSIS — Z83.719 FAMILY HISTORY OF COLON POLYPS, UNSPECIFIED: ICD-10-CM

## 2024-01-02 DIAGNOSIS — D12.5 BENIGN NEOPLASM OF SIGMOID COLON: ICD-10-CM

## 2024-01-02 DIAGNOSIS — Z12.11 ENCOUNTER FOR SCREENING FOR MALIGNANT NEOPLASM OF COLON: ICD-10-CM

## 2024-01-03 ENCOUNTER — HOSPITAL ENCOUNTER (OUTPATIENT)
Dept: CT IMAGING | Facility: HOSPITAL | Age: 69
Discharge: HOME | End: 2024-01-03
Admitting: INTERNAL MEDICINE
Payer: MEDICARE

## 2024-01-03 DIAGNOSIS — T81.89XA OTHER COMPLICATIONS OF PROCEDURES, NOT ELSEWHERE CLASSIFIED, INITIAL ENCOUNTER: ICD-10-CM

## 2024-01-03 LAB — REF LAB TEST METHOD: NORMAL

## 2024-01-03 PROCEDURE — 74176 CT ABD & PELVIS W/O CONTRAST: CPT

## 2024-05-16 DIAGNOSIS — Z79.4 TYPE 2 DIABETES MELLITUS WITH OTHER SPECIFIED COMPLICATION, WITH LONG-TERM CURRENT USE OF INSULIN: ICD-10-CM

## 2024-05-16 DIAGNOSIS — E11.69 TYPE 2 DIABETES MELLITUS WITH OTHER SPECIFIED COMPLICATION, WITH LONG-TERM CURRENT USE OF INSULIN: ICD-10-CM

## 2024-05-16 RX ORDER — METFORMIN HYDROCHLORIDE 500 MG/1
500 TABLET, EXTENDED RELEASE ORAL
Qty: 90 TABLET | Refills: 3 | Status: SHIPPED | OUTPATIENT
Start: 2024-05-16

## 2024-05-16 NOTE — TELEPHONE ENCOUNTER
Rx Refill Note    Requested Prescriptions     Pending Prescriptions Disp Refills    metFORMIN ER (GLUCOPHAGE-XR) 500 MG 24 hr tablet [Pharmacy Med Name: METFORMIN HCL  MG TABLET] 30 tablet 5     Sig: Take 1 tablet by mouth Daily With Breakfast.        Last office visit with prescribing clinician: 11/21/2023         Next office visit with prescribing clinician: 8/19/2024

## 2024-08-19 ENCOUNTER — OFFICE VISIT (OUTPATIENT)
Dept: ENDOCRINOLOGY | Facility: CLINIC | Age: 69
End: 2024-08-19
Payer: MEDICARE

## 2024-08-19 VITALS
HEIGHT: 68 IN | SYSTOLIC BLOOD PRESSURE: 136 MMHG | DIASTOLIC BLOOD PRESSURE: 74 MMHG | OXYGEN SATURATION: 97 % | BODY MASS INDEX: 39.16 KG/M2 | HEART RATE: 84 BPM | WEIGHT: 258.4 LBS

## 2024-08-19 DIAGNOSIS — E11.69 TYPE 2 DIABETES MELLITUS WITH OTHER SPECIFIED COMPLICATION, WITH LONG-TERM CURRENT USE OF INSULIN: Primary | ICD-10-CM

## 2024-08-19 DIAGNOSIS — Z79.4 TYPE 2 DIABETES MELLITUS WITH OTHER SPECIFIED COMPLICATION, WITH LONG-TERM CURRENT USE OF INSULIN: Primary | ICD-10-CM

## 2024-08-19 LAB
EXPIRATION DATE: NORMAL
GLUCOSE BLDC GLUCOMTR-MCNC: 115 MG/DL (ref 70–130)
Lab: NORMAL

## 2024-08-19 NOTE — PROGRESS NOTES
Chief Complaint   Patient presents with    Diabetes     Follow-up       HPI:   Cortney Delacruz is a 68 y.o.female who returns to [facility]ocrine Clinic for f/u evaluation and management of her Type 2 DM. Last visit 11/21/2023. Her history is as follows:    Interim Events:   - Pt battery/lead replacement surgery for her gastric stimulator in 10/2023. Has had complications of seroma and infection at the incision site. She had to cancel her 02/27/2024 appt with me at that time due to ongoing issues with wound dehiscence. Is currently managing site with wet to dry dressings.   - s/p R eye cataract surgery in 08/2024, scheduled for left eye on 09/17/2024.  - had labs collected at PCP's office on 05/20/2024, see below  - pt is taking different doses of insulin than at her last visit in 11/2023. Pt changed insulin doses on her own.      1) Type 2 DM with associated complications of peripheral neuropathy, gastroparesis, NPDR, CKD stage 2:   - Diagnosed in 2001  - Initially on metformin and actos.   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 She is followed at [facility] gastroenterology (Dr. Pruett). Is s/p gastric stimulator placement.   - Gastroparesis causing variable meal schedule / tolerance and prolonged postprandial hyperglycemia.  - Was on basal-bolus therapy with analogue insulins. Switched to NPH/Reg in 01/2021 at her initial Endocrine Visit  - Is no longer on SGLT-2 inhibitor due to cost and frequent UTI  - Humalog U-200, Novolog were too expensive and did not provide better glycemic control in comparison to regular inaulin    Current DM Medications: pt determined these doses on her own  - NPH: 50 units qAM, 30 units qHS (will adjust +/- 10 units for hyperglycemia or hypoglycemia)   - Regular: 10 units with daily meal  - Using a different CF when not eating than I had prescribed: 5 units if  - 200, 8 units if 201 - 250, 11 units if  - 300.      - metformin  mg: daily in AM    Glucometer / Dexcom CGM:  SG > 250: 6%  (goal < 5%)  SG (181 - 250): 29% (goal < 25%)  SG (70 - 180): 63%  (Goal > 70%)   SG (54 - 69): 2% (goal < 4%)  SG < 54: <1 % (goal < 1%)  Avg Glucose: 163  Glucose Variability: 32.1%  (goal </= 35%)  Glucose Management Indicator: 7.2%    Overall with good glycemic overnight to 6PM. Occasional post-prandial hyperglycemia with higher carb meals after 6 PM.     DM Health Maintenance:  Ophtho: (05/30/2024) s/p rt vitrectomy for hemorrhage.   Podiatry: no resent visit  Monofilament / Foot exam: (03/2023)   Lipids: (05/2024) Tchol 118, , HDL 24.1, LDL 52 - not on a statin, on zetia  Urine Microalb/Cr ratio: (09/2022) 3.9  CMP: (05/2024) Cr 1.27, GFR 46, LFTs WNL  CBC: (08/2023) Hgb 14.4  TSH: (06/2021) 0.78    Review of Systems   Constitutional:  Positive for fatigue.        Wt loss since last visit   HENT: Negative.     Eyes:  Positive for itching.        Dry eyes   Respiratory:  Positive for shortness of breath and wheezing.    Cardiovascular:  Positive for leg swelling (ankles). Negative for palpitations.   Gastrointestinal:  Positive for nausea (occasional). Negative for abdominal pain, constipation, diarrhea and vomiting.   Endocrine: Negative.  Negative for polydipsia.   Genitourinary:  Positive for frequency.   Musculoskeletal:  Positive for arthralgias.        Chronic pain   Skin:  Positive for wound (see HPI).   Allergic/Immunologic: Negative.    Neurological:  Positive for numbness.   Hematological:  Bruises/bleeds easily.   Psychiatric/Behavioral:  Positive for sleep disturbance.         H/o depression     The following portions of the patient's history were reviewed and updated as appropriate: allergies, current medications, past family history, past medical history, past social history, past surgical history and problem list.    /74 (BP Location: Right arm, Patient Position: Sitting, Cuff Size: Large Adult)   Pulse  "84   Ht 172.7 cm (68\")   Wt 117 kg (258 lb 6.4 oz)   LMP  (LMP Unknown)   SpO2 97%   BMI 39.29 kg/m²   Physical Exam  Vitals reviewed.   Constitutional:       General: She is not in acute distress.     Appearance: Normal appearance. She is well-developed. She is not diaphoretic.   HENT:      Head: Normocephalic.   Eyes:      Conjunctiva/sclera: Conjunctivae normal.      Pupils: Pupils are equal, round, and reactive to light.   Neck:      Thyroid: No thyromegaly.      Trachea: No tracheal deviation.      Comments: No palpable thyroid nodules    Cardiovascular:      Rate and Rhythm: Normal rate and regular rhythm.      Heart sounds: Normal heart sounds. No murmur heard.  Pulmonary:      Effort: Pulmonary effort is normal. No respiratory distress.      Breath sounds: Normal breath sounds.   Abdominal:      General: Bowel sounds are normal.      Palpations: Abdomen is soft. There is no mass.      Comments: No scarring at insulin injection sites  Stimulator noted on right   Musculoskeletal:      Comments: Using Walker   Lymphadenopathy:      Cervical: No cervical adenopathy.   Skin:     General: Skin is warm and dry.      Findings: No erythema.      Comments: Dressing intact over wound dehiscence.    Neurological:      Mental Status: She is alert and oriented to person, place, and time.      Cranial Nerves: No cranial nerve deficit.   Psychiatric:         Behavior: Behavior normal.       LABS/IMAGING: outside records reviewed and summarized in HPI  Office Visit on 08/19/2024   Component Date Value Ref Range Status    Glucose 08/19/2024 115  70 - 130 mg/dL Final    Lot Number 08/19/2024 [id number]   Final    Expiration Date 08/19/2024 1/20/25   Final       ASSESSMENT/PLAN:    1) Type 2 DM with associated complications of peripheral neuropathy, gastroparesis, NPDR, mild CKD:   - fair control at this time, A1C% 6.4 on 05/20/2024.  Current CGM estimated A1C% is 7.2%, glucose time in range (70 - 180) was 63%, goal is > " 70%  CGM reviewed, see HPI    - Cortney often adjusts her insulin doses on her own w/o discussion with me. At this time, I can only make recommendations based on data that is available to me today and encourage her to call for dose recommendations in the future rather than adjust doses on her own.       Pt is currently taking the following regimen:   - NPH: 50 units qAM, 30 units qHS (will adjust +/- 10 units for hyperglycemia or hypoglycemia)   - Regular: 10 units with daily meal. I recommended she take 5 units for smaller meals/snacks. Has not been taking any insulin for these smaller meals/snack  - Using a different CF when not eating than I had prescribed: she is taking this  - 5 units if  - 200, 8 units if 201 - 250, 11 units if  - 300. Is okay for now. Cautioned about hypoglycemia with this correction dosing.     - labs completed at PCP's office in 05/2024 reviewed.     RTC 5 months    Electronically Signed: Nancy Shepherd MD

## 2024-09-01 RX ORDER — HUMAN INSULIN 100 [IU]/ML
INJECTION, SOLUTION SUBCUTANEOUS
Start: 2024-09-01

## 2024-09-01 RX ORDER — SYRINGE-NEEDLE,INSULIN,0.5 ML 27GX1/2"
1 SYRINGE, EMPTY DISPOSABLE MISCELLANEOUS 4 TIMES DAILY
Start: 2024-09-01

## 2024-09-01 RX ORDER — HUMAN INSULIN 100 [IU]/ML
INJECTION, SUSPENSION SUBCUTANEOUS
Status: SHIPPED
Start: 2024-09-01

## 2025-01-21 ENCOUNTER — OFFICE VISIT (OUTPATIENT)
Dept: ENDOCRINOLOGY | Facility: CLINIC | Age: 70
End: 2025-01-21
Payer: MEDICARE

## 2025-01-21 ENCOUNTER — HOSPITAL ENCOUNTER (OUTPATIENT)
Dept: GENERAL RADIOLOGY | Facility: HOSPITAL | Age: 70
Discharge: HOME | End: 2025-01-21
Admitting: INTERNAL MEDICINE
Payer: MEDICARE

## 2025-01-21 ENCOUNTER — TRANSCRIBE ORDERS (OUTPATIENT)
Dept: ADMINISTRATIVE | Facility: HOSPITAL | Age: 70
End: 2025-01-21
Payer: MEDICARE

## 2025-01-21 VITALS
BODY MASS INDEX: 39.56 KG/M2 | SYSTOLIC BLOOD PRESSURE: 152 MMHG | HEART RATE: 107 BPM | OXYGEN SATURATION: 97 % | DIASTOLIC BLOOD PRESSURE: 82 MMHG | HEIGHT: 68 IN | WEIGHT: 261 LBS

## 2025-01-21 DIAGNOSIS — R05.3 PERSISTENT COUGH: Primary | ICD-10-CM

## 2025-01-21 DIAGNOSIS — Z79.4 TYPE 2 DIABETES MELLITUS WITH OTHER SPECIFIED COMPLICATION, WITH LONG-TERM CURRENT USE OF INSULIN: Primary | ICD-10-CM

## 2025-01-21 DIAGNOSIS — E11.69 TYPE 2 DIABETES MELLITUS WITH OTHER SPECIFIED COMPLICATION, WITH LONG-TERM CURRENT USE OF INSULIN: Primary | ICD-10-CM

## 2025-01-21 LAB
ARTICHOKE IGE QN: 77 MG/DL (ref 0–100)
CHOLEST SERPL-MCNC: 150 MG/DL
EXPIRATION DATE: ABNORMAL
EXPIRATION DATE: NORMAL
EXTERNAL THYROID STIMULATING HORMONE: 1.18 M[IU]/ML (ref 0.45–4.5)
GLUCOSE BLDC GLUCOMTR-MCNC: 100 MG/DL (ref 70–130)
HBA1C MFR BLD: 6.9 % (ref 4.5–5.7)
HDLC SERPL-MCNC: 47 MG/DL (ref 40–60)
Lab: ABNORMAL
Lab: NORMAL
MICROALBUMIN/CREAT UR: 7.4 MG/G (ref 0–29)
NONHDLC SERPL-MCNC: 103 MG/DL
TRIGL SERPL-MCNC: 157 MG/DL (ref 0–150)

## 2025-01-21 PROCEDURE — 71046 X-RAY EXAM CHEST 2 VIEWS: CPT

## 2025-01-21 RX ORDER — METFORMIN HYDROCHLORIDE 500 MG/1
500 TABLET, EXTENDED RELEASE ORAL 2 TIMES DAILY WITH MEALS
Qty: 180 TABLET | Refills: 3 | Status: SHIPPED | OUTPATIENT
Start: 2025-01-21

## 2025-01-21 NOTE — PROGRESS NOTES
Chief Complaint   Patient presents with    Diabetes     Follow-up       HPI:   Cortney Delacruz is a 69 y.o.female who returns to [facility]ocrine Clinic for f/u evaluation and management of her Type 2 DM. Last visit 08/19/2024. Her history is as follows:    Interim Events:   - Pt completed labs at PCP's office today  - Pt's post-operative seroma has not resolved.   - Pt started Paxil 10 mg today  - Pt has increased her her NPH  doses since last visit.    1) Type 2 DM with associated complications of peripheral neuropathy, gastroparesis, NPDR, CKD stage 2:   - Diagnosed in 2001  - Initially on metformin and actos.   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 She is followed at [facility] gastroenterology (Dr. Pruett). Is s/p gastric stimulator placement.   - Gastroparesis causing variable meal schedule / tolerance and prolonged postprandial hyperglycemia.  - Was on basal-bolus therapy with analogue insulins. Switched to NPH/Reg in 01/2021 at her initial Endocrine Visit  - Is no longer on SGLT-2 inhibitor due to cost and frequent UTI  - Humalog U-200, Novolog were too expensive and did not provide better glycemic control in comparison to regular inaulin    Current DM Medications: pt determined these doses on her own  - NPH: 60 units qAM, 40 units qHS (will adjust +/- 10 units for hyperglycemia or hypoglycemia)   - Regular: 10 units with BK, 5 units with other meals + an unclear correction factor.  - metformin  mg: daily in AM    Glucometer / Dexcom CGM:  SG > 250: 5%  (goal < 5%)  SG (181 - 250): 25% (goal < 25%)  SG (70 - 180): 70%  (Goal > 70%)   SG (54 - 69): 0% (goal < 4%)  SG < 54: 0 % (goal < 1%)  Avg Glucose: 161  Glucose Variability: 29.1%  (goal </= 35%)  Glucose Management Indicator: 7.2%    Overall with good glycemic overnight to 6AM. Occasional post-prandial hyperglycemia with higher carb meals.     DM Health Maintenance:  Ophtho: (05/30/2024)  "s/p rt vitrectomy for hemorrhage.   Podiatry: no resent visit  Monofilament / Foot exam: (03/2023)   Lipids: (05/2024) Tchol 118, , HDL 24.1, LDL 52 - not on a statin, on zetia  Urine Microalb/Cr ratio: (09/2022) 3.9  CMP: (05/2024) Cr 1.27, GFR 46, LFTs WNL  CBC: (08/2023) Hgb 14.4  TSH: (06/2021) 0.78    Review of Systems   Constitutional:  Positive for fatigue.        Wt gain since last visit   HENT: Negative.     Eyes:  Positive for itching.        Dry eyes   Respiratory:  Positive for shortness of breath and wheezing.    Cardiovascular:  Positive for leg swelling (ankles). Negative for palpitations.   Gastrointestinal:  Positive for nausea (occasional). Negative for abdominal pain, constipation, diarrhea and vomiting.   Endocrine: Negative.  Negative for polydipsia.   Genitourinary:  Positive for frequency.   Musculoskeletal:  Positive for arthralgias.        Chronic pain   Skin:  Positive for wound (see HPI).   Allergic/Immunologic: Negative.    Neurological:  Positive for numbness.   Hematological:  Bruises/bleeds easily.   Psychiatric/Behavioral:  Positive for sleep disturbance.         H/o depression     The following portions of the patient's history were reviewed and updated as appropriate: allergies, current medications, past family history, past medical history, past social history, past surgical history and problem list.      /82   Pulse 107   Ht 172.7 cm (68\")   Wt 118 kg (261 lb)   LMP  (LMP Unknown)   SpO2 97%   BMI 39.68 kg/m²   Physical Exam  Vitals reviewed.   Constitutional:       General: She is not in acute distress.     Appearance: Normal appearance. She is well-developed. She is not diaphoretic.   HENT:      Head: Normocephalic.   Eyes:      Conjunctiva/sclera: Conjunctivae normal.      Pupils: Pupils are equal, round, and reactive to light.   Neck:      Thyroid: No thyromegaly.      Trachea: No tracheal deviation.      Comments: No palpable thyroid " nodules    Cardiovascular:      Rate and Rhythm: Regular rhythm. Tachycardia present.      Heart sounds: Normal heart sounds. No murmur heard.  Pulmonary:      Effort: Pulmonary effort is normal. No respiratory distress.      Breath sounds: Normal breath sounds.   Abdominal:      General: Bowel sounds are normal.      Palpations: Abdomen is soft. There is no mass.      Comments: No scarring at insulin injection sites  Stimulator noted on right   Lymphadenopathy:      Cervical: No cervical adenopathy.   Skin:     General: Skin is warm and dry.      Findings: No erythema.      Comments: Dressing intact over wound dehiscence.    Neurological:      Mental Status: She is alert and oriented to person, place, and time.      Cranial Nerves: No cranial nerve deficit.   Psychiatric:         Behavior: Behavior normal.       LABS/IMAGING: outside records reviewed and summarized in HPI  Office Visit on 01/21/2025   Component Date Value Ref Range Status    Glucose 01/21/2025 100  70 - 130 mg/dL Final    Lot Number 01/21/2025 [id number]   Final    Expiration Date 01/21/2025 2025-08-09   Final    Hemoglobin A1C 01/21/2025 6.9 (A)  4.5 - 5.7 % Final    Lot Number 01/21/2025 [id number]   Final    Expiration Date 01/21/2025 2026-10-04   Final       ASSESSMENT/PLAN:    1) Type 2 DM with associated complications of peripheral neuropathy, gastroparesis, NPDR, mild CKD:   - fair control at this time, POCT A1C% 6.9 today.  Current CGM estimated A1C% is 7.2%, glucose time in range (70 - 180) was 70%, goal is > 70%  CGM reviewed, see HPI    - Cortney often adjusts her insulin doses on her own w/o discussion with me. At this time, I can only make recommendations based on data that is available to me today and encourage her to call for dose recommendations in the future rather than adjust doses on her own.       The following changes were made to her regimen:   - NPH: 60 units qAM, 40 units qHS (will adjust +/- 10 units for hyperglycemia or  hypoglycemia)   - Regular: 10 units AC BK, 5 units AC LN, 5 units AC DN + CF of 2 units : 50 > 150  - Increase metformin ER to 500 mg BID    Written instructions reviewed with patient.      - labs completed at PCP's office today. Will obtain copy of results for review.    RTC 5 months    Electronically Signed: Nancy Shepherd MD

## 2025-02-02 DIAGNOSIS — E11.69 TYPE 2 DIABETES MELLITUS WITH OTHER SPECIFIED COMPLICATION, WITH LONG-TERM CURRENT USE OF INSULIN: Primary | ICD-10-CM

## 2025-02-02 DIAGNOSIS — Z79.4 TYPE 2 DIABETES MELLITUS WITH OTHER SPECIFIED COMPLICATION, WITH LONG-TERM CURRENT USE OF INSULIN: Primary | ICD-10-CM

## 2025-02-02 RX ORDER — GLUCAGON INJECTION, SOLUTION 0.5 MG/.1ML
0.5 INJECTION, SOLUTION SUBCUTANEOUS ONCE AS NEEDED
Qty: 0.1 ML | Refills: 5 | Status: SHIPPED | OUTPATIENT
Start: 2025-02-02

## 2025-02-03 ENCOUNTER — PRIOR AUTHORIZATION (OUTPATIENT)
Dept: ENDOCRINOLOGY | Facility: CLINIC | Age: 70
End: 2025-02-03
Payer: MEDICARE

## 2025-02-03 ENCOUNTER — TELEPHONE (OUTPATIENT)
Dept: ENDOCRINOLOGY | Facility: CLINIC | Age: 70
End: 2025-02-03
Payer: MEDICARE

## 2025-02-03 DIAGNOSIS — E11.69 TYPE 2 DIABETES MELLITUS WITH OTHER SPECIFIED COMPLICATION, WITH LONG-TERM CURRENT USE OF INSULIN: Primary | ICD-10-CM

## 2025-02-03 DIAGNOSIS — Z79.4 TYPE 2 DIABETES MELLITUS WITH OTHER SPECIFIED COMPLICATION, WITH LONG-TERM CURRENT USE OF INSULIN: Primary | ICD-10-CM

## 2025-02-03 NOTE — TELEPHONE ENCOUNTER
----- Message from Nancy Shepherd sent at 2/2/2025  3:41 PM EST -----  Regarding: recent labs from PCP's office  Please get copy of recent labs from pt's PCP's office  Thanks  MD

## 2025-02-05 NOTE — TELEPHONE ENCOUNTER
G-voke denied by insurance.  The drug you asked for is not listed in your preferred drug list (formulary). The preferred drug(s), you may not have tried are: Zegalogue 0.6 mg/0.6 mL subcutaneous auto-injector AND Zegalogue 0.6 mg/0.6 mL subcutaneous syringe.

## 2025-06-10 ENCOUNTER — HOSPITAL ENCOUNTER (OUTPATIENT)
Dept: GENERAL RADIOLOGY | Facility: HOSPITAL | Age: 70
Discharge: HOME | End: 2025-06-10
Admitting: INTERNAL MEDICINE
Payer: MEDICARE

## 2025-06-10 ENCOUNTER — TRANSCRIBE ORDERS (OUTPATIENT)
Dept: ADMINISTRATIVE | Facility: HOSPITAL | Age: 70
End: 2025-06-10
Payer: MEDICARE

## 2025-06-10 DIAGNOSIS — J69.0 ASPIRATION PNEUMONIA OF BOTH LOWER LOBES DUE TO REGURGITATED FOOD: Primary | ICD-10-CM

## 2025-06-10 PROCEDURE — 71046 X-RAY EXAM CHEST 2 VIEWS: CPT

## 2025-06-15 ENCOUNTER — HOSPITAL ENCOUNTER (EMERGENCY)
Facility: HOSPITAL | Age: 70
Discharge: HOME | End: 2025-06-15
Attending: EMERGENCY MEDICINE | Admitting: EMERGENCY MEDICINE
Payer: MEDICARE

## 2025-06-15 ENCOUNTER — APPOINTMENT (OUTPATIENT)
Dept: GENERAL RADIOLOGY | Facility: HOSPITAL | Age: 70
End: 2025-06-15
Payer: MEDICARE

## 2025-06-15 ENCOUNTER — APPOINTMENT (OUTPATIENT)
Facility: HOSPITAL | Age: 70
End: 2025-06-15
Payer: MEDICARE

## 2025-06-15 VITALS
RESPIRATION RATE: 22 BRPM | DIASTOLIC BLOOD PRESSURE: 59 MMHG | BODY MASS INDEX: 38.27 KG/M2 | HEART RATE: 69 BPM | OXYGEN SATURATION: 94 % | SYSTOLIC BLOOD PRESSURE: 145 MMHG | TEMPERATURE: 98.4 F | HEIGHT: 68 IN | WEIGHT: 252.5 LBS

## 2025-06-15 DIAGNOSIS — J45.909 MODERATE ASTHMA, UNSPECIFIED WHETHER COMPLICATED, UNSPECIFIED WHETHER PERSISTENT: Primary | ICD-10-CM

## 2025-06-15 LAB
ALBUMIN SERPL-MCNC: 4.1 G/DL (ref 3.5–5.2)
ALBUMIN/GLOB SERPL: 1 G/DL
ALP SERPL-CCNC: 95 U/L (ref 39–117)
ALT SERPL W P-5'-P-CCNC: 19 U/L (ref 1–33)
ANION GAP SERPL CALCULATED.3IONS-SCNC: 13.3 MMOL/L (ref 5–15)
AST SERPL-CCNC: 33 U/L (ref 1–32)
BASOPHILS # BLD AUTO: 0.02 10*3/MM3 (ref 0–0.2)
BASOPHILS NFR BLD AUTO: 0.3 % (ref 0–1.5)
BILIRUB SERPL-MCNC: 0.3 MG/DL (ref 0–1.2)
BUN SERPL-MCNC: 9 MG/DL (ref 8–23)
BUN/CREAT SERPL: 9.4 (ref 7–25)
CALCIUM SPEC-SCNC: 10 MG/DL (ref 8.6–10.5)
CHLORIDE SERPL-SCNC: 98 MMOL/L (ref 98–107)
CO2 SERPL-SCNC: 27.7 MMOL/L (ref 22–29)
CREAT SERPL-MCNC: 0.96 MG/DL (ref 0.57–1)
DEPRECATED RDW RBC AUTO: 47.5 FL (ref 37–54)
EGFRCR SERPLBLD CKD-EPI 2021: 64.2 ML/MIN/1.73
EOSINOPHIL # BLD AUTO: 0.5 10*3/MM3 (ref 0–0.4)
EOSINOPHIL NFR BLD AUTO: 7.7 % (ref 0.3–6.2)
ERYTHROCYTE [DISTWIDTH] IN BLOOD BY AUTOMATED COUNT: 14.9 % (ref 12.3–15.4)
GEN 5 1HR TROPONIN T REFLEX: <6 NG/L
GLOBULIN UR ELPH-MCNC: 4 GM/DL
GLUCOSE SERPL-MCNC: 137 MG/DL (ref 65–99)
HCT VFR BLD AUTO: 42.1 % (ref 34–46.6)
HGB BLD-MCNC: 13.2 G/DL (ref 12–15.9)
HOLD SPECIMEN: NORMAL
IMM GRANULOCYTES # BLD AUTO: 0.02 10*3/MM3 (ref 0–0.05)
IMM GRANULOCYTES NFR BLD AUTO: 0.3 % (ref 0–0.5)
LYMPHOCYTES # BLD AUTO: 1.63 10*3/MM3 (ref 0.7–3.1)
LYMPHOCYTES NFR BLD AUTO: 25.2 % (ref 19.6–45.3)
MCH RBC QN AUTO: 26.7 PG (ref 26.6–33)
MCHC RBC AUTO-ENTMCNC: 31.4 G/DL (ref 31.5–35.7)
MCV RBC AUTO: 85.2 FL (ref 79–97)
MONOCYTES # BLD AUTO: 0.66 10*3/MM3 (ref 0.1–0.9)
MONOCYTES NFR BLD AUTO: 10.2 % (ref 5–12)
NEUTROPHILS NFR BLD AUTO: 3.63 10*3/MM3 (ref 1.7–7)
NEUTROPHILS NFR BLD AUTO: 56.3 % (ref 42.7–76)
NT-PROBNP SERPL-MCNC: 120 PG/ML (ref 0–900)
PLATELET # BLD AUTO: 234 10*3/MM3 (ref 140–450)
PMV BLD AUTO: 11.6 FL (ref 6–12)
POTASSIUM SERPL-SCNC: 4.3 MMOL/L (ref 3.5–5.2)
PROT SERPL-MCNC: 8.1 G/DL (ref 6–8.5)
RBC # BLD AUTO: 4.94 10*6/MM3 (ref 3.77–5.28)
SODIUM SERPL-SCNC: 139 MMOL/L (ref 136–145)
TROPONIN T NUMERIC DELTA: NORMAL
TROPONIN T SERPL HS-MCNC: 6 NG/L
WBC NRBC COR # BLD AUTO: 6.46 10*3/MM3 (ref 3.4–10.8)
WHOLE BLOOD HOLD COAG: NORMAL
WHOLE BLOOD HOLD SPECIMEN: NORMAL

## 2025-06-15 PROCEDURE — 83880 ASSAY OF NATRIURETIC PEPTIDE: CPT | Performed by: EMERGENCY MEDICINE

## 2025-06-15 PROCEDURE — 71275 CT ANGIOGRAPHY CHEST: CPT

## 2025-06-15 PROCEDURE — 25510000001 IOPAMIDOL PER 1 ML: Performed by: EMERGENCY MEDICINE

## 2025-06-15 PROCEDURE — 80053 COMPREHEN METABOLIC PANEL: CPT | Performed by: EMERGENCY MEDICINE

## 2025-06-15 PROCEDURE — 94640 AIRWAY INHALATION TREATMENT: CPT

## 2025-06-15 PROCEDURE — 99285 EMERGENCY DEPT VISIT HI MDM: CPT | Performed by: EMERGENCY MEDICINE

## 2025-06-15 PROCEDURE — 96374 THER/PROPH/DIAG INJ IV PUSH: CPT

## 2025-06-15 PROCEDURE — 94799 UNLISTED PULMONARY SVC/PX: CPT

## 2025-06-15 PROCEDURE — 85025 COMPLETE CBC W/AUTO DIFF WBC: CPT | Performed by: EMERGENCY MEDICINE

## 2025-06-15 PROCEDURE — 93005 ELECTROCARDIOGRAM TRACING: CPT | Performed by: EMERGENCY MEDICINE

## 2025-06-15 PROCEDURE — 25010000002 DEXAMETHASONE PER 1 MG: Performed by: EMERGENCY MEDICINE

## 2025-06-15 PROCEDURE — 84484 ASSAY OF TROPONIN QUANT: CPT | Performed by: EMERGENCY MEDICINE

## 2025-06-15 PROCEDURE — 36415 COLL VENOUS BLD VENIPUNCTURE: CPT

## 2025-06-15 RX ORDER — PREDNISONE 20 MG/1
40 TABLET ORAL DAILY
Qty: 8 TABLET | Refills: 0 | Status: SHIPPED | OUTPATIENT
Start: 2025-06-15 | End: 2025-06-19

## 2025-06-15 RX ORDER — ALBUTEROL SULFATE 0.83 MG/ML
2.5 SOLUTION RESPIRATORY (INHALATION) ONCE
Status: COMPLETED | OUTPATIENT
Start: 2025-06-15 | End: 2025-06-15

## 2025-06-15 RX ORDER — SODIUM CHLORIDE 0.9 % (FLUSH) 0.9 %
10 SYRINGE (ML) INJECTION AS NEEDED
Status: DISCONTINUED | OUTPATIENT
Start: 2025-06-15 | End: 2025-06-15 | Stop reason: HOSPADM

## 2025-06-15 RX ORDER — IOPAMIDOL 755 MG/ML
100 INJECTION, SOLUTION INTRAVASCULAR
Status: COMPLETED | OUTPATIENT
Start: 2025-06-15 | End: 2025-06-15

## 2025-06-15 RX ORDER — IPRATROPIUM BROMIDE AND ALBUTEROL SULFATE 2.5; .5 MG/3ML; MG/3ML
3 SOLUTION RESPIRATORY (INHALATION) ONCE
Status: COMPLETED | OUTPATIENT
Start: 2025-06-15 | End: 2025-06-15

## 2025-06-15 RX ORDER — ALBUTEROL SULFATE 1.25 MG/3ML
1 SOLUTION RESPIRATORY (INHALATION) EVERY 6 HOURS PRN
Qty: 40 EACH | Refills: 0 | Status: SHIPPED | OUTPATIENT
Start: 2025-06-15

## 2025-06-15 RX ORDER — METOCLOPRAMIDE HYDROCHLORIDE 5 MG/ML
10 INJECTION INTRAMUSCULAR; INTRAVENOUS ONCE
Status: DISCONTINUED | OUTPATIENT
Start: 2025-06-15 | End: 2025-06-15 | Stop reason: HOSPADM

## 2025-06-15 RX ORDER — DEXAMETHASONE SODIUM PHOSPHATE 10 MG/ML
10 INJECTION, SOLUTION INTRA-ARTICULAR; INTRALESIONAL; INTRAMUSCULAR; INTRAVENOUS; SOFT TISSUE ONCE
Status: COMPLETED | OUTPATIENT
Start: 2025-06-15 | End: 2025-06-15

## 2025-06-15 RX ADMIN — ALBUTEROL SULFATE 2.5 MG: 2.5 SOLUTION RESPIRATORY (INHALATION) at 12:15

## 2025-06-15 RX ADMIN — IOPAMIDOL 85 ML: 755 INJECTION, SOLUTION INTRAVENOUS at 11:21

## 2025-06-15 RX ADMIN — DEXAMETHASONE SODIUM PHOSPHATE 10 MG: 10 INJECTION INTRAMUSCULAR; INTRAVENOUS at 10:45

## 2025-06-15 RX ADMIN — IPRATROPIUM BROMIDE AND ALBUTEROL SULFATE 3 ML: 2.5; .5 SOLUTION RESPIRATORY (INHALATION) at 10:59

## 2025-06-15 NOTE — ED NOTES
Pt reports her incision site where her gastric stimulator is is still open. Family reports that it is quarter sized, red but scabbing over. Surgery was done 2 years ago. Incision site is covered with bandage, this medic did not look at it. Pt reports that she has ceromas. MD made aware.

## 2025-06-15 NOTE — FSED PROVIDER NOTE
Subjective  History of Present Illness:    Patient Kristy to the emergency department with difficulty breathing.  She states that she has had 2 episodes of aspiration over the last 2 weeks.  Is currently on azithromycin and ampicillin.  Denies any fever but states her temperature has been elevated greater than 100 even.  Describes cough, congestion.  Denies any previous history of asthma or COPD.  States that she feels like she is wheezing.  She has been having to use more pillows at night to prop herself up with her concern of aspiration.  She does not use any positive pressure device at night    Nurses Notes reviewed and agree, including vitals, allergies, social history and prior medical history.     REVIEW OF SYSTEMS: All systems reviewed and not pertinent unless noted.  Review of Systems   Constitutional:  Positive for fatigue.   Respiratory:  Positive for cough, shortness of breath and wheezing.    All other systems reviewed and are negative.      Past Medical History:   Diagnosis Date    Arthritis     CAP (community acquired pneumonia) 2015    Chronic pain     on dilaudid x4 years    Depression     Diabetes     type II    Diabetic neuropathy     gabapentin, lamictal; reports uses xanax for this when severe as well    Gastroparesis     GERD (gastroesophageal reflux disease)     PPI and pepcid    HX: breast cancer 2000    s/p chemo    Hypercholesteremia     Hypertension 2002    Hypoglycemia     Occasionally    Osteoporosis     reclast inj yearly    Pyelonephritis 03/2019    Type 2 diabetes mellitus 2001    Vitamin D deficiency     Unkown       Allergies:    Cephalosporins, Ciprofloxacin, Codeine, Kiwi extract, Pioglitazone, Sulbactam, Ace inhibitors, Amitriptyline, Clarithromycin, Crestor [rosuvastatin calcium], Dexlansoprazole, Empagliflozin, Esomeprazole magnesium, Glimepiride, Latex, Omeprazole, Pitavastatin, Pravachol  [pravastatin sodium], Sitagliptin, Topiramate, and Vraylar  [cariprazine hcl]      Past  Surgical History:   Procedure Laterality Date    ABDOMINAL HERNIA REPAIR  2001    ABDOMINAL HERNIA REPAIR  2005    APPENDECTOMY  1968    CATARACT EXTRACTION Right     DIAGNOSTIC LAPAROSCOPY  1988    ENDOSCOPY N/A 01/24/2022    Procedure: ESOPHAGOGASTRODUODENOSCOPY;  Surgeon: Eliezer Bernardo MD;  Location: [facility] ENDOSCOPY;  Service: Gastroenterology;  Laterality: N/A;    GASTRIC STIMULATOR IMPLANT SURGERY  2014    GASTRIC STIMULATOR IMPLANT SURGERY  2015    replaced generator    GASTRIC STIMULATOR IMPLANT SURGERY  2017    Replacement    LAPAROSCOPIC CHOLECYSTECTOMY  1992    LUMBAR LAMINECTOMY  1985    L4-L5    LUMBAR LAMINECTOMY  1987    L3-L4    LUMBAR LAMINECTOMY  1997    L3-L4    LUMBAR LAMINECTOMY  2007    L3-L5    MASTECTOMY Left 2000    TONGUE SURGERY  2018    TONSILLECTOMY AND ADENOIDECTOMY  1962    TOTAL HIP ARTHROPLASTY REVISION  2015    TRIGGER FINGER RELEASE Left 2017    ring finger    URETEROSCOPY LASER LITHOTRIPSY WITH STENT INSERTION Right 03/13/2019    Dr. Cerna    VENTRAL HERNIA REPAIR  2007    VENTRAL HERNIA REPAIR  2013    WRIST GANGLION EXCISION Right 1986         Social History     Socioeconomic History    Marital status:    Tobacco Use    Smoking status: Never    Smokeless tobacco: Never   Vaping Use    Vaping status: Never Used   Substance and Sexual Activity    Alcohol use: No    Drug use: No    Sexual activity: Not Currently     Partners: Male     Birth control/protection: Abstinence         Family History   Problem Relation Age of Onset    Alcohol abuse Father     Lung cancer Father     Cancer Father         Lung cancer    Dementia Maternal Grandmother     Thyroid disease Maternal Grandmother         Goiter    Hypertension Mother     Arthritis Mother     Osteoporosis Mother     Thyroid disease Mother     Coronary artery disease Brother     Melanoma Brother     Hypertension Maternal Grandfather     Cancer Paternal Grandfather         Pancreatic cancer    Cancer Brother     Obesity  "Brother     Thyroid disease Brother     Diabetes Brother     Hypertension Brother     Obesity Brother     Thyroid disease Brother     Hypertension Maternal Aunt     Obesity Maternal Aunt     Thyroid disease Brother        Objective  Physical Exam:  /59   Pulse 69   Temp 98.4 °F (36.9 °C) (Oral)   Resp 22   Ht 172.7 cm (68\")   Wt 115 kg (252 lb 8 oz)   LMP  (LMP Unknown)   SpO2 94%   BMI 38.39 kg/m²      Physical Exam  Vitals and nursing note reviewed.   Constitutional:       General: She is in acute distress.      Appearance: She is well-developed. She is obese. She is ill-appearing. She is not toxic-appearing or diaphoretic.   HENT:      Head: Normocephalic and atraumatic.   Eyes:      Extraocular Movements: Extraocular movements intact.      Pupils: Pupils are equal, round, and reactive to light.   Cardiovascular:      Rate and Rhythm: Normal rate and regular rhythm.   Pulmonary:      Effort: Pulmonary effort is normal.      Breath sounds: Decreased breath sounds, wheezing and rales present. No rhonchi.   Musculoskeletal:         General: Normal range of motion.   Skin:     General: Skin is warm.      Capillary Refill: Capillary refill takes less than 2 seconds.   Neurological:      General: No focal deficit present.      Mental Status: She is alert and oriented to person, place, and time.   Psychiatric:         Mood and Affect: Mood normal. Mood is not anxious.         Behavior: Behavior normal. Behavior is not agitated.         ECG 12 Lead Dyspnea      Date/Time: 6/15/2025 11:21 AM    Performed by: Feroz Penaloza DO  Authorized by: Feroz Penaloza DO  Interpreted by ED physician  Comparison: not compared with previous ECG   Rhythm: sinus rhythm  Rate: normal  BPM: 66  QRS axis: left  Conduction: conduction normal  T Waves: T waves normal  Other: no other findings  Clinical impression: normal ECG        ED Course:         Lab Results (last 24 hours)       Procedure Component Value Units " Date/Time    CBC & Differential [[id number]]  (Abnormal) Collected: 06/15/25 1030    Specimen: Blood Updated: 06/15/25 1044    Narrative:      The following orders were created for panel order CBC & Differential.  Procedure                               Abnormality         Status                     ---------                               -----------         ------                     CBC Auto Differential[[id number]]        Abnormal            Final result                 Please view results for these tests on the individual orders.    Comprehensive Metabolic Panel [[id number]]  (Abnormal) Collected: 06/15/25 1030    Specimen: Blood Updated: 06/15/25 1104     Glucose 137 mg/dL      BUN 9.0 mg/dL      Creatinine 0.96 mg/dL      Sodium 139 mmol/L      Potassium 4.3 mmol/L      Chloride 98 mmol/L      CO2 27.7 mmol/L      Calcium 10.0 mg/dL      Total Protein 8.1 g/dL      Albumin 4.1 g/dL      ALT (SGPT) 19 U/L      AST (SGOT) 33 U/L      Alkaline Phosphatase 95 U/L      Total Bilirubin 0.3 mg/dL      Globulin 4.0 gm/dL      A/G Ratio 1.0 g/dL      BUN/Creatinine Ratio 9.4     Anion Gap 13.3 mmol/L      eGFR 64.2 mL/min/1.73     Narrative:      GFR Categories in Chronic Kidney Disease (CKD)              GFR Category          GFR (mL/min/1.73)    Interpretation  G1                    90 or greater        Normal or high (1)  G2                    60-89                Mild decrease (1)  G3a                   45-59                Mild to moderate decrease  G3b                   30-44                Moderate to severe decrease  G4                    15-29                Severe decrease  G5                    14 or less           Kidney failure    (1)In the absence of evidence of kidney disease, neither GFR category G1 or G2 fulfill the criteria for CKD.    eGFR calculation 2021 CKD-EPI creatinine equation, which does not include race as a factor    BNP [[id number]]  (Normal) Collected: 06/15/25 1030    Specimen: Blood  Updated: 06/15/25 1102     proBNP 120.0 pg/mL     Narrative:      This assay is used as an aid in the diagnosis of individuals suspected of having heart failure. It can be used as an aid in the diagnosis of acute decompensated heart failure (ADHF) in patients presenting with signs and symptoms of ADHF to the emergency department (ED). In addition, NT-proBNP of <300 pg/mL indicates ADHF is not likely.    Age Range Result Interpretation  NT-proBNP Concentration (pg/mL:      <50             Positive            >450                   Gray                 300-450                    Negative             <300    50-75           Positive            >900                  Gray                300-900                  Negative            <300      >75             Positive            >1800                  Gray                300-1800                  Negative            <300    High Sensitivity Troponin T [[id number]]  (Normal) Collected: 06/15/25 1030    Specimen: Blood Updated: 06/15/25 1101     HS Troponin T 6 ng/L     CBC Auto Differential [[id number]]  (Abnormal) Collected: 06/15/25 1030    Specimen: Blood Updated: 06/15/25 1044     WBC 6.46 10*3/mm3      RBC 4.94 10*6/mm3      Hemoglobin 13.2 g/dL      Hematocrit 42.1 %      MCV 85.2 fL      MCH 26.7 pg      MCHC 31.4 g/dL      RDW 14.9 %      RDW-SD 47.5 fl      MPV 11.6 fL      Platelets 234 10*3/mm3      Neutrophil % 56.3 %      Lymphocyte % 25.2 %      Monocyte % 10.2 %      Eosinophil % 7.7 %      Basophil % 0.3 %      Immature Grans % 0.3 %      Neutrophils, Absolute 3.63 10*3/mm3      Lymphocytes, Absolute 1.63 10*3/mm3      Monocytes, Absolute 0.66 10*3/mm3      Eosinophils, Absolute 0.50 10*3/mm3      Basophils, Absolute 0.02 10*3/mm3      Immature Grans, Absolute 0.02 10*3/mm3     High Sensitivity Troponin T 1Hr [[id number]] Collected: 06/15/25 1146    Specimen: Blood Updated: 06/15/25 1149             CT Angiogram Chest Pulmonary Embolism  Result Date:  6/15/2025  CT ANGIOGRAM CHEST PULMONARY EMBOLISM Date of Exam: 6/15/2025 11:05 AM EDT Indication: Pulmonary Embolism. Comparison: Chest radiograph from [place] 10, 2025 and CT chest from December 15, 2016 Technique: Axial CT images were obtained of the chest after the uneventful intravenous administration of 85 mL Isovue-370 utilizing pulmonary embolism protocol.  In addition, a 3-D volume rendered image was created for interpretation.  Reconstructed coronal and sagittal images were also obtained. Automated exposure control and iterative construction methods were used. Findings: The central tracheobronchial tree is clear. A couple benign calcified granulomas are noted. The lungs are clear. There is no pleural effusion. The heart size appears normal. The great vessels are normal in caliber. There is no evidence of pulmonary embolus. No abnormally enlarged lymph nodes are identified. Partial evaluation of the upper abdomen demonstrates hepatic steatosis and changes of cholecystectomy. No aggressive osseous lesions are identified. There are surgical clips along the left axilla.     Impression: Impression: 1.Negative for pulmonary embolus. 2.No acute cardiopulmonary process. 3.Hepatic steatosis. Electronically Signed: Subhash Stokes MD  6/15/2025 11:45 AM EDT  Workstation ID: [id number]         MDM  Number of Diagnoses or Management Options  Moderate asthma, unspecified whether complicated, unspecified whether persistent  Diagnosis management comments: Patient was evaluated labs are drawn.  The patient's lung exam was significantly concerning for global and expiratory wheezes.  She had audible wheezes on exam.  Was given a DuoNeb followed by an albuterol treatment.  Started on steroids and discharged home to follow-up with her primary care physician on outpatient basis.  She was dispensed an albuterol neb machine prior to discharge.  We did discuss steroids and its probability of increasing her blood sugar.  She states  that she will be very vigilant on checking her blood sugar.  I encouraged her to return to the emergency department if she was unable to control this or if she became dehydrated       Amount and/or Complexity of Data Reviewed  Clinical lab tests: reviewed  Tests in the medicine section of CPT®: reviewed        Medications   sodium chloride 0.9 % flush 10 mL (has no administration in time range)   metoclopramide (REGLAN) injection 10 mg (10 mg Intravenous Not Given 6/15/25 1045)   albuterol (PROVENTIL) nebulizer solution 0.083% 2.5 mg/3mL (has no administration in time range)   ipratropium-albuterol (DUO-NEB) nebulizer solution 3 mL (3 mL Nebulization Given 6/15/25 1059)   dexAMETHasone (DECADRON) injection 10 mg (10 mg Intravenous Given 6/15/25 1045)   iopamidol (ISOVUE-370) 76 % injection 100 mL (85 mL Intravenous Given 6/15/25 1121)       Data interpreted: Nursing notes reviewed, vital signs reviewed.  Labs independently interpreted by me (CBC, CMP, lipase, UA, troponin, ABG, lactic acid, procalcitonin).  Imaging independently interpreted by me (x-ray, CT scan).  EKG independently interpreted by me.  O2 saturation:    Counseling: Discussed the results above with the patient regarding need for admission or discharge.  Patient understands and agrees plan of care.      -----  ED Disposition       ED Disposition   Discharge    Condition   Stable    Comment   --             Final diagnoses:   Moderate asthma, unspecified whether complicated, unspecified whether persistent      Your Follow-Up Providers       Ronnie Garvey MD. Call in 2 days.    Specialty: Internal Medicine  [address] [facility]    [facility] [address]  [phone]               [facility] EMERGENCY DEPARTMENT [place].    Specialty: Emergency Medicine  Follow up details: As needed  [address]  [facility] [address] [phone]                     Contact information for after-discharge care     Follow-up information has not been specified.                    Your medication list        START taking these medications        Instructions Last Dose Given Next Dose Due   predniSONE 20 MG tablet  Commonly known as: DELTASONE      Take 2 tablets by mouth Daily for 4 days.              CHANGE how you take these medications        Instructions Last Dose Given Next Dose Due   albuterol sulfate  (90 Base) MCG/ACT inhaler  Commonly known as: PROVENTIL HFA;VENTOLIN HFA;PROAIR HFA  What changed: Another medication with the same name was added. Make sure you understand how and when to take each.      Inhale 2 puffs Every 6 (Six) Hours As Needed for Wheezing.       albuterol 1.25 MG/3ML nebulizer solution  Commonly known as: ACCUNEB  What changed: You were already taking a medication with the same name, and this prescription was added. Make sure you understand how and when to take each.      Take 3 mL by nebulization Every 6 (Six) Hours As Needed for Shortness of Air for up to 40 doses.       promethazine 25 MG tablet  Commonly known as: PHENERGAN  What changed: when to take this      Take 1 tablet by mouth Every 6 (Six) Hours As Needed for nausea or vomiting for up to 12 doses.              CONTINUE taking these medications        Instructions Last Dose Given Next Dose Due   ALPRAZolam 1 MG tablet  Commonly known as: XANAX      Take 1 tablet by mouth 3 (Three) Times a Day As Needed for Anxiety.       Cholecalciferol 125 MCG (5000 UT) tablet      Vitamin D3 125 mcg (5,000 unit) tablet   Take 1 tablet every day by oral route with meals.       Dasiglucagon HCl 0.6 MG/0.6ML solution auto-injector      Inject 0.6 mg under the skin into the appropriate area as directed 1 (One) Time As Needed (glucose < 70 that cannot be treated orally) for up to 1 dose.       diphenoxylate-atropine 2.5-0.025 MG per tablet  Commonly known as: LOMOTIL      diphenoxylate-atropine 2.5 mg-0.025 mg tablet       erythromycin 5 MG/GM  "ophthalmic ointment  Commonly known as: ROMYCIN      Apply a 1 cm strip into the lower lid of the left eye 2 (Two) Times a Day.       ezetimibe 10 MG tablet  Commonly known as: ZETIA           FLUoxetine 40 MG capsule  Commonly known as: PROzac      fluoxetine 40 mg capsule       fluticasone 50 MCG/ACT nasal spray  Commonly known as: FLONASE      Administer 2 sprays into the nostril(s) as directed by provider Daily. AS NEEDED       furosemide 20 MG tablet  Commonly known as: LASIX      Take 1 tablet by mouth As Needed (weight gain).       gabapentin 600 MG tablet  Commonly known as: NEURONTIN      Take 1 tablet by mouth Every Morning. 1 TAB QAM, 2 TABS QPM       HYDROmorphone 2 MG tablet  Commonly known as: DILAUDID      Take 1 tablet by mouth Every 6 (Six) Hours As Needed for Moderate Pain .       Insulin Syringe-Needle U-100 30G X 5/16\" 0.5 ML misc      Use 1 each 4 (Four) Times a Day.       lamoTRIgine 100 MG tablet  Commonly known as: LaMICtal      1 tablet 2 (Two) Times a Day.       latanoprost 0.005 % ophthalmic solution  Commonly known as: XALATAN           linaclotide 72 MCG capsule capsule  Commonly known as: LINZESS      Take 1 capsule by mouth.       metFORMIN  MG 24 hr tablet  Commonly known as: GLUCOPHAGE-XR      Take 1 tablet by mouth 2 (Two) Times a Day With Meals.       metoprolol succinate  MG 24 hr tablet  Commonly known as: TOPROL-XL      Take 1 tablet by mouth 2 (Two) Times a Day.       multivitamin with minerals tablet tablet      Take 1 tablet by mouth Daily.       NovoLIN N 100 UNIT/ML injection  Generic drug: insulin NPH      Take 50 Units is AM and 30 units nightly       NovoLIN R ReliOn 100 UNIT/ML injection  Generic drug: insulin regular      Take 10 units TID AC meals plus extra as directed up to 40 units per day       ondansetron 8 MG tablet  Commonly known as: ZOFRAN      Take 1 tablet by mouth Every 8 (Eight) Hours As Needed for Nausea or Vomiting.       pantoprazole 40 MG " EC tablet  Commonly known as: Protonix      Take 1 tablet by mouth 2 (Two) Times a Day.       PROBIOTIC PO      Take  by mouth.       Saline Sensitive Eyes solution      PRN       sodium chloride 0.65 % nasal spray      Administer 1 spray into the nostril(s) as directed by provider As Needed for Congestion.       tiZANidine 2 MG tablet  Commonly known as: ZANAFLEX      3 (Three) Times a Day.                 Where to Get Your Medications        These medications were sent to [facility] [id number] - [place] STEWARD - [address] - [phone]  - [phone] FX  [address], [place] [address]      Phone: [phone]   albuterol 1.25 MG/3ML nebulizer solution  predniSONE 20 MG tablet

## 2025-06-22 LAB
QT INTERVAL: 436 MS
QT INTERVAL: 446 MS
QTC INTERVAL: 457 MS
QTC INTERVAL: 463 MS

## 2025-06-24 ENCOUNTER — OFFICE VISIT (OUTPATIENT)
Dept: ENDOCRINOLOGY | Facility: CLINIC | Age: 70
End: 2025-06-24
Payer: MEDICARE

## 2025-06-24 VITALS
HEART RATE: 68 BPM | SYSTOLIC BLOOD PRESSURE: 142 MMHG | OXYGEN SATURATION: 95 % | HEIGHT: 68 IN | DIASTOLIC BLOOD PRESSURE: 62 MMHG | WEIGHT: 254.8 LBS | BODY MASS INDEX: 38.62 KG/M2

## 2025-06-24 DIAGNOSIS — Z79.4 TYPE 2 DIABETES MELLITUS WITH OTHER SPECIFIED COMPLICATION, WITH LONG-TERM CURRENT USE OF INSULIN: Primary | ICD-10-CM

## 2025-06-24 DIAGNOSIS — E11.69 TYPE 2 DIABETES MELLITUS WITH OTHER SPECIFIED COMPLICATION, WITH LONG-TERM CURRENT USE OF INSULIN: Primary | ICD-10-CM

## 2025-06-24 LAB
EXPIRATION DATE: ABNORMAL
GLUCOSE BLDC GLUCOMTR-MCNC: 164 MG/DL (ref 70–130)
Lab: ABNORMAL

## 2025-06-24 PROCEDURE — 3077F SYST BP >= 140 MM HG: CPT | Performed by: INTERNAL MEDICINE

## 2025-06-24 PROCEDURE — 95251 CONT GLUC MNTR ANALYSIS I&R: CPT | Performed by: INTERNAL MEDICINE

## 2025-06-24 PROCEDURE — 3078F DIAST BP <80 MM HG: CPT | Performed by: INTERNAL MEDICINE

## 2025-06-24 PROCEDURE — 3044F HG A1C LEVEL LT 7.0%: CPT | Performed by: INTERNAL MEDICINE

## 2025-06-24 PROCEDURE — 99214 OFFICE O/P EST MOD 30 MIN: CPT | Performed by: INTERNAL MEDICINE

## 2025-06-24 NOTE — PROGRESS NOTES
Chief Complaint   Patient presents with    Diabetes     A1C on 4/2/2025 and was 6.6       HPI:   Cortney Dleacruz is a 69 y.o.female who returns to [facility] for f/u evaluation and management of her Type 2 DM. Last visit 01/21/2025. Her history is as follows:    Interim Events:   - (06/2025). Was treated for aspiration pneumonia, asthma exacerbation. Was prescribed a z-mirna, amoxicillin, IV dexamethasone, prednisone 20 mg daily x 4 days.   - Pt's post-operative seroma has not resolved.     1) Type 2 DM with associated complications of peripheral neuropathy, gastroparesis, NPDR, CKD stage 2:   - Diagnosed in 2001  - Initially on metformin and actos.   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 She is followed at [facility] gastroenterology (Dr. Pruett). Is s/p gastric stimulator placement.   - Gastroparesis causing variable meal schedule / tolerance and prolonged postprandial hyperglycemia.  - Was on basal-bolus therapy with analogue insulins. Switched to NPH/Reg in 01/2021 at her initial Endocrine Visit  - Is no longer on SGLT-2 inhibitor due to cost and frequent UTI  - Humalog U-200, Novolog were too expensive and did not provide better glycemic control in comparison to regular inaulin    Current DM Medications: pt determined these doses on her own  - NPH: 35 units qAM, 25 units qHS   - Regular: 10 units with BK, 5 units with other meals + an unclear correction factor.  - metformin  mg: BID    Glucometer / Dexcom CGM:  SG > 250: 12%  (goal < 5%)  SG (181 - 250): 23% (goal < 25%)  SG (70 - 180): 63%  (Goal > 70%)   SG (54 - 69): 0% (goal < 4%)  SG < 54: 0 % (goal < 1%)  Avg Glucose: 167  Glucose Variability: 44.4%  (goal </= 35%)  Glucose Management Indicator: 7.3%    Overall with good glycemic overnight to 6AM. Daytime hyperglycemia due to recent illness and glucocorticoids and post-prandial hyperglycemia with higher carb meals    DM Health  "Maintenance:  Ophtho: (05/30/2024) s/p rt vitrectomy for hemorrhage.   Podiatry: no resent visit  Monofilament / Foot exam: (03/2023)   Lipids: (01/2025) Tchol 150, , HDL 47, LDL 77 - not on a statin, on zetia  Urine Microalb/Cr ratio: (01/2025) 7.4  CMP: (06/2025) Cr 0.96, eGFR 64.2, ALT 19, AST 33  CBC: (06/2025) Hgb 13.2  TSH: (06/2021) 0.78    Review of Systems   Constitutional:  Positive for fatigue.        Wt loss since last visit   HENT: Negative.     Eyes:  Positive for itching.        Dry eyes   Respiratory:  Positive for shortness of breath and wheezing.    Cardiovascular:  Positive for leg swelling (ankles). Negative for palpitations.   Gastrointestinal:  Positive for nausea (occasional). Negative for abdominal pain, constipation, diarrhea and vomiting.   Endocrine: Negative.  Negative for polydipsia.   Genitourinary:  Positive for frequency.   Musculoskeletal:  Positive for arthralgias.        Chronic pain   Skin:  Positive for wound (see HPI).   Allergic/Immunologic: Negative.    Neurological:  Positive for numbness.   Hematological:  Bruises/bleeds easily.   Psychiatric/Behavioral:  Positive for sleep disturbance.         H/o depression     The following portions of the patient's history were reviewed and updated as appropriate: allergies, current medications, past family history, past medical history, past social history, past surgical history and problem list.      /62 (BP Location: Right arm, Patient Position: Sitting, Cuff Size: Adult)   Pulse 68   Ht 172.7 cm (68\")   Wt 116 kg (254 lb 12.8 oz)   LMP  (LMP Unknown)   SpO2 95%   BMI 38.74 kg/m²   Physical Exam  Vitals reviewed.   Constitutional:       General: She is not in acute distress.     Appearance: Normal appearance. She is well-developed. She is not diaphoretic.   HENT:      Head: Normocephalic.   Eyes:      Conjunctiva/sclera: Conjunctivae normal.      Pupils: Pupils are equal, round, and reactive to light.   Neck:      " Thyroid: No thyromegaly.      Trachea: No tracheal deviation.      Comments: No palpable thyroid nodules    Cardiovascular:      Rate and Rhythm: Regular rhythm. Tachycardia present.      Heart sounds: Normal heart sounds. No murmur heard.  Pulmonary:      Effort: Pulmonary effort is normal. No respiratory distress.      Breath sounds: Normal breath sounds.   Abdominal:      General: Bowel sounds are normal.      Palpations: Abdomen is soft. There is no mass.      Comments: No scarring at insulin injection sites  Stimulator noted on right   Lymphadenopathy:      Cervical: No cervical adenopathy.   Skin:     General: Skin is warm and dry.      Findings: No erythema.      Comments: Dressing intact over wound dehiscence.    Neurological:      Mental Status: She is alert and oriented to person, place, and time.      Cranial Nerves: No cranial nerve deficit.   Psychiatric:         Behavior: Behavior normal.       LABS/IMAGING: outside records reviewed and summarized in HPI  Office Visit on 06/24/2025   Component Date Value Ref Range Status    Glucose 06/24/2025 164 (A)  70 - 130 mg/dL Final    Lot Number 06/24/2025 [id number]   Final    Expiration Date 06/24/2025 12/27/25   Final     Lab Results   Component Value Date    HGBA1C 6.9 (A) 01/21/2025 4/2/2025 and was 6.6    ASSESSMENT/PLAN:    1) Type 2 DM with associated complications of peripheral neuropathy, gastroparesis, NPDR, mild CKD:   - fair control at this time, (04/02/2025) A1C% was 6.6.  Current CGM estimated A1C% is 7.3%, glucose time in range (70 - 180) was 63%, goal is > 70%    Overall with good glycemic overnight to 6AM. Daytime hyperglycemia due to recent illness and glucocorticoids and post-prandial hyperglycemia with higher carb meals    The following changes were made to her regimen:   - NPH: 35 units qAM, 25 units qHS (will take 30 units qAM and 20 units qhs over the next 2 weeks while recovering from illness and with decreased appetite)  - Regular:  10 units AC BK, 5 units AC LN, 5 units AC DN + CF of 2 units : 50 > 150  - continue metformin ER to 500 mg BID    Instructions reviewed with patient.     DM health maintenance labs from 01/2025, 06/2025 reviewed and summarized in HPI    RTC 5 months    Electronically Signed: Nancy Shepherd MD

## (undated) DEVICE — TUBING, SUCTION, 1/4" X 10', STRAIGHT: Brand: MEDLINE

## (undated) DEVICE — KT ORCA ORCAPOD DISP STRL

## (undated) DEVICE — THE BITE BLOCK MAXI, LATEX FREE STRAP IS USED TO PROTECT THE ENDOSCOPE INSERTION TUBE FROM BEING BITTEN BY THE PATIENT.

## (undated) DEVICE — INTRO ACCSR BLNT TP

## (undated) DEVICE — LUBE GEL ENDOGLIDE 1.1OZ

## (undated) DEVICE — SPNG VERSALON 4X4 4PLY NONSTRL LF BG/200

## (undated) DEVICE — HYBRID CO2 TUBING/CAP SET FOR OLYMPUS® SCOPES & CO2 SOURCE: Brand: ERBE

## (undated) DEVICE — SAFELINER SUCTION CANISTER 1000CC: Brand: DEROYAL

## (undated) DEVICE — SOL IRR H2O BTL 1000ML STRL

## (undated) DEVICE — SINGLE-USE BIOPSY FORCEPS: Brand: RADIAL JAW 4

## (undated) DEVICE — CONTN GRAD MEAS TRIANG 32OZ BLK

## (undated) DEVICE — SYR LUERLOK 50ML